# Patient Record
Sex: FEMALE | Race: WHITE | NOT HISPANIC OR LATINO | Employment: OTHER | URBAN - NONMETROPOLITAN AREA
[De-identification: names, ages, dates, MRNs, and addresses within clinical notes are randomized per-mention and may not be internally consistent; named-entity substitution may affect disease eponyms.]

---

## 2017-01-23 ENCOUNTER — OFFICE VISIT (OUTPATIENT)
Dept: CARDIOLOGY | Facility: CLINIC | Age: 72
End: 2017-01-23

## 2017-01-23 VITALS
HEART RATE: 65 BPM | WEIGHT: 165 LBS | BODY MASS INDEX: 30.36 KG/M2 | SYSTOLIC BLOOD PRESSURE: 118 MMHG | HEIGHT: 62 IN | DIASTOLIC BLOOD PRESSURE: 80 MMHG

## 2017-01-23 DIAGNOSIS — I10 BENIGN ESSENTIAL HYPERTENSION: ICD-10-CM

## 2017-01-23 DIAGNOSIS — I25.10 CORONARY ARTERY DISEASE INVOLVING NATIVE CORONARY ARTERY OF NATIVE HEART WITHOUT ANGINA PECTORIS: Primary | ICD-10-CM

## 2017-01-23 DIAGNOSIS — R00.2 PALPITATIONS: ICD-10-CM

## 2017-01-23 DIAGNOSIS — E78.5 HYPERLIPIDEMIA, UNSPECIFIED HYPERLIPIDEMIA TYPE: ICD-10-CM

## 2017-01-23 PROCEDURE — 99213 OFFICE O/P EST LOW 20 MIN: CPT | Performed by: INTERNAL MEDICINE

## 2017-01-23 RX ORDER — UBIDECARENONE 100 MG
100 CAPSULE ORAL DAILY
COMMUNITY

## 2017-01-23 RX ORDER — PYRIDOXINE HCL (VITAMIN B6) 100 MG
TABLET ORAL DAILY
COMMUNITY
End: 2017-06-06

## 2017-01-23 RX ORDER — SODIUM PHOSPHATE,MONO-DIBASIC 19G-7G/118
ENEMA (ML) RECTAL DAILY
COMMUNITY
End: 2017-06-06

## 2017-01-23 NOTE — LETTER
January 23, 2017     Abdiel Juan MD  [address] [facility] [address]  [facility] [address]    Patient: Monica Perea   YOB: 1945   Date of Visit: 1/23/2017       Dear Dr. Yessica MD:    Thank you for referring Monica Perea to me for evaluation. Below are the relevant portions of my assessment and plan of care.    If you have questions, please do not hesitate to call me. I look forward to following Monica along with you.         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    Chief Complaint   Patient presents with   • Coronary Artery Disease   • Hypertension       PROBLEM LIST:   1. Coronary artery disease:  a.  Left heart catheterization, 2009:  Moderate calcification of the left anterior descending territory.  Diffusely diseased proximal and mid left anterior descending with luminal narrowing up to 30%.  Large caliber diagonal 1 with a proximal calcified eccentric  narrowing of luminal up to 50%.  Normal left ventricular systolic function with normal left ventricular end-diastolic pressures.  b. Echocardiogram, 2012:  Ejection fraction 55% to 60%, mild MR and TR, RVSP of 50 mmHg.  c. Cardiolite GXT, October 2015,  within normal limits with an LVEF of 59%.  2. Hypertension.  3. Hyperlipidemia.  a. 12/16: 174/62/82/80  4.  Obstructive sleep apnea - discontinued CPAP use 2 months ago:  a.  Nocturnal oxygen saturation, October 2015, with 92% of the time spent below 95% oxygen saturation with 6.2% spent below 90% oxygen saturation. Reinitiation of CPAP therapy, November 2015.  5. Glaucoma.  6. Gastroesophageal reflux  disease.  7. Osteopenia.  8. Surgical history:  a. Total hysterectomy, 2000.  b. Tubal ligation, 1974.  c. Appendectomy in 1963.    Allergies  Allergies   Allergen Reactions   • Macrobid [Nitrofurantoin Monohyd Macro]    • Nitrofurantoin Hives   • Phenylephrine-Guaifenesin Hives   • Sulfa Antibiotics Hives       Current Medications    Current Outpatient Prescriptions:   •  amLODIPine (NORVASC) 5 MG tablet, Take 1 tablet by mouth Daily., Disp: 90 tablet, Rfl: 3  •  aspirin (ASPIRIN LOW DOSE) 81 MG tablet, Take  by mouth., Disp: , Rfl:   •  calcium-vitamin D (OSCAL-500) 500-200 MG-UNIT per tablet, Take  by mouth 2 (two) times a day., Disp: , Rfl:   •  clobetasol (TEMOVATE) 0.05 % ointment, Apply  topically 2 (Two) Times a Day., Disp: 60 g, Rfl: 2  •  coenzyme Q10 100 MG capsule, Take 100 mg by mouth Daily., Disp: , Rfl:   •  Cranberry 500 MG capsule, Take  by mouth Daily., Disp: , Rfl:   •  glucosamine-chondroitin 500-400 MG capsule capsule, Take  by mouth Daily., Disp: , Rfl:   •  magnesium oxide (MAGOX) 400 (241.3 MG) MG tablet tablet, Take 400 mg by mouth Every Night., Disp: , Rfl:   •  melatonin 5 MG tablet tablet, Take 5 mg by mouth at night as needed., Disp: , Rfl:   •  Omega-3 Fatty Acids (FISH OIL) 1000 MG capsule capsule, Take 1,200 mg by mouth 2 (Two) Times a Day With Meals., Disp: , Rfl:   •  pravastatin (PRAVACHOL) 40 MG tablet, Take 1 tablet by mouth Daily., Disp: 90 tablet, Rfl: 3      History of Present Illness   Patient presents in routine follow-up. Pt denies any chest pain, dyspnea, dyspnea on exertion, orthopnea, PND, or lower extremity edema.  Does not perform routine exercise regimen.  Blood pressures been well controlled.  Cholesterol elevated in her again after discontinuation of pravastatin last year.  Recheck of cholesterol through PCPs office was better controlled back on her pravastatin.  Has had a recurrent episode of her palpitations that we discussed at last office visit.  It was in  "November 2016 when she was traveling to [place] for a women's retreat.  She felt regular tachycardia palpitations that lasted about an hour and made her tired.  She went to bed early at night.  She denies chest discomfort, dyspnea, dizziness, or presyncopal symptoms.  There are no specific alleviating or aggravating factors.  She took an extra aspirin dose before going to bed and they were gone before morning.    ROS:  All systems have been reviewed and are negative with the exception of those mentioned in the HPI.    OBJECTIVE:  Vitals:    01/23/17 1007 01/23/17 1009   BP: 126/84 118/80   BP Location: Right arm Left arm   Patient Position: Sitting Sitting   Pulse: 65    Weight: 165 lb (74.8 kg)    Height: 62\" (157.5 cm)      Physical Exam   Constitutional: She is oriented to person, place, and time. She appears well-developed and well-nourished. No distress.   Neck: Normal range of motion. Neck supple. No hepatojugular reflux and no JVD present. Carotid bruit is not present. No tracheal deviation present. No thyromegaly present.   Cardiovascular: Normal rate, regular rhythm, S1 normal, S2 normal, intact distal pulses and normal pulses.  PMI is not displaced.  Exam reveals no gallop, no distant heart sounds, no friction rub, no midsystolic click and no opening snap.    No murmur heard.  Pulses:       Radial pulses are 2+ on the right side, and 2+ on the left side.        Dorsalis pedis pulses are 2+ on the right side, and 2+ on the left side.        Posterior tibial pulses are 2+ on the right side, and 2+ on the left side.   Pulmonary/Chest: Effort normal and breath sounds normal. She has no wheezes. She has no rales.   Abdominal: Soft. Bowel sounds are normal. She exhibits no mass. There is no tenderness. There is no guarding.   Musculoskeletal: Normal range of motion. She exhibits no edema or tenderness.   Neurological: She is alert and oriented to person, place, and time.   Nursing note and vitals " reviewed.      Diagnostic Data:  Procedures  Lab Results   Component Value Date    CHLPL 174 12/09/2016    TRIG 62 12/09/2016    HDL 82 12/09/2016    LDLDIRECT 78 01/07/2014        ASSESSMENT:   Diagnosis Plan   1. Coronary artery disease involving native coronary artery of native heart without angina pectoris     2. Benign essential hypertension     3. Hyperlipidemia, unspecified hyperlipidemia type     4. Palpitations         PLAN:  1.  No new anginal equivalent.  Continue risk factor modification and medical management.  2.  Well controlled on today's visit.  Continue current regimen.  3.  Improvement in cholesterol since she restarted her pravastatin over the summer 2016.  Continue to monitor yearly lipids.  4.  Stress-induced palpitations with the concerns and anxiety with caring for her elderly mother.  Encouraged to find a stress relieving activities and monitor symptoms.  Will defer Holter evaluation at her request.    Follow-up with us in one year.    Scribed for Elier Diaz MD by NANI Munroe. 1/23/2017  10:50 AM     Abdiel Juan MD, thank you for referring Ms. Perea for evaluation.  I have forwarded my electronically generated recommendations to you for review.  Please do not hesitate to call with any questions.      Elier Diaz MD, [facility]

## 2017-01-23 NOTE — PROGRESS NOTES
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    Chief Complaint   Patient presents with   • Coronary Artery Disease   • Hypertension       PROBLEM LIST:   1. Coronary artery disease:  a.  Left heart catheterization, 2009:  Moderate calcification of the left anterior descending territory.  Diffusely diseased proximal and mid left anterior descending with luminal narrowing up to 30%.  Large caliber diagonal 1 with a proximal calcified eccentric  narrowing of luminal up to 50%.  Normal left ventricular systolic function with normal left ventricular end-diastolic pressures.  b. Echocardiogram, 2012:  Ejection fraction 55% to 60%, mild MR and TR, RVSP of 50 mmHg.  c. Cardiolite GXT, October 2015,  within normal limits with an LVEF of 59%.  2. Hypertension.  3. Hyperlipidemia.  a. 12/16: 174/62/82/80  4.  Obstructive sleep apnea - discontinued CPAP use 2 months ago:  a.  Nocturnal oxygen saturation, October 2015, with 92% of the time spent below 95% oxygen saturation with 6.2% spent below 90% oxygen saturation. Reinitiation of CPAP therapy, November 2015.  5. Glaucoma.  6. Gastroesophageal reflux disease.  7. Osteopenia.  8. Surgical history:  a. Total hysterectomy, 2000.  b. Tubal ligation, 1974.  c. Appendectomy in 1963.    Allergies  Allergies   Allergen Reactions   • Macrobid [Nitrofurantoin Monohyd Macro]    • Nitrofurantoin Hives   • Phenylephrine-Guaifenesin Hives   • Sulfa Antibiotics Hives       Current Medications    Current Outpatient Prescriptions:   •  amLODIPine (NORVASC) 5 MG tablet, Take 1 tablet by mouth Daily., Disp: 90 tablet, Rfl: 3  •  aspirin (ASPIRIN LOW DOSE) 81 MG tablet, Take  by mouth., Disp: , Rfl:   •  calcium-vitamin D (OSCAL-500)  500-200 MG-UNIT per tablet, Take  by mouth 2 (two) times a day., Disp: , Rfl:   •  clobetasol (TEMOVATE) 0.05 % ointment, Apply  topically 2 (Two) Times a Day., Disp: 60 g, Rfl: 2  •  coenzyme Q10 100 MG capsule, Take 100 mg by mouth Daily., Disp: , Rfl:   •  Cranberry 500 MG capsule, Take  by mouth Daily., Disp: , Rfl:   •  glucosamine-chondroitin 500-400 MG capsule capsule, Take  by mouth Daily., Disp: , Rfl:   •  magnesium oxide (MAGOX) 400 (241.3 MG) MG tablet tablet, Take 400 mg by mouth Every Night., Disp: , Rfl:   •  melatonin 5 MG tablet tablet, Take 5 mg by mouth at night as needed., Disp: , Rfl:   •  Omega-3 Fatty Acids (FISH OIL) 1000 MG capsule capsule, Take 1,200 mg by mouth 2 (Two) Times a Day With Meals., Disp: , Rfl:   •  pravastatin (PRAVACHOL) 40 MG tablet, Take 1 tablet by mouth Daily., Disp: 90 tablet, Rfl: 3      History of Present Illness   Patient presents in routine follow-up. Pt denies any chest pain, dyspnea, dyspnea on exertion, orthopnea, PND, or lower extremity edema.  Does not perform routine exercise regimen.  Blood pressures been well controlled.  Cholesterol elevated in her again after discontinuation of pravastatin last year.  Recheck of cholesterol through PCPs office was better controlled back on her pravastatin.  Has had a recurrent episode of her palpitations that we discussed at last office visit.  It was in November 2016 when she was traveling to [place] for a women's retreat.  She felt regular tachycardia palpitations that lasted about an hour and made her tired.  She went to bed early at night.  She denies chest discomfort, dyspnea, dizziness, or presyncopal symptoms.  There are no specific alleviating or aggravating factors.  She took an extra aspirin dose before going to bed and they were gone before morning.    ROS:  All systems have been reviewed and are negative with the exception of those mentioned in the HPI.    OBJECTIVE:  Vitals:    01/23/17 1007 01/23/17 1009  "  BP: 126/84 118/80   BP Location: Right arm Left arm   Patient Position: Sitting Sitting   Pulse: 65    Weight: 165 lb (74.8 kg)    Height: 62\" (157.5 cm)      Physical Exam   Constitutional: She is oriented to person, place, and time. She appears well-developed and well-nourished. No distress.   Neck: Normal range of motion. Neck supple. No hepatojugular reflux and no JVD present. Carotid bruit is not present. No tracheal deviation present. No thyromegaly present.   Cardiovascular: Normal rate, regular rhythm, S1 normal, S2 normal, intact distal pulses and normal pulses.  PMI is not displaced.  Exam reveals no gallop, no distant heart sounds, no friction rub, no midsystolic click and no opening snap.    No murmur heard.  Pulses:       Radial pulses are 2+ on the right side, and 2+ on the left side.        Dorsalis pedis pulses are 2+ on the right side, and 2+ on the left side.        Posterior tibial pulses are 2+ on the right side, and 2+ on the left side.   Pulmonary/Chest: Effort normal and breath sounds normal. She has no wheezes. She has no rales.   Abdominal: Soft. Bowel sounds are normal. She exhibits no mass. There is no tenderness. There is no guarding.   Musculoskeletal: Normal range of motion. She exhibits no edema or tenderness.   Neurological: She is alert and oriented to person, place, and time.   Nursing note and vitals reviewed.      Diagnostic Data:  Procedures  Lab Results   Component Value Date    CHLPL 174 12/09/2016    TRIG 62 12/09/2016    HDL 82 12/09/2016    LDLDIRECT 78 01/07/2014        ASSESSMENT:   Diagnosis Plan   1. Coronary artery disease involving native coronary artery of native heart without angina pectoris     2. Benign essential hypertension     3. Hyperlipidemia, unspecified hyperlipidemia type     4. Palpitations         PLAN:  1.  No new anginal equivalent.  Continue risk factor modification and medical management.  2.  Well controlled on today's visit.  Continue current " regimen.  3.  Improvement in cholesterol since she restarted her pravastatin over the summer 2016.  Continue to monitor yearly lipids.  4.  Stress-induced palpitations with the concerns and anxiety with caring for her elderly mother.  Encouraged to find a stress relieving activities and monitor symptoms.  Will defer Holter evaluation at her request.    Follow-up with us in one year.    Scribed for Elier Diaz MD by NANI Munroe. 1/23/2017  10:50 AM   I, Elier Diaz MD, personally performed the services described in this documentation as scribed by the above named individual in my presence, and it is both accurate and complete.  1/23/2017  11:14 AM    Abdiel Juan MD, thank you for referring Ms. Perea for evaluation.  I have forwarded my electronically generated recommendations to you for review.  Please do not hesitate to call with any questions.      Elier Diaz MD, FACC

## 2017-01-23 NOTE — MR AVS SNAPSHOT
Monica Perea   1/23/2017 10:15 AM   Office Visit    Dept Phone:  [phone]   Encounter #:  [id number]    Provider:  Elier Diaz MD   Department:  [facility] CARDIOLOGY                Your Full Care Plan              Today's Medication Changes          These changes are accurate as of: 1/23/17 10:50 AM.  If you have any questions, ask your nurse or doctor.               Stop taking medication(s)listed here:     Cetirizine HCl 10 MG capsule   Stopped by:  Elier Diaz MD           Niacin (Antihyperlipidemic) 500 MG tablet   Stopped by:  Elier Diaz MD                      Your Updated Medication List          This list is accurate as of: 1/23/17 10:50 AM.  Always use your most recent med list.                amLODIPine 5 MG tablet   Commonly known as:  NORVASC   Take 1 tablet by mouth Daily.       ASPIRIN LOW DOSE 81 MG tablet   Generic drug:  aspirin       calcium-vitamin D 500-200 MG-UNIT per tablet   Commonly known as:  OSCAL-500       clobetasol 0.05 % ointment   Commonly known as:  TEMOVATE   Apply  topically 2 (Two) Times a Day.       coenzyme Q10 100 MG capsule       Cranberry 500 MG capsule       fish oil 1000 MG capsule capsule       glucosamine-chondroitin 500-400 MG capsule capsule       magnesium oxide 400 (241.3 MG) MG tablet tablet   Commonly known as:  MAGOX       melatonin 5 MG tablet tablet       pravastatin 40 MG tablet   Commonly known as:  PRAVACHOL   Take 1 tablet by mouth Daily.               You Were Diagnosed With        Codes Comments    Coronary artery disease involving native coronary artery of native heart without angina pectoris    -  Primary ICD-10-CM: I25.10  ICD-9-CM: 414.01     Benign essential hypertension     ICD-10-CM: I10  ICD-9-CM: 401.1     Hyperlipidemia, unspecified hyperlipidemia type     ICD-10-CM: E78.5  ICD-9-CM: 272.4     Palpitations     ICD-10-CM: R00.2  ICD-9-CM: 785.1       Instructions     "None    Patient Instructions History      Upcoming Appointments     Visit Type Date Time Department    FOLLOW UP 2017 10:15 AM [facility]    SUBSEQUENT MEDICARE WELLNESS 2017 10:30 AM [facility]      [employer] Signup     [employer] allows you to send messages to your doctor, view your test results, renew your prescriptions, schedule appointments, and more. To sign up, go to [employer] and click on the Sign Up Now link in the New User? box. Enter your [employer] Activation Code exactly as it appears below along with the last four digits of your Social Security Number and your Date of Birth () to complete the sign-up process. If you do not sign up before the expiration date, you must request a new code.    [employer] Activation Code: [email or web address]  Expires: 2017 10:48 AM    If you have questions, you can email [email or web address] or call [phone] to talk to our [employer] staff. Remember, [employer] is NOT to be used for urgent needs. For medical emergencies, dial 911.               Other Info from Your Visit           Your Appointments     2017 10:30 AM EDT   Subsequent Medicare Wellness with Abdiel Juan MD   [facility] PRIMARY CARE (--)    [address] [facility] [address]  [facility] [address] [phone]            2018 10:15 AM EST   Follow Up with Elier Diaz MD   [facility] CARDIOLOGY (--)    [address] [facility] [address]  [facility] [address] [phone]           Arrive 15 minutes prior to appointment.              Allergies     Macrobid [Nitrofurantoin Monohyd Macro]      Nitrofurantoin  Hives    Phenylephrine-guaifenesin  Hives    Sulfa Antibiotics  Hives      Reason for Visit     Coronary Artery Disease     Hypertension           Vital Signs     Blood Pressure Pulse Height Weight Body Mass Index Smoking Status    118/80 (BP Location: Left arm, Patient Position: Sitting) 65 62\" " (157.5 cm) 165 lb (74.8 kg) 30.18 kg/m2 Former Smoker      Problems and Diagnoses Noted     Benign essential hypertension    Coronary heart disease    High cholesterol or triglycerides    Palpitations

## 2017-03-21 ENCOUNTER — HOSPITAL ENCOUNTER (OUTPATIENT)
Dept: SLEEP MEDICINE | Facility: HOSPITAL | Age: 72
Discharge: HOME | End: 2017-03-21
Admitting: INTERNAL MEDICINE

## 2017-03-21 DIAGNOSIS — G47.33 OSA (OBSTRUCTIVE SLEEP APNEA): ICD-10-CM

## 2017-03-21 PROCEDURE — 95811 POLYSOM 6/>YRS CPAP 4/> PARM: CPT

## 2017-03-22 VITALS
SYSTOLIC BLOOD PRESSURE: 132 MMHG | WEIGHT: 165 LBS | OXYGEN SATURATION: 95 % | RESPIRATION RATE: 12 BRPM | DIASTOLIC BLOOD PRESSURE: 90 MMHG | HEIGHT: 62 IN | BODY MASS INDEX: 30.36 KG/M2 | HEART RATE: 66 BPM

## 2017-04-05 ENCOUNTER — OFFICE VISIT (OUTPATIENT)
Dept: INTERNAL MEDICINE | Facility: CLINIC | Age: 72
End: 2017-04-05

## 2017-04-05 VITALS
DIASTOLIC BLOOD PRESSURE: 64 MMHG | HEIGHT: 62 IN | TEMPERATURE: 98.3 F | OXYGEN SATURATION: 97 % | BODY MASS INDEX: 30 KG/M2 | RESPIRATION RATE: 14 BRPM | HEART RATE: 60 BPM | WEIGHT: 163 LBS | SYSTOLIC BLOOD PRESSURE: 130 MMHG

## 2017-04-05 DIAGNOSIS — R10.32 LEFT LOWER QUADRANT PAIN: ICD-10-CM

## 2017-04-05 DIAGNOSIS — N39.0 URINARY TRACT INFECTION, SITE UNSPECIFIED: Primary | ICD-10-CM

## 2017-04-05 LAB
BILIRUB BLD-MCNC: NEGATIVE MG/DL
CLARITY, POC: CLEAR
COLOR UR: YELLOW
GLUCOSE UR STRIP-MCNC: NEGATIVE MG/DL
KETONES UR QL: NEGATIVE
LEUKOCYTE EST, POC: NEGATIVE
NITRITE UR-MCNC: NEGATIVE MG/ML
PH UR: 6.5 [PH] (ref 5–8)
PROT UR STRIP-MCNC: NEGATIVE MG/DL
RBC # UR STRIP: NEGATIVE /UL
SP GR UR: 1.01 (ref 1–1.03)
UROBILINOGEN UR QL: NORMAL

## 2017-04-05 PROCEDURE — 99214 OFFICE O/P EST MOD 30 MIN: CPT | Performed by: INTERNAL MEDICINE

## 2017-04-05 PROCEDURE — 81003 URINALYSIS AUTO W/O SCOPE: CPT | Performed by: INTERNAL MEDICINE

## 2017-04-05 RX ORDER — CIPROFLOXACIN 500 MG/1
500 TABLET, FILM COATED ORAL 2 TIMES DAILY
Qty: 14 TABLET | Refills: 0 | Status: SHIPPED | OUTPATIENT
Start: 2017-04-05 | End: 2017-06-06

## 2017-04-05 RX ORDER — TERBINAFINE HYDROCHLORIDE 250 MG/1
TABLET ORAL
COMMUNITY
Start: 2017-03-08 | End: 2017-06-06

## 2017-04-05 NOTE — PROGRESS NOTES
Subjective   Monica Perea is a 72 y.o. female.     Chief Complaint   Patient presents with   • Urinary Tract Infection     possible       Urinary Tract Infection    This is a new problem. The current episode started in the past 7 days. The problem occurs every urination. The problem has been unchanged. The quality of the pain is described as burning. The pain is mild. There has been no fever. Associated symptoms include frequency and urgency. Pertinent negatives include no flank pain, hematuria, nausea or vomiting. Treatments tried: azur. The treatment provided moderate relief.   Abdominal Pain   This is a new problem. The current episode started in the past 7 days. The onset quality is gradual. The problem occurs constantly. The pain is located in the LLQ. The pain is mild. The quality of the pain is a sensation of fullness. The abdominal pain does not radiate. Associated symptoms include dysuria and frequency. Pertinent negatives include no anorexia, constipation, diarrhea, fever, flatus, hematochezia, hematuria, melena, nausea or vomiting. Nothing aggravates the pain. The pain is relieved by nothing. The treatment provided no relief.          Current Outpatient Prescriptions:   •  amLODIPine (NORVASC) 5 MG tablet, Take 1 tablet by mouth Daily., Disp: 90 tablet, Rfl: 3  •  aspirin (ASPIRIN LOW DOSE) 81 MG tablet, Take  by mouth., Disp: , Rfl:   •  calcium-vitamin D (OSCAL-500) 500-200 MG-UNIT per tablet, Take  by mouth 2 (two) times a day., Disp: , Rfl:   •  clobetasol (TEMOVATE) 0.05 % ointment, Apply  topically 2 (Two) Times a Day., Disp: 60 g, Rfl: 2  •  coenzyme Q10 100 MG capsule, Take 100 mg by mouth Daily., Disp: , Rfl:   •  Cranberry 500 MG capsule, Take  by mouth Daily., Disp: , Rfl:   •  glucosamine-chondroitin 500-400 MG capsule capsule, Take  by mouth Daily., Disp: , Rfl:   •  magnesium oxide (MAGOX) 400 (241.3 MG) MG tablet tablet, Take 400 mg by mouth Every Night., Disp: , Rfl:   •   melatonin 5 MG tablet tablet, Take 5 mg by mouth at night as needed., Disp: , Rfl:   •  Omega-3 Fatty Acids (FISH OIL) 1000 MG capsule capsule, Take 1,200 mg by mouth 2 (Two) Times a Day With Meals., Disp: , Rfl:   •  terbinafine (lamiSIL) 250 MG tablet, , Disp: , Rfl:   •  pravastatin (PRAVACHOL) 40 MG tablet, Take 1 tablet by mouth Daily., Disp: 90 tablet, Rfl: 3    The following portions of the patient's history were reviewed and updated as appropriate: allergies, current medications, past family history, past medical history, past social history, past surgical history and problem list.    Review of Systems   Constitutional: Negative.  Negative for fever.   Respiratory: Negative.    Cardiovascular: Negative.    Gastrointestinal: Negative.  Negative for anorexia, constipation, diarrhea, flatus, hematochezia, melena, nausea and vomiting. Abdominal pain: LLQ pain mild.   Genitourinary: Positive for dysuria, frequency and urgency. Negative for flank pain and hematuria.   Skin: Negative.    Psychiatric/Behavioral: Negative.        Objective   Physical Exam   Constitutional: She is oriented to person, place, and time. She appears well-developed and well-nourished.   Neck: Neck supple.   Cardiovascular: Normal rate, regular rhythm and normal heart sounds.    Pulmonary/Chest: Effort normal and breath sounds normal.   Abdominal: Soft. Bowel sounds are normal. Tenderness: mild LLQ tender.   Neurological: She is alert and oriented to person, place, and time.   Psychiatric: She has a normal mood and affect. Her behavior is normal.       All tests have been reviewed.    Assessment/Plan   There are no diagnoses linked to this encounter.         UTI vs interstitial cystitis UA dip normal,   refer back to uro pt to call, do micro  LLQ pain mild tender and pain, hx of divertic, start cipro call if no better

## 2017-04-06 LAB
APPEARANCE UR: ABNORMAL
BILIRUB UR QL STRIP: NEGATIVE
COLOR UR: YELLOW
GLUCOSE UR QL: NEGATIVE
HGB UR QL STRIP: NEGATIVE
KETONES UR QL STRIP: NEGATIVE
LEUKOCYTE ESTERASE UR QL STRIP: NEGATIVE
NITRITE UR QL STRIP: NEGATIVE
PH UR STRIP: 6.5 [PH] (ref 5–8)
PROT UR QL STRIP: NEGATIVE
SP GR UR: 1.01 (ref 1–1.03)
UROBILINOGEN UR STRIP-MCNC: ABNORMAL MG/DL

## 2017-06-06 ENCOUNTER — OFFICE VISIT (OUTPATIENT)
Dept: INTERNAL MEDICINE | Facility: CLINIC | Age: 72
End: 2017-06-06

## 2017-06-06 VITALS
DIASTOLIC BLOOD PRESSURE: 70 MMHG | WEIGHT: 162 LBS | HEART RATE: 56 BPM | BODY MASS INDEX: 29.81 KG/M2 | RESPIRATION RATE: 14 BRPM | OXYGEN SATURATION: 95 % | SYSTOLIC BLOOD PRESSURE: 102 MMHG | HEIGHT: 62 IN | TEMPERATURE: 98.4 F

## 2017-06-06 DIAGNOSIS — E78.5 HYPERLIPIDEMIA, UNSPECIFIED HYPERLIPIDEMIA TYPE: ICD-10-CM

## 2017-06-06 DIAGNOSIS — M79.7 FIBROMYALGIA: ICD-10-CM

## 2017-06-06 DIAGNOSIS — E66.3 OVERWEIGHT: ICD-10-CM

## 2017-06-06 DIAGNOSIS — R00.2 PALPITATIONS: ICD-10-CM

## 2017-06-06 DIAGNOSIS — M85.80 OSTEOPENIA: ICD-10-CM

## 2017-06-06 DIAGNOSIS — K21.9 GASTROESOPHAGEAL REFLUX DISEASE WITHOUT ESOPHAGITIS: ICD-10-CM

## 2017-06-06 DIAGNOSIS — R31.9 HEMATURIA: ICD-10-CM

## 2017-06-06 DIAGNOSIS — N30.10 INTERSTITIAL CYSTITIS: ICD-10-CM

## 2017-06-06 DIAGNOSIS — L40.9 PSORIASIS: ICD-10-CM

## 2017-06-06 DIAGNOSIS — H40.9 GLAUCOMA, UNSPECIFIED GLAUCOMA, UNSPECIFIED LATERALITY: ICD-10-CM

## 2017-06-06 DIAGNOSIS — K59.09 OTHER CONSTIPATION: ICD-10-CM

## 2017-06-06 DIAGNOSIS — I25.10 CORONARY ARTERY DISEASE INVOLVING NATIVE CORONARY ARTERY OF NATIVE HEART WITHOUT ANGINA PECTORIS: ICD-10-CM

## 2017-06-06 DIAGNOSIS — I10 BENIGN ESSENTIAL HYPERTENSION: Primary | ICD-10-CM

## 2017-06-06 DIAGNOSIS — G47.33 OSA (OBSTRUCTIVE SLEEP APNEA): ICD-10-CM

## 2017-06-06 DIAGNOSIS — M54.50 LOW BACK PAIN WITHOUT SCIATICA, UNSPECIFIED BACK PAIN LATERALITY, UNSPECIFIED CHRONICITY: ICD-10-CM

## 2017-06-06 DIAGNOSIS — N95.2 ATROPHIC VAGINITIS: ICD-10-CM

## 2017-06-06 DIAGNOSIS — M19.90 ARTHRITIS: ICD-10-CM

## 2017-06-06 DIAGNOSIS — K57.90 DIVERTICULOSIS OF INTESTINE WITHOUT BLEEDING, UNSPECIFIED INTESTINAL TRACT LOCATION: ICD-10-CM

## 2017-06-06 PROBLEM — R10.32 LEFT LOWER QUADRANT PAIN: Status: RESOLVED | Noted: 2017-04-05 | Resolved: 2017-06-06

## 2017-06-06 PROBLEM — N39.0 URINARY TRACT INFECTION: Status: RESOLVED | Noted: 2017-04-05 | Resolved: 2017-06-06

## 2017-06-06 PROCEDURE — G0439 PPPS, SUBSEQ VISIT: HCPCS | Performed by: INTERNAL MEDICINE

## 2017-06-06 PROCEDURE — 96160 PT-FOCUSED HLTH RISK ASSMT: CPT | Performed by: INTERNAL MEDICINE

## 2017-06-06 PROCEDURE — 99397 PER PM REEVAL EST PAT 65+ YR: CPT | Performed by: INTERNAL MEDICINE

## 2017-06-06 RX ORDER — KETOCONAZOLE 20 MG/G
CREAM TOPICAL DAILY
COMMUNITY
End: 2017-10-12

## 2017-06-06 NOTE — PROGRESS NOTES
QUICK REFERENCE INFORMATION:  The ABCs of the Annual Wellness Visit    Subsequent Medicare Wellness Visit    HEALTH RISK ASSESSMENT    1945    Recent Hospitalizations:  No hospitalization(s) within the last year..        Current Medical Providers:  Patient Care Team:  Abdiel Juan MD as PCP - General  Abdiel Juan MD as PCP - Family Medicine        Smoking Status:  History   Smoking Status   • Former Smoker   • Quit date: 1994   Smokeless Tobacco   • Not on file       Alcohol Consumption:  History   Alcohol Use No     Comment: quit in 1994       Depression Screen:   PHQ-9 Depression Screening 6/6/2017   Little interest or pleasure in doing things 0   Feeling down, depressed, or hopeless 0   Trouble falling or staying asleep, or sleeping too much -   Feeling tired or having little energy -   Poor appetite or overeating -   Feeling bad about yourself - or that you are a failure or have let yourself or your family down -   Trouble concentrating on things, such as reading the newspaper or watching television -   Moving or speaking so slowly that other people could have noticed. Or the opposite - being so fidgety or restless that you have been moving around a lot more than usual -   Thoughts that you would be better off dead, or of hurting yourself in some way -   PHQ-9 Total Score 0   If you checked off any problems, how difficult have these problems made it for you to do your work, take care of things at home, or get along with other people? -       Health Habits and Functional and Cognitive Screening:  Functional & Cognitive Status 6/6/2017   Do you have difficulty preparing food and eating? No   Do you have difficulty bathing yourself? No   Do you have difficulty getting dressed? No   Do you have difficulty using the toilet? No   Do you have difficulty moving around from place to place? No   In the past year have you fallen or experienced a near fall? No   Do you need help using the phone?  No   Are you deaf or  do you have serious difficulty hearing?  No   Do you need help with transportation? No   Do you need help shopping? No   Do you need help preparing meals?  No   Do you need help with housework?  No   Do you need help with laundry? No   Do you need help taking your medications? No   Do you need help managing money? No   Do you have difficulty concentrating, remembering or making decisions? No       Health Habits  Current Diet: Well Balanced Diet  Dental Exam: Up to date  Eye Exam: Up to date  Exercise (times per week): 0 times per week  Current Exercise Activities Include: None      Does the patient have evidence of cognitive impairment? Yes    Aspirin use counseling: Taking ASA appropriately as indicated      Recent Lab Results:  CMP:  Lab Results   Component Value Date    GLU 97 12/09/2016    BUN 12 12/09/2016    CREATININE 0.88 12/09/2016    EGFRIFNONA 67 12/09/2016    EGFRIFAFRI 78 12/09/2016    BCR 14 12/09/2016     (H) 12/09/2016    K 4.2 12/09/2016    CO2 25 12/09/2016    CALCIUM 9.1 12/09/2016    PROTENTOTREF 6.9 12/09/2016    ALBUMIN 4.1 12/09/2016    LABGLOBREF 2.8 12/09/2016    LABIL2 1.5 12/09/2016    BILITOT 0.7 12/09/2016    ALKPHOS 83 12/09/2016    AST 15 12/09/2016    ALT 10 12/09/2016     Lipid Panel:  Lab Results   Component Value Date    CHLPL 174 12/09/2016    TRIG 62 12/09/2016    HDL 82 12/09/2016    VLDL 12 12/09/2016    LDL 80 12/09/2016     HbA1c:       Visual Acuity:  No exam data present    Age-appropriate Screening Schedule:  Refer to the list below for future screening recommendations based on patient's age, sex and/or medical conditions. Orders for these recommended tests are listed in the plan section. The patient has been provided with a written plan.    Health Maintenance   Topic Date Due   • TDAP/TD VACCINES (1 - Tdap) 01/03/1966   • PNEUMOCOCCAL VACCINES (65+ LOW/MEDIUM RISK) (1 of 2 - PCV13) 01/03/2012   • ZOSTER VACCINE  06/03/2016   • COLONOSCOPY  06/06/2016   • DXA SCAN   11/15/2016   • INFLUENZA VACCINE  08/01/2017   • LIPID PANEL  12/09/2017   • MAMMOGRAM  06/06/2018        Subjective   History of Present Illness    Monica Perea is a 72 y.o. female who presents for an Subsequent Wellness Visit.    The following portions of the patient's history were reviewed and updated as appropriate: allergies, current medications, past family history, past medical history, past social history, past surgical history and problem list.    Outpatient Medications Prior to Visit   Medication Sig Dispense Refill   • amLODIPine (NORVASC) 5 MG tablet Take 1 tablet by mouth Daily. 90 tablet 3   • aspirin (ASPIRIN LOW DOSE) 81 MG tablet Take  by mouth.     • calcium-vitamin D (OSCAL-500) 500-200 MG-UNIT per tablet Take  by mouth 2 (two) times a day.     • coenzyme Q10 100 MG capsule Take 100 mg by mouth Daily.     • magnesium oxide (MAGOX) 400 (241.3 MG) MG tablet tablet Take 400 mg by mouth Every Night.     • melatonin 5 MG tablet tablet Take 5 mg by mouth at night as needed.     • Omega-3 Fatty Acids (FISH OIL) 1000 MG capsule capsule Take 1,200 mg by mouth 2 (Two) Times a Day With Meals.     • pravastatin (PRAVACHOL) 40 MG tablet Take 1 tablet by mouth Daily. 90 tablet 3   • ciprofloxacin (CIPRO) 500 MG tablet Take 1 tablet by mouth 2 (Two) Times a Day. 14 tablet 0   • clobetasol (TEMOVATE) 0.05 % ointment Apply  topically 2 (Two) Times a Day. 60 g 2   • Cranberry 500 MG capsule Take  by mouth Daily.     • glucosamine-chondroitin 500-400 MG capsule capsule Take  by mouth Daily.     • terbinafine (lamiSIL) 250 MG tablet        No facility-administered medications prior to visit.        Patient Active Problem List   Diagnosis   • Arthritis   • Benign essential hypertension   • Constipation   • Diverticulosis of intestine   • Gastroesophageal reflux disease   • Glaucoma   • Hematuria   • Hyperlipidemia   • Low back pain   • Osteopenia   • Overweight   • Atrophic vaginitis   • MOON (obstructive  sleep apnea)   • Tinea pedis   • Psoriasis   • Fibromyalgia   • Palpitations   • CAD (coronary artery disease)   • Urinary tract infection   • Left lower quadrant pain       Advance Care Planning:  has an advance directive - a copy HAS NOT been provided    Identification of Risk Factors:  Risk factors include: weight  and unhealthy diet.    Review of Systems   Constitutional: Negative.    HENT: Negative.    Eyes: Negative.    Respiratory: Negative.    Cardiovascular: Negative.    Gastrointestinal: Negative.    Endocrine: Negative.    Genitourinary: Negative.    Musculoskeletal: Negative.    Skin: Negative.    Allergic/Immunologic: Negative.    Neurological: Negative.    Hematological: Negative.    Psychiatric/Behavioral: Negative.        Compared to one year ago, the patient feels her physical health is the same.  Compared to one year ago, the patient feels her mental health is the same.    Objective     Physical Exam   Constitutional: She is oriented to person, place, and time. She appears well-developed and well-nourished.   HENT:   Head: Normocephalic and atraumatic.   Right Ear: External ear normal.   Left Ear: External ear normal.   Nose: Nose normal.   Mouth/Throat: Oropharynx is clear and moist.   Eyes: Conjunctivae and EOM are normal. Pupils are equal, round, and reactive to light.   Neck: Normal range of motion. Neck supple.   Cardiovascular: Normal rate, regular rhythm, normal heart sounds and intact distal pulses.    Pulmonary/Chest: Effort normal and breath sounds normal.   Abdominal: Soft. Bowel sounds are normal.   Musculoskeletal: Normal range of motion.   Neurological: She is alert and oriented to person, place, and time. She has normal reflexes.   Skin: Skin is warm and dry.   Psychiatric: She has a normal mood and affect. Her behavior is normal. Judgment and thought content normal.       Vitals:    06/06/17 1055   BP: 102/70   Pulse: 56   Resp: 14   Temp: 98.4 °F (36.9 °C)   SpO2: 95%   Weight: 162  "lb (73.5 kg)   Height: 62\" (157.5 cm)       Body mass index is 29.63 kg/(m^2).  Discussed the patient's BMI with her. The BMI is above average; BMI management plan is completed.    Assessment/Plan   Patient Self-Management and Personalized Health Advice  The patient has been provided with information about: diet, exercise and weight management and preventive services including:   · Advance directive.    Visit Diagnoses:  No diagnosis found.    No orders of the defined types were placed in this encounter.      Outpatient Encounter Prescriptions as of 6/6/2017   Medication Sig Dispense Refill   • amLODIPine (NORVASC) 5 MG tablet Take 1 tablet by mouth Daily. 90 tablet 3   • aspirin (ASPIRIN LOW DOSE) 81 MG tablet Take  by mouth.     • calcium-vitamin D (OSCAL-500) 500-200 MG-UNIT per tablet Take  by mouth 2 (two) times a day.     • coenzyme Q10 100 MG capsule Take 100 mg by mouth Daily.     • Fexofenadine HCl (ALLERGY 24-HR PO) Take  by mouth.     • ketoconazole (NIZORAL) 2 % cream Apply  topically Daily.     • magnesium oxide (MAGOX) 400 (241.3 MG) MG tablet tablet Take 400 mg by mouth Every Night.     • melatonin 5 MG tablet tablet Take 5 mg by mouth at night as needed.     • Omega-3 Fatty Acids (FISH OIL) 1000 MG capsule capsule Take 1,200 mg by mouth 2 (Two) Times a Day With Meals.     • pravastatin (PRAVACHOL) 40 MG tablet Take 1 tablet by mouth Daily. 90 tablet 3   • Sodium Sulfide 1 % gel Apply  topically.     • [DISCONTINUED] ciprofloxacin (CIPRO) 500 MG tablet Take 1 tablet by mouth 2 (Two) Times a Day. 14 tablet 0   • [DISCONTINUED] clobetasol (TEMOVATE) 0.05 % ointment Apply  topically 2 (Two) Times a Day. 60 g 2   • [DISCONTINUED] Cranberry 500 MG capsule Take  by mouth Daily.     • [DISCONTINUED] glucosamine-chondroitin 500-400 MG capsule capsule Take  by mouth Daily.     • [DISCONTINUED] terbinafine (lamiSIL) 250 MG tablet        No facility-administered encounter medications on file as of 6/6/2017.  "       Reviewed use of high risk medication in the elderly: yes  Reviewed for potential of harmful drug interactions in the elderly: no    Follow Up:  No Follow-up on file.     An After Visit Summary and PPPS with all of these plans were given to the patient.       hypertension, continue medicine  Cad MILD BLOCKAGE 2009 30%. palpitation soa with climbing stairs stress test normal, follow up with cardio, for possible holter  GERD continue omeprazole   Colon 7/18/16: Left-sided diverticulosis. Colon polyps. Internal hemorrhoids. Repeat 5 year  RECENT diverticulitis, resolved after cipro  Hyperlipidemia continue med do lab   Pneumovax 2015, refuse prevnar 13, and zostavax refused  Psoriasis stable without med  Constipation cont otc fiber  Knees pain, OA probable, glucosamine continue   Osteopenia schedule bone density scan 12/2016, --  Foot fungal infection s/p med by derm  Follow-up 3 weeks

## 2017-06-13 ENCOUNTER — RESULTS ENCOUNTER (OUTPATIENT)
Dept: INTERNAL MEDICINE | Facility: CLINIC | Age: 72
End: 2017-06-13

## 2017-06-13 DIAGNOSIS — I10 BENIGN ESSENTIAL HYPERTENSION: ICD-10-CM

## 2017-06-13 DIAGNOSIS — E78.5 HYPERLIPIDEMIA, UNSPECIFIED HYPERLIPIDEMIA TYPE: ICD-10-CM

## 2017-06-13 DIAGNOSIS — I25.10 CORONARY ARTERY DISEASE INVOLVING NATIVE CORONARY ARTERY OF NATIVE HEART WITHOUT ANGINA PECTORIS: ICD-10-CM

## 2017-06-13 DIAGNOSIS — R31.9 HEMATURIA: ICD-10-CM

## 2017-06-14 LAB
ALBUMIN SERPL-MCNC: 4.3 G/DL (ref 3.5–5)
ALBUMIN/GLOB SERPL: 1.5 G/DL (ref 1–2)
ALP SERPL-CCNC: 74 U/L (ref 38–126)
ALT SERPL-CCNC: 30 U/L (ref 13–69)
APPEARANCE UR: CLEAR
AST SERPL-CCNC: 24 U/L (ref 15–46)
BASOPHILS # BLD AUTO: 0.03 10*3/MM3 (ref 0–0.2)
BASOPHILS NFR BLD AUTO: 0.8 % (ref 0–2.5)
BILIRUB SERPL-MCNC: 0.6 MG/DL (ref 0.2–1.3)
BILIRUB UR QL STRIP: NEGATIVE
BUN SERPL-MCNC: 10 MG/DL (ref 7–20)
BUN/CREAT SERPL: 12.5 (ref 7.1–23.5)
CALCIUM SERPL-MCNC: 9.7 MG/DL (ref 8.4–10.2)
CHLORIDE SERPL-SCNC: 103 MMOL/L (ref 98–107)
CHOLEST SERPL-MCNC: 181 MG/DL (ref 0–199)
CK SERPL-CCNC: 35 U/L (ref 30–170)
CO2 SERPL-SCNC: 29 MMOL/L (ref 26–30)
COLOR UR: YELLOW
CREAT SERPL-MCNC: 0.8 MG/DL (ref 0.6–1.3)
EOSINOPHIL # BLD AUTO: 0.22 10*3/MM3 (ref 0–0.7)
EOSINOPHIL NFR BLD AUTO: 5.8 % (ref 0–7)
ERYTHROCYTE [DISTWIDTH] IN BLOOD BY AUTOMATED COUNT: 12.5 % (ref 11.5–14.5)
GLOBULIN SER CALC-MCNC: 2.9 GM/DL
GLUCOSE SERPL-MCNC: 95 MG/DL (ref 74–98)
GLUCOSE UR QL: NEGATIVE
HCT VFR BLD AUTO: 40 % (ref 37–47)
HDLC SERPL-MCNC: 67 MG/DL (ref 40–60)
HGB BLD-MCNC: 12.9 G/DL (ref 12–16)
HGB UR QL STRIP: NEGATIVE
IMM GRANULOCYTES # BLD: 0 10*3/MM3 (ref 0–0.06)
IMM GRANULOCYTES NFR BLD: 0 % (ref 0–0.6)
KETONES UR QL STRIP: NEGATIVE
LDLC SERPL CALC-MCNC: 92 MG/DL (ref 0–99)
LEUKOCYTE ESTERASE UR QL STRIP: NEGATIVE
LYMPHOCYTES # BLD AUTO: 1.53 10*3/MM3 (ref 0.6–3.4)
LYMPHOCYTES NFR BLD AUTO: 40.3 % (ref 10–50)
MCH RBC QN AUTO: 30.7 PG (ref 27–31)
MCHC RBC AUTO-ENTMCNC: 32.3 G/DL (ref 30–37)
MCV RBC AUTO: 95.2 FL (ref 81–99)
MONOCYTES # BLD AUTO: 0.44 10*3/MM3 (ref 0–0.9)
MONOCYTES NFR BLD AUTO: 11.6 % (ref 0–12)
NEUTROPHILS # BLD AUTO: 1.58 10*3/MM3 (ref 2–6.9)
NEUTROPHILS NFR BLD AUTO: 41.5 % (ref 37–80)
NITRITE UR QL STRIP: NEGATIVE
NRBC BLD AUTO-RTO: 0 /100 WBC (ref 0–0)
PH UR STRIP: 7 [PH] (ref 5–8)
PLATELET # BLD AUTO: 198 10*3/MM3 (ref 130–400)
POTASSIUM SERPL-SCNC: 4.5 MMOL/L (ref 3.5–5.1)
PROT SERPL-MCNC: 7.2 G/DL (ref 6.3–8.2)
PROT UR QL STRIP: NEGATIVE
RBC # BLD AUTO: 4.2 10*6/MM3 (ref 4.2–5.4)
SODIUM SERPL-SCNC: 143 MMOL/L (ref 137–145)
SP GR UR: 1.01 (ref 1–1.03)
TRIGL SERPL-MCNC: 111 MG/DL
TSH SERPL DL<=0.005 MIU/L-ACNC: 1.17 MIU/ML (ref 0.47–4.68)
UROBILINOGEN UR STRIP-MCNC: NORMAL MG/DL
VLDLC SERPL CALC-MCNC: 22.2 MG/DL
WBC # BLD AUTO: 3.8 10*3/MM3 (ref 4.8–10.8)

## 2017-07-10 ENCOUNTER — TRANSCRIBE ORDERS (OUTPATIENT)
Dept: INTERNAL MEDICINE | Facility: CLINIC | Age: 72
End: 2017-07-10

## 2017-07-10 DIAGNOSIS — Z12.31 VISIT FOR SCREENING MAMMOGRAM: Primary | ICD-10-CM

## 2017-07-19 ENCOUNTER — HOSPITAL ENCOUNTER (OUTPATIENT)
Dept: MAMMOGRAPHY | Facility: HOSPITAL | Age: 72
Discharge: HOME | End: 2017-07-19
Attending: INTERNAL MEDICINE | Admitting: INTERNAL MEDICINE

## 2017-07-19 DIAGNOSIS — Z12.31 VISIT FOR SCREENING MAMMOGRAM: ICD-10-CM

## 2017-07-19 PROCEDURE — G0202 SCR MAMMO BI INCL CAD: HCPCS | Performed by: RADIOLOGY

## 2017-07-19 PROCEDURE — 77063 BREAST TOMOSYNTHESIS BI: CPT | Performed by: RADIOLOGY

## 2017-07-19 PROCEDURE — G0202 SCR MAMMO BI INCL CAD: HCPCS

## 2017-07-19 PROCEDURE — 77063 BREAST TOMOSYNTHESIS BI: CPT

## 2017-08-18 NOTE — PROGRESS NOTES
72 years female with Hx OSAH, REM dependent with Hx with mask interface problems, was reason to have this PSG-Split night study done. Recent bout of respiratory failure.     Impression: 1. OSAH.                       2. Nocturnal Alveolar Hypoventilation with COPD.                       3. Hypertension.     Plan: Split night PSG

## 2017-08-24 ENCOUNTER — TELEPHONE (OUTPATIENT)
Dept: CARDIOLOGY | Facility: CLINIC | Age: 72
End: 2017-08-24

## 2017-08-25 DIAGNOSIS — R00.2 PALPITATIONS: Primary | ICD-10-CM

## 2017-08-25 NOTE — TELEPHONE ENCOUNTER
Patient aware and verbally understands. Message sent to scheduling for appt and order placed for ZIO patch.

## 2017-09-22 ENCOUNTER — TELEPHONE (OUTPATIENT)
Dept: CARDIOLOGY | Facility: CLINIC | Age: 72
End: 2017-09-22

## 2017-09-22 NOTE — TELEPHONE ENCOUNTER
Patient called and stated that she wanted to make sure her holter monitor was back and if not would like to move appt out. Informed her the monitor has not been received yet and I will let scheduling know and they will call and move appt out to ensure results are back.

## 2017-10-12 ENCOUNTER — OFFICE VISIT (OUTPATIENT)
Dept: CARDIOLOGY | Facility: CLINIC | Age: 72
End: 2017-10-12

## 2017-10-12 VITALS
BODY MASS INDEX: 29.08 KG/M2 | HEIGHT: 62 IN | DIASTOLIC BLOOD PRESSURE: 80 MMHG | SYSTOLIC BLOOD PRESSURE: 120 MMHG | OXYGEN SATURATION: 94 % | WEIGHT: 158 LBS | HEART RATE: 67 BPM

## 2017-10-12 DIAGNOSIS — I25.10 CORONARY ARTERY DISEASE INVOLVING NATIVE CORONARY ARTERY OF NATIVE HEART WITHOUT ANGINA PECTORIS: Primary | ICD-10-CM

## 2017-10-12 DIAGNOSIS — I10 ESSENTIAL HYPERTENSION: ICD-10-CM

## 2017-10-12 DIAGNOSIS — E78.2 MIXED HYPERLIPIDEMIA: ICD-10-CM

## 2017-10-12 DIAGNOSIS — R00.2 PALPITATIONS: ICD-10-CM

## 2017-10-12 PROCEDURE — 99214 OFFICE O/P EST MOD 30 MIN: CPT | Performed by: INTERNAL MEDICINE

## 2017-10-12 RX ORDER — CETIRIZINE HYDROCHLORIDE 10 MG/1
10 TABLET ORAL DAILY
COMMUNITY
End: 2019-03-25

## 2017-10-12 NOTE — PROGRESS NOTES
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    Chief Complaint   Patient presents with   • Coronary Artery Disease   • Shortness of Breath   • Fatigue       PROBLEM LIST:   1. Coronary artery disease:  a.  Left heart catheterization, 2009:  Moderate calcification of the left anterior descending territory.  Diffusely diseased proximal and mid left anterior descending with luminal narrowing up to 30%.  Large caliber diagonal 1 with a proximal calcified eccentric  narrowing of luminal up to 50%.  Normal left ventricular systolic function with normal left ventricular end-diastolic pressures.  b. Echocardiogram, 2012:  Ejection fraction 55% to 60%, mild MR and TR, RVSP of 50 mmHg.  c. Cardiolite GXT, October 2015,  within normal limits with an LVEF of 59%.  2. Hypertension.  9/17 Zio less than 1% PAC /PVC with normal circadian change  3. Hyperlipidemia.  a. 12/16: 174/62/82/80  4.  Obstructive sleep apnea - discontinued CPAP use 2 months ago:  a.  Nocturnal oxygen saturation, October 2015, with 92% of the time spent below 95% oxygen saturation with 6.2% spent below 90% oxygen saturation. Reinitiation of CPAP therapy, November 2015.  5. Glaucoma.  6. Gastroesophageal reflux disease.  7. Osteopenia.  8. Surgical history:  a. Total hysterectomy, 2000.  b. Tubal ligation, 1974.  c. Appendectomy in 1963.    Allergies  Allergies   Allergen Reactions   • Macrobid [Nitrofurantoin Monohyd Macro]    • Nitrofurantoin Hives   • Phenylephrine-Guaifenesin Hives   • Sulfa Antibiotics Hives       Current Medications    Current Outpatient Prescriptions:   •  amLODIPine (NORVASC) 5 MG tablet, Take 1 tablet by mouth Daily., Disp: 90 tablet, Rfl: 3  •  aspirin (ASPIRIN LOW DOSE) 81  "MG tablet, Take  by mouth Daily., Disp: , Rfl:   •  Calcium Carbonate-Vitamin D (CALCIUM PLUS VITAMIN D PO), Take  by mouth 2 (Two) Times a Day., Disp: , Rfl:   •  cetirizine (zyrTEC) 10 MG tablet, Take 10 mg by mouth Daily., Disp: , Rfl:   •  coenzyme Q10 100 MG capsule, Take 100 mg by mouth Daily., Disp: , Rfl:   •  magnesium oxide (MAGOX) 400 (241.3 MG) MG tablet tablet, Take 250 mg by mouth 2 (Two) Times a Day., Disp: , Rfl:   •  Omega-3 Fatty Acids (FISH OIL) 1000 MG capsule capsule, Take 1,200 mg by mouth 2 (Two) Times a Day With Meals., Disp: , Rfl:   •  pravastatin (PRAVACHOL) 40 MG tablet, Take 1 tablet by mouth Daily., Disp: 90 tablet, Rfl: 3      History of Present Illness   Patient presents in routine follow-up. Pt denies any chest pain, dyspnea, dyspnea on exertion, orthopnea, PND, or lower extremity edema.  Losing some weight a low carbohydrate diet.  Continuing to enjoy quilting and scheduled to go on a women's or treat this fall to [place]    ROS:  All systems have been reviewed and are negative with the exception of those mentioned in the HPI.    OBJECTIVE:  Vitals:    10/12/17 1326   BP: 120/80   BP Location: Right arm   Patient Position: Sitting   Pulse: 67   SpO2: 94%   Weight: 158 lb (71.7 kg)   Height: 62\" (157.5 cm)     Physical Exam   Constitutional: She is oriented to person, place, and time. She appears well-developed and well-nourished. No distress.   Neck: Normal range of motion. Neck supple. No hepatojugular reflux and no JVD present. Carotid bruit is not present. No tracheal deviation present. No thyromegaly present.   Cardiovascular: Normal rate, regular rhythm, S1 normal, S2 normal, intact distal pulses and normal pulses.  PMI is not displaced.  Exam reveals no gallop, no distant heart sounds, no friction rub, no midsystolic click and no opening snap.    No murmur heard.  Pulses:       Radial pulses are 2+ on the right side, and 2+ on the left side.        Dorsalis pedis pulses are " 2+ on the right side, and 2+ on the left side.        Posterior tibial pulses are 2+ on the right side, and 2+ on the left side.   Pulmonary/Chest: Effort normal and breath sounds normal. She has no wheezes. She has no rales.   Abdominal: Soft. Bowel sounds are normal. She exhibits no mass. There is no tenderness. There is no guarding.   Musculoskeletal: Normal range of motion. She exhibits no edema or tenderness.   Neurological: She is alert and oriented to person, place, and time.   Nursing note and vitals reviewed.      Diagnostic Data:  Procedures  Lab Results   Component Value Date    CHLPL 181 06/14/2017    TRIG 111 06/14/2017    HDL 67 (H) 06/14/2017    LDLDIRECT 78 01/07/2014        ASSESSMENT:   Diagnosis Plan   1. Coronary artery disease involving native coronary artery of native heart without angina pectoris     2. Essential hypertension     3. Mixed hyperlipidemia     4. Palpitations         PLAN:  1.  No new anginal equivalent.  Continue risk factor modification and medical management.  2.  Well controlled on today's visit.  Continue current regimen.  3.  Improvement in cholesterol since she restarted her pravastatin over the summer 2016.  Continue to monitor yearly lipids.  4.  Stress-induced palpitations with the concerns and anxiety with caring for her elderly mother.  Encouraged to find a stress relieving activities and monitor symptoms.    Follow-up with us in one year.

## 2017-12-26 RX ORDER — PRAVASTATIN SODIUM 40 MG
TABLET ORAL
Qty: 90 TABLET | Refills: 3 | Status: SHIPPED | OUTPATIENT
Start: 2017-12-26 | End: 2018-05-17 | Stop reason: SDUPTHER

## 2017-12-26 RX ORDER — AMLODIPINE BESYLATE 5 MG/1
TABLET ORAL
Qty: 90 TABLET | Refills: 3 | Status: SHIPPED | OUTPATIENT
Start: 2017-12-26 | End: 2018-05-17 | Stop reason: SDUPTHER

## 2018-05-17 ENCOUNTER — OFFICE VISIT (OUTPATIENT)
Dept: INTERNAL MEDICINE | Facility: CLINIC | Age: 73
End: 2018-05-17

## 2018-05-17 VITALS
HEART RATE: 66 BPM | TEMPERATURE: 98.6 F | RESPIRATION RATE: 12 BRPM | HEIGHT: 62 IN | WEIGHT: 161 LBS | SYSTOLIC BLOOD PRESSURE: 122 MMHG | DIASTOLIC BLOOD PRESSURE: 78 MMHG | BODY MASS INDEX: 29.63 KG/M2 | OXYGEN SATURATION: 99 %

## 2018-05-17 DIAGNOSIS — E66.3 OVERWEIGHT: ICD-10-CM

## 2018-05-17 DIAGNOSIS — I25.10 CORONARY ARTERY DISEASE INVOLVING NATIVE CORONARY ARTERY OF NATIVE HEART WITHOUT ANGINA PECTORIS: ICD-10-CM

## 2018-05-17 DIAGNOSIS — R31.9 URINARY TRACT INFECTION WITH HEMATURIA, SITE UNSPECIFIED: ICD-10-CM

## 2018-05-17 DIAGNOSIS — K21.9 GASTROESOPHAGEAL REFLUX DISEASE WITHOUT ESOPHAGITIS: ICD-10-CM

## 2018-05-17 DIAGNOSIS — M94.9 DISORDER OF CARTILAGE: ICD-10-CM

## 2018-05-17 DIAGNOSIS — E78.5 HYPERLIPIDEMIA, UNSPECIFIED HYPERLIPIDEMIA TYPE: ICD-10-CM

## 2018-05-17 DIAGNOSIS — N39.0 URINARY TRACT INFECTION WITH HEMATURIA, SITE UNSPECIFIED: ICD-10-CM

## 2018-05-17 DIAGNOSIS — R39.9 URINARY SYMPTOM OR SIGN: Primary | ICD-10-CM

## 2018-05-17 DIAGNOSIS — I10 BENIGN ESSENTIAL HYPERTENSION: ICD-10-CM

## 2018-05-17 DIAGNOSIS — N30.10 INTERSTITIAL CYSTITIS: ICD-10-CM

## 2018-05-17 DIAGNOSIS — Z12.11 ENCOUNTER FOR SCREENING FOR MALIGNANT NEOPLASM OF COLON: ICD-10-CM

## 2018-05-17 LAB
APPEARANCE UR: ABNORMAL
BACTERIA #/AREA URNS HPF: ABNORMAL /HPF
BILIRUB BLD-MCNC: ABNORMAL MG/DL
BILIRUB UR QL STRIP: NEGATIVE
CLARITY, POC: ABNORMAL
COLOR UR: YELLOW
COLOR UR: YELLOW
EPI CELLS #/AREA URNS HPF: ABNORMAL /HPF
GLUCOSE UR QL: NEGATIVE
GLUCOSE UR STRIP-MCNC: NEGATIVE MG/DL
HGB UR QL STRIP: ABNORMAL
KETONES UR QL STRIP: NEGATIVE
KETONES UR QL: NEGATIVE
LEUKOCYTE EST, POC: ABNORMAL
LEUKOCYTE ESTERASE UR QL STRIP: ABNORMAL
NITRITE UR QL STRIP: POSITIVE
NITRITE UR-MCNC: NEGATIVE MG/ML
PH UR STRIP: 7 [PH] (ref 5–8)
PH UR: 6.5 [PH] (ref 5–8)
PROT UR QL STRIP: NEGATIVE
PROT UR STRIP-MCNC: ABNORMAL MG/DL
RBC # UR STRIP: ABNORMAL /UL
RBC #/AREA URNS HPF: ABNORMAL /HPF
SP GR UR: 1.01 (ref 1–1.03)
SP GR UR: 1.02 (ref 1–1.03)
UROBILINOGEN UR QL: ABNORMAL
UROBILINOGEN UR STRIP-MCNC: ABNORMAL MG/DL
WBC #/AREA URNS HPF: ABNORMAL /HPF

## 2018-05-17 PROCEDURE — 99214 OFFICE O/P EST MOD 30 MIN: CPT | Performed by: INTERNAL MEDICINE

## 2018-05-17 PROCEDURE — 81003 URINALYSIS AUTO W/O SCOPE: CPT | Performed by: INTERNAL MEDICINE

## 2018-05-17 RX ORDER — CLOBETASOL PROPIONATE 0.5 MG/G
OINTMENT TOPICAL EVERY 12 HOURS SCHEDULED
Qty: 60 G | Refills: 3 | Status: SHIPPED | OUTPATIENT
Start: 2018-05-17 | End: 2019-03-25 | Stop reason: SDUPTHER

## 2018-05-17 RX ORDER — CEPHALEXIN 500 MG/1
500 CAPSULE ORAL 3 TIMES DAILY
Qty: 21 CAPSULE | Refills: 0 | Status: SHIPPED | OUTPATIENT
Start: 2018-05-17 | End: 2018-06-14

## 2018-05-17 RX ORDER — PRAVASTATIN SODIUM 40 MG
40 TABLET ORAL DAILY
Qty: 90 TABLET | Refills: 3 | Status: SHIPPED | OUTPATIENT
Start: 2018-05-17 | End: 2019-04-16 | Stop reason: SDUPTHER

## 2018-05-17 RX ORDER — AMLODIPINE BESYLATE 5 MG/1
5 TABLET ORAL DAILY
Qty: 90 TABLET | Refills: 3 | Status: SHIPPED | OUTPATIENT
Start: 2018-05-17 | End: 2018-10-22

## 2018-05-17 NOTE — PROGRESS NOTES
Subjective   Monica Perea is a 73 y.o. female.     No chief complaint on file.      History of Present Illness   Patient here for follow-up.  Hypertension stable on medication.  Hyperlipidemia stable medication.  GERD stable medication.  UTI stable.  After medication.  Psoriasis and patient needs medicine refill.  Knee joint pain patient is taking glucosamine helps some.    Current Outpatient Prescriptions:   •  amLODIPine (NORVASC) 5 MG tablet, Take 1 tablet by mouth Daily., Disp: 90 tablet, Rfl: 3  •  aspirin (ASPIRIN LOW DOSE) 81 MG tablet, Take  by mouth Daily., Disp: , Rfl:   •  Calcium Carbonate-Vitamin D (CALCIUM PLUS VITAMIN D PO), Take  by mouth 2 (Two) Times a Day., Disp: , Rfl:   •  cetirizine (zyrTEC) 10 MG tablet, Take 10 mg by mouth Daily., Disp: , Rfl:   •  coenzyme Q10 100 MG capsule, Take 100 mg by mouth Daily., Disp: , Rfl:   •  magnesium oxide (MAGOX) 400 (241.3 MG) MG tablet tablet, Take 250 mg by mouth 2 (Two) Times a Day., Disp: , Rfl:   •  Omega-3 Fatty Acids (FISH OIL) 1000 MG capsule capsule, Take 1,200 mg by mouth 2 (Two) Times a Day With Meals., Disp: , Rfl:   •  pravastatin (PRAVACHOL) 40 MG tablet, Take 1 tablet by mouth Daily. as directed, Disp: 90 tablet, Rfl: 3  •  TURMERIC PO, Take  by mouth., Disp: , Rfl:   •  Ubiquinol 100 MG capsule, Take  by mouth., Disp: , Rfl:   •  cephalexin (KEFLEX) 500 MG capsule, Take 1 capsule by mouth 3 (Three) Times a Day., Disp: 21 capsule, Rfl: 0  •  clobetasol (TEMOVATE) 0.05 % ointment, Apply  topically Every 12 (Twelve) Hours., Disp: 60 g, Rfl: 3    The following portions of the patient's history were reviewed and updated as appropriate: allergies, current medications, past family history, past medical history, past social history, past surgical history and problem list.    Review of Systems   Constitutional: Negative.    Respiratory: Negative.    Cardiovascular: Negative.    Gastrointestinal: Negative.    Musculoskeletal: Negative.     Skin: Negative.    Neurological: Negative.    Psychiatric/Behavioral: Negative.        Objective   Physical Exam   Constitutional: She is oriented to person, place, and time. She appears well-nourished.   Neck: Neck supple.   Cardiovascular: Normal rate, regular rhythm and normal heart sounds.    Pulmonary/Chest: Effort normal and breath sounds normal.   Abdominal: Bowel sounds are normal.   Neurological: She is alert and oriented to person, place, and time.   Skin: Skin is warm.   Psychiatric: She has a normal mood and affect.       All tests have been reviewed.    Assessment/Plan   Diagnoses and all orders for this visit:    Urinary symptom or sign  -     POC Urinalysis Dipstick, Automated  -     CBC & Differential  -     CK  -     Comprehensive Metabolic Panel  -     Lipid Panel  -     Hepatitis C Antibody  -     TSH  -     Urinalysis With / Microscopic If Indicated - Urine, Clean Catch  -     Vitamin D 25 Hydroxy  -     Cologuard - Stool, Per Rectum; Future    Benign essential hypertension  -     CBC & Differential  -     CK  -     Comprehensive Metabolic Panel  -     Lipid Panel  -     Hepatitis C Antibody  -     TSH  -     Urinalysis With / Microscopic If Indicated - Urine, Clean Catch  -     Vitamin D 25 Hydroxy  -     Cologuard - Stool, Per Rectum; Future    Hyperlipidemia, unspecified hyperlipidemia type  -     CBC & Differential  -     CK  -     Comprehensive Metabolic Panel  -     Lipid Panel  -     Hepatitis C Antibody  -     TSH  -     Urinalysis With / Microscopic If Indicated - Urine, Clean Catch  -     Vitamin D 25 Hydroxy  -     Cologuard - Stool, Per Rectum; Future    Coronary artery disease involving native coronary artery of native heart without angina pectoris  -     CBC & Differential  -     CK  -     Comprehensive Metabolic Panel  -     Lipid Panel  -     Hepatitis C Antibody  -     TSH  -     Urinalysis With / Microscopic If Indicated - Urine, Clean Catch  -     Vitamin D 25 Hydroxy  -      Cologuard - Stool, Per Rectum; Future    Gastroesophageal reflux disease without esophagitis  -     CBC & Differential  -     CK  -     Comprehensive Metabolic Panel  -     Lipid Panel  -     Hepatitis C Antibody  -     TSH  -     Urinalysis With / Microscopic If Indicated - Urine, Clean Catch  -     Vitamin D 25 Hydroxy  -     Cologuard - Stool, Per Rectum; Future    Interstitial cystitis    Overweight    Urinary tract infection with hematuria, site unspecified  -     CBC & Differential  -     CK  -     Comprehensive Metabolic Panel  -     Lipid Panel  -     Hepatitis C Antibody  -     TSH  -     Urinalysis With / Microscopic If Indicated - Urine, Clean Catch  -     Vitamin D 25 Hydroxy  -     Cologuard - Stool, Per Rectum; Future    Disorder of cartilage   -     Vitamin D 25 Hydroxy    Encounter for screening for malignant neoplasm of colon   -     Cologuard - Stool, Per Rectum; Future    Other orders  -     Ubiquinol 100 MG capsule; Take  by mouth.  -     TURMERIC PO; Take  by mouth.  -     amLODIPine (NORVASC) 5 MG tablet; Take 1 tablet by mouth Daily.  -     pravastatin (PRAVACHOL) 40 MG tablet; Take 1 tablet by mouth Daily. as directed  -     cephalexin (KEFLEX) 500 MG capsule; Take 1 capsule by mouth 3 (Three) Times a Day.  -     clobetasol (TEMOVATE) 0.05 % ointment; Apply  topically Every 12 (Twelve) Hours.               hypertension, continue medicine  UTI start med  Cad MILD BLOCKAGE 2009 30%. palpitation soa with climbing stairs stress test normal, follow up with cardio,   GERD continue omeprazole   Colon 7/18/16: Left-sided diverticulosis. Colon polyps. Internal hemorrhoids. Repeat 5 year   divertic hx  Hyperlipidemia continue med    Pneumovax 2015, refuse prevnar 13, and zostavax refused  Psoriasis refill clobetasole   Constipation cont otc fiber  Knees pain, OA probable, glucosamine continue   Osteopenia due bone density   Foot fungal infection s/p med by derm  Follow-up 4 weeks wellness after labs

## 2018-05-18 LAB
25(OH)D3+25(OH)D2 SERPL-MCNC: 53.3 NG/ML
ALBUMIN SERPL-MCNC: 4 G/DL (ref 3.5–5)
ALBUMIN/GLOB SERPL: 1.3 G/DL (ref 1–2)
ALP SERPL-CCNC: 96 U/L (ref 38–126)
ALT SERPL-CCNC: 23 U/L (ref 13–69)
AST SERPL-CCNC: 21 U/L (ref 15–46)
BASOPHILS # BLD AUTO: 0.06 10*3/MM3 (ref 0–0.2)
BASOPHILS NFR BLD AUTO: 1 % (ref 0–2.5)
BILIRUB SERPL-MCNC: 0.6 MG/DL (ref 0.2–1.3)
BUN SERPL-MCNC: 14 MG/DL (ref 7–20)
BUN/CREAT SERPL: 20 (ref 7.1–23.5)
CALCIUM SERPL-MCNC: 9.5 MG/DL (ref 8.4–10.2)
CHLORIDE SERPL-SCNC: 106 MMOL/L (ref 98–107)
CHOLEST SERPL-MCNC: 171 MG/DL (ref 0–199)
CK SERPL-CCNC: 105 U/L (ref 30–170)
CO2 SERPL-SCNC: 28 MMOL/L (ref 26–30)
CREAT SERPL-MCNC: 0.7 MG/DL (ref 0.6–1.3)
EOSINOPHIL # BLD AUTO: 0.24 10*3/MM3 (ref 0–0.7)
EOSINOPHIL NFR BLD AUTO: 3.9 % (ref 0–7)
ERYTHROCYTE [DISTWIDTH] IN BLOOD BY AUTOMATED COUNT: 12.5 % (ref 11.5–14.5)
GFR SERPLBLD CREATININE-BSD FMLA CKD-EPI: 100 ML/MIN/1.73
GFR SERPLBLD CREATININE-BSD FMLA CKD-EPI: 82 ML/MIN/1.73
GLOBULIN SER CALC-MCNC: 3.2 GM/DL
GLUCOSE SERPL-MCNC: 98 MG/DL (ref 74–98)
HCT VFR BLD AUTO: 38.8 % (ref 37–47)
HCV AB S/CO SERPL IA: <0.1 S/CO RATIO (ref 0–0.9)
HDLC SERPL-MCNC: 66 MG/DL (ref 40–60)
HGB BLD-MCNC: 12.6 G/DL (ref 12–16)
IMM GRANULOCYTES # BLD: 0.02 10*3/MM3 (ref 0–0.06)
IMM GRANULOCYTES NFR BLD: 0.3 % (ref 0–0.6)
LDLC SERPL CALC-MCNC: 90 MG/DL (ref 0–99)
LYMPHOCYTES # BLD AUTO: 1.87 10*3/MM3 (ref 0.6–3.4)
LYMPHOCYTES NFR BLD AUTO: 30.5 % (ref 10–50)
MCH RBC QN AUTO: 30.6 PG (ref 27–31)
MCHC RBC AUTO-ENTMCNC: 32.5 G/DL (ref 30–37)
MCV RBC AUTO: 94.2 FL (ref 81–99)
MONOCYTES # BLD AUTO: 0.56 10*3/MM3 (ref 0–0.9)
MONOCYTES NFR BLD AUTO: 9.1 % (ref 0–12)
NEUTROPHILS # BLD AUTO: 3.38 10*3/MM3 (ref 2–6.9)
NEUTROPHILS NFR BLD AUTO: 55.2 % (ref 37–80)
NRBC BLD AUTO-RTO: 0 /100 WBC (ref 0–0)
PLATELET # BLD AUTO: 214 10*3/MM3 (ref 130–400)
POTASSIUM SERPL-SCNC: 4 MMOL/L (ref 3.5–5.1)
PROT SERPL-MCNC: 7.2 G/DL (ref 6.3–8.2)
RBC # BLD AUTO: 4.12 10*6/MM3 (ref 4.2–5.4)
SODIUM SERPL-SCNC: 145 MMOL/L (ref 137–145)
TRIGL SERPL-MCNC: 75 MG/DL
TSH SERPL DL<=0.005 MIU/L-ACNC: 1.17 MIU/ML (ref 0.47–4.68)
VLDLC SERPL CALC-MCNC: 15 MG/DL
WBC # BLD AUTO: 6.13 10*3/MM3 (ref 4.8–10.8)

## 2018-06-04 DIAGNOSIS — I10 BENIGN ESSENTIAL HYPERTENSION: ICD-10-CM

## 2018-06-04 DIAGNOSIS — N39.0 URINARY TRACT INFECTION WITH HEMATURIA, SITE UNSPECIFIED: ICD-10-CM

## 2018-06-04 DIAGNOSIS — E78.5 HYPERLIPIDEMIA, UNSPECIFIED HYPERLIPIDEMIA TYPE: ICD-10-CM

## 2018-06-04 DIAGNOSIS — K21.9 GASTROESOPHAGEAL REFLUX DISEASE WITHOUT ESOPHAGITIS: ICD-10-CM

## 2018-06-04 DIAGNOSIS — R31.9 URINARY TRACT INFECTION WITH HEMATURIA, SITE UNSPECIFIED: ICD-10-CM

## 2018-06-04 DIAGNOSIS — R39.9 URINARY SYMPTOM OR SIGN: ICD-10-CM

## 2018-06-04 DIAGNOSIS — I25.10 CORONARY ARTERY DISEASE INVOLVING NATIVE CORONARY ARTERY OF NATIVE HEART WITHOUT ANGINA PECTORIS: ICD-10-CM

## 2018-06-04 DIAGNOSIS — Z12.11 ENCOUNTER FOR SCREENING FOR MALIGNANT NEOPLASM OF COLON: ICD-10-CM

## 2018-06-14 ENCOUNTER — OFFICE VISIT (OUTPATIENT)
Dept: INTERNAL MEDICINE | Facility: CLINIC | Age: 73
End: 2018-06-14

## 2018-06-14 DIAGNOSIS — N39.0 URINARY TRACT INFECTION WITH HEMATURIA, SITE UNSPECIFIED: ICD-10-CM

## 2018-06-14 DIAGNOSIS — L40.9 PSORIASIS: ICD-10-CM

## 2018-06-14 DIAGNOSIS — R00.2 PALPITATIONS: ICD-10-CM

## 2018-06-14 DIAGNOSIS — N95.2 ATROPHIC VAGINITIS: ICD-10-CM

## 2018-06-14 DIAGNOSIS — K57.90 DIVERTICULOSIS OF INTESTINE WITHOUT BLEEDING, UNSPECIFIED INTESTINAL TRACT LOCATION: ICD-10-CM

## 2018-06-14 DIAGNOSIS — E66.3 OVERWEIGHT: ICD-10-CM

## 2018-06-14 DIAGNOSIS — H40.9 GLAUCOMA, UNSPECIFIED GLAUCOMA TYPE, UNSPECIFIED LATERALITY: ICD-10-CM

## 2018-06-14 DIAGNOSIS — R31.9 URINARY TRACT INFECTION WITH HEMATURIA, SITE UNSPECIFIED: ICD-10-CM

## 2018-06-14 DIAGNOSIS — I10 BENIGN ESSENTIAL HYPERTENSION: ICD-10-CM

## 2018-06-14 DIAGNOSIS — N30.10 INTERSTITIAL CYSTITIS: ICD-10-CM

## 2018-06-14 DIAGNOSIS — M54.50 LOW BACK PAIN WITHOUT SCIATICA, UNSPECIFIED BACK PAIN LATERALITY, UNSPECIFIED CHRONICITY: ICD-10-CM

## 2018-06-14 DIAGNOSIS — K59.09 OTHER CONSTIPATION: ICD-10-CM

## 2018-06-14 DIAGNOSIS — M85.80 OSTEOPENIA, UNSPECIFIED LOCATION: ICD-10-CM

## 2018-06-14 DIAGNOSIS — G47.33 OSA (OBSTRUCTIVE SLEEP APNEA): ICD-10-CM

## 2018-06-14 DIAGNOSIS — Z78.0 MENOPAUSE: Primary | ICD-10-CM

## 2018-06-14 DIAGNOSIS — E78.5 HYPERLIPIDEMIA, UNSPECIFIED HYPERLIPIDEMIA TYPE: ICD-10-CM

## 2018-06-14 DIAGNOSIS — I25.10 CORONARY ARTERY DISEASE INVOLVING NATIVE CORONARY ARTERY OF NATIVE HEART WITHOUT ANGINA PECTORIS: ICD-10-CM

## 2018-06-14 PROBLEM — R39.9 URINARY SYMPTOM OR SIGN: Status: RESOLVED | Noted: 2018-05-17 | Resolved: 2018-06-14

## 2018-06-14 PROBLEM — M94.9 DISORDER OF CARTILAGE: Status: RESOLVED | Noted: 2018-05-17 | Resolved: 2018-06-14

## 2018-06-14 PROCEDURE — 99397 PER PM REEVAL EST PAT 65+ YR: CPT | Performed by: INTERNAL MEDICINE

## 2018-06-14 PROCEDURE — G0439 PPPS, SUBSEQ VISIT: HCPCS | Performed by: INTERNAL MEDICINE

## 2018-06-14 PROCEDURE — 96160 PT-FOCUSED HLTH RISK ASSMT: CPT | Performed by: INTERNAL MEDICINE

## 2018-06-14 NOTE — PROGRESS NOTES
QUICK REFERENCE INFORMATION:  The ABCs of the Annual Wellness Visit    Subsequent Medicare Wellness Visit    HEALTH RISK ASSESSMENT    1945    Recent Hospitalizations:  No hospitalization(s) within the last year..        Current Medical Providers:  Patient Care Team:  Abdiel Juan MD as PCP - General  Abdiel Juan MD as PCP - Family Medicine        Smoking Status:  History   Smoking Status   • Former Smoker   • Quit date: 1994   Smokeless Tobacco   • Not on file       Alcohol Consumption:  History   Alcohol Use No     Comment: quit in 1994       Depression Screen:   PHQ-2/PHQ-9 Depression Screening 6/14/2018   Little interest or pleasure in doing things 0   Feeling down, depressed, or hopeless 0   Trouble falling or staying asleep, or sleeping too much -   Feeling tired or having little energy -   Poor appetite or overeating -   Feeling bad about yourself - or that you are a failure or have let yourself or your family down -   Trouble concentrating on things, such as reading the newspaper or watching television -   Moving or speaking so slowly that other people could have noticed. Or the opposite - being so fidgety or restless that you have been moving around a lot more than usual -   Thoughts that you would be better off dead, or of hurting yourself in some way -   Total Score 0   If you checked off any problems, how difficult have these problems made it for you to do your work, take care of things at home, or get along with other people? -       Health Habits and Functional and Cognitive Screening:  Functional & Cognitive Status 6/14/2018   Do you have difficulty preparing food and eating? No   Do you have difficulty bathing yourself, getting dressed or grooming yourself? No   Do you have difficulty using the toilet? No   Do you have difficulty moving around from place to place? No   Do you have trouble with steps or getting out of a bed or a chair? No   In the past year have you fallen or experienced a near  fall? No   Current Diet Well Balanced Diet   Dental Exam Up to date   Eye Exam Up to date   Exercise (times per week) 0 times per week   Current Exercise Activities Include None   Do you need help using the phone?  No   Are you deaf or do you have serious difficulty hearing?  No   Do you need help with transportation? No   Do you need help shopping? No   Do you need help preparing meals?  No   Do you need help with housework?  No   Do you need help with laundry? No   Do you need help taking your medications? No   Do you need help managing money? No   Do you ever drive or ride in a car without wearing a seat belt? No   Have you felt unusual stress, anger or loneliness in the last month? No   Who do you live with? Spouse   If you need help, do you have trouble finding someone available to you? No   Have you been bothered in the last four weeks by sexual problems? No   Do you have difficulty concentrating, remembering or making decisions? No           Does the patient have evidence of cognitive impairment? No    Aspirin use counseling: Taking ASA appropriately as indicated      Recent Lab Results:  CMP:  Lab Results   Component Value Date    GLU 98 05/17/2018    BUN 14 05/17/2018    CREATININE 0.70 05/17/2018    EGFRIFNONA 82 05/17/2018    EGFRIFAFRI 100 05/17/2018    BCR 20.0 05/17/2018     05/17/2018    K 4.0 05/17/2018    CO2 28.0 05/17/2018    CALCIUM 9.5 05/17/2018    PROTENTOTREF 7.2 05/17/2018    ALBUMIN 4.00 05/17/2018    LABGLOBREF 3.2 05/17/2018    LABIL2 1.3 05/17/2018    BILITOT 0.6 05/17/2018    ALKPHOS 96 05/17/2018    AST 21 05/17/2018    ALT 23 05/17/2018     Lipid Panel:  Lab Results   Component Value Date    TRIG 75 05/17/2018    HDL 66 (H) 05/17/2018    VLDL 15 05/17/2018     HbA1c:       Visual Acuity:  No exam data present    Age-appropriate Screening Schedule:  Refer to the list below for future screening recommendations based on patient's age, sex and/or medical conditions. Orders for these  recommended tests are listed in the plan section. The patient has been provided with a written plan.    Health Maintenance   Topic Date Due   • TDAP/TD VACCINES (1 - Tdap) 01/03/1966   • ZOSTER VACCINE (1 of 2) 01/03/1997   • PNEUMOCOCCAL VACCINES (65+ LOW/MEDIUM RISK) (1 of 2 - PCV13) 01/03/2012   • DXA SCAN  11/15/2016   • INFLUENZA VACCINE  08/01/2018   • LIPID PANEL  05/17/2019   • MAMMOGRAM  07/19/2019   • COLONOSCOPY  07/18/2026        Subjective   History of Present Illness    Monica Perea is a 73 y.o. female who presents for an Subsequent Wellness Visit.    The following portions of the patient's history were reviewed and updated as appropriate: allergies, current medications, past family history, past medical history, past social history, past surgical history and problem list.    Outpatient Medications Prior to Visit   Medication Sig Dispense Refill   • amLODIPine (NORVASC) 5 MG tablet Take 1 tablet by mouth Daily. 90 tablet 3   • aspirin (ASPIRIN LOW DOSE) 81 MG tablet Take  by mouth Daily.     • Calcium Carbonate-Vitamin D (CALCIUM PLUS VITAMIN D PO) Take  by mouth 2 (Two) Times a Day.     • cetirizine (zyrTEC) 10 MG tablet Take 10 mg by mouth Daily.     • clobetasol (TEMOVATE) 0.05 % ointment Apply  topically Every 12 (Twelve) Hours. 60 g 3   • coenzyme Q10 100 MG capsule Take 100 mg by mouth Daily.     • magnesium oxide (MAGOX) 400 (241.3 MG) MG tablet tablet Take 250 mg by mouth 2 (Two) Times a Day.     • Omega-3 Fatty Acids (FISH OIL) 1000 MG capsule capsule Take 1,200 mg by mouth 2 (Two) Times a Day With Meals.     • pravastatin (PRAVACHOL) 40 MG tablet Take 1 tablet by mouth Daily. as directed 90 tablet 3   • TURMERIC PO Take  by mouth.     • Ubiquinol 100 MG capsule Take  by mouth.     • cephalexin (KEFLEX) 500 MG capsule Take 1 capsule by mouth 3 (Three) Times a Day. 21 capsule 0     No facility-administered medications prior to visit.        Patient Active Problem List   Diagnosis   •  Arthritis   • Benign essential hypertension   • Constipation   • Diverticulosis of intestine   • Gastroesophageal reflux disease   • Glaucoma   • Hematuria   • Hyperlipidemia   • Low back pain   • Osteopenia   • Overweight   • Atrophic vaginitis   • MOON (obstructive sleep apnea)   • Psoriasis   • Encounter for screening for malignant neoplasm of colon   • Fibromyalgia   • Palpitations   • CAD (coronary artery disease)   • Urinary tract infection with hematuria   • Interstitial cystitis   • Disorder of cartilage    • Urinary symptom or sign       Advance Care Planning:  has an advance directive - a copy HAS NOT been provided    Identification of Risk Factors:  Risk factors include: weight .    Review of Systems   Constitutional: Negative.    HENT: Negative.    Eyes: Negative.    Respiratory: Negative.    Cardiovascular: Negative.    Gastrointestinal: Negative.    Endocrine: Negative.    Genitourinary: Negative.    Musculoskeletal: Negative.    Skin: Negative.    Allergic/Immunologic: Negative.    Neurological: Negative.    Hematological: Negative.    Psychiatric/Behavioral: Negative.        Compared to one year ago, the patient feels her physical health is better.  Compared to one year ago, the patient feels her mental health is the same.    Objective     Physical Exam   Constitutional: She is oriented to person, place, and time. She appears well-developed and well-nourished.   HENT:   Head: Normocephalic and atraumatic.   Right Ear: External ear normal.   Left Ear: External ear normal.   Nose: Nose normal.   Mouth/Throat: Oropharynx is clear and moist.   Eyes: Conjunctivae and EOM are normal. Pupils are equal, round, and reactive to light.   Neck: Normal range of motion. Neck supple.   Cardiovascular: Normal rate, regular rhythm, normal heart sounds and intact distal pulses.    Pulmonary/Chest: Effort normal and breath sounds normal.   Abdominal: Soft. Bowel sounds are normal.   Musculoskeletal: Normal range of  "motion.   Neurological: She is alert and oriented to person, place, and time. She has normal reflexes.   Skin: Skin is warm and dry.   Psychiatric: She has a normal mood and affect. Her behavior is normal. Judgment and thought content normal.       Vitals:    06/14/18 1020   BP: 102/64   Pulse: 60   Resp: 14   Temp: 98.8 °F (37.1 °C)   SpO2: 98%   Weight: 72.6 kg (160 lb)   Height: 157.5 cm (62\")       Patient's Body mass index is 29.26 kg/m². BMI is above normal parameters. Recommendations include: nutrition counseling.      Assessment/Plan   Patient Self-Management and Personalized Health Advice  The patient has been provided with information about: diet, exercise and weight management and preventive services including:   · Advance directive.    Visit Diagnoses:  No diagnosis found.    No orders of the defined types were placed in this encounter.      Outpatient Encounter Prescriptions as of 6/14/2018   Medication Sig Dispense Refill   • amLODIPine (NORVASC) 5 MG tablet Take 1 tablet by mouth Daily. 90 tablet 3   • aspirin (ASPIRIN LOW DOSE) 81 MG tablet Take  by mouth Daily.     • Calcium Carbonate-Vitamin D (CALCIUM PLUS VITAMIN D PO) Take  by mouth 2 (Two) Times a Day.     • cetirizine (zyrTEC) 10 MG tablet Take 10 mg by mouth Daily.     • clobetasol (TEMOVATE) 0.05 % ointment Apply  topically Every 12 (Twelve) Hours. 60 g 3   • coenzyme Q10 100 MG capsule Take 100 mg by mouth Daily.     • magnesium oxide (MAGOX) 400 (241.3 MG) MG tablet tablet Take 250 mg by mouth 2 (Two) Times a Day.     • Omega-3 Fatty Acids (FISH OIL) 1000 MG capsule capsule Take 1,200 mg by mouth 2 (Two) Times a Day With Meals.     • pravastatin (PRAVACHOL) 40 MG tablet Take 1 tablet by mouth Daily. as directed 90 tablet 3   • TURMERIC PO Take  by mouth.     • Ubiquinol 100 MG capsule Take  by mouth.     • [DISCONTINUED] cephalexin (KEFLEX) 500 MG capsule Take 1 capsule by mouth 3 (Three) Times a Day. 21 capsule 0     No " facility-administered encounter medications on file as of 6/14/2018.        Reviewed use of high risk medication in the elderly: yes  Reviewed for potential of harmful drug interactions in the elderly: no    Follow Up:  No Follow-up on file.     An After Visit Summary and PPPS with all of these plans were given to the patient.          hypertension, continue medicine  Cad MILD BLOCKAGE 2009 30%.  stress test normal, follow up with cardio,   GERD continue omeprazole   Colon 7/18/16: Left-sided diverticulosis. Colon polyps. Internal hemorrhoids. Repeat 5 year       Diverticulitis hx.   Hyperlipidemia continue med    Pneumovax 2015, refuse prevnar 13, and zostavax and shingrix refused and explained risks  Psoriasis refill clobetasole   Constipation cont otc fiber  Knees pain, OA probable, glucosamine continue   Osteopenia due bone density   Mamm patient to schedule  Foot fungal infection s/p med by derm  6 mo

## 2018-06-18 ENCOUNTER — TRANSCRIBE ORDERS (OUTPATIENT)
Dept: INTERNAL MEDICINE | Facility: CLINIC | Age: 73
End: 2018-06-18

## 2018-06-18 ENCOUNTER — APPOINTMENT (OUTPATIENT)
Dept: BONE DENSITY | Facility: HOSPITAL | Age: 73
End: 2018-06-18

## 2018-06-18 DIAGNOSIS — Z12.31 VISIT FOR SCREENING MAMMOGRAM: Primary | ICD-10-CM

## 2018-06-28 ENCOUNTER — APPOINTMENT (OUTPATIENT)
Dept: BONE DENSITY | Facility: HOSPITAL | Age: 73
End: 2018-06-28

## 2018-06-28 PROCEDURE — 77080 DXA BONE DENSITY AXIAL: CPT

## 2018-07-02 VITALS
HEART RATE: 60 BPM | RESPIRATION RATE: 14 BRPM | WEIGHT: 160 LBS | OXYGEN SATURATION: 98 % | BODY MASS INDEX: 29.44 KG/M2 | HEIGHT: 62 IN | TEMPERATURE: 98.8 F | DIASTOLIC BLOOD PRESSURE: 64 MMHG | SYSTOLIC BLOOD PRESSURE: 102 MMHG

## 2018-07-23 ENCOUNTER — HOSPITAL ENCOUNTER (OUTPATIENT)
Dept: MAMMOGRAPHY | Facility: HOSPITAL | Age: 73
Discharge: HOME | End: 2018-07-23
Attending: INTERNAL MEDICINE | Admitting: INTERNAL MEDICINE

## 2018-07-23 DIAGNOSIS — Z12.31 VISIT FOR SCREENING MAMMOGRAM: ICD-10-CM

## 2018-07-23 PROCEDURE — 77067 SCR MAMMO BI INCL CAD: CPT

## 2018-07-23 PROCEDURE — 77063 BREAST TOMOSYNTHESIS BI: CPT | Performed by: RADIOLOGY

## 2018-07-23 PROCEDURE — 77063 BREAST TOMOSYNTHESIS BI: CPT

## 2018-07-23 PROCEDURE — 77067 SCR MAMMO BI INCL CAD: CPT | Performed by: RADIOLOGY

## 2018-10-22 ENCOUNTER — OFFICE VISIT (OUTPATIENT)
Dept: CARDIOLOGY | Facility: CLINIC | Age: 73
End: 2018-10-22

## 2018-10-22 VITALS
BODY MASS INDEX: 30.51 KG/M2 | HEART RATE: 85 BPM | SYSTOLIC BLOOD PRESSURE: 126 MMHG | WEIGHT: 165.8 LBS | HEIGHT: 62 IN | OXYGEN SATURATION: 96 % | DIASTOLIC BLOOD PRESSURE: 80 MMHG

## 2018-10-22 DIAGNOSIS — E78.2 MIXED HYPERLIPIDEMIA: ICD-10-CM

## 2018-10-22 DIAGNOSIS — I10 ESSENTIAL HYPERTENSION: ICD-10-CM

## 2018-10-22 DIAGNOSIS — I25.10 CORONARY ARTERY DISEASE INVOLVING NATIVE CORONARY ARTERY OF NATIVE HEART WITHOUT ANGINA PECTORIS: Primary | ICD-10-CM

## 2018-10-22 DIAGNOSIS — R00.2 PALPITATIONS: ICD-10-CM

## 2018-10-22 PROCEDURE — 99214 OFFICE O/P EST MOD 30 MIN: CPT | Performed by: INTERNAL MEDICINE

## 2018-10-22 RX ORDER — DILTIAZEM HYDROCHLORIDE 240 MG/1
240 CAPSULE, COATED, EXTENDED RELEASE ORAL DAILY
Qty: 90 CAPSULE | Refills: 3 | Status: SHIPPED | OUTPATIENT
Start: 2018-10-22 | End: 2018-10-25 | Stop reason: SDUPTHER

## 2018-10-22 NOTE — PROGRESS NOTES
[facility] at 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    Chief Complaint   Patient presents with   • Coronary Artery Disease   • Rapid Heart Rate     happened last night       PROBLEM LIST:   1. Coronary artery disease:  a.  Left heart catheterization, 2009:  Moderate calcification of the left anterior descending territory.  Diffusely diseased proximal and mid left anterior descending with luminal narrowing up to 30%.  Large caliber diagonal 1 with a proximal calcified eccentric  narrowing of luminal up to 50%.  Normal left ventricular systolic function with normal left ventricular end-diastolic pressures.  b. Echocardiogram, 2012:  Ejection fraction 55% to 60%, mild MR and TR, RVSP of 50 mmHg.  c. Cardiolite GXT, October 2015,  within normal limits with an LVEF of 59%.  2. Hypertension.  3. Palps  a. 9/17 Zio less than 1% PAC /PVC with normal circadian change  4. Hyperlipidemia.  a. 12/16: 174/62/82/80  b. 5/17/18  TG 75 HDL 66 LDL 90  5.  Obstructive sleep apnea - discontinued CPAP use 2 months ago:  a.  Nocturnal oxygen saturation, October 2015, with 92% of the time spent below 95% oxygen saturation with 6.2% spent below 90% oxygen saturation. Reinitiation of CPAP therapy, November 2015.  6. Glaucoma.  7. Gastroesophageal reflux disease.  8. Osteopenia.  9. Surgical history:  a. Total hysterectomy, 2000.  b. Tubal ligation, 1974.  c. Appendectomy in 1963.    Allergies  Allergies   Allergen Reactions   • Macrobid [Nitrofurantoin Monohyd Macro]    • Nitrofurantoin Hives   • Phenylephrine-Guaifenesin Hives   • Sulfa Antibiotics Hives       Current Medications    Current Outpatient Prescriptions:   •  amLODIPine (NORVASC) 5 MG tablet, Take 1 tablet by mouth  "Daily., Disp: 90 tablet, Rfl: 3  •  aspirin (ASPIRIN LOW DOSE) 81 MG tablet, Take  by mouth Daily., Disp: , Rfl:   •  Calcium Carbonate-Vitamin D (CALCIUM PLUS VITAMIN D PO), Take  by mouth 2 (Two) Times a Day., Disp: , Rfl:   •  cetirizine (zyrTEC) 10 MG tablet, Take 10 mg by mouth Daily., Disp: , Rfl:   •  clobetasol (TEMOVATE) 0.05 % ointment, Apply  topically Every 12 (Twelve) Hours., Disp: 60 g, Rfl: 3  •  coenzyme Q10 100 MG capsule, Take 100 mg by mouth Daily., Disp: , Rfl:   •  diphenhydrAMINE (SOMINEX) 25 MG tablet, Take 25 mg by mouth At Night As Needed for Sleep., Disp: , Rfl:   •  magnesium oxide (MAGOX) 400 (241.3 MG) MG tablet tablet, Take 250 mg by mouth 2 (Two) Times a Day., Disp: , Rfl:   •  Omega-3 Fatty Acids (FISH OIL) 1000 MG capsule capsule, Take 1,200 mg by mouth 2 (Two) Times a Day With Meals., Disp: , Rfl:   •  pravastatin (PRAVACHOL) 40 MG tablet, Take 1 tablet by mouth Daily. as directed, Disp: 90 tablet, Rfl: 3  •  TURMERIC PO, Take  by mouth., Disp: , Rfl:       History of Present Illness   Patient presents in routine follow-up. Pt denies any chest pain, dyspnea, dyspnea on exertion, orthopnea, PND, or lower extremity edema.    Had abrupt palpitations last pm that lasted several hours.  Not exercising and trying to watch CHO intake.    ROS:  All systems have been reviewed and are negative with the exception of those mentioned in the HPI.    OBJECTIVE:  Vitals:    10/22/18 1031   BP: 126/80   BP Location: Right arm   Patient Position: Sitting   Pulse: 85   SpO2: 96%   Weight: 75.2 kg (165 lb 12.8 oz)   Height: 157.5 cm (62\")     Physical Exam   Constitutional: She is oriented to person, place, and time. She appears well-developed and well-nourished. No distress.   Neck: Normal range of motion. Neck supple. No hepatojugular reflux and no JVD present. Carotid bruit is not present. No tracheal deviation present. No thyromegaly present.   Cardiovascular: Normal rate, regular rhythm, S1 " normal, S2 normal, intact distal pulses and normal pulses.  PMI is not displaced.  Exam reveals no gallop, no distant heart sounds, no friction rub, no midsystolic click and no opening snap.    No murmur heard.  Pulses:       Radial pulses are 2+ on the right side, and 2+ on the left side.        Dorsalis pedis pulses are 2+ on the right side, and 2+ on the left side.        Posterior tibial pulses are 2+ on the right side, and 2+ on the left side.   Pulmonary/Chest: Effort normal and breath sounds normal. She has no wheezes. She has no rales.   Abdominal: Soft. Bowel sounds are normal. She exhibits no mass. There is no tenderness. There is no guarding.   Musculoskeletal: Normal range of motion. She exhibits no edema or tenderness.   Neurological: She is alert and oriented to person, place, and time.   Nursing note and vitals reviewed.      Diagnostic Data:  Procedures  Lab Results   Component Value Date    CHLPL 171 05/17/2018    TRIG 75 05/17/2018    HDL 66 (H) 05/17/2018        ASSESSMENT:   Diagnosis Plan   1. Coronary artery disease involving native coronary artery of native heart without angina pectoris     2. Essential hypertension     3. Mixed hyperlipidemia     4. Palpitations         PLAN:  1.  No new anginal equivalent.  Continue risk factor modification and medical management.  2.  Well controlled on today's visit.  Transition amlod to dilt due to palpitations.  3.  Improvement in cholesterol since she restarted her pravastatin over the summer 2016.  Continue to monitor yearly lipids.  4.  Stress-induced palpitations with the concerns and anxiety with caring for her elderly mother.   Follow-up with us in one year.

## 2018-10-25 ENCOUNTER — TELEPHONE (OUTPATIENT)
Dept: CARDIOLOGY | Facility: CLINIC | Age: 73
End: 2018-10-25

## 2018-10-25 RX ORDER — DILTIAZEM HYDROCHLORIDE 120 MG/1
CAPSULE, COATED, EXTENDED RELEASE ORAL
Qty: 30 CAPSULE | Refills: 5 | Status: SHIPPED | OUTPATIENT
Start: 2018-10-25 | End: 2018-12-05 | Stop reason: ALTCHOICE

## 2018-11-09 ENCOUNTER — TELEPHONE (OUTPATIENT)
Dept: CARDIOLOGY | Facility: CLINIC | Age: 73
End: 2018-11-09

## 2018-11-09 NOTE — TELEPHONE ENCOUNTER
Patient called and stated she is concerned as her BP is elevated in morning. She reported 149/75 144/87 HR 53-73. Patient stated these reading are before medication. After medication patient reported 129/70 HR 55. Informed patient it appears the medication is controlling her BP and to take her BP after medication at least an hr. If he BP becomes elevated and consistently remains elevated after medication then we would consider a dose change at that time. Patient verbalized understanding.

## 2018-11-26 ENCOUNTER — OFFICE VISIT (OUTPATIENT)
Dept: CARDIOLOGY | Facility: CLINIC | Age: 73
End: 2018-11-26

## 2018-11-26 VITALS
HEIGHT: 62 IN | HEART RATE: 57 BPM | OXYGEN SATURATION: 97 % | BODY MASS INDEX: 30.36 KG/M2 | WEIGHT: 165 LBS | DIASTOLIC BLOOD PRESSURE: 90 MMHG | SYSTOLIC BLOOD PRESSURE: 166 MMHG

## 2018-11-26 DIAGNOSIS — I25.10 CORONARY ARTERY DISEASE INVOLVING NATIVE CORONARY ARTERY OF NATIVE HEART WITHOUT ANGINA PECTORIS: Primary | ICD-10-CM

## 2018-11-26 DIAGNOSIS — R00.2 PALPITATIONS: ICD-10-CM

## 2018-11-26 DIAGNOSIS — E78.2 MIXED HYPERLIPIDEMIA: ICD-10-CM

## 2018-11-26 DIAGNOSIS — I10 ESSENTIAL HYPERTENSION: ICD-10-CM

## 2018-11-26 PROCEDURE — 99214 OFFICE O/P EST MOD 30 MIN: CPT | Performed by: INTERNAL MEDICINE

## 2018-11-26 PROCEDURE — 93000 ELECTROCARDIOGRAM COMPLETE: CPT | Performed by: INTERNAL MEDICINE

## 2018-11-26 RX ORDER — AMLODIPINE BESYLATE 5 MG/1
5 TABLET ORAL DAILY
COMMUNITY
End: 2018-12-05 | Stop reason: SDUPTHER

## 2018-12-04 ENCOUNTER — TELEPHONE (OUTPATIENT)
Dept: CARDIOLOGY | Facility: CLINIC | Age: 73
End: 2018-12-04

## 2018-12-05 ENCOUNTER — OFFICE VISIT (OUTPATIENT)
Dept: CARDIOLOGY | Facility: CLINIC | Age: 73
End: 2018-12-05

## 2018-12-05 VITALS
HEART RATE: 69 BPM | DIASTOLIC BLOOD PRESSURE: 80 MMHG | SYSTOLIC BLOOD PRESSURE: 118 MMHG | BODY MASS INDEX: 31.21 KG/M2 | WEIGHT: 169.6 LBS | HEIGHT: 62 IN

## 2018-12-05 DIAGNOSIS — I25.10 CORONARY ARTERY DISEASE INVOLVING NATIVE CORONARY ARTERY OF NATIVE HEART WITHOUT ANGINA PECTORIS: Primary | ICD-10-CM

## 2018-12-05 DIAGNOSIS — R00.2 PALPITATIONS: ICD-10-CM

## 2018-12-05 DIAGNOSIS — I10 ESSENTIAL HYPERTENSION: ICD-10-CM

## 2018-12-05 PROCEDURE — 99213 OFFICE O/P EST LOW 20 MIN: CPT | Performed by: INTERNAL MEDICINE

## 2018-12-05 RX ORDER — AMLODIPINE BESYLATE 5 MG/1
5 TABLET ORAL DAILY
Qty: 90 TABLET | Refills: 3 | Status: SHIPPED | OUTPATIENT
Start: 2018-12-05 | End: 2019-03-25 | Stop reason: SDUPTHER

## 2018-12-14 ENCOUNTER — OFFICE VISIT (OUTPATIENT)
Dept: INTERNAL MEDICINE | Facility: CLINIC | Age: 73
End: 2018-12-14

## 2018-12-14 VITALS
OXYGEN SATURATION: 97 % | SYSTOLIC BLOOD PRESSURE: 136 MMHG | HEART RATE: 67 BPM | DIASTOLIC BLOOD PRESSURE: 84 MMHG | HEIGHT: 62 IN | WEIGHT: 166 LBS | BODY MASS INDEX: 30.55 KG/M2

## 2018-12-14 DIAGNOSIS — N39.0 URINARY TRACT INFECTION WITHOUT HEMATURIA, SITE UNSPECIFIED: Primary | ICD-10-CM

## 2018-12-14 DIAGNOSIS — N39.0 URINARY TRACT INFECTION WITH HEMATURIA, SITE UNSPECIFIED: ICD-10-CM

## 2018-12-14 DIAGNOSIS — E78.5 HYPERLIPIDEMIA, UNSPECIFIED HYPERLIPIDEMIA TYPE: ICD-10-CM

## 2018-12-14 DIAGNOSIS — R00.2 PALPITATION: ICD-10-CM

## 2018-12-14 DIAGNOSIS — R31.9 URINARY TRACT INFECTION WITH HEMATURIA, SITE UNSPECIFIED: ICD-10-CM

## 2018-12-14 DIAGNOSIS — I10 BENIGN ESSENTIAL HYPERTENSION: ICD-10-CM

## 2018-12-14 DIAGNOSIS — R53.83 OTHER FATIGUE: ICD-10-CM

## 2018-12-14 DIAGNOSIS — I25.10 CORONARY ARTERY DISEASE INVOLVING NATIVE CORONARY ARTERY OF NATIVE HEART WITHOUT ANGINA PECTORIS: ICD-10-CM

## 2018-12-14 DIAGNOSIS — F41.9 ANXIETY: ICD-10-CM

## 2018-12-14 LAB
ALBUMIN SERPL-MCNC: 4.4 G/DL (ref 3.5–5)
ALBUMIN/GLOB SERPL: 1.4 G/DL (ref 1–2)
ALP SERPL-CCNC: 88 U/L (ref 38–126)
ALT SERPL-CCNC: 25 U/L (ref 13–69)
AST SERPL-CCNC: 21 U/L (ref 15–46)
BASOPHILS # BLD AUTO: 0.05 10*3/MM3 (ref 0–0.2)
BASOPHILS NFR BLD AUTO: 1 % (ref 0–2.5)
BILIRUB BLD-MCNC: NEGATIVE MG/DL
BILIRUB SERPL-MCNC: 0.5 MG/DL (ref 0.2–1.3)
BUN SERPL-MCNC: 18 MG/DL (ref 7–20)
BUN/CREAT SERPL: 22.5 (ref 7.1–23.5)
CALCIUM SERPL-MCNC: 9.2 MG/DL (ref 8.4–10.2)
CHLORIDE SERPL-SCNC: 106 MMOL/L (ref 98–107)
CHOLEST SERPL-MCNC: 179 MG/DL (ref 0–199)
CK SERPL-CCNC: 52 U/L (ref 30–170)
CLARITY, POC: CLEAR
CO2 SERPL-SCNC: 27 MMOL/L (ref 26–30)
COLOR UR: YELLOW
CREAT SERPL-MCNC: 0.8 MG/DL (ref 0.6–1.3)
EOSINOPHIL # BLD AUTO: 0.23 10*3/MM3 (ref 0–0.7)
EOSINOPHIL NFR BLD AUTO: 4.5 % (ref 0–7)
ERYTHROCYTE [DISTWIDTH] IN BLOOD BY AUTOMATED COUNT: 12.5 % (ref 11.5–14.5)
FOLATE SERPL-MCNC: 13.7 NG/ML
GLOBULIN SER CALC-MCNC: 3.1 GM/DL
GLUCOSE SERPL-MCNC: 97 MG/DL (ref 74–98)
GLUCOSE UR STRIP-MCNC: NEGATIVE MG/DL
HCT VFR BLD AUTO: 38.3 % (ref 37–47)
HDLC SERPL-MCNC: 84 MG/DL (ref 40–60)
HGB BLD-MCNC: 12.4 G/DL (ref 12–16)
IMM GRANULOCYTES # BLD: 0.01 10*3/MM3 (ref 0–0.06)
IMM GRANULOCYTES NFR BLD: 0.2 % (ref 0–0.6)
KETONES UR QL: NEGATIVE
LDLC SERPL CALC-MCNC: 85 MG/DL (ref 0–99)
LEUKOCYTE EST, POC: ABNORMAL
LYMPHOCYTES # BLD AUTO: 1.59 10*3/MM3 (ref 0.6–3.4)
LYMPHOCYTES NFR BLD AUTO: 30.9 % (ref 10–50)
MCH RBC QN AUTO: 30.4 PG (ref 27–31)
MCHC RBC AUTO-ENTMCNC: 32.4 G/DL (ref 30–37)
MCV RBC AUTO: 93.9 FL (ref 81–99)
MONOCYTES # BLD AUTO: 0.48 10*3/MM3 (ref 0–0.9)
MONOCYTES NFR BLD AUTO: 9.3 % (ref 0–12)
NEUTROPHILS # BLD AUTO: 2.78 10*3/MM3 (ref 2–6.9)
NEUTROPHILS NFR BLD AUTO: 54.1 % (ref 37–80)
NITRITE UR-MCNC: NEGATIVE MG/ML
NRBC BLD AUTO-RTO: 0 /100 WBC (ref 0–0)
PH UR: 6 [PH] (ref 5–8)
PLATELET # BLD AUTO: 209 10*3/MM3 (ref 130–400)
POTASSIUM SERPL-SCNC: 4.1 MMOL/L (ref 3.5–5.1)
PROT SERPL-MCNC: 7.5 G/DL (ref 6.3–8.2)
PROT UR STRIP-MCNC: NEGATIVE MG/DL
RBC # BLD AUTO: 4.08 10*6/MM3 (ref 4.2–5.4)
RBC # UR STRIP: NEGATIVE /UL
SODIUM SERPL-SCNC: 142 MMOL/L (ref 137–145)
SP GR UR: 1.01 (ref 1–1.03)
TRIGL SERPL-MCNC: 52 MG/DL
TSH SERPL DL<=0.005 MIU/L-ACNC: 1.01 MIU/ML (ref 0.47–4.68)
UROBILINOGEN UR QL: NORMAL
VIT B12 SERPL-MCNC: 281 PG/ML (ref 239–931)
VLDLC SERPL CALC-MCNC: 10.4 MG/DL
WBC # BLD AUTO: 5.14 10*3/MM3 (ref 4.8–10.8)

## 2018-12-14 PROCEDURE — 81003 URINALYSIS AUTO W/O SCOPE: CPT | Performed by: INTERNAL MEDICINE

## 2018-12-14 PROCEDURE — 99214 OFFICE O/P EST MOD 30 MIN: CPT | Performed by: INTERNAL MEDICINE

## 2018-12-14 RX ORDER — CIPROFLOXACIN 500 MG/1
500 TABLET, FILM COATED ORAL 2 TIMES DAILY
Qty: 14 TABLET | Refills: 0 | Status: SHIPPED | OUTPATIENT
Start: 2018-12-14 | End: 2019-01-16

## 2018-12-14 RX ORDER — SERTRALINE HYDROCHLORIDE 25 MG/1
25 TABLET, FILM COATED ORAL DAILY
Qty: 30 TABLET | Refills: 1 | Status: SHIPPED | OUTPATIENT
Start: 2018-12-14 | End: 2019-02-18 | Stop reason: SDUPTHER

## 2018-12-14 NOTE — PROGRESS NOTES
Subjective   Monica Perea is a 73 y.o. female.     Chief Complaint   Patient presents with   • Follow-up     6 month follow up    • Irregular Heart Beat     went to cardio - did EKG discussed. - BP is all over the place during the day and HR gos up when at rest        History of Present Illness   Patient here for follow-up of.  October patient developed palpitation and hypertension and nighttime.  Patient was seen by cardiologist Holter monitor unremarkable.  Patient was diagnosed stress related hypertension.  Patient was put on amlodipine in the morning and metoprolol and nighttime as needed patient yet to start metoprolol.  Patient is on allergy medicine also has some anxiety.  Recent retinal leading possibly secondary to hypertension.  Also complains some bladder pressure no fever chills no urine frequency urgency.  History of a bladder infection.  GERD stable medication.  The hyperlipidemia stable medication.fatigue no energy    Current Outpatient Medications:   •  amLODIPine (NORVASC) 5 MG tablet, Take 1 tablet by mouth Daily., Disp: 90 tablet, Rfl: 3  •  aspirin (ASPIRIN LOW DOSE) 81 MG tablet, Take  by mouth Daily., Disp: , Rfl:   •  Calcium Carbonate-Vitamin D (CALCIUM PLUS VITAMIN D PO), Take  by mouth Daily., Disp: , Rfl:   •  cetirizine (zyrTEC) 10 MG tablet, Take 10 mg by mouth Daily., Disp: , Rfl:   •  clobetasol (TEMOVATE) 0.05 % ointment, Apply  topically Every 12 (Twelve) Hours., Disp: 60 g, Rfl: 3  •  coenzyme Q10 100 MG capsule, Take 100 mg by mouth Daily., Disp: , Rfl:   •  magnesium oxide (MAGOX) 400 (241.3 MG) MG tablet tablet, Take 250 mg by mouth 2 (Two) Times a Day., Disp: , Rfl:   •  metoprolol tartrate (LOPRESSOR) 25 MG tablet, Take 1 tablet by mouth Daily As Needed (palpitations)., Disp: 30 tablet, Rfl: 11  •  Omega-3 Fatty Acids (FISH OIL) 1000 MG capsule capsule, Take 1,200 mg by mouth 2 (Two) Times a Day With Meals., Disp: , Rfl:   •  pravastatin (PRAVACHOL) 40 MG tablet, Take  1 tablet by mouth Daily. as directed, Disp: 90 tablet, Rfl: 3  •  TURMERIC PO, Take  by mouth Daily., Disp: , Rfl:     The following portions of the patient's history were reviewed and updated as appropriate: allergies, current medications, past family history, past medical history, past social history, past surgical history and problem list.    Review of Systems   Constitutional: Negative.    Respiratory: Negative.    Cardiovascular: Positive for palpitations.   Gastrointestinal: Negative.    Musculoskeletal: Negative.    Skin: Negative.    Neurological: Negative.    Psychiatric/Behavioral: Negative.        Objective   Physical Exam   Constitutional: She is oriented to person, place, and time. She appears well-nourished.   Neck: Neck supple.   Cardiovascular: Normal rate, regular rhythm and normal heart sounds.   Pulmonary/Chest: Effort normal and breath sounds normal.   Abdominal: Bowel sounds are normal.   Neurological: She is alert and oriented to person, place, and time.   Skin: Skin is warm.   Psychiatric: She has a normal mood and affect.       All tests have been reviewed.    Assessment/Plan   There are no diagnoses linked to this encounter.           hypertension, continue medicine  Cad MILD BLOCKAGE 2009 30%.  stress test normal, follow up with cardio,   GERD continue omeprazole   Colon 7/18/16: Left-sided diverticulosis. Colon polyps. Internal hemorrhoids. Repeat 5 year       Diverticulitis hx.   Hyperlipidemia continue med    Pneumovax 2015, refuse prevnar 13, and zostavax and shingrix refused and explained risks  Psoriasis refill clobetasole   Constipation cont otc fiber  Knees pain, OA probable, glucosamine continue   Osteopenia due bone density   Foot fungal infection s/p med by derm  Palpitation seeing cardio on medicine improved,  metoprol prn at night per cardio, hold allergy medicine, start zoloft 25   Anxiety, start zoloft  Retina bleeding  Bladder pressure  Do UA  1mo

## 2019-01-15 NOTE — TELEPHONE ENCOUNTER
Pt called in and stated that the new medication that was started is not working for her. It does help her fluttering but makes her feel washed out and tired the next day. Blood pressure and heart rate have been normal. Today 159/87 80 pulse before taking her metoprolol      Previous visit note: stress-induced palpitations with the concerns and anxiety with caring for her elderly mother. Holter low freq pac/pvc to utilize metop tartrate 25 prn .   Is there another medication you would like to try?    Current medications  norvasc 5mg  Aspirin 81  Metoprolol as needed   pravastatin zoloft

## 2019-01-16 ENCOUNTER — OFFICE VISIT (OUTPATIENT)
Dept: INTERNAL MEDICINE | Facility: CLINIC | Age: 74
End: 2019-01-16

## 2019-01-16 VITALS
HEIGHT: 62 IN | BODY MASS INDEX: 30.55 KG/M2 | WEIGHT: 166 LBS | DIASTOLIC BLOOD PRESSURE: 80 MMHG | SYSTOLIC BLOOD PRESSURE: 122 MMHG | OXYGEN SATURATION: 97 % | HEART RATE: 61 BPM

## 2019-01-16 DIAGNOSIS — M54.50 LOW BACK PAIN WITHOUT SCIATICA, UNSPECIFIED BACK PAIN LATERALITY, UNSPECIFIED CHRONICITY: ICD-10-CM

## 2019-01-16 DIAGNOSIS — E66.3 OVERWEIGHT: ICD-10-CM

## 2019-01-16 DIAGNOSIS — E78.5 HYPERLIPIDEMIA, UNSPECIFIED HYPERLIPIDEMIA TYPE: ICD-10-CM

## 2019-01-16 DIAGNOSIS — N39.0 URINARY TRACT INFECTION WITH HEMATURIA, SITE UNSPECIFIED: ICD-10-CM

## 2019-01-16 DIAGNOSIS — R00.2 PALPITATION: ICD-10-CM

## 2019-01-16 DIAGNOSIS — F41.9 ANXIETY: ICD-10-CM

## 2019-01-16 DIAGNOSIS — R31.9 URINARY TRACT INFECTION WITH HEMATURIA, SITE UNSPECIFIED: ICD-10-CM

## 2019-01-16 DIAGNOSIS — G47.33 OSA (OBSTRUCTIVE SLEEP APNEA): ICD-10-CM

## 2019-01-16 DIAGNOSIS — I10 BENIGN ESSENTIAL HYPERTENSION: Primary | ICD-10-CM

## 2019-01-16 DIAGNOSIS — I25.10 CORONARY ARTERY DISEASE INVOLVING NATIVE CORONARY ARTERY OF NATIVE HEART WITHOUT ANGINA PECTORIS: ICD-10-CM

## 2019-01-16 PROCEDURE — 99214 OFFICE O/P EST MOD 30 MIN: CPT | Performed by: INTERNAL MEDICINE

## 2019-01-16 RX ORDER — HYDROCHLOROTHIAZIDE 12.5 MG/1
12.5 TABLET ORAL DAILY
Qty: 30 TABLET | Refills: 1 | Status: SHIPPED | OUTPATIENT
Start: 2019-01-16 | End: 2019-02-18 | Stop reason: SDUPTHER

## 2019-01-16 NOTE — TELEPHONE ENCOUNTER
Pt went to primary care doctor today. Pt was prescribed a hydroclorithiazide and to only take one tab of norvasc daily to help with swelling. Says that 1/2 of metoprolol is helping. She will try and take that each night and give us a call back to let us know how that is working.

## 2019-01-16 NOTE — PROGRESS NOTES
Subjective   Monica Perea is a 74 y.o. female.     Chief Complaint   Patient presents with   • Medication Problem     discuss BP meds    • Follow-up       History of Present Illness   Patient here for follow-up.  The patient still complains of some time blood pressure elevated and a heart rate elevated palpitation and she is.  Patient is taking metoprolol tartrate 12.5 mg daily instead of 25 mg as needed per cardiologist.  Patient states the symptoms is much improved.  Patient also taking 10 mg amlodipine herself due to blood pressure elevation.  Patient has a mild swelling in lower extremities.  Anxiety still patient declined to take the Zoloft.  Also allergy symptoms zyrtec makes worse.  Denies any urinary infection symptoms.    Current Outpatient Medications:   •  amLODIPine (NORVASC) 5 MG tablet, Take 1 tablet by mouth Daily., Disp: 90 tablet, Rfl: 3  •  aspirin (ASPIRIN LOW DOSE) 81 MG tablet, Take  by mouth Daily., Disp: , Rfl:   •  Calcium Carbonate-Vitamin D (CALCIUM PLUS VITAMIN D PO), Take  by mouth Daily., Disp: , Rfl:   •  cetirizine (zyrTEC) 10 MG tablet, Take 10 mg by mouth Daily., Disp: , Rfl:   •  clobetasol (TEMOVATE) 0.05 % ointment, Apply  topically Every 12 (Twelve) Hours., Disp: 60 g, Rfl: 3  •  coenzyme Q10 100 MG capsule, Take 100 mg by mouth Daily., Disp: , Rfl:   •  magnesium oxide (MAGOX) 400 (241.3 MG) MG tablet tablet, Take 250 mg by mouth 2 (Two) Times a Day., Disp: , Rfl:   •  metoprolol tartrate (LOPRESSOR) 25 MG tablet, Take 1 tablet by mouth Daily As Needed (palpitations)., Disp: 30 tablet, Rfl: 11  •  Omega-3 Fatty Acids (FISH OIL) 1000 MG capsule capsule, Take 1,200 mg by mouth 2 (Two) Times a Day With Meals., Disp: , Rfl:   •  pravastatin (PRAVACHOL) 40 MG tablet, Take 1 tablet by mouth Daily. as directed, Disp: 90 tablet, Rfl: 3  •  sertraline (ZOLOFT) 25 MG tablet, Take 1 tablet by mouth Daily., Disp: 30 tablet, Rfl: 1  •  TURMERIC PO, Take  by mouth Daily., Disp: ,  Rfl:     The following portions of the patient's history were reviewed and updated as appropriate: allergies, current medications, past family history, past medical history, past social history, past surgical history and problem list.    Review of Systems   Constitutional: Negative.    Respiratory: Negative.    Cardiovascular: Negative.    Gastrointestinal: Negative.    Musculoskeletal: Negative.    Skin: Negative.    Neurological: Negative.    Psychiatric/Behavioral: Negative.        Objective   Physical Exam   Constitutional: She is oriented to person, place, and time. She appears well-nourished.   Neck: Neck supple.   Cardiovascular: Normal rate, regular rhythm and normal heart sounds.   Pulmonary/Chest: Effort normal and breath sounds normal.   Abdominal: Bowel sounds are normal.   Neurological: She is alert and oriented to person, place, and time.   Skin: Skin is warm.   Psychiatric: She has a normal mood and affect.       All tests have been reviewed.    Assessment/Plan   There are no diagnoses linked to this encounter.           hypertension, continue medicine, add HCTZ 12.5 daily  Cad MILD BLOCKAGE 2009 30%.  stress test normal, follow up with cardio,   GERD continue omeprazole   Colon 7/18/16: Left-sided diverticulosis. Colon polyps. Internal hemorrhoids. Repeat 5 year       Diverticulitis hx.   Hyperlipidemia continue med    Pneumovax 2015, refuse prevnar 13, and zostavax and shingrix refused and explained risks  Psoriasis refill clobetasole   Constipation cont otc fiber  Knees pain, OA probable, glucosamine continue   Osteopenia due bone density   Foot fungal infection s/p med by derm  Allergy, claritin   Palpitation seeing cardio on medicine improved,  metoprol change to 1/2 tab qhs,   Anxiety, patient declines zoloft  Retina bleeding  Bladder pressure  Do UA abnormal patient no sx now, watch  1mo

## 2019-01-25 NOTE — TELEPHONE ENCOUNTER
Received a call from patient and her regimen that she is taking now is working but she wants to know if she can have the extended release Metoprolol     She is taking short acting 1/2 at night and 1/2 in the morning

## 2019-02-18 ENCOUNTER — OFFICE VISIT (OUTPATIENT)
Dept: INTERNAL MEDICINE | Facility: CLINIC | Age: 74
End: 2019-02-18

## 2019-02-18 VITALS
BODY MASS INDEX: 30.73 KG/M2 | HEIGHT: 62 IN | DIASTOLIC BLOOD PRESSURE: 80 MMHG | SYSTOLIC BLOOD PRESSURE: 132 MMHG | OXYGEN SATURATION: 98 % | HEART RATE: 70 BPM | WEIGHT: 167 LBS

## 2019-02-18 DIAGNOSIS — E78.5 HYPERLIPIDEMIA, UNSPECIFIED HYPERLIPIDEMIA TYPE: ICD-10-CM

## 2019-02-18 DIAGNOSIS — I25.10 CORONARY ARTERY DISEASE INVOLVING NATIVE CORONARY ARTERY OF NATIVE HEART WITHOUT ANGINA PECTORIS: ICD-10-CM

## 2019-02-18 DIAGNOSIS — I10 BENIGN ESSENTIAL HYPERTENSION: Primary | ICD-10-CM

## 2019-02-18 DIAGNOSIS — E66.3 OVERWEIGHT: ICD-10-CM

## 2019-02-18 DIAGNOSIS — G47.33 OSA (OBSTRUCTIVE SLEEP APNEA): ICD-10-CM

## 2019-02-18 DIAGNOSIS — F41.9 ANXIETY: ICD-10-CM

## 2019-02-18 PROBLEM — R31.9 URINARY TRACT INFECTION WITH HEMATURIA: Status: RESOLVED | Noted: 2017-04-05 | Resolved: 2019-02-18

## 2019-02-18 PROBLEM — N39.0 URINARY TRACT INFECTION WITH HEMATURIA: Status: RESOLVED | Noted: 2017-04-05 | Resolved: 2019-02-18

## 2019-02-18 PROCEDURE — 99214 OFFICE O/P EST MOD 30 MIN: CPT | Performed by: INTERNAL MEDICINE

## 2019-02-18 RX ORDER — HYDROCHLOROTHIAZIDE 12.5 MG/1
12.5 TABLET ORAL DAILY
Qty: 30 TABLET | Refills: 1 | Status: SHIPPED | OUTPATIENT
Start: 2019-02-18 | End: 2019-04-17 | Stop reason: SDUPTHER

## 2019-02-18 RX ORDER — ALPRAZOLAM 0.25 MG/1
0.25 TABLET ORAL DAILY PRN
Qty: 15 TABLET | Refills: 1 | Status: SHIPPED | OUTPATIENT
Start: 2019-02-18 | End: 2019-11-04 | Stop reason: SDUPTHER

## 2019-02-18 RX ORDER — ALPRAZOLAM 0.25 MG/1
0.25 TABLET ORAL DAILY PRN
Qty: 15 TABLET | Refills: 1 | Status: SHIPPED | OUTPATIENT
Start: 2019-02-18 | End: 2019-02-18 | Stop reason: SDUPTHER

## 2019-02-18 RX ORDER — SERTRALINE HYDROCHLORIDE 25 MG/1
25 TABLET, FILM COATED ORAL DAILY
Qty: 90 TABLET | Refills: 3 | Status: SHIPPED | OUTPATIENT
Start: 2019-02-18 | End: 2019-02-18 | Stop reason: SDUPTHER

## 2019-02-18 NOTE — PROGRESS NOTES
Subjective   Monica Perea is a 74 y.o. female.     Chief Complaint   Patient presents with   • Follow-up     1 month    • Medication Problem     discuss HCTZ    • Hypertension     discuss- patient reports when walking short distance her BP goes up too high        History of Present Illness   Patient here for follow-up of.  Blood pressure normal on medication now.  Patient states episodes of a high blood pressure at home.  Patient still has some palpitation sensation at home.  Patient does complain anxious nervous.  Patient is taking Zoloft 25 mg no significant difference.  Hyperlipidemia stable on diet medication.  Constipation stable.  Eye disease patient is seeing an eye doctor.  Coronary artery disease patient only had 30% blockages in 209 stress test normal patient is seeing cardiologist.    Current Outpatient Medications:   •  amLODIPine (NORVASC) 5 MG tablet, Take 1 tablet by mouth Daily., Disp: 90 tablet, Rfl: 3  •  aspirin (ASPIRIN LOW DOSE) 81 MG tablet, Take  by mouth Daily., Disp: , Rfl:   •  Calcium Carbonate-Vitamin D (CALCIUM PLUS VITAMIN D PO), Take  by mouth Daily., Disp: , Rfl:   •  cetirizine (zyrTEC) 10 MG tablet, Take 10 mg by mouth Daily., Disp: , Rfl:   •  clobetasol (TEMOVATE) 0.05 % ointment, Apply  topically Every 12 (Twelve) Hours., Disp: 60 g, Rfl: 3  •  coenzyme Q10 100 MG capsule, Take 100 mg by mouth Daily., Disp: , Rfl:   •  hydrochlorothiazide (HYDRODIURIL) 12.5 MG tablet, Take 1 tablet by mouth Daily., Disp: 30 tablet, Rfl: 1  •  magnesium oxide (MAGOX) 400 (241.3 MG) MG tablet tablet, Take 250 mg by mouth 2 (Two) Times a Day., Disp: , Rfl:   •  metoprolol tartrate (LOPRESSOR) 25 MG tablet, Take 1 tablet by mouth Daily As Needed (palpitations)., Disp: 30 tablet, Rfl: 11  •  Omega-3 Fatty Acids (FISH OIL) 1000 MG capsule capsule, Take 1,200 mg by mouth 2 (Two) Times a Day With Meals., Disp: , Rfl:   •  pravastatin (PRAVACHOL) 40 MG tablet, Take 1 tablet by mouth Daily. as  directed, Disp: 90 tablet, Rfl: 3  •  sertraline (ZOLOFT) 25 MG tablet, Take 1 tablet by mouth Daily., Disp: 90 tablet, Rfl: 3  •  TURMERIC PO, Take  by mouth Daily., Disp: , Rfl:     The following portions of the patient's history were reviewed and updated as appropriate: allergies, current medications, past family history, past medical history, past social history, past surgical history and problem list.    Review of Systems   Constitutional: Negative.    Respiratory: Negative.    Cardiovascular: Negative.    Gastrointestinal: Negative.    Musculoskeletal: Negative.    Skin: Negative.    Neurological: Negative.    Psychiatric/Behavioral: The patient is nervous/anxious.        Objective   Physical Exam   Constitutional: She is oriented to person, place, and time. She appears well-nourished.   Neck: Neck supple.   Cardiovascular: Normal rate, regular rhythm and normal heart sounds.   Pulmonary/Chest: Effort normal and breath sounds normal.   Abdominal: Bowel sounds are normal.   Neurological: She is alert and oriented to person, place, and time.   Skin: Skin is warm.   Psychiatric: She has a normal mood and affect.       All tests have been reviewed.    Assessment/Plan   There are no diagnoses linked to this encounter.            hypertension, continue medicine,  HCTZ 12.5 daily  Cad MILD BLOCKAGE 2009 30%.  stress test normal, follow up with cardio,   GERD continue omeprazole   Colon 7/18/16: Left-sided diverticulosis. Colon polyps. Internal hemorrhoids. Repeat 5 year       Diverticulitis hx.   Hyperlipidemia continue med    Pneumovax 2015, refuse prevnar 13, and zostavax and shingrix refused and explained risks  Psoriasis refill clobetasole   Constipation cont otc fiber  Knees pain, OA probable, glucosamine continue   Osteopenia due bone density   Foot fungal infection s/p med by derm  Allergy, claritin   Palpitation seeing cardio on medicine improved,  metoprol change to 1/2 tab qhs prn,   Anxiety,  increase  zoloft to 50  Retina bleeding  Bladder pressure  UA abnormal patient no sx now, watch  1mo

## 2019-03-01 ENCOUNTER — OFFICE VISIT (OUTPATIENT)
Dept: CARDIOLOGY | Facility: CLINIC | Age: 74
End: 2019-03-01

## 2019-03-01 VITALS
WEIGHT: 163 LBS | HEIGHT: 62 IN | SYSTOLIC BLOOD PRESSURE: 122 MMHG | OXYGEN SATURATION: 97 % | BODY MASS INDEX: 30 KG/M2 | RESPIRATION RATE: 18 BRPM | DIASTOLIC BLOOD PRESSURE: 72 MMHG | HEART RATE: 64 BPM

## 2019-03-01 DIAGNOSIS — R00.2 PALPITATIONS: ICD-10-CM

## 2019-03-01 DIAGNOSIS — I10 BENIGN ESSENTIAL HYPERTENSION: Primary | ICD-10-CM

## 2019-03-01 DIAGNOSIS — I34.1 MVP (MITRAL VALVE PROLAPSE): ICD-10-CM

## 2019-03-01 PROCEDURE — 99214 OFFICE O/P EST MOD 30 MIN: CPT | Performed by: INTERNAL MEDICINE

## 2019-03-01 RX ORDER — CLONIDINE HYDROCHLORIDE 0.1 MG/1
0.1 TABLET ORAL EVERY 8 HOURS PRN
Status: DISCONTINUED | OUTPATIENT
Start: 2019-03-01 | End: 2019-04-04

## 2019-03-01 RX ORDER — CLONIDINE HYDROCHLORIDE 0.1 MG/1
0.1 TABLET ORAL EVERY 12 HOURS SCHEDULED
Status: DISCONTINUED | OUTPATIENT
Start: 2019-03-01 | End: 2019-03-01

## 2019-03-01 NOTE — PROGRESS NOTES
"    Subjective:     Encounter Date:03/01/2019      Patient ID: Monica Perea is a 74 y.o. female.    Chief Complaint: Palpitations  HPI  This is a 74-year-old female patient who presents to cardiology clinic for a second opinion regarding palpitations.  The patient was previously under the care of Dr. Diaz who had performed multiple outpatient cardiac monitoring procedures all of which were unremarkable.  The patient has been experiencing palpitations for the last 5+ years.  She reports a sense that her heart will race, beat irregularly, flutter and skipped beats.  There is no associated dizziness or syncope.  The patient has previously worn a 14-day BioZ patch which showed no significant arrhythmia with rare PAC and PVC burden.  There was no correlation between ectopy and her symptoms.  The patient also recently wore a 48-hour Holter monitor which was essentially normal.  All bradycardic events were limited to sleeping hours and were asymptomatic.  She had a total of 2 PVCs over a 48-hour timeframe.  She had a less than 1% PAC burden with 6 episodes of very brief paroxysmal atrial tachycardia.  This was all asymptomatic with no correlation to her symptoms.  The patient has no history of documented atrial fibrillation\atrial flutter.  She has no history of ventricular tachycardia.  The patient is also concerned about her hypertension.  Her last 4 clinic recorded blood pressures have been acceptable.  The patient indicates that when she is at home and is at rest her blood pressure will be \"perfect\".  However if she gets up and does physical activities she notes that her blood pressure will be as high as 160 mmHg systolic.  She has no chest discomfort at rest or with activity.  There is no exertional chest arm neck jaw shoulder or back discomfort.  The patient underwent heart catheterization 10-15 years ago and was demonstrated to have minor luminal irregularities at that time.  She has no history of " congestive heart failure.  There is no shortness of breath with rest or with activity.  She has no orthopnea PND or lower extremity edema.  She carries the diagnosis of mitral valve prolapse which is unsubstantiated by any recent echocardiogram available for my review.  She stopped smoking in 1994.  She has no personal history of diabetes but is on statin based cholesterol-lowering therapy.  Her family history is negative for premature coronary artery disease.  The following portions of the patient's history were reviewed and updated as appropriate: allergies, current medications, past family history, past medical history, past social history, past surgical history and problem  Review of Systems   Constitution: Negative for chills, diaphoresis, fever, weakness, malaise/fatigue, night sweats, weight gain and weight loss.   HENT: Negative for ear discharge, hearing loss, hoarse voice and nosebleeds.    Eyes: Negative for discharge, double vision, pain and photophobia.   Cardiovascular: Positive for palpitations. Negative for chest pain, claudication, cyanosis, dyspnea on exertion, irregular heartbeat, leg swelling, near-syncope, orthopnea, paroxysmal nocturnal dyspnea and syncope.   Respiratory: Negative for cough, hemoptysis, shortness of breath, sputum production and wheezing.    Endocrine: Negative for cold intolerance, heat intolerance, polydipsia, polyphagia and polyuria.   Hematologic/Lymphatic: Negative for adenopathy and bleeding problem. Does not bruise/bleed easily.   Skin: Negative for color change, flushing, itching and rash.   Musculoskeletal: Negative for muscle cramps, muscle weakness, myalgias and stiffness.   Gastrointestinal: Negative for abdominal pain, diarrhea, hematemesis, hematochezia, nausea and vomiting.   Genitourinary: Negative for dysuria, frequency and nocturia.   Neurological: Negative for focal weakness, loss of balance, numbness, paresthesias and seizures.   Psychiatric/Behavioral:  Negative for altered mental status, hallucinations and suicidal ideas.   Allergic/Immunologic: Negative for HIV exposure, hives and persistent infections.           Current Outpatient Medications:   •  ALPRAZolam (XANAX) 0.25 MG tablet, Take 1 tablet by mouth Daily As Needed for Anxiety., Disp: 15 tablet, Rfl: 1  •  amLODIPine (NORVASC) 5 MG tablet, Take 1 tablet by mouth Daily., Disp: 90 tablet, Rfl: 3  •  aspirin (ASPIRIN LOW DOSE) 81 MG tablet, Take  by mouth Daily., Disp: , Rfl:   •  Calcium Carbonate-Vitamin D (CALCIUM PLUS VITAMIN D PO), Take  by mouth Daily., Disp: , Rfl:   •  cetirizine (zyrTEC) 10 MG tablet, Take 10 mg by mouth Daily., Disp: , Rfl:   •  clobetasol (TEMOVATE) 0.05 % ointment, Apply  topically Every 12 (Twelve) Hours., Disp: 60 g, Rfl: 3  •  coenzyme Q10 100 MG capsule, Take 100 mg by mouth Daily., Disp: , Rfl:   •  hydrochlorothiazide (HYDRODIURIL) 12.5 MG tablet, Take 1 tablet by mouth Daily., Disp: 30 tablet, Rfl: 1  •  magnesium oxide (MAGOX) 400 (241.3 MG) MG tablet tablet, Take 250 mg by mouth 2 (Two) Times a Day., Disp: , Rfl:   •  metoprolol tartrate (LOPRESSOR) 25 MG tablet, Take 1 tablet by mouth Daily As Needed (palpitations)., Disp: 30 tablet, Rfl: 11  •  Omega-3 Fatty Acids (FISH OIL) 1000 MG capsule capsule, Take 1,200 mg by mouth 2 (Two) Times a Day With Meals., Disp: , Rfl:   •  pravastatin (PRAVACHOL) 40 MG tablet, Take 1 tablet by mouth Daily. as directed, Disp: 90 tablet, Rfl: 3  •  sertraline (ZOLOFT) 50 MG tablet, Take 1 tablet by mouth Daily., Disp: 30 tablet, Rfl: 1  •  TURMERIC PO, Take  by mouth Daily., Disp: , Rfl:     Current Facility-Administered Medications:   •  CloNIDine (CATAPRES) tablet 0.1 mg, 0.1 mg, Oral, Q8H PRN, Breeding, Tico DELGADO MD    Objective:   Physical Exam   Constitutional: She is oriented to person, place, and time. She appears well-developed and well-nourished. No distress.   HENT:   Head: Normocephalic and atraumatic.   Mouth/Throat: Oropharynx  "is clear and moist.   Eyes: Conjunctivae and EOM are normal. Pupils are equal, round, and reactive to light. No scleral icterus.   Neck: Normal range of motion. Neck supple. No JVD present. No tracheal deviation present. No thyromegaly present.   Cardiovascular: Normal rate, regular rhythm, S1 normal, S2 normal, normal heart sounds, intact distal pulses and normal pulses. PMI is not displaced. Exam reveals no gallop and no friction rub.   No murmur heard.  Pulmonary/Chest: Effort normal and breath sounds normal. No stridor. No respiratory distress. She has no wheezes. She has no rales.   Abdominal: Soft. Bowel sounds are normal. She exhibits no distension and no mass. There is no tenderness. There is no rebound and no guarding.   Musculoskeletal: Normal range of motion. She exhibits no edema or deformity.   Neurological: She is alert and oriented to person, place, and time. She displays normal reflexes. No cranial nerve deficit. Coordination normal.   Skin: Skin is warm and dry. No rash noted. She is not diaphoretic. No erythema.   Psychiatric: She has a normal mood and affect. Her behavior is normal. Thought content normal.     Blood pressure 122/72, pulse 64, resp. rate 18, height 157.5 cm (62.01\"), weight 73.9 kg (163 lb), SpO2 97 %.   Lab Review:     Assessment:       1. Benign essential hypertension  Overall her blood pressure control appears to be acceptable from my perspective.  The patient is extremely concerned about her overall blood pressure control as she recently had an ophthalmologic evaluation which demonstrated flame hemorrhages in the retina which were attributed to hypertensive retinopathy.  - Adult Transthoracic Echo Complete W/ Cont if Necessary Per Protocol  - CloNIDine (CATAPRES) tablet 0.1 mg; Take 1 tablet by mouth Every 8 (Eight) Hours As Needed for High Blood Pressure (SBP>150 mm Hg and/or DBP> 90 mm Hg).    2. MVP (mitral valve prolapse)  I did not appreciate the typical click murmur " complex associated with true mitral valve prolapse during auscultation of her heart at today's cardiac examination.  - Adult Transthoracic Echo Complete W/ Cont if Necessary Per Protocol    3. Palpitations  Over the patient's most recent outpatient cardiac monitoring, the patient has manifest no arrhythmia or frequent\complex atrial or ventricular ectopy.  She has had no correlation between her symptoms and underlying rhythm disorder or ectopy.  She is not accepted reassurance that this is almost certainly a benign condition unrelated to a cardiac arrhythmia.  - Adult Transthoracic Echo Complete W/ Cont if Necessary Per Protocol    Procedures    Plan:     I have reassured the patient regarding the benign nature of her cardiac testing today.  I have recommended an implantable loop recorder.  I have recommended an echocardiogram.  The patient has been reassured that it is normal for the systolic blood pressure to increase with activity.  She has been counseled to take her blood pressure 30-45 minutes after taking all of her morning medications when she is sitting comfortably and relaxed without having done physical activity previously and not emotionally upset for any reason.  I have given the patient a prescription for clonidine 0.1 mg to take orally every 8 hours as needed for a systolic blood pressure greater than 150 mmHg and/or diastolic blood pressure greater than 90 mmHg.  The patient has been counseled regarding the importance of dietary sodium restriction.  She has been given a list of specific foods and beverages that are high in sodium content with instructions to avoid these.  She has been counseled to eat foods that are rich in potassium such as cantaloupes, tomatoes, bananas, etc.  She is counseled to maintain an active lifestyle.  Further recommendations will be predicated on the results of her outpatient testing.

## 2019-03-07 LAB
BH CV ECHO MEAS - % IVS THICK: 68.2 %
BH CV ECHO MEAS - % LVPW THICK: 53.8 %
BH CV ECHO MEAS - AO MAX PG (FULL): 3.1 MMHG
BH CV ECHO MEAS - AO MAX PG: 7 MMHG
BH CV ECHO MEAS - AO MEAN PG (FULL): 2 MMHG
BH CV ECHO MEAS - AO MEAN PG: 4 MMHG
BH CV ECHO MEAS - AO ROOT AREA: 3.1 CM^2
BH CV ECHO MEAS - AO ROOT DIAM: 2 CM
BH CV ECHO MEAS - AO V2 MAX: 133.5 CM/SEC
BH CV ECHO MEAS - AO V2 MEAN: 92.9 CM/SEC
BH CV ECHO MEAS - AO V2 VTI: 30 CM
BH CV ECHO MEAS - AVA(I,A): 2.3 CM^2
BH CV ECHO MEAS - AVA(I,D): 2.3 CM^2
BH CV ECHO MEAS - AVA(V,A): 2.3 CM^2
BH CV ECHO MEAS - AVA(V,D): 2.3 CM^2
BH CV ECHO MEAS - EDV(CUBED): 121.3 ML
BH CV ECHO MEAS - EDV(MOD-SP4): 104 ML
BH CV ECHO MEAS - EDV(TEICH): 115.5 ML
BH CV ECHO MEAS - EF(CUBED): 81.9 %
BH CV ECHO MEAS - EF(MOD-SP4): 68.3 %
BH CV ECHO MEAS - EF(TEICH): 74.4 %
BH CV ECHO MEAS - ESV(CUBED): 22 ML
BH CV ECHO MEAS - ESV(MOD-SP4): 33 ML
BH CV ECHO MEAS - ESV(TEICH): 29.6 ML
BH CV ECHO MEAS - FS: 43.4 %
BH CV ECHO MEAS - IVS/LVPW: 1.7
BH CV ECHO MEAS - IVSD: 0.9 CM
BH CV ECHO MEAS - IVSS: 1.9 CM
BH CV ECHO MEAS - LA DIMENSION: 3.1 CM
BH CV ECHO MEAS - LA/AO: 1.6
BH CV ECHO MEAS - LAD MAJOR: 4 CM
BH CV ECHO MEAS - LAT PEAK E' VEL: 12.3 CM/SEC
BH CV ECHO MEAS - LATERAL E/E' RATIO: 4.7
BH CV ECHO MEAS - LV IVRT: 0.13 SEC
BH CV ECHO MEAS - LV MASS(C)D: 149.9 GRAMS
BH CV ECHO MEAS - LV MASS(C)S: 132.4 GRAMS
BH CV ECHO MEAS - LV MAX PG: 3.9 MMHG
BH CV ECHO MEAS - LV MEAN PG: 2 MMHG
BH CV ECHO MEAS - LV V1 MAX: 99.1 CM/SEC
BH CV ECHO MEAS - LV V1 MEAN: 58.5 CM/SEC
BH CV ECHO MEAS - LV V1 VTI: 22 CM
BH CV ECHO MEAS - LVIDD: 5 CM
BH CV ECHO MEAS - LVIDS: 2.8 CM
BH CV ECHO MEAS - LVLD AP4: 7.7 CM
BH CV ECHO MEAS - LVLS AP4: 6.5 CM
BH CV ECHO MEAS - LVOT AREA (M): 3.1 CM^2
BH CV ECHO MEAS - LVOT AREA: 3.1 CM^2
BH CV ECHO MEAS - LVOT DIAM: 2 CM
BH CV ECHO MEAS - LVPWD: 0.65 CM
BH CV ECHO MEAS - LVPWS: 1 CM
BH CV ECHO MEAS - MED PEAK E' VEL: 8.9 CM/SEC
BH CV ECHO MEAS - MEDIAL E/E' RATIO: 6.6
BH CV ECHO MEAS - MR MAX PG: 86 MMHG
BH CV ECHO MEAS - MR MAX VEL: 463 CM/SEC
BH CV ECHO MEAS - MR MEAN PG: 52 MMHG
BH CV ECHO MEAS - MR MEAN VEL: 335 CM/SEC
BH CV ECHO MEAS - MR VTI: 169 CM
BH CV ECHO MEAS - MV A MAX VEL: 46.9 CM/SEC
BH CV ECHO MEAS - MV DEC SLOPE: 259.5 CM/SEC^2
BH CV ECHO MEAS - MV DEC TIME: 0.21 SEC
BH CV ECHO MEAS - MV E MAX VEL: 58.2 CM/SEC
BH CV ECHO MEAS - MV E/A: 1.2
BH CV ECHO MEAS - MV MAX PG: 2.5 MMHG
BH CV ECHO MEAS - MV MEAN PG: 1 MMHG
BH CV ECHO MEAS - MV P1/2T MAX VEL: 60.7 CM/SEC
BH CV ECHO MEAS - MV P1/2T: 68.5 MSEC
BH CV ECHO MEAS - MV V2 MAX: 79.3 CM/SEC
BH CV ECHO MEAS - MV V2 MEAN: 46.4 CM/SEC
BH CV ECHO MEAS - MV V2 VTI: 28 CM
BH CV ECHO MEAS - MVA P1/2T LCG: 3.6 CM^2
BH CV ECHO MEAS - MVA(P1/2T): 3.2 CM^2
BH CV ECHO MEAS - MVA(VTI): 2.5 CM^2
BH CV ECHO MEAS - PA MAX PG: 2.8 MMHG
BH CV ECHO MEAS - PA V2 MAX: 84.4 CM/SEC
BH CV ECHO MEAS - RAP SYSTOLE: 5 MMHG
BH CV ECHO MEAS - RVSP: 30 MMHG
BH CV ECHO MEAS - SV(AO): 94.2 ML
BH CV ECHO MEAS - SV(CUBED): 99.3 ML
BH CV ECHO MEAS - SV(LVOT): 69.1 ML
BH CV ECHO MEAS - SV(MOD-SP4): 71 ML
BH CV ECHO MEAS - SV(TEICH): 86 ML
BH CV ECHO MEAS - TR MAX PG: 25 MMHG
BH CV ECHO MEAS - TR MAX VEL: 250 CM/SEC
BH CV ECHO MEAS - TV MAX PG: 0.79 MMHG
BH CV ECHO MEAS - TV V2 MAX: 44.4 CM/SEC
BH CV ECHO MEASUREMENTS AVERAGE E/E' RATIO: 5.49
LEFT ATRIUM VOLUME: 35 ML
LV EF 2D ECHO EST: 69 %

## 2019-03-18 ENCOUNTER — TELEPHONE (OUTPATIENT)
Dept: CARDIOLOGY | Facility: CLINIC | Age: 74
End: 2019-03-18

## 2019-03-18 NOTE — TELEPHONE ENCOUNTER
Patient called to check on her next appointment. Patient wants to continue with you as her cardiologist. Patient does Not wish to have the implantable loop recorder placed. Patient also wanted to let you know that her BP is perfect since increasing Amlodipine 10 mg qd.

## 2019-03-25 ENCOUNTER — OFFICE VISIT (OUTPATIENT)
Dept: INTERNAL MEDICINE | Facility: CLINIC | Age: 74
End: 2019-03-25

## 2019-03-25 VITALS
OXYGEN SATURATION: 95 % | SYSTOLIC BLOOD PRESSURE: 124 MMHG | DIASTOLIC BLOOD PRESSURE: 74 MMHG | BODY MASS INDEX: 29.77 KG/M2 | TEMPERATURE: 97.7 F | HEART RATE: 66 BPM | HEIGHT: 62 IN | WEIGHT: 161.75 LBS

## 2019-03-25 DIAGNOSIS — I34.1 MVP (MITRAL VALVE PROLAPSE): ICD-10-CM

## 2019-03-25 DIAGNOSIS — H61.23 BILATERAL IMPACTED CERUMEN: ICD-10-CM

## 2019-03-25 DIAGNOSIS — N95.2 ATROPHIC VAGINITIS: ICD-10-CM

## 2019-03-25 DIAGNOSIS — K57.90 DIVERTICULOSIS OF INTESTINE WITHOUT BLEEDING, UNSPECIFIED INTESTINAL TRACT LOCATION: ICD-10-CM

## 2019-03-25 DIAGNOSIS — E78.5 HYPERLIPIDEMIA, UNSPECIFIED HYPERLIPIDEMIA TYPE: ICD-10-CM

## 2019-03-25 DIAGNOSIS — M54.50 LOW BACK PAIN WITHOUT SCIATICA, UNSPECIFIED BACK PAIN LATERALITY, UNSPECIFIED CHRONICITY: ICD-10-CM

## 2019-03-25 DIAGNOSIS — F41.9 ANXIETY: ICD-10-CM

## 2019-03-25 DIAGNOSIS — K59.09 OTHER CONSTIPATION: ICD-10-CM

## 2019-03-25 DIAGNOSIS — N30.10 INTERSTITIAL CYSTITIS: ICD-10-CM

## 2019-03-25 DIAGNOSIS — J06.9 UPPER RESPIRATORY TRACT INFECTION, UNSPECIFIED TYPE: ICD-10-CM

## 2019-03-25 DIAGNOSIS — I10 BENIGN ESSENTIAL HYPERTENSION: Primary | ICD-10-CM

## 2019-03-25 DIAGNOSIS — M85.80 OSTEOPENIA, UNSPECIFIED LOCATION: ICD-10-CM

## 2019-03-25 DIAGNOSIS — I25.10 CORONARY ARTERY DISEASE INVOLVING NATIVE CORONARY ARTERY OF NATIVE HEART WITHOUT ANGINA PECTORIS: ICD-10-CM

## 2019-03-25 DIAGNOSIS — E66.3 OVERWEIGHT: ICD-10-CM

## 2019-03-25 DIAGNOSIS — R00.2 PALPITATIONS: ICD-10-CM

## 2019-03-25 DIAGNOSIS — L40.9 PSORIASIS: ICD-10-CM

## 2019-03-25 DIAGNOSIS — G47.33 OSA (OBSTRUCTIVE SLEEP APNEA): ICD-10-CM

## 2019-03-25 DIAGNOSIS — H40.9 GLAUCOMA, UNSPECIFIED GLAUCOMA TYPE, UNSPECIFIED LATERALITY: ICD-10-CM

## 2019-03-25 PROCEDURE — 99214 OFFICE O/P EST MOD 30 MIN: CPT | Performed by: INTERNAL MEDICINE

## 2019-03-25 PROCEDURE — 69210 REMOVE IMPACTED EAR WAX UNI: CPT | Performed by: INTERNAL MEDICINE

## 2019-03-25 RX ORDER — BENZONATATE 200 MG/1
200 CAPSULE ORAL 3 TIMES DAILY PRN
Qty: 30 CAPSULE | Refills: 0 | Status: SHIPPED | OUTPATIENT
Start: 2019-03-25 | End: 2019-04-09

## 2019-03-25 RX ORDER — CLOBETASOL PROPIONATE 0.5 MG/G
OINTMENT TOPICAL EVERY 12 HOURS SCHEDULED
Qty: 60 G | Refills: 3 | Status: SHIPPED | OUTPATIENT
Start: 2019-03-25 | End: 2020-03-13 | Stop reason: SDUPTHER

## 2019-03-25 RX ORDER — AMLODIPINE BESYLATE 10 MG/1
10 TABLET ORAL DAILY
Qty: 90 TABLET | Refills: 3 | Status: SHIPPED | OUTPATIENT
Start: 2019-03-25 | End: 2019-06-25 | Stop reason: SDUPTHER

## 2019-03-25 NOTE — PROGRESS NOTES
Subjective   Monica Perea is a 74 y.o. female.     Chief Complaint   Patient presents with   • Follow-up     pt states she had to start taking 2 amlodipine and hydrochlorothiazide to keep her BP steady/ pt is also complaining of a sore throat        History of Present Illness   Patient presents today for follow up of HTN. Blood pressure well controlled. Patient is currently taking amlodipine bid and hydrochlorothiazide for HTN. She also is prescribed clonidine as needed if blood pressure is elevated. Patient stated she monitors BP at home every day usually in AM and PM. Night time BP usually runs 106/74, and AM BP runs 110/80.   GERD controlled on med. HLD controlled on med. Constipation doing well with fiber. Anxiety controlled with zoloft. Patient taking Xanax as needed, only taking 2 pills per month on average. CAD followed by Cardiologist. Next follow up 4/15/19.  Patient complains today of sore throat and cough. Patient stated Symptoms began on Saturday. Cough is productive with clear phlegm. Sore throat is rated 2/10. Cough is worse at night when lying flat. Sore throat is worse with cough. Patient has tried Robitussin DM with improvement of cough/sore throat. Patient stated she is feeling better today compared to previous 2 days. Denies trouble swallowing or choking. Denies sinus pain, sinus pressure, post nasal drip or ear pain. Denies fever or chills. Patient denies chest pain, SOA, abdominal pain, nausea, or diarrhea.   Anxiety improved after increased zoloft to 50 daily    Current Outpatient Medications:   •  ALPRAZolam (XANAX) 0.25 MG tablet, Take 1 tablet by mouth Daily As Needed for Anxiety., Disp: 15 tablet, Rfl: 1  •  amLODIPine (NORVASC) 5 MG tablet, Take 1 tablet by mouth Daily., Disp: 90 tablet, Rfl: 3  •  aspirin (ASPIRIN LOW DOSE) 81 MG tablet, Take  by mouth Daily., Disp: , Rfl:   •  Calcium Carbonate-Vitamin D (CALCIUM PLUS VITAMIN D PO), Take  by mouth Daily., Disp: , Rfl:   •   clobetasol (TEMOVATE) 0.05 % ointment, Apply  topically Every 12 (Twelve) Hours., Disp: 60 g, Rfl: 3  •  coenzyme Q10 100 MG capsule, Take 100 mg by mouth Daily., Disp: , Rfl:   •  hydrochlorothiazide (HYDRODIURIL) 12.5 MG tablet, Take 1 tablet by mouth Daily., Disp: 30 tablet, Rfl: 1  •  magnesium oxide (MAGOX) 400 (241.3 MG) MG tablet tablet, Take 250 mg by mouth 2 (Two) Times a Day., Disp: , Rfl:   •  Omega-3 Fatty Acids (FISH OIL) 1000 MG capsule capsule, Take 1,200 mg by mouth 2 (Two) Times a Day With Meals., Disp: , Rfl:   •  pravastatin (PRAVACHOL) 40 MG tablet, Take 1 tablet by mouth Daily. as directed, Disp: 90 tablet, Rfl: 3  •  sertraline (ZOLOFT) 50 MG tablet, Take 1 tablet by mouth Daily., Disp: 30 tablet, Rfl: 1  •  TURMERIC PO, Take  by mouth Daily., Disp: , Rfl:     Current Facility-Administered Medications:   •  CloNIDine (CATAPRES) tablet 0.1 mg, 0.1 mg, Oral, Q8H PRN, Breeding, Tico DELGADO MD    The following portions of the patient's history were reviewed and updated as appropriate: allergies, current medications, past family history, past medical history, past social history, past surgical history and problem list.    Review of Systems   HENT: Positive for sore throat. Negative for congestion, ear pain, postnasal drip, rhinorrhea, sinus pressure and sinus pain.    Respiratory: Positive for cough. Negative for chest tightness, shortness of breath, wheezing and stridor.    Cardiovascular: Negative for chest pain, palpitations and leg swelling.   Gastrointestinal: Negative for abdominal pain, constipation, diarrhea, nausea and vomiting.   Genitourinary: Negative for difficulty urinating, dysuria, flank pain, frequency, hematuria and urgency.   Skin: Negative.    Neurological: Negative.        Objective   Physical Exam   Constitutional: She is oriented to person, place, and time. She appears well-developed and well-nourished. No distress.   HENT:   Head: Normocephalic.   Right Ear: External ear normal.  No swelling or tenderness.   Left Ear: External ear normal. No swelling or tenderness.   Nose: Right sinus exhibits no maxillary sinus tenderness and no frontal sinus tenderness. Left sinus exhibits no maxillary sinus tenderness and no frontal sinus tenderness.   Mouth/Throat: Uvula is midline and mucous membranes are normal. Posterior oropharyngeal erythema present. No tonsillar exudate.   Cerumen bilaterally blocking visualization of TM   Eyes: EOM are normal.   Neck: Normal range of motion.   Cardiovascular: Normal rate, regular rhythm, normal heart sounds and intact distal pulses. Exam reveals no gallop and no friction rub.   No murmur heard.  Pulmonary/Chest: Effort normal and breath sounds normal. No stridor. No respiratory distress. She has no wheezes. She has no rales.   Abdominal: Soft. She exhibits no distension. There is no tenderness.   Musculoskeletal: She exhibits no edema.   Neurological: She is alert and oriented to person, place, and time.   Skin: Skin is warm and dry.   Psychiatric: She has a normal mood and affect. Her behavior is normal.       All tests have been reviewed.    Assessment/Plan   There are no diagnoses linked to this encounter.          hypertension, continue medicine,  HCTZ 12.5 daily  Cad MILD BLOCKAGE 2009 30%.  stress test normal, follow up with cardio,   GERD continue omeprazole   Colon 7/18/16: Left-sided diverticulosis. Colon polyps. Internal hemorrhoids. Repeat 5 year       Diverticulitis hx.   Hyperlipidemia continue med    Pneumovax 2015, prevnar 13 today, and zostavax and shingrix refused and explained risks  Psoriasis refill clobetasole   Constipation cont otc fiber  Knees pain, OA probable, glucosamine continue   Osteopenia due bone density   Foot fungal infection s/p med by derm  Allergy  -- not currently taking claritin  Palpitation seeing cardio on medicine improved,   Anxiety,  improved on zoloft 50, xanax prn  Retina bleeding  Bladder pressure  UA abnormal patient  no sx now, watch  Informed about shingrix at pharmacy. Tdap?   Eczema refill medicine today  Uri, tessher, zyrtec, tylenol  1mo     Ear Cerumen Removal Instrumentation  Date/Time: 3/25/2019 9:53 AM  Performed by: Abdiel Juan MD  Authorized by: Abdiel Juan MD   Consent: Verbal consent obtained.  Risks and benefits: risks, benefits and alternatives were discussed  Consent given by: patient  Patient understanding: patient states understanding of the procedure being performed  Patient consent: the patient's understanding of the procedure matches consent given  Patient identity confirmed: verbally with patient    Anesthesia:  Local Anesthetic: none  Location details: right ear and left ear  Patient tolerance: Patient tolerated the procedure well with no immediate complications  Procedure type: curette   Sedation:  Patient sedated: no

## 2019-04-04 ENCOUNTER — TELEPHONE (OUTPATIENT)
Dept: CARDIOLOGY | Facility: CLINIC | Age: 74
End: 2019-04-04

## 2019-04-04 DIAGNOSIS — I10 ESSENTIAL HYPERTENSION: Primary | ICD-10-CM

## 2019-04-04 RX ORDER — CLONIDINE HYDROCHLORIDE 0.1 MG/1
0.1 TABLET ORAL 3 TIMES DAILY
Qty: 90 TABLET | Refills: 6 | Status: SHIPPED | OUTPATIENT
Start: 2019-04-04 | End: 2019-08-01 | Stop reason: SINTOL

## 2019-04-04 NOTE — TELEPHONE ENCOUNTER
Wrong Rx for Clonidine was sent in for patient because it did not Go anywhere. Sent the correct med to [facility] pharmacy.  Elham Posada MA

## 2019-04-09 ENCOUNTER — OFFICE VISIT (OUTPATIENT)
Dept: INTERNAL MEDICINE | Facility: CLINIC | Age: 74
End: 2019-04-09

## 2019-04-09 VITALS
HEART RATE: 66 BPM | OXYGEN SATURATION: 97 % | BODY MASS INDEX: 30.39 KG/M2 | TEMPERATURE: 97.1 F | DIASTOLIC BLOOD PRESSURE: 76 MMHG | SYSTOLIC BLOOD PRESSURE: 136 MMHG | HEIGHT: 62 IN | WEIGHT: 165.12 LBS

## 2019-04-09 DIAGNOSIS — N39.0 URINARY TRACT INFECTION WITHOUT HEMATURIA, SITE UNSPECIFIED: ICD-10-CM

## 2019-04-09 DIAGNOSIS — R39.9 URINARY SYMPTOM OR SIGN: Primary | ICD-10-CM

## 2019-04-09 LAB
BILIRUB BLD-MCNC: NEGATIVE MG/DL
CLARITY, POC: CLEAR
COLOR UR: YELLOW
GLUCOSE UR STRIP-MCNC: NEGATIVE MG/DL
KETONES UR QL: NEGATIVE
LEUKOCYTE EST, POC: ABNORMAL
NITRITE UR-MCNC: POSITIVE MG/ML
PH UR: 6.5 [PH] (ref 5–8)
PROT UR STRIP-MCNC: NEGATIVE MG/DL
RBC # UR STRIP: NEGATIVE /UL
SP GR UR: 1 (ref 1–1.03)
UROBILINOGEN UR QL: NORMAL

## 2019-04-09 PROCEDURE — 81003 URINALYSIS AUTO W/O SCOPE: CPT | Performed by: INTERNAL MEDICINE

## 2019-04-09 PROCEDURE — 99213 OFFICE O/P EST LOW 20 MIN: CPT | Performed by: INTERNAL MEDICINE

## 2019-04-09 RX ORDER — CIPROFLOXACIN 500 MG/1
500 TABLET, FILM COATED ORAL 2 TIMES DAILY
Qty: 14 TABLET | Refills: 0 | Status: SHIPPED | OUTPATIENT
Start: 2019-04-09 | End: 2019-06-25

## 2019-04-09 NOTE — PROGRESS NOTES
Subjective   Monica Perea is a 74 y.o. female.     Chief Complaint   Patient presents with   • Urinary Frequency     X 2-3 days.       History of Present Illness   Dysuria , frequency, urgency, no back , fever chill none. Increase fluids no help. Azo helps some. For 2 days.     Current Outpatient Medications:   •  ALPRAZolam (XANAX) 0.25 MG tablet, Take 1 tablet by mouth Daily As Needed for Anxiety., Disp: 15 tablet, Rfl: 1  •  amLODIPine (NORVASC) 10 MG tablet, Take 1 tablet by mouth Daily., Disp: 90 tablet, Rfl: 3  •  aspirin (ASPIRIN LOW DOSE) 81 MG tablet, Take  by mouth Daily., Disp: , Rfl:   •  Calcium Carbonate-Vitamin D (CALCIUM PLUS VITAMIN D PO), Take  by mouth Daily., Disp: , Rfl:   •  clobetasol (TEMOVATE) 0.05 % ointment, Apply  topically to the appropriate area as directed Every 12 (Twelve) Hours., Disp: 60 g, Rfl: 3  •  CloNIDine (CATAPRES) 0.1 MG tablet, Take 1 tablet by mouth 3 (Three) Times a Day. If BP is 150/90, Disp: 90 tablet, Rfl: 6  •  coenzyme Q10 100 MG capsule, Take 100 mg by mouth Daily., Disp: , Rfl:   •  hydrochlorothiazide (HYDRODIURIL) 12.5 MG tablet, Take 1 tablet by mouth Daily., Disp: 30 tablet, Rfl: 1  •  magnesium oxide (MAGOX) 400 (241.3 MG) MG tablet tablet, Take 250 mg by mouth 2 (Two) Times a Day., Disp: , Rfl:   •  Omega-3 Fatty Acids (FISH OIL) 1000 MG capsule capsule, Take 1,200 mg by mouth 2 (Two) Times a Day With Meals., Disp: , Rfl:   •  pravastatin (PRAVACHOL) 40 MG tablet, Take 1 tablet by mouth Daily. as directed, Disp: 90 tablet, Rfl: 3  •  sertraline (ZOLOFT) 50 MG tablet, Take 1 tablet by mouth Daily., Disp: 30 tablet, Rfl: 1  •  TURMERIC PO, Take  by mouth Daily., Disp: , Rfl:   •  ciprofloxacin (CIPRO) 500 MG tablet, Take 1 tablet by mouth 2 (Two) Times a Day., Disp: 14 tablet, Rfl: 0    The following portions of the patient's history were reviewed and updated as appropriate: allergies, current medications, past family history, past medical history,  past social history, past surgical history and problem list.    Review of Systems   Constitutional: Negative.    Respiratory: Negative.    Cardiovascular: Negative.    Gastrointestinal: Negative.    Genitourinary: Positive for dysuria, frequency and urgency. Negative for flank pain and hematuria.   Skin: Negative.    Psychiatric/Behavioral: Negative.        Objective   Physical Exam   Constitutional: She is oriented to person, place, and time. She appears well-developed and well-nourished.   HENT:   Head: Normocephalic.   Neck: Neck supple.   Cardiovascular: Normal rate, regular rhythm and normal heart sounds.   Pulmonary/Chest: Effort normal and breath sounds normal.   Abdominal: Soft. Bowel sounds are normal.   Neurological: She is alert and oriented to person, place, and time.   Psychiatric: She has a normal mood and affect. Her behavior is normal.       All tests have been reviewed.    Assessment/Plan   Diagnoses and all orders for this visit:    Urinary symptom or sign  -     POC Urinalysis Dipstick, Automated    Urinary tract infection without hematuria, site unspecified    Other orders  -     ciprofloxacin (CIPRO) 500 MG tablet; Take 1 tablet by mouth 2 (Two) Times a Day.

## 2019-04-15 ENCOUNTER — OFFICE VISIT (OUTPATIENT)
Dept: CARDIOLOGY | Facility: CLINIC | Age: 74
End: 2019-04-15

## 2019-04-15 VITALS
OXYGEN SATURATION: 99 % | HEIGHT: 62 IN | BODY MASS INDEX: 30.25 KG/M2 | DIASTOLIC BLOOD PRESSURE: 72 MMHG | HEART RATE: 90 BPM | WEIGHT: 164.4 LBS | SYSTOLIC BLOOD PRESSURE: 122 MMHG

## 2019-04-15 DIAGNOSIS — R00.2 PALPITATIONS: ICD-10-CM

## 2019-04-15 DIAGNOSIS — I10 BENIGN ESSENTIAL HYPERTENSION: Primary | ICD-10-CM

## 2019-04-15 DIAGNOSIS — I25.10 CORONARY ARTERY DISEASE INVOLVING NATIVE CORONARY ARTERY OF NATIVE HEART WITHOUT ANGINA PECTORIS: ICD-10-CM

## 2019-04-15 PROCEDURE — 99214 OFFICE O/P EST MOD 30 MIN: CPT | Performed by: INTERNAL MEDICINE

## 2019-04-15 RX ORDER — CHOLECALCIFEROL (VITAMIN D3) 125 MCG
5 CAPSULE ORAL NIGHTLY PRN
COMMUNITY
Start: 2019-03-26 | End: 2021-04-26

## 2019-04-15 NOTE — PROGRESS NOTES
Subjective:     Encounter Date:04/15/2019      Patient ID: Monica Perea is a 74 y.o. female.    Chief Complaint: Palpitations  HPI  This is a 74-year-old female patient who is been experiencing palpitations off and on for quite some time.  She was recommended to have an echocardiogram which was performed and was normal. The echocardiogram showed no evidence of ventricular hypertrophy or cardiac chamber enlargement.  Left ventricular systolic and diastolic function was normal.  There was no evidence of valvular pathology of significance, pericardial disease, pulmonary hypertension or intracardiac shunting.  The left ventricular ejection fraction was normal and there were no regional wall motion abnormalities.  She was also recommended to have an implantable loop recorder as her previous 30-day cardiac monitor had shown no abnormalities.  The patient decided she did not want to proceed with placing an implantable loop recorder.  She indicates that her palpitations persist with no change in frequency duration or intensity.  She has some dizziness and reports fatigue and exhaustion.  She has no syncope.  There is no chest discomfort at rest or with activity.  There is no change in her baseline dyspnea with activity.  There is no orthopnea PND or lower extremity edema.  She is a non-smoker.  She indicates that she has had only one episode where her systolic blood pressure was greater than 150 mmHg but chose not to use her clonidine for that episode.    The following portions of the patient's history were reviewed and updated as appropriate: allergies, current medications, past family history, past medical history, past social history, past surgical history and problem  Review of Systems   Constitution: Negative for chills, diaphoresis, fever, weakness, malaise/fatigue, weight gain and weight loss.   HENT: Negative for ear discharge, hearing loss, hoarse voice and nosebleeds.    Eyes: Negative for discharge,  double vision, pain and photophobia.   Cardiovascular: Positive for palpitations. Negative for chest pain, claudication, cyanosis, dyspnea on exertion, irregular heartbeat, leg swelling, near-syncope, orthopnea, paroxysmal nocturnal dyspnea and syncope.   Respiratory: Negative for cough, hemoptysis, shortness of breath, sputum production and wheezing.    Endocrine: Negative for cold intolerance, heat intolerance, polydipsia, polyphagia and polyuria.   Hematologic/Lymphatic: Negative for adenopathy and bleeding problem. Does not bruise/bleed easily.   Skin: Negative for color change, flushing, itching and rash.   Musculoskeletal: Negative for muscle cramps, muscle weakness, myalgias and stiffness.   Gastrointestinal: Negative for abdominal pain, diarrhea, hematemesis, hematochezia, nausea and vomiting.   Genitourinary: Negative for dysuria, frequency and nocturia.   Neurological: Negative for focal weakness, loss of balance, numbness, paresthesias and seizures.   Psychiatric/Behavioral: Negative for altered mental status, hallucinations and suicidal ideas.   Allergic/Immunologic: Negative for HIV exposure, hives and persistent infections.           Current Outpatient Medications:   •  ALPRAZolam (XANAX) 0.25 MG tablet, Take 1 tablet by mouth Daily As Needed for Anxiety., Disp: 15 tablet, Rfl: 1  •  amLODIPine (NORVASC) 10 MG tablet, Take 1 tablet by mouth Daily., Disp: 90 tablet, Rfl: 3  •  aspirin (ASPIRIN LOW DOSE) 81 MG tablet, Take  by mouth Daily., Disp: , Rfl:   •  Calcium Carbonate-Vitamin D (CALCIUM PLUS VITAMIN D PO), Take  by mouth Daily., Disp: , Rfl:   •  ciprofloxacin (CIPRO) 500 MG tablet, Take 1 tablet by mouth 2 (Two) Times a Day., Disp: 14 tablet, Rfl: 0  •  clobetasol (TEMOVATE) 0.05 % ointment, Apply  topically to the appropriate area as directed Every 12 (Twelve) Hours., Disp: 60 g, Rfl: 3  •  CloNIDine (CATAPRES) 0.1 MG tablet, Take 1 tablet by mouth 3 (Three) Times a Day. If BP is 150/90, Disp:  90 tablet, Rfl: 6  •  coenzyme Q10 100 MG capsule, Take 100 mg by mouth Daily., Disp: , Rfl:   •  hydrochlorothiazide (HYDRODIURIL) 12.5 MG tablet, Take 1 tablet by mouth Daily., Disp: 30 tablet, Rfl: 1  •  magnesium oxide (MAGOX) 400 (241.3 MG) MG tablet tablet, Take 250 mg by mouth 2 (Two) Times a Day., Disp: , Rfl:   •  melatonin 5 MG tablet tablet, , Disp: , Rfl:   •  Omega-3 Fatty Acids (FISH OIL) 1000 MG capsule capsule, Take 1,200 mg by mouth 2 (Two) Times a Day With Meals., Disp: , Rfl:   •  Oxymetazoline HCl (NASAL DECONGESTANT SPRAY NA), , Disp: , Rfl:   •  pravastatin (PRAVACHOL) 40 MG tablet, Take 1 tablet by mouth Daily. as directed, Disp: 90 tablet, Rfl: 3  •  sertraline (ZOLOFT) 50 MG tablet, Take 1 tablet by mouth Daily., Disp: 30 tablet, Rfl: 1  •  TURMERIC PO, Take  by mouth Daily., Disp: , Rfl:     Objective:   Physical Exam   Constitutional: She is oriented to person, place, and time. She appears well-developed and well-nourished. No distress.   HENT:   Head: Normocephalic and atraumatic.   Mouth/Throat: Oropharynx is clear and moist.   Eyes: Conjunctivae and EOM are normal. Pupils are equal, round, and reactive to light. No scleral icterus.   Neck: Normal range of motion. Neck supple. No JVD present. No tracheal deviation present. No thyromegaly present.   Cardiovascular: Normal rate, regular rhythm, S1 normal, S2 normal, normal heart sounds, intact distal pulses and normal pulses. PMI is not displaced. Exam reveals no gallop and no friction rub.   No murmur heard.  Pulmonary/Chest: Effort normal and breath sounds normal. No stridor. No respiratory distress. She has no wheezes. She has no rales.   Abdominal: Soft. Bowel sounds are normal. She exhibits no distension and no mass. There is no tenderness. There is no rebound and no guarding.   Musculoskeletal: Normal range of motion. She exhibits no edema or deformity.   Neurological: She is alert and oriented to person, place, and time. She  "displays normal reflexes. No cranial nerve deficit. Coordination normal.   Skin: Skin is warm and dry. No rash noted. She is not diaphoretic. No erythema.   Psychiatric: She has a normal mood and affect. Her behavior is normal. Thought content normal.     Blood pressure 122/72, pulse 90, height 157.5 cm (62\"), weight 74.6 kg (164 lb 6.4 oz), SpO2 99 %.   Lab Review:     Assessment:       1. Benign essential hypertension  Acceptable blood pressure control.    2. Coronary artery disease involving native coronary artery of native heart without angina pectoris  Stable and angina free with an active lifestyle.    3. Palpitations  No evidence of arrhythmia or ectopy based on previous cardiac monitoring.  Her echocardiogram shows no structural heart disease.  The patient has declined placing an implantable loop recorder.    Procedures    Plan:     No changes in her medications have been made at today's visit.  No additional cardiovascular testing is warranted.  The patient is encouraged to maintain an active lifestyle.  She is up again been counseled regarding the importance of dietary sodium restriction.  The patient has been instructed that if she changes her mind and decides to have the implantable loop recorder placed, call our office and we can arrange this to be done through Dr. Otto Car.      "

## 2019-04-16 RX ORDER — PRAVASTATIN SODIUM 40 MG
40 TABLET ORAL DAILY
Qty: 90 TABLET | Refills: 3 | Status: SHIPPED | OUTPATIENT
Start: 2019-04-16 | End: 2019-11-01 | Stop reason: SDUPTHER

## 2019-04-17 RX ORDER — HYDROCHLOROTHIAZIDE 12.5 MG/1
12.5 TABLET ORAL DAILY
Qty: 30 TABLET | Refills: 1 | Status: SHIPPED | OUTPATIENT
Start: 2019-04-17 | End: 2019-06-25 | Stop reason: SDUPTHER

## 2019-06-25 ENCOUNTER — OFFICE VISIT (OUTPATIENT)
Dept: INTERNAL MEDICINE | Facility: CLINIC | Age: 74
End: 2019-06-25

## 2019-06-25 VITALS
HEART RATE: 61 BPM | SYSTOLIC BLOOD PRESSURE: 124 MMHG | HEIGHT: 62 IN | TEMPERATURE: 97.7 F | WEIGHT: 161.12 LBS | OXYGEN SATURATION: 99 % | DIASTOLIC BLOOD PRESSURE: 78 MMHG | BODY MASS INDEX: 29.65 KG/M2

## 2019-06-25 DIAGNOSIS — I25.10 CORONARY ARTERY DISEASE INVOLVING NATIVE CORONARY ARTERY OF NATIVE HEART WITHOUT ANGINA PECTORIS: ICD-10-CM

## 2019-06-25 DIAGNOSIS — M54.50 LOW BACK PAIN WITHOUT SCIATICA, UNSPECIFIED BACK PAIN LATERALITY, UNSPECIFIED CHRONICITY: ICD-10-CM

## 2019-06-25 DIAGNOSIS — N30.10 INTERSTITIAL CYSTITIS: ICD-10-CM

## 2019-06-25 DIAGNOSIS — L40.9 PSORIASIS: ICD-10-CM

## 2019-06-25 DIAGNOSIS — E66.3 OVERWEIGHT: ICD-10-CM

## 2019-06-25 DIAGNOSIS — N95.2 ATROPHIC VAGINITIS: ICD-10-CM

## 2019-06-25 DIAGNOSIS — F41.9 ANXIETY: ICD-10-CM

## 2019-06-25 DIAGNOSIS — R00.2 PALPITATIONS: ICD-10-CM

## 2019-06-25 DIAGNOSIS — G47.33 OSA (OBSTRUCTIVE SLEEP APNEA): ICD-10-CM

## 2019-06-25 DIAGNOSIS — K57.90 DIVERTICULOSIS OF INTESTINE WITHOUT BLEEDING, UNSPECIFIED INTESTINAL TRACT LOCATION: ICD-10-CM

## 2019-06-25 DIAGNOSIS — H40.9 GLAUCOMA, UNSPECIFIED GLAUCOMA TYPE, UNSPECIFIED LATERALITY: ICD-10-CM

## 2019-06-25 DIAGNOSIS — I10 BENIGN ESSENTIAL HYPERTENSION: Primary | ICD-10-CM

## 2019-06-25 DIAGNOSIS — G47.00 INSOMNIA, UNSPECIFIED TYPE: ICD-10-CM

## 2019-06-25 DIAGNOSIS — I34.1 MVP (MITRAL VALVE PROLAPSE): ICD-10-CM

## 2019-06-25 DIAGNOSIS — K59.09 OTHER CONSTIPATION: ICD-10-CM

## 2019-06-25 DIAGNOSIS — E78.5 HYPERLIPIDEMIA, UNSPECIFIED HYPERLIPIDEMIA TYPE: ICD-10-CM

## 2019-06-25 DIAGNOSIS — M85.80 OSTEOPENIA, UNSPECIFIED LOCATION: ICD-10-CM

## 2019-06-25 DIAGNOSIS — M54.16 LUMBAR RADICULOPATHY: ICD-10-CM

## 2019-06-25 PROBLEM — N39.0 URINARY TRACT INFECTION WITHOUT HEMATURIA: Status: RESOLVED | Noted: 2017-04-05 | Resolved: 2019-06-25

## 2019-06-25 PROCEDURE — 96160 PT-FOCUSED HLTH RISK ASSMT: CPT | Performed by: INTERNAL MEDICINE

## 2019-06-25 PROCEDURE — 99213 OFFICE O/P EST LOW 20 MIN: CPT | Performed by: INTERNAL MEDICINE

## 2019-06-25 PROCEDURE — G0439 PPPS, SUBSEQ VISIT: HCPCS | Performed by: INTERNAL MEDICINE

## 2019-06-25 PROCEDURE — 99397 PER PM REEVAL EST PAT 65+ YR: CPT | Performed by: INTERNAL MEDICINE

## 2019-06-25 RX ORDER — HYDROCHLOROTHIAZIDE 25 MG/1
25 TABLET ORAL DAILY
Qty: 30 TABLET | Refills: 1 | Status: SHIPPED | OUTPATIENT
Start: 2019-06-25 | End: 2019-08-19 | Stop reason: SDUPTHER

## 2019-06-25 RX ORDER — AMLODIPINE BESYLATE 5 MG/1
5 TABLET ORAL DAILY
Qty: 30 TABLET | Refills: 1 | Status: SHIPPED | OUTPATIENT
Start: 2019-06-25 | End: 2019-11-01

## 2019-06-25 RX ORDER — TRAVOPROST 0.004 %
DROPS OPHTHALMIC (EYE)
Refills: 6 | COMMUNITY
Start: 2019-05-19 | End: 2021-10-18 | Stop reason: ALTCHOICE

## 2019-06-25 NOTE — PROGRESS NOTES
QUICK REFERENCE INFORMATION:  The ABCs of the Annual Wellness Visit    Subsequent Medicare Wellness Visit     HEALTH RISK ASSESSMENT    : 1945    Recent Hospitalizations:  No hospitalization(s) within the last year..  ccc      Current Medical Providers:  Patient Care Team:  Abdiel Juan MD as PCP - General  Abdiel Juan MD as PCP - Family Medicine        Smoking Status:  Social History     Tobacco Use   Smoking Status Former Smoker   • Last attempt to quit:    • Years since quittin.4   Smokeless Tobacco Never Used       Alcohol Consumption:  Social History     Substance and Sexual Activity   Alcohol Use No    Comment: quit in        Depression Screen:   PHQ-2/PHQ-9 Depression Screening 2019   Little interest or pleasure in doing things 0   Feeling down, depressed, or hopeless 0   Trouble falling or staying asleep, or sleeping too much -   Feeling tired or having little energy -   Poor appetite or overeating -   Feeling bad about yourself - or that you are a failure or have let yourself or your family down -   Trouble concentrating on things, such as reading the newspaper or watching television -   Moving or speaking so slowly that other people could have noticed. Or the opposite - being so fidgety or restless that you have been moving around a lot more than usual -   Thoughts that you would be better off dead, or of hurting yourself in some way -   Total Score 0   If you checked off any problems, how difficult have these problems made it for you to do your work, take care of things at home, or get along with other people? -       Health Habits and Functional and Cognitive Screening:  Functional & Cognitive Status 2019   Do you have difficulty preparing food and eating? No   Do you have difficulty bathing yourself, getting dressed or grooming yourself? No   Do you have difficulty using the toilet? No   Do you have difficulty moving around from place to place? No   Do you have trouble  with steps or getting out of a bed or a chair? No   In the past year have you fallen or experienced a near fall? No   Current Diet Well Balanced Diet   Dental Exam Up to date   Eye Exam Up to date   Exercise (times per week) 0 times per week   Current Exercise Activities Include None   Do you need help using the phone?  No   Are you deaf or do you have serious difficulty hearing?  No   Do you need help with transportation? No   Do you need help shopping? No   Do you need help preparing meals?  No   Do you need help with housework?  No   Do you need help with laundry? No   Do you need help taking your medications? No   Do you need help managing money? No   Do you ever drive or ride in a car without wearing a seat belt? No   Have you felt unusual stress, anger or loneliness in the last month? No   Who do you live with? Spouse   If you need help, do you have trouble finding someone available to you? No   Have you been bothered in the last four weeks by sexual problems? No   Do you have difficulty concentrating, remembering or making decisions? No           Does the patient have evidence of cognitive impairment? No    Asiprin use counseling: Taking ASA appropriately as indicated      Recent Lab Results:    Lab Results   Component Value Date    GLU 97 12/14/2018        Lab Results   Component Value Date    TRIG 52 12/14/2018    HDL 84 (H) 12/14/2018    VLDL 10.4 12/14/2018           Age-appropriate Screening Schedule:  Refer to the list below for future screening recommendations based on patient's age, sex and/or medical conditions. Orders for these recommended tests are listed in the plan section. The patient has been provided with a written plan.    Health Maintenance   Topic Date Due   • TDAP/TD VACCINES (1 - Tdap) 01/03/1966   • ZOSTER VACCINE (1 of 2) 01/03/1997   • INFLUENZA VACCINE  08/01/2019   • LIPID PANEL  12/14/2019   • PNEUMOCOCCAL VACCINES (65+ LOW/MEDIUM RISK) (2 of 2 - PPSV23) 03/25/2020   • DXA SCAN   06/28/2020   • MAMMOGRAM  07/23/2020   • COLONOSCOPY  07/18/2026        Subjective   History of Present Illness    Monica Perea is a 74 y.o. female who presents for an Annual Wellness Visit.  Patient here for annual checkup wellness check also has medical problems to be addressed.  Hypertension stable on medications.  Patient complains lower extremities edema worse at nighttime.  No pain.  Patient also complains of right buttock area pain especially at nighttime in bed.  Patient does have history of low back pain which is stable now.  GERD stable.  Hyperlipidemia stable on medication.  Psoriasis improved after medication.  Insomnia stable on medication.  Anxiety stable now.  The following portions of the patient's history were reviewed and updated as appropriate: allergies, current medications, past family history, past medical history, past social history, past surgical history and problem list.    Outpatient Medications Prior to Visit   Medication Sig Dispense Refill   • ALPRAZolam (XANAX) 0.25 MG tablet Take 1 tablet by mouth Daily As Needed for Anxiety. 15 tablet 1   • amLODIPine (NORVASC) 10 MG tablet Take 1 tablet by mouth Daily. 90 tablet 3   • aspirin (ASPIRIN LOW DOSE) 81 MG tablet Take  by mouth Daily.     • Calcium Carbonate-Vitamin D (CALCIUM PLUS VITAMIN D PO) Take  by mouth Daily.     • clobetasol (TEMOVATE) 0.05 % ointment Apply  topically to the appropriate area as directed Every 12 (Twelve) Hours. 60 g 3   • CloNIDine (CATAPRES) 0.1 MG tablet Take 1 tablet by mouth 3 (Three) Times a Day. If BP is 150/90 90 tablet 6   • coenzyme Q10 100 MG capsule Take 100 mg by mouth Daily.     • hydrochlorothiazide (HYDRODIURIL) 12.5 MG tablet Take 1 tablet by mouth Daily. 30 tablet 1   • magnesium oxide (MAGOX) 400 (241.3 MG) MG tablet tablet Take 250 mg by mouth 2 (Two) Times a Day.     • melatonin 5 MG tablet tablet      • Omega-3 Fatty Acids (FISH OIL) 1000 MG capsule capsule Take 1,200 mg by mouth 2  (Two) Times a Day With Meals.     • Oxymetazoline HCl (NASAL DECONGESTANT SPRAY NA)      • pravastatin (PRAVACHOL) 40 MG tablet TAKE 1 TABLET BY MOUTH DAILY AS DIRECTED 90 tablet 3   • sertraline (ZOLOFT) 50 MG tablet Take 1 tablet by mouth Daily. 30 tablet 1   • TRAVATAN Z 0.004 % solution ophthalmic solution INSTILL 1 DROP INTO EACH EYE NIGHTLY AS DIRECTED  6   • TURMERIC PO Take  by mouth Daily.     • ciprofloxacin (CIPRO) 500 MG tablet Take 1 tablet by mouth 2 (Two) Times a Day. 14 tablet 0     No facility-administered medications prior to visit.        Patient Active Problem List   Diagnosis   • Benign essential hypertension   • Constipation   • Diverticulosis of intestine   • Glaucoma   • Hyperlipidemia   • Low back pain   • Osteopenia   • Overweight   • Palpitations   • Atrophic vaginitis   • MOON (obstructive sleep apnea)   • Psoriasis   • CAD (coronary artery disease)   • Urinary tract infection without hematuria   • Interstitial cystitis   • Anxiety   • MVP (mitral valve prolapse)       Advance Care Planning:  Patient has an advance directive - a copy has not been provided. Have asked the patient to send this to us to add to record    Identification of Risk Factors:  Risk factors include: weight .    Review of Systems   Constitutional: Negative.    HENT: Negative.    Eyes: Negative.    Respiratory: Negative.    Cardiovascular: Positive for leg swelling.   Gastrointestinal: Negative.    Endocrine: Negative.    Genitourinary: Negative.    Musculoskeletal: Positive for arthralgias.        Right sciatic pain   Skin: Negative.    Allergic/Immunologic: Negative.    Neurological: Negative.    Hematological: Negative.    Psychiatric/Behavioral: Negative.        Compared to one year ago, the patient feels her physical health is the same.  Compared to one year ago, the patient feels her mental health is the same.    Objective     Physical Exam   Constitutional: She is oriented to person, place, and time. She appears  "well-developed and well-nourished.   HENT:   Head: Normocephalic and atraumatic.   Right Ear: External ear normal.   Left Ear: External ear normal.   Nose: Nose normal.   Mouth/Throat: Oropharynx is clear and moist.   Eyes: Conjunctivae and EOM are normal. Pupils are equal, round, and reactive to light.   Neck: Normal range of motion. Neck supple.   Cardiovascular: Normal rate, regular rhythm, normal heart sounds and intact distal pulses.   Pulmonary/Chest: Effort normal and breath sounds normal.   Abdominal: Soft. Bowel sounds are normal.   Genitourinary: Vagina normal and uterus normal.   Musculoskeletal: Normal range of motion. She exhibits tenderness.   Neurological: She is alert and oriented to person, place, and time. She has normal reflexes.   Skin: Skin is warm and dry.   Psychiatric: She has a normal mood and affect. Her behavior is normal. Judgment and thought content normal.       Vitals:    06/25/19 1043   BP: 124/78   Pulse: 61   Temp: 97.7 °F (36.5 °C)   TempSrc: Temporal   SpO2: 99%   Weight: 73.1 kg (161 lb 1.9 oz)   Height: 157.5 cm (62\")   PainSc: 0-No pain       Patient's Body mass index is 29.47 kg/m². BMI is above normal parameters. Recommendations include: nutrition counseling.      Assessment/Plan   Patient Self-Management and Personalized Health Advice  The patient has been provided with information about: diet, exercise and weight management and preventive services including:   · Advance directive.    Visit Diagnoses:  No diagnosis found.    No orders of the defined types were placed in this encounter.      Outpatient Encounter Medications as of 6/25/2019   Medication Sig Dispense Refill   • ALPRAZolam (XANAX) 0.25 MG tablet Take 1 tablet by mouth Daily As Needed for Anxiety. 15 tablet 1   • amLODIPine (NORVASC) 10 MG tablet Take 1 tablet by mouth Daily. 90 tablet 3   • aspirin (ASPIRIN LOW DOSE) 81 MG tablet Take  by mouth Daily.     • Calcium Carbonate-Vitamin D (CALCIUM PLUS VITAMIN D PO) " Take  by mouth Daily.     • clobetasol (TEMOVATE) 0.05 % ointment Apply  topically to the appropriate area as directed Every 12 (Twelve) Hours. 60 g 3   • CloNIDine (CATAPRES) 0.1 MG tablet Take 1 tablet by mouth 3 (Three) Times a Day. If BP is 150/90 90 tablet 6   • coenzyme Q10 100 MG capsule Take 100 mg by mouth Daily.     • hydrochlorothiazide (HYDRODIURIL) 12.5 MG tablet Take 1 tablet by mouth Daily. 30 tablet 1   • magnesium oxide (MAGOX) 400 (241.3 MG) MG tablet tablet Take 250 mg by mouth 2 (Two) Times a Day.     • melatonin 5 MG tablet tablet      • Omega-3 Fatty Acids (FISH OIL) 1000 MG capsule capsule Take 1,200 mg by mouth 2 (Two) Times a Day With Meals.     • Oxymetazoline HCl (NASAL DECONGESTANT SPRAY NA)      • pravastatin (PRAVACHOL) 40 MG tablet TAKE 1 TABLET BY MOUTH DAILY AS DIRECTED 90 tablet 3   • sertraline (ZOLOFT) 50 MG tablet Take 1 tablet by mouth Daily. 30 tablet 1   • TRAVATAN Z 0.004 % solution ophthalmic solution INSTILL 1 DROP INTO EACH EYE NIGHTLY AS DIRECTED  6   • TURMERIC PO Take  by mouth Daily.     • [DISCONTINUED] ciprofloxacin (CIPRO) 500 MG tablet Take 1 tablet by mouth 2 (Two) Times a Day. 14 tablet 0     No facility-administered encounter medications on file as of 6/25/2019.        Reviewed use of high risk medication in the elderly: yes  Reviewed for potential of harmful drug interactions in the elderly: no    Follow Up:  No Follow-up on file.     An After Visit Summary and PPPS with all of these plans were given to the patient.         hypertension, continue medicine,  HCTZ 12.5 daily  Cad MILD BLOCKAGE 2009 30%.  stress test normal, follow up with cardio,   GERD continue omeprazole   Colon 7/18/16: Left-sided diverticulosis. Colon polyps. Internal hemorrhoids. Repeat 5 year       Diverticulitis hx.   Hyperlipidemia continue med    Pneumovax 2015,and zostavax and shingrix refused and explained risks  Psoriasis refill clobetasole   Constipation cont otc fiber  Knees pain,  OA probable, glucosamine continue   Osteopenia due bone density   Foot fungal infection s/p med by derm  Allergy  -- not currently taking claritin  Palpitation seeing cardio on medicine improved,   Anxiety,  improved on zoloft 50, xanax prn  Retina bleeding  Bladder pressure  UA abnormal patient no sx now, watch   Bilateral lower extremity edema worse in the evening we will lower amlodipine to 5 mg daily (due to leg swelling) and to increase hydrochlorothiazide to 25 mg daily.  Eczema continue med  Lumbar radiculopathy, do PT  1 mo after labs

## 2019-07-15 ENCOUNTER — TRANSCRIBE ORDERS (OUTPATIENT)
Dept: ADMINISTRATIVE | Facility: HOSPITAL | Age: 74
End: 2019-07-15

## 2019-07-15 DIAGNOSIS — Z12.31 VISIT FOR SCREENING MAMMOGRAM: Primary | ICD-10-CM

## 2019-07-16 ENCOUNTER — HOSPITAL ENCOUNTER (EMERGENCY)
Facility: HOSPITAL | Age: 74
Discharge: HOME | End: 2019-07-17
Attending: STUDENT IN AN ORGANIZED HEALTH CARE EDUCATION/TRAINING PROGRAM | Admitting: STUDENT IN AN ORGANIZED HEALTH CARE EDUCATION/TRAINING PROGRAM

## 2019-07-16 DIAGNOSIS — I48.0 PAROXYSMAL ATRIAL FIBRILLATION (HCC): Primary | ICD-10-CM

## 2019-07-16 PROCEDURE — 96374 THER/PROPH/DIAG INJ IV PUSH: CPT

## 2019-07-16 PROCEDURE — 99284 EMERGENCY DEPT VISIT MOD MDM: CPT

## 2019-07-17 VITALS
BODY MASS INDEX: 29.26 KG/M2 | HEIGHT: 62 IN | RESPIRATION RATE: 18 BRPM | HEART RATE: 116 BPM | TEMPERATURE: 97.4 F | OXYGEN SATURATION: 97 % | WEIGHT: 159 LBS | DIASTOLIC BLOOD PRESSURE: 91 MMHG | SYSTOLIC BLOOD PRESSURE: 126 MMHG

## 2019-07-17 LAB
ALBUMIN SERPL-MCNC: 4.4 G/DL (ref 3.5–5)
ALBUMIN/GLOB SERPL: 1.2 G/DL (ref 1–2)
ALP SERPL-CCNC: 73 U/L (ref 38–126)
ALT SERPL W P-5'-P-CCNC: 19 U/L (ref 13–69)
ANION GAP SERPL CALCULATED.3IONS-SCNC: 13.2 MMOL/L (ref 10–20)
AST SERPL-CCNC: 27 U/L (ref 15–46)
BASOPHILS # BLD AUTO: 0.06 10*3/MM3 (ref 0–0.2)
BASOPHILS NFR BLD AUTO: 1 % (ref 0–1.5)
BILIRUB SERPL-MCNC: 0.4 MG/DL (ref 0.2–1.3)
BUN BLD-MCNC: 18 MG/DL (ref 7–20)
BUN/CREAT SERPL: 22.5 (ref 7.1–23.5)
CALCIUM SPEC-SCNC: 9.4 MG/DL (ref 8.4–10.2)
CHLORIDE SERPL-SCNC: 98 MMOL/L (ref 98–107)
CO2 SERPL-SCNC: 28 MMOL/L (ref 26–30)
CREAT BLD-MCNC: 0.8 MG/DL (ref 0.6–1.3)
DEPRECATED RDW RBC AUTO: 39.5 FL (ref 37–54)
EOSINOPHIL # BLD AUTO: 0.32 10*3/MM3 (ref 0–0.4)
EOSINOPHIL NFR BLD AUTO: 5.2 % (ref 0.3–6.2)
ERYTHROCYTE [DISTWIDTH] IN BLOOD BY AUTOMATED COUNT: 12.3 % (ref 12.3–15.4)
GFR SERPL CREATININE-BSD FRML MDRD: 71 ML/MIN/1.73
GLOBULIN UR ELPH-MCNC: 3.6 GM/DL
GLUCOSE BLD-MCNC: 107 MG/DL (ref 74–98)
HCT VFR BLD AUTO: 36.7 % (ref 34–46.6)
HGB BLD-MCNC: 12.7 G/DL (ref 12–15.9)
IMM GRANULOCYTES # BLD AUTO: 0.01 10*3/MM3 (ref 0–0.05)
IMM GRANULOCYTES NFR BLD AUTO: 0.2 % (ref 0–0.5)
LYMPHOCYTES # BLD AUTO: 1.88 10*3/MM3 (ref 0.7–3.1)
LYMPHOCYTES NFR BLD AUTO: 30.7 % (ref 19.6–45.3)
MCH RBC QN AUTO: 31 PG (ref 26.6–33)
MCHC RBC AUTO-ENTMCNC: 34.6 G/DL (ref 31.5–35.7)
MCV RBC AUTO: 89.5 FL (ref 79–97)
MONOCYTES # BLD AUTO: 0.72 10*3/MM3 (ref 0.1–0.9)
MONOCYTES NFR BLD AUTO: 11.8 % (ref 5–12)
NEUTROPHILS # BLD AUTO: 3.13 10*3/MM3 (ref 1.7–7)
NEUTROPHILS NFR BLD AUTO: 51.1 % (ref 42.7–76)
NRBC BLD AUTO-RTO: 0 /100 WBC (ref 0–0.2)
PLATELET # BLD AUTO: 192 10*3/MM3 (ref 140–450)
PMV BLD AUTO: 11.7 FL (ref 6–12)
POTASSIUM BLD-SCNC: 3.2 MMOL/L (ref 3.5–5.1)
PROT SERPL-MCNC: 8 G/DL (ref 6.3–8.2)
RBC # BLD AUTO: 4.1 10*6/MM3 (ref 3.77–5.28)
SODIUM BLD-SCNC: 136 MMOL/L (ref 137–145)
TROPONIN I SERPL-MCNC: <0.012 NG/ML (ref 0–0.03)
WBC NRBC COR # BLD: 6.12 10*3/MM3 (ref 3.4–10.8)

## 2019-07-17 PROCEDURE — 85025 COMPLETE CBC W/AUTO DIFF WBC: CPT | Performed by: STUDENT IN AN ORGANIZED HEALTH CARE EDUCATION/TRAINING PROGRAM

## 2019-07-17 PROCEDURE — 93005 ELECTROCARDIOGRAM TRACING: CPT | Performed by: STUDENT IN AN ORGANIZED HEALTH CARE EDUCATION/TRAINING PROGRAM

## 2019-07-17 PROCEDURE — 84484 ASSAY OF TROPONIN QUANT: CPT | Performed by: STUDENT IN AN ORGANIZED HEALTH CARE EDUCATION/TRAINING PROGRAM

## 2019-07-17 PROCEDURE — 80053 COMPREHEN METABOLIC PANEL: CPT | Performed by: STUDENT IN AN ORGANIZED HEALTH CARE EDUCATION/TRAINING PROGRAM

## 2019-07-17 RX ORDER — METOPROLOL TARTRATE 5 MG/5ML
5 INJECTION INTRAVENOUS ONCE
Status: COMPLETED | OUTPATIENT
Start: 2019-07-17 | End: 2019-07-17

## 2019-07-17 RX ADMIN — METOPROLOL TARTRATE 5 MG: 1 INJECTION, SOLUTION INTRAVENOUS at 01:44

## 2019-07-17 RX ADMIN — RIVAROXABAN 20 MG: 10 TABLET, FILM COATED ORAL at 01:35

## 2019-07-17 RX ADMIN — METOPROLOL TARTRATE 25 MG: 25 TABLET ORAL at 01:38

## 2019-07-18 ENCOUNTER — OFFICE VISIT (OUTPATIENT)
Dept: CARDIOLOGY | Facility: CLINIC | Age: 74
End: 2019-07-18

## 2019-07-18 VITALS
HEIGHT: 62 IN | HEART RATE: 50 BPM | BODY MASS INDEX: 29.08 KG/M2 | SYSTOLIC BLOOD PRESSURE: 114 MMHG | WEIGHT: 158 LBS | DIASTOLIC BLOOD PRESSURE: 80 MMHG | OXYGEN SATURATION: 99 %

## 2019-07-18 DIAGNOSIS — R00.2 PALPITATIONS: ICD-10-CM

## 2019-07-18 DIAGNOSIS — I48.0 PAROXYSMAL ATRIAL FIBRILLATION (HCC): Primary | ICD-10-CM

## 2019-07-18 DIAGNOSIS — I25.10 CORONARY ARTERY DISEASE INVOLVING NATIVE CORONARY ARTERY OF NATIVE HEART WITHOUT ANGINA PECTORIS: ICD-10-CM

## 2019-07-18 DIAGNOSIS — E78.00 PURE HYPERCHOLESTEROLEMIA: ICD-10-CM

## 2019-07-18 DIAGNOSIS — I34.1 MVP (MITRAL VALVE PROLAPSE): ICD-10-CM

## 2019-07-18 DIAGNOSIS — I10 BENIGN ESSENTIAL HYPERTENSION: ICD-10-CM

## 2019-07-18 PROCEDURE — 99214 OFFICE O/P EST MOD 30 MIN: CPT | Performed by: NURSE PRACTITIONER

## 2019-07-18 NOTE — PATIENT INSTRUCTIONS
Monitor BP and heart rate twice daily and document.  Reduce metoprolol 25mg 1/2 twice daily = so a total of 25 mg daily.  Follow up one month after holter monitor.

## 2019-07-18 NOTE — PROGRESS NOTES
"Monica Perea is a 74 y.o. female.  MRN #: [id number]    Referring Provider:Abdiel Juan MD    Chief Complaint:   Chief Complaint   Patient presents with   • Follow-up   • Coronary Artery Disease        History of Present Illness:  74-year-old female presents for cardiac follow-up after evaluation in the emergency department 3 nights ago for abrupt onset of atrial fibrillation with rapid ventricular response.  Patient's atrial fib was chemically cardioverted with IV Lopressor and oral Lopressor and she was started on Xarelto.  Patient does have a past medical history of benign essential hypertension, hyperlipidemia, palpitations, CAD involving the native coronary artery, mitral valve prolapse.  Patient has prior history of vasodilator myocardial perfusion stress test in 2015, with no evidence of EKG changes suggestive of myocardial ischemia.  Patient states she was again evaluated by her cardiologist, (Dr Diaz) at that time for palpitations and she wore a 72-hour Holter monitor.  Her Holter monitor at that time showed episodes of sinus arrhythmia, 58 episodes of supraventricular ectopic beats and 1 episode of supraventricular tachycardia.  Patient states that she was started on a beta-blocker by  for which she did not tolerate well.  She was subsequently continued from her beta-blocker.  She presents to the office today stating that she feels much better, she denies any episodes of chest discomfort, palpitations, shortness of breath.  She denies any vertigo or near syncopal episodes.  She does state however that she does feel \"draggy\" after restarting the beta-blocker metoprolol 50 mg twice daily.  She also reports that she checks her blood pressure and heart rate and has noted that her heart rate has been in the \"50s\".    The patient presents today with their self who contributes to the history of their care.     The following portions of the patient's history were reviewed and updated as " appropriate: allergies, current medications, past family history, past medical history, past social history, past surgical history and problem list.     Review of Systems:     Review of Systems   Constitutional: Negative for activity change, appetite change, diaphoresis, fatigue, unexpected weight gain and unexpected weight loss.   HENT: Negative.    Eyes: Negative for blurred vision, double vision and visual disturbance.   Respiratory: Positive for shortness of breath. Negative for apnea, chest tightness and wheezing.    Cardiovascular: Positive for chest pain and palpitations. Negative for leg swelling.        History of paroxysmal atrial tachydysrhythmia, paroxysmal atrial fibrillation.   Gastrointestinal: Negative for abdominal distention, abdominal pain, nausea, vomiting, GERD and indigestion.   Endocrine: Negative.    Genitourinary: Negative for decreased libido and hematuria.   Musculoskeletal: Negative.  Negative for back pain, joint swelling and neck pain.   Skin: Negative.  Negative for pallor and bruise.   Allergic/Immunologic: Negative.    Neurological: Negative.  Negative for dizziness, tremors, syncope, facial asymmetry, speech difficulty, weakness, light-headedness and numbness.   Hematological: Negative.  Does not bruise/bleed easily.   Psychiatric/Behavioral: Negative.  Negative for agitation, sleep disturbance, negative for hyperactivity and stress. The patient is not nervous/anxious.    All other systems reviewed and are negative.         Current Outpatient Medications:   •  ALPRAZolam (XANAX) 0.25 MG tablet, Take 1 tablet by mouth Daily As Needed for Anxiety., Disp: 15 tablet, Rfl: 1  •  amLODIPine (NORVASC) 5 MG tablet, Take 1 tablet by mouth Daily., Disp: 30 tablet, Rfl: 1  •  Calcium Carbonate-Vitamin D (CALCIUM PLUS VITAMIN D PO), Take  by mouth Daily., Disp: , Rfl:   •  clobetasol (TEMOVATE) 0.05 % ointment, Apply  topically to the appropriate area as directed Every 12 (Twelve) Hours., Disp:  "60 g, Rfl: 3  •  CloNIDine (CATAPRES) 0.1 MG tablet, Take 1 tablet by mouth 3 (Three) Times a Day. If BP is 150/90, Disp: 90 tablet, Rfl: 6  •  coenzyme Q10 100 MG capsule, Take 100 mg by mouth Daily., Disp: , Rfl:   •  hydrochlorothiazide (HYDRODIURIL) 25 MG tablet, Take 1 tablet by mouth Daily., Disp: 30 tablet, Rfl: 1  •  magnesium oxide (MAGOX) 400 (241.3 MG) MG tablet tablet, Take 250 mg by mouth 2 (Two) Times a Day., Disp: , Rfl:   •  melatonin 5 MG tablet tablet, , Disp: , Rfl:   •  metoprolol tartrate (LOPRESSOR) 25 MG tablet, Take 1 tablet by mouth 2 (Two) Times a Day., Disp: 60 tablet, Rfl: 0  •  Omega-3 Fatty Acids (FISH OIL) 1000 MG capsule capsule, Take 1,200 mg by mouth 2 (Two) Times a Day With Meals., Disp: , Rfl:   •  Oxymetazoline HCl (NASAL DECONGESTANT SPRAY NA), , Disp: , Rfl:   •  pravastatin (PRAVACHOL) 40 MG tablet, TAKE 1 TABLET BY MOUTH DAILY AS DIRECTED, Disp: 90 tablet, Rfl: 3  •  rivaroxaban (XARELTO) 20 MG tablet, Take 1 tablet by mouth Daily., Disp: 30 tablet, Rfl: 0  •  sertraline (ZOLOFT) 50 MG tablet, Take 1 tablet by mouth Daily., Disp: 30 tablet, Rfl: 1  •  TRAVATAN Z 0.004 % solution ophthalmic solution, INSTILL 1 DROP INTO EACH EYE NIGHTLY AS DIRECTED, Disp: , Rfl: 6  •  aspirin (ASPIRIN LOW DOSE) 81 MG tablet, Take  by mouth Daily., Disp: , Rfl:   •  TURMERIC PO, Take  by mouth Daily., Disp: , Rfl:     Vitals:    07/18/19 1022   BP: 114/80   BP Location: Right arm   Patient Position: Sitting   Cuff Size: Adult   Pulse: 50   SpO2: 99%   Weight: 71.7 kg (158 lb)   Height: 157.5 cm (62\")       Physical Exam:     Physical Exam   Constitutional: She is oriented to person, place, and time. She appears well-developed and well-nourished. No distress.   HENT:   Head: Normocephalic and atraumatic.   Eyes: EOM are normal. Pupils are equal, round, and reactive to light. No scleral icterus.   Neck: Trachea normal, normal range of motion and full passive range of motion without pain. Neck " supple. No JVD present. Carotid bruit is not present. No thyromegaly present.   Cardiovascular: Regular rhythm, normal heart sounds and intact distal pulses. PMI is not displaced. Exam reveals no gallop and no friction rub.   No murmur heard.  Bradycardic rate at 50 bpm   Pulmonary/Chest: Effort normal and breath sounds normal. No respiratory distress. She has no wheezes. She has no rales. She exhibits no tenderness.   Abdominal: Soft. Bowel sounds are normal. She exhibits no mass. There is no tenderness.   Musculoskeletal: Normal range of motion. She exhibits no edema.   Neurovascular checks are intact.   Neurological: She is alert and oriented to person, place, and time.   Skin: Skin is warm and dry. Capillary refill takes less than 2 seconds. No rash noted. She is not diaphoretic.   Psychiatric: She has a normal mood and affect. Her behavior is normal. Judgment and thought content normal.   Nursing note and vitals reviewed.      Procedures    Results:   Viewed medications and EKGs with patient.    Assessment/Plan:   We will decrease her metoprolol to 12.5 mg p.o. twice daily.  She is instructed to monitor her blood pressure and heart rate 2-3 times daily and document and bring with her to her next office visit.  Monica was seen today for follow-up and coronary artery disease.    Diagnoses and all orders for this visit:    Paroxysmal atrial fibrillation (CMS/HCC)  -     [facility] Outpatient Telemetry; Future    Palpitations  -     [facility]patient Tele[facility]; Future    Benign essential hypertension    Pure hypercholesterolemia    Coronary artery disease involving native coronary artery of native heart without angina pectoris    MVP (mitral valve prolapse)        No Follow-up on file.    TERRY Andrade

## 2019-07-23 LAB
ALBUMIN SERPL-MCNC: 4.2 G/DL (ref 3.5–5)
ALBUMIN/GLOB SERPL: 1.3 G/DL (ref 1–2)
ALP SERPL-CCNC: 67 U/L (ref 38–126)
ALT SERPL-CCNC: 24 U/L (ref 13–69)
APPEARANCE UR: CLEAR
AST SERPL-CCNC: 24 U/L (ref 15–46)
BACTERIA #/AREA URNS HPF: ABNORMAL /HPF
BASOPHILS # BLD AUTO: 0.08 10*3/MM3 (ref 0–0.2)
BASOPHILS NFR BLD AUTO: 1.6 % (ref 0–1.5)
BILIRUB SERPL-MCNC: 0.5 MG/DL (ref 0.2–1.3)
BILIRUB UR QL STRIP: NEGATIVE
BUN SERPL-MCNC: 16 MG/DL (ref 7–20)
BUN/CREAT SERPL: 22.9 (ref 7.1–23.5)
CALCIUM SERPL-MCNC: 9.6 MG/DL (ref 8.4–10.2)
CASTS URNS MICRO: ABNORMAL
CHLORIDE SERPL-SCNC: 100 MMOL/L (ref 98–107)
CHOLEST SERPL-MCNC: 196 MG/DL (ref 0–199)
CK SERPL-CCNC: 40 U/L (ref 30–170)
CO2 SERPL-SCNC: 30 MMOL/L (ref 26–30)
COLOR UR: YELLOW
CREAT SERPL-MCNC: 0.7 MG/DL (ref 0.6–1.3)
EOSINOPHIL # BLD AUTO: 0.31 10*3/MM3 (ref 0–0.4)
EOSINOPHIL NFR BLD AUTO: 6.3 % (ref 0.3–6.2)
EPI CELLS #/AREA URNS HPF: ABNORMAL /HPF
ERYTHROCYTE [DISTWIDTH] IN BLOOD BY AUTOMATED COUNT: 12.5 % (ref 12.3–15.4)
GLOBULIN SER CALC-MCNC: 3.2 GM/DL
GLUCOSE SERPL-MCNC: 96 MG/DL (ref 74–98)
GLUCOSE UR QL: NEGATIVE
HCT VFR BLD AUTO: 39 % (ref 34–46.6)
HDLC SERPL-MCNC: 61 MG/DL (ref 40–60)
HGB BLD-MCNC: 12.9 G/DL (ref 12–15.9)
HGB UR QL STRIP: NEGATIVE
IMM GRANULOCYTES # BLD AUTO: 0.01 10*3/MM3 (ref 0–0.05)
IMM GRANULOCYTES NFR BLD AUTO: 0.2 % (ref 0–0.5)
KETONES UR QL STRIP: NEGATIVE
LDLC SERPL CALC-MCNC: 119 MG/DL (ref 0–99)
LEUKOCYTE ESTERASE UR QL STRIP: ABNORMAL
LYMPHOCYTES # BLD AUTO: 1.68 10*3/MM3 (ref 0.7–3.1)
LYMPHOCYTES NFR BLD AUTO: 34.1 % (ref 19.6–45.3)
MCH RBC QN AUTO: 30.4 PG (ref 26.6–33)
MCHC RBC AUTO-ENTMCNC: 33.1 G/DL (ref 31.5–35.7)
MCV RBC AUTO: 91.8 FL (ref 79–97)
MONOCYTES # BLD AUTO: 0.5 10*3/MM3 (ref 0.1–0.9)
MONOCYTES NFR BLD AUTO: 10.2 % (ref 5–12)
NEUTROPHILS # BLD AUTO: 2.34 10*3/MM3 (ref 1.7–7)
NEUTROPHILS NFR BLD AUTO: 47.6 % (ref 42.7–76)
NITRITE UR QL STRIP: NEGATIVE
NRBC BLD AUTO-RTO: 0 /100 WBC (ref 0–0.2)
PH UR STRIP: 7 [PH] (ref 5–8)
PLATELET # BLD AUTO: 213 10*3/MM3 (ref 140–450)
POTASSIUM SERPL-SCNC: 3.6 MMOL/L (ref 3.5–5.1)
PROT SERPL-MCNC: 7.4 G/DL (ref 6.3–8.2)
PROT UR QL STRIP: NEGATIVE
RBC # BLD AUTO: 4.25 10*6/MM3 (ref 3.77–5.28)
RBC #/AREA URNS HPF: ABNORMAL /HPF
SODIUM SERPL-SCNC: 140 MMOL/L (ref 137–145)
SP GR UR: 1.02 (ref 1–1.03)
TRIGL SERPL-MCNC: 78 MG/DL
TSH SERPL DL<=0.005 MIU/L-ACNC: 0.97 MIU/ML (ref 0.47–4.68)
UROBILINOGEN UR STRIP-MCNC: ABNORMAL MG/DL
VIT B12 SERPL-MCNC: 277 PG/ML (ref 239–931)
VLDLC SERPL CALC-MCNC: 15.6 MG/DL
WBC # BLD AUTO: 4.92 10*3/MM3 (ref 3.4–10.8)
WBC #/AREA URNS HPF: ABNORMAL /HPF

## 2019-08-01 ENCOUNTER — OFFICE VISIT (OUTPATIENT)
Dept: INTERNAL MEDICINE | Facility: CLINIC | Age: 74
End: 2019-08-01

## 2019-08-01 VITALS
SYSTOLIC BLOOD PRESSURE: 134 MMHG | BODY MASS INDEX: 29.4 KG/M2 | HEIGHT: 62 IN | OXYGEN SATURATION: 98 % | HEART RATE: 57 BPM | WEIGHT: 159.8 LBS | DIASTOLIC BLOOD PRESSURE: 72 MMHG | TEMPERATURE: 98.5 F

## 2019-08-01 DIAGNOSIS — I48.0 PAROXYSMAL ATRIAL FIBRILLATION (HCC): ICD-10-CM

## 2019-08-01 DIAGNOSIS — E66.3 OVERWEIGHT: ICD-10-CM

## 2019-08-01 DIAGNOSIS — E53.8 LOW SERUM VITAMIN B12: ICD-10-CM

## 2019-08-01 DIAGNOSIS — R39.9 URINARY SYMPTOM OR SIGN: ICD-10-CM

## 2019-08-01 DIAGNOSIS — I10 BENIGN ESSENTIAL HYPERTENSION: Primary | ICD-10-CM

## 2019-08-01 DIAGNOSIS — M54.50 LOW BACK PAIN WITHOUT SCIATICA, UNSPECIFIED BACK PAIN LATERALITY, UNSPECIFIED CHRONICITY: ICD-10-CM

## 2019-08-01 DIAGNOSIS — I25.10 CORONARY ARTERY DISEASE INVOLVING NATIVE CORONARY ARTERY OF NATIVE HEART WITHOUT ANGINA PECTORIS: ICD-10-CM

## 2019-08-01 DIAGNOSIS — E78.00 PURE HYPERCHOLESTEROLEMIA: ICD-10-CM

## 2019-08-01 DIAGNOSIS — F41.9 ANXIETY: ICD-10-CM

## 2019-08-01 DIAGNOSIS — N30.10 INTERSTITIAL CYSTITIS: ICD-10-CM

## 2019-08-01 PROCEDURE — 99214 OFFICE O/P EST MOD 30 MIN: CPT | Performed by: INTERNAL MEDICINE

## 2019-08-01 PROCEDURE — 81003 URINALYSIS AUTO W/O SCOPE: CPT | Performed by: INTERNAL MEDICINE

## 2019-08-16 ENCOUNTER — APPOINTMENT (OUTPATIENT)
Dept: MAMMOGRAPHY | Facility: HOSPITAL | Age: 74
End: 2019-08-16

## 2019-08-20 RX ORDER — HYDROCHLOROTHIAZIDE 25 MG/1
25 TABLET ORAL DAILY
Qty: 30 TABLET | Refills: 1 | Status: SHIPPED | OUTPATIENT
Start: 2019-08-20 | End: 2019-10-24 | Stop reason: SDUPTHER

## 2019-08-29 ENCOUNTER — HOSPITAL ENCOUNTER (OUTPATIENT)
Dept: MAMMOGRAPHY | Facility: HOSPITAL | Age: 74
Discharge: HOME | End: 2019-08-29
Admitting: INTERNAL MEDICINE

## 2019-08-29 DIAGNOSIS — Z12.31 VISIT FOR SCREENING MAMMOGRAM: ICD-10-CM

## 2019-08-29 PROCEDURE — 77067 SCR MAMMO BI INCL CAD: CPT

## 2019-08-29 PROCEDURE — 77063 BREAST TOMOSYNTHESIS BI: CPT

## 2019-08-29 PROCEDURE — 77067 SCR MAMMO BI INCL CAD: CPT | Performed by: RADIOLOGY

## 2019-08-29 PROCEDURE — 77063 BREAST TOMOSYNTHESIS BI: CPT | Performed by: RADIOLOGY

## 2019-09-04 ENCOUNTER — OFFICE VISIT (OUTPATIENT)
Dept: CARDIOLOGY | Facility: CLINIC | Age: 74
End: 2019-09-04

## 2019-09-04 VITALS
BODY MASS INDEX: 29.3 KG/M2 | HEIGHT: 62 IN | WEIGHT: 159.2 LBS | OXYGEN SATURATION: 99 % | DIASTOLIC BLOOD PRESSURE: 70 MMHG | SYSTOLIC BLOOD PRESSURE: 120 MMHG | HEART RATE: 51 BPM

## 2019-09-04 DIAGNOSIS — I48.0 PAROXYSMAL ATRIAL FIBRILLATION (HCC): Primary | ICD-10-CM

## 2019-09-04 DIAGNOSIS — R00.2 PALPITATIONS: ICD-10-CM

## 2019-09-04 DIAGNOSIS — I10 BENIGN ESSENTIAL HYPERTENSION: ICD-10-CM

## 2019-09-04 DIAGNOSIS — I34.1 MVP (MITRAL VALVE PROLAPSE): ICD-10-CM

## 2019-09-04 DIAGNOSIS — G47.33 OSA (OBSTRUCTIVE SLEEP APNEA): ICD-10-CM

## 2019-09-04 DIAGNOSIS — I25.10 CORONARY ARTERY DISEASE INVOLVING NATIVE CORONARY ARTERY OF NATIVE HEART WITHOUT ANGINA PECTORIS: ICD-10-CM

## 2019-09-04 DIAGNOSIS — E78.00 PURE HYPERCHOLESTEROLEMIA: ICD-10-CM

## 2019-09-04 PROCEDURE — 99213 OFFICE O/P EST LOW 20 MIN: CPT | Performed by: NURSE PRACTITIONER

## 2019-09-04 NOTE — PROGRESS NOTES
Monica Perea is a 74 y.o. female.  MRN #: [id number]    Referring Provider: Abdiel Juan MD    Chief Complaint:   Chief Complaint   Patient presents with   • Follow-up   • Coronary Artery Disease   • Palpitations        History of Present Illness:  74-year-old female presents for cardiac follow-up after Holter monitor test for a reoccurrence of paroxysmal atrial fibrillation with RVR.  Patient was evaluated in the emergency department July 16, 2019, she was chemically converted with Lopressor beta-blocker therapy, and followed up in our office July 18, 2019.  She is currently on metoprolol 12.5 mg p.o. daily for rate control and for prevention of atrial fibrillation.  With today's visit patient states she has done well.  She has not had any episodes of palpitations, vertigo symptoms, near syncope, chest pain, shortness of breath.  She does relate that her heart rate will drop into the 40s in the evening time when she lays down for sleep.  During these times she states that she has no symptoms.    The patient presents today with their self who contributes to the history of their care.     The following portions of the patient's history were reviewed and updated as appropriate: allergies, current medications, past family history, past medical history, past social history, past surgical history and problem list.     Review of Systems:     Review of Systems   Constitutional: Negative.  Negative for activity change, appetite change, diaphoresis and fatigue.   HENT: Negative.    Eyes: Negative.  Negative for blurred vision, double vision and visual disturbance.   Respiratory: Negative.  Negative for apnea, chest tightness, shortness of breath and wheezing.    Cardiovascular: Positive for chest pain and palpitations. Negative for leg swelling.   Gastrointestinal: Negative.  Negative for nausea, vomiting, GERD and indigestion.   Endocrine: Negative.    Genitourinary: Negative.  Negative for decreased libido.    Musculoskeletal: Negative.  Negative for back pain and neck pain.   Skin: Negative.    Allergic/Immunologic: Negative.    Neurological: Negative.  Negative for dizziness, tremors, syncope, speech difficulty, weakness, light-headedness, numbness, headache, memory problem and confusion.   Hematological: Negative.  Does not bruise/bleed easily.   Psychiatric/Behavioral: Negative.  Negative for agitation, sleep disturbance, negative for hyperactivity and stress. The patient is not nervous/anxious.    All other systems reviewed and are negative.         Current Outpatient Medications:   •  amLODIPine (NORVASC) 5 MG tablet, Take 1 tablet by mouth Daily., Disp: 30 tablet, Rfl: 1  •  aspirin (ASPIRIN LOW DOSE) 81 MG tablet, Take  by mouth Daily., Disp: , Rfl:   •  Calcium Carbonate-Vitamin D (CALCIUM PLUS VITAMIN D PO), Take  by mouth Daily., Disp: , Rfl:   •  clobetasol (TEMOVATE) 0.05 % ointment, Apply  topically to the appropriate area as directed Every 12 (Twelve) Hours., Disp: 60 g, Rfl: 3  •  coenzyme Q10 100 MG capsule, Take 100 mg by mouth Daily., Disp: , Rfl:   •  hydrochlorothiazide (HYDRODIURIL) 25 MG tablet, Take 1 tablet by mouth Daily., Disp: 30 tablet, Rfl: 1  •  magnesium oxide (MAGOX) 400 (241.3 MG) MG tablet tablet, Take 250 mg by mouth 2 (Two) Times a Day., Disp: , Rfl:   •  melatonin 5 MG tablet tablet, , Disp: , Rfl:   •  metoprolol tartrate (LOPRESSOR) 25 MG tablet, Take 1 tablet by mouth 2 (Two) Times a Day. (Patient taking differently: Take  by mouth 2 (Two) Times a Day. Pt is taking 1/2 tab at night), Disp: 60 tablet, Rfl: 0  •  Omega-3 Fatty Acids (FISH OIL) 1000 MG capsule capsule, Take 1,200 mg by mouth 2 (Two) Times a Day With Meals., Disp: , Rfl:   •  pravastatin (PRAVACHOL) 40 MG tablet, TAKE 1 TABLET BY MOUTH DAILY AS DIRECTED, Disp: 90 tablet, Rfl: 3  •  rivaroxaban (XARELTO) 20 MG tablet, Take 1 tablet by mouth Daily., Disp: 30 tablet, Rfl: 5  •  TRAVATAN Z 0.004 % solution ophthalmic  "solution, INSTILL 1 DROP INTO EACH EYE NIGHTLY AS DIRECTED, Disp: , Rfl: 6  •  ALPRAZolam (XANAX) 0.25 MG tablet, Take 1 tablet by mouth Daily As Needed for Anxiety., Disp: 15 tablet, Rfl: 1  •  Oxymetazoline HCl (NASAL DECONGESTANT SPRAY NA), , Disp: , Rfl:   •  sertraline (ZOLOFT) 50 MG tablet, Take 1 tablet by mouth Daily., Disp: 30 tablet, Rfl: 1  •  TURMERIC PO, Take  by mouth Daily., Disp: , Rfl:     Vitals:    09/04/19 0950   BP: 120/70   BP Location: Left arm   Patient Position: Sitting   Cuff Size: Adult   Pulse: 51   SpO2: 99%   Weight: 72.2 kg (159 lb 3.2 oz)   Height: 157.5 cm (62\")       Physical Exam:     Physical Exam   Constitutional: She is oriented to person, place, and time. She appears well-developed and well-nourished.   HENT:   Head: Normocephalic and atraumatic.   Eyes: Conjunctivae are normal. Pupils are equal, round, and reactive to light. No scleral icterus.   Neck: Trachea normal, normal range of motion and full passive range of motion without pain. Neck supple. No JVD present. Carotid bruit is not present. No thyroid mass and no thyromegaly present.   Cardiovascular: Normal rate, regular rhythm, S1 normal, S2 normal, intact distal pulses and normal pulses. PMI is not displaced. Exam reveals no gallop and no friction rub.   Murmur heard.  Pulmonary/Chest: Effort normal and breath sounds normal. No respiratory distress. She has no wheezes. She has no rales. She exhibits no tenderness.   Abdominal: Soft. Bowel sounds are normal. She exhibits no mass. There is no tenderness.   Musculoskeletal: Normal range of motion. She exhibits no edema.   Distal neurovascular checks are intact   Neurological: She is alert and oriented to person, place, and time.   Skin: Skin is warm and dry. Capillary refill takes less than 2 seconds. No rash noted.   Psychiatric: She has a normal mood and affect. Her behavior is normal. Judgment and thought content normal.   Nursing note and vitals " reviewed.      Procedures    Results:   Holter monitor results, normal monitor study with no evidence of recurrent atrial fibrillation or dysrhythmias.    Assessment/Plan:   Will continue metoprolol 12.5 mg p.o. Daily.  Follow-up 6 months or as needed.  Monica was seen today for follow-up, coronary artery disease and palpitations.    Diagnoses and all orders for this visit:    Paroxysmal atrial fibrillation (CMS/HCC)  -     rivaroxaban (XARELTO) 20 MG tablet; Take 1 tablet by mouth Daily.    Benign essential hypertension    Pure hypercholesterolemia    Palpitations  -     rivaroxaban (XARELTO) 20 MG tablet; Take 1 tablet by mouth Daily.    Coronary artery disease involving native coronary artery of native heart without angina pectoris  -     rivaroxaban (XARELTO) 20 MG tablet; Take 1 tablet by mouth Daily.    MVP (mitral valve prolapse)  -     rivaroxaban (XARELTO) 20 MG tablet; Take 1 tablet by mouth Daily.    MOON (obstructive sleep apnea)        Return in about 6 months (around 3/4/2020).    TERRY Andrade

## 2019-09-06 ENCOUNTER — TELEPHONE (OUTPATIENT)
Dept: CARDIOLOGY | Facility: CLINIC | Age: 74
End: 2019-09-06

## 2019-09-06 NOTE — TELEPHONE ENCOUNTER
Patient informed we could not get the cost lower. Patient was told when she gets low on her xarelto to come in and get samples

## 2019-09-06 NOTE — TELEPHONE ENCOUNTER
[employer] Part D ID [id number], BIN [id number] [facility] [id number], [id number]   Patient cost $47.00 No PA needed.

## 2019-10-02 DIAGNOSIS — R00.2 PALPITATIONS: Primary | ICD-10-CM

## 2019-10-02 DIAGNOSIS — I10 HYPERTENSION, UNSPECIFIED TYPE: ICD-10-CM

## 2019-10-02 NOTE — TELEPHONE ENCOUNTER
Patient is on Metoprolol and states that she needs it changed to something else.  States that it is making hr low-53 to 40's, bp low-87/50's and weakness. Advise

## 2019-10-03 RX ORDER — CARVEDILOL 3.12 MG/1
3.12 TABLET ORAL 2 TIMES DAILY
Qty: 180 TABLET | Refills: 3 | Status: SHIPPED | OUTPATIENT
Start: 2019-10-03 | End: 2019-11-19 | Stop reason: ALTCHOICE

## 2019-10-03 NOTE — TELEPHONE ENCOUNTER
So the patient has tried Metoprolol 6.25 (1/4) TABLET of the 25 mg, and the 12.5 mg (1/2) tablet of 25 mg, she cannot get a happy medium, BP & HR is either too HIGH or too LOW. Could the patient try another type of Beta Blocker such as Coreg?  Please review and advise.  lEham Posada MA

## 2019-10-08 DIAGNOSIS — I25.10 CORONARY ARTERY DISEASE INVOLVING NATIVE CORONARY ARTERY OF NATIVE HEART WITHOUT ANGINA PECTORIS: ICD-10-CM

## 2019-10-08 DIAGNOSIS — R00.2 PALPITATIONS: ICD-10-CM

## 2019-10-08 DIAGNOSIS — I48.0 PAROXYSMAL ATRIAL FIBRILLATION (HCC): ICD-10-CM

## 2019-10-08 DIAGNOSIS — I34.1 MVP (MITRAL VALVE PROLAPSE): ICD-10-CM

## 2019-10-24 RX ORDER — HYDROCHLOROTHIAZIDE 25 MG/1
TABLET ORAL
Qty: 30 TABLET | Refills: 1 | Status: SHIPPED | OUTPATIENT
Start: 2019-10-24 | End: 2019-11-01 | Stop reason: SDUPTHER

## 2019-11-01 ENCOUNTER — OFFICE VISIT (OUTPATIENT)
Dept: INTERNAL MEDICINE | Facility: CLINIC | Age: 74
End: 2019-11-01

## 2019-11-01 VITALS
BODY MASS INDEX: 29.44 KG/M2 | SYSTOLIC BLOOD PRESSURE: 120 MMHG | HEART RATE: 54 BPM | OXYGEN SATURATION: 98 % | DIASTOLIC BLOOD PRESSURE: 64 MMHG | TEMPERATURE: 97.5 F | HEIGHT: 62 IN | RESPIRATION RATE: 18 BRPM | WEIGHT: 160 LBS

## 2019-11-01 DIAGNOSIS — I10 BENIGN ESSENTIAL HYPERTENSION: Primary | ICD-10-CM

## 2019-11-01 DIAGNOSIS — I48.0 PAROXYSMAL ATRIAL FIBRILLATION (HCC): ICD-10-CM

## 2019-11-01 DIAGNOSIS — E78.00 PURE HYPERCHOLESTEROLEMIA: ICD-10-CM

## 2019-11-01 DIAGNOSIS — M85.80 OSTEOPENIA, UNSPECIFIED LOCATION: ICD-10-CM

## 2019-11-01 DIAGNOSIS — M54.50 LOW BACK PAIN WITHOUT SCIATICA, UNSPECIFIED BACK PAIN LATERALITY, UNSPECIFIED CHRONICITY: ICD-10-CM

## 2019-11-01 DIAGNOSIS — I25.10 CORONARY ARTERY DISEASE INVOLVING NATIVE CORONARY ARTERY OF NATIVE HEART WITHOUT ANGINA PECTORIS: ICD-10-CM

## 2019-11-01 DIAGNOSIS — F41.9 ANXIETY: ICD-10-CM

## 2019-11-01 DIAGNOSIS — G47.00 INSOMNIA, UNSPECIFIED TYPE: ICD-10-CM

## 2019-11-01 DIAGNOSIS — E53.8 B12 DEFICIENCY: ICD-10-CM

## 2019-11-01 DIAGNOSIS — G47.33 OSA (OBSTRUCTIVE SLEEP APNEA): ICD-10-CM

## 2019-11-01 DIAGNOSIS — K59.09 OTHER CONSTIPATION: ICD-10-CM

## 2019-11-01 DIAGNOSIS — E66.3 OVERWEIGHT: ICD-10-CM

## 2019-11-01 PROCEDURE — G0008 ADMIN INFLUENZA VIRUS VAC: HCPCS | Performed by: INTERNAL MEDICINE

## 2019-11-01 PROCEDURE — 99214 OFFICE O/P EST MOD 30 MIN: CPT | Performed by: INTERNAL MEDICINE

## 2019-11-01 PROCEDURE — 90653 IIV ADJUVANT VACCINE IM: CPT | Performed by: INTERNAL MEDICINE

## 2019-11-01 RX ORDER — PRAVASTATIN SODIUM 40 MG
40 TABLET ORAL DAILY
Qty: 90 TABLET | Refills: 3 | Status: SHIPPED | OUTPATIENT
Start: 2019-11-01 | End: 2020-07-20

## 2019-11-01 RX ORDER — HYDROCHLOROTHIAZIDE 25 MG/1
25 TABLET ORAL DAILY
Qty: 90 TABLET | Refills: 3 | Status: SHIPPED | OUTPATIENT
Start: 2019-11-01 | End: 2020-02-17 | Stop reason: ALTCHOICE

## 2019-11-01 NOTE — PROGRESS NOTES
Subjective   Monica Perea is a 74 y.o. female.     Chief Complaint   Patient presents with   • Follow-up     3mo f/u for HLD, CAD, low back pain.        History of Present Illness   Patient here for follow-up of.  The blood pressure stable on medication.  Patient self discontinued amlodipine doing well now.  Hyperlipidemia stable on medication.  The coronary artery disease is stable now.  Atrial fibrillation stable on medication.  Sleep apnea on CPAP is stable.  The constipation stable now.  Osteopenia on supplement, Pain immediately relieved. No complications.  Supplemented.  Anxiety stable on medication.  Overweight is still.    Current Outpatient Medications:   •  ALPRAZolam (XANAX) 0.25 MG tablet, Take 1 tablet by mouth Daily As Needed for Anxiety., Disp: 15 tablet, Rfl: 1  •  aspirin (ASPIRIN LOW DOSE) 81 MG tablet, Take  by mouth Daily., Disp: , Rfl:   •  Calcium Carbonate-Vitamin D (CALCIUM PLUS VITAMIN D PO), Take  by mouth Daily., Disp: , Rfl:   •  carvedilol (COREG) 3.125 MG tablet, Take 1 tablet by mouth 2 (Two) Times a Day., Disp: 180 tablet, Rfl: 3  •  clobetasol (TEMOVATE) 0.05 % ointment, Apply  topically to the appropriate area as directed Every 12 (Twelve) Hours., Disp: 60 g, Rfl: 3  •  coenzyme Q10 100 MG capsule, Take 100 mg by mouth Daily., Disp: , Rfl:   •  hydroCHLOROthiazide (HYDRODIURIL) 25 MG tablet, Take 1 tablet by mouth Daily., Disp: 90 tablet, Rfl: 3  •  magnesium oxide (MAGOX) 400 (241.3 MG) MG tablet tablet, Take 250 mg by mouth 2 (Two) Times a Day., Disp: , Rfl:   •  melatonin 5 MG tablet tablet, , Disp: , Rfl:   •  Omega-3 Fatty Acids (FISH OIL) 1000 MG capsule capsule, Take 1,200 mg by mouth 2 (Two) Times a Day With Meals., Disp: , Rfl:   •  Oxymetazoline HCl (NASAL DECONGESTANT SPRAY NA), , Disp: , Rfl:   •  pravastatin (PRAVACHOL) 40 MG tablet, Take 1 tablet by mouth Daily. as directed, Disp: 90 tablet, Rfl: 3  •  rivaroxaban (XARELTO) 20 MG tablet, Take 1 tablet by mouth  Daily., Disp: 30 tablet, Rfl: 5  •  sertraline (ZOLOFT) 50 MG tablet, Take 1 tablet by mouth Daily., Disp: 30 tablet, Rfl: 1  •  TRAVATAN Z 0.004 % solution ophthalmic solution, INSTILL 1 DROP INTO EACH EYE NIGHTLY AS DIRECTED, Disp: , Rfl: 6  •  TURMERIC PO, Take  by mouth Daily., Disp: , Rfl:     The following portions of the patient's history were reviewed and updated as appropriate: allergies, current medications, past family history, past medical history, past social history, past surgical history and problem list.    Review of Systems   Constitutional: Negative.    Respiratory: Negative.    Cardiovascular: Negative.    Gastrointestinal: Negative.    Skin: Negative.    Psychiatric/Behavioral: Negative.        Objective   Physical Exam   Constitutional: She is oriented to person, place, and time. She appears well-developed and well-nourished.   Neck: Neck supple.   Cardiovascular: Normal rate, regular rhythm and normal heart sounds.   Pulmonary/Chest: Effort normal and breath sounds normal.   Abdominal: Soft. Bowel sounds are normal.   Neurological: She is alert and oriented to person, place, and time.   Psychiatric: She has a normal mood and affect. Her behavior is normal.       All tests have been reviewed.    Assessment/Plan   Diagnoses and all orders for this visit:    Benign essential hypertension cont med    Pure hypercholesterolemia cont med  Coronary artery disease involving native coronary artery of native heart without angina pectoris cont med    Paroxysmal atrial fibrillation (CMS/HCC) cont med    MOON (obstructive sleep apnea) cont cpap    Other constipation    Low back pain cont med    Osteopenia, unspecified location oscal D    Anxiety stable cont med    Overweight diet    Insomnia, unspecified type cont med    Other orders  -     pravastatin (PRAVACHOL) 40 MG tablet; Take 1 tablet by mouth Daily. as directed  -     hydroCHLOROthiazide (HYDRODIURIL) 25 MG tablet; Take 1 tablet by mouth Daily.    3  mo after lab          ----Below is to historical records for reference only:      Cad MILD BLOCKAGE 2009 30%.  stress test normal, follow up with cardio,   Colon 7/18/16: Left-sided diverticulosis. Colon polyps. Internal hemorrhoids. Repeat 5 year       Diverticulitis hx.    Pneumovax 2015,and zostavax and shingrix refused and explained risks  Psoriasis refill clobetasole   Knees pain, OA probable, glucosamine continue   Foot fungal infection s/p med by derm  Interstitial cystitis UA normal follow uro   leg swelling on hydrochlorothiazide to 25 mg daily., edema resolved

## 2019-11-04 RX ORDER — ALPRAZOLAM 0.25 MG/1
TABLET ORAL
Qty: 15 TABLET | Refills: 1 | Status: SHIPPED | OUTPATIENT
Start: 2019-11-04 | End: 2019-12-31 | Stop reason: ALTCHOICE

## 2019-11-04 NOTE — TELEPHONE ENCOUNTER
SHELLY fontanez, please advise  
Abdomen soft, nontender, nondistended, bowel sounds present in all 4 quadrants.

## 2019-11-11 DIAGNOSIS — I34.1 MVP (MITRAL VALVE PROLAPSE): ICD-10-CM

## 2019-11-11 DIAGNOSIS — R00.2 PALPITATIONS: ICD-10-CM

## 2019-11-11 DIAGNOSIS — I48.0 PAROXYSMAL ATRIAL FIBRILLATION (HCC): ICD-10-CM

## 2019-11-11 DIAGNOSIS — I25.10 CORONARY ARTERY DISEASE INVOLVING NATIVE CORONARY ARTERY OF NATIVE HEART WITHOUT ANGINA PECTORIS: ICD-10-CM

## 2019-11-19 ENCOUNTER — OFFICE VISIT (OUTPATIENT)
Dept: CARDIOLOGY | Facility: CLINIC | Age: 74
End: 2019-11-19

## 2019-11-19 VITALS
OXYGEN SATURATION: 95 % | HEIGHT: 62 IN | SYSTOLIC BLOOD PRESSURE: 130 MMHG | HEART RATE: 69 BPM | BODY MASS INDEX: 29.81 KG/M2 | DIASTOLIC BLOOD PRESSURE: 70 MMHG | WEIGHT: 162 LBS

## 2019-11-19 DIAGNOSIS — I10 BENIGN ESSENTIAL HYPERTENSION: ICD-10-CM

## 2019-11-19 DIAGNOSIS — I25.10 CORONARY ARTERY DISEASE INVOLVING NATIVE CORONARY ARTERY OF NATIVE HEART WITHOUT ANGINA PECTORIS: Primary | ICD-10-CM

## 2019-11-19 DIAGNOSIS — I10 ESSENTIAL HYPERTENSION: ICD-10-CM

## 2019-11-19 DIAGNOSIS — I34.1 MVP (MITRAL VALVE PROLAPSE): ICD-10-CM

## 2019-11-19 DIAGNOSIS — R00.2 PALPITATIONS: ICD-10-CM

## 2019-11-19 DIAGNOSIS — I48.0 PAROXYSMAL ATRIAL FIBRILLATION (HCC): ICD-10-CM

## 2019-11-19 PROCEDURE — 93000 ELECTROCARDIOGRAM COMPLETE: CPT | Performed by: NURSE PRACTITIONER

## 2019-11-19 PROCEDURE — 99214 OFFICE O/P EST MOD 30 MIN: CPT | Performed by: NURSE PRACTITIONER

## 2019-11-19 RX ORDER — CARVEDILOL 6.25 MG/1
6.25 TABLET ORAL 2 TIMES DAILY
Qty: 180 TABLET | Refills: 3 | Status: SHIPPED | OUTPATIENT
Start: 2019-11-19 | End: 2020-01-28 | Stop reason: ALTCHOICE

## 2019-11-19 NOTE — PROGRESS NOTES
"Monica Perea is a 74 y.o. female.  MRN #: [id number]    Referring Provider: Abdiel Juan MD    Chief Complaint:   Chief Complaint   Patient presents with   • Follow-up     from Sat night    • Palpitations   • Chest Pain     throbbing pain    • Hypertension     bottom # was high during episode         History of Present Illness:  Ms Perea is a 74-year-old female that presents for follow-up for her history of essential hypertension but also new occurrence of palpitations and chest pain.  Patient had recently been changed from metoprolol to Coreg 3.125 mg p.o. twice daily for blood pressure maintenance.  Patient reports that after starting her Coreg she had been doing well until 2 nights ago when she developed palpitations and chest discomfort.  Patient reports that she noticed her diastolic blood pressure was \"140\".  She states it has never been this high before.  She then took another 3.125 mg of Coreg in addition to 6.125 mg of metoprolol.  She stated her symptoms resolved but then her blood pressure \"dropped too low\".  She was evaluated in the emergency department and her cardiac work-up was negative for any acute cardiac findings.  She was advised by the emergency department provider to follow-up with cardiology.  With today's visit patient's blood pressure is stable at 130/70 and heart rate of 56 with twelve-lead EKG of sinus bradycardia with no indication of ischemia.  At present she denies any chest pain palpitations or shortness of breath.  She reports that since her change to Coreg she had been doing well with her blood pressure maintenance and with her symptoms.  She is inquiring as to whether she may increase her Coreg and was also questioning if she could supplement her Coreg with her metoprolol.  She was advised against this.  Ms. Perea is reluctant to undergo a vasodilator myocardial perfusion stress test due to a past intolerance to this test.  She is also requesting dietary measures " to aid in healthy diet choices and for sodium reduction and high fat reduction.    The patient presents today with their self who contributes to the history of their care.     The following portions of the patient's history were reviewed and updated as appropriate: allergies, current medications, past family history, past medical history, past social history, past surgical history and problem list.     Review of Systems:     Review of Systems   Constitutional: Positive for fatigue. Negative for activity change, appetite change, diaphoresis, unexpected weight gain and unexpected weight loss.   HENT: Negative.    Eyes: Negative for blurred vision, double vision, photophobia and visual disturbance.        Wears corrective lenses   Respiratory: Positive for chest tightness. Negative for apnea, cough, shortness of breath and wheezing.    Cardiovascular: Positive for chest pain. Negative for palpitations and leg swelling.   Gastrointestinal: Negative.  Negative for abdominal distention, abdominal pain, blood in stool, nausea, vomiting, GERD and indigestion.   Endocrine: Negative.  Negative for cold intolerance, heat intolerance, polydipsia, polyphagia and polyuria.   Genitourinary: Negative.  Negative for decreased libido, frequency, genital sores, hematuria and urgency.   Musculoskeletal: Positive for arthralgias and myalgias. Negative for back pain, joint swelling, neck pain and neck stiffness.   Skin: Negative.  Negative for color change, pallor, rash and bruise.   Allergic/Immunologic: Negative.    Neurological: Negative.  Negative for dizziness, tremors, seizures, syncope, facial asymmetry, speech difficulty, weakness, light-headedness and numbness.   Hematological: Negative.  Negative for adenopathy. Does not bruise/bleed easily.   Psychiatric/Behavioral: Negative.  Negative for agitation, decreased concentration, sleep disturbance and stress. The patient is not nervous/anxious.    All other systems reviewed and are  "negative.         Current Outpatient Medications:   •  ALPRAZolam (XANAX) 0.25 MG tablet, TAKE 1 TABLET BY MOUTH ONCE DAILY AS NEEDED FOR ANXIETY, Disp: 15 tablet, Rfl: 1  •  aspirin (ASPIRIN LOW DOSE) 81 MG tablet, Take  by mouth Daily., Disp: , Rfl:   •  Calcium Carbonate-Vitamin D (CALCIUM PLUS VITAMIN D PO), Take  by mouth Daily., Disp: , Rfl:   •  clobetasol (TEMOVATE) 0.05 % ointment, Apply  topically to the appropriate area as directed Every 12 (Twelve) Hours., Disp: 60 g, Rfl: 3  •  coenzyme Q10 100 MG capsule, Take 100 mg by mouth Daily., Disp: , Rfl:   •  hydroCHLOROthiazide (HYDRODIURIL) 25 MG tablet, Take 1 tablet by mouth Daily., Disp: 90 tablet, Rfl: 3  •  melatonin 5 MG tablet tablet, , Disp: , Rfl:   •  Omega-3 Fatty Acids (FISH OIL) 1000 MG capsule capsule, Take 1,200 mg by mouth 2 (Two) Times a Day With Meals., Disp: , Rfl:   •  pravastatin (PRAVACHOL) 40 MG tablet, Take 1 tablet by mouth Daily. as directed, Disp: 90 tablet, Rfl: 3  •  rivaroxaban (XARELTO) 20 MG tablet, Take 1 tablet by mouth Daily., Disp: 30 tablet, Rfl: 5  •  TRAVATAN Z 0.004 % solution ophthalmic solution, INSTILL 1 DROP INTO EACH EYE NIGHTLY AS DIRECTED, Disp: , Rfl: 6  •  carvedilol (COREG) 6.25 MG tablet, Take 1 tablet by mouth 2 (Two) Times a Day., Disp: 180 tablet, Rfl: 3  •  magnesium oxide (MAGOX) 400 (241.3 MG) MG tablet tablet, Take 250 mg by mouth 2 (Two) Times a Day., Disp: , Rfl:   •  Oxymetazoline HCl (NASAL DECONGESTANT SPRAY NA), , Disp: , Rfl:   •  sertraline (ZOLOFT) 50 MG tablet, Take 1 tablet by mouth Daily., Disp: 30 tablet, Rfl: 1  •  TURMERIC PO, Take  by mouth Daily., Disp: , Rfl:     Vitals:    11/19/19 0843   BP: 130/70   BP Location: Right arm   Patient Position: Sitting   Cuff Size: Adult   Pulse: 69   SpO2: 95%   Weight: 73.5 kg (162 lb)   Height: 157.5 cm (62\")       Physical Exam:     Physical Exam   Constitutional: She is oriented to person, place, and time. She appears well-developed and " well-nourished.   HENT:   Head: Normocephalic and atraumatic.   Eyes: No scleral icterus.   Neck: Trachea normal and normal range of motion. Neck supple. No JVD present. Carotid bruit is not present.   Cardiovascular: Normal rate, regular rhythm, S1 normal, S2 normal, normal heart sounds and intact distal pulses. PMI is not displaced. Exam reveals no gallop and no friction rub.   No murmur heard.  Pulses:       Carotid pulses are 1+ on the right side, and 1+ on the left side.  Pulmonary/Chest: Effort normal and breath sounds normal. No respiratory distress. She has no wheezes. She has no rales. She exhibits no tenderness.   Abdominal: Soft. Bowel sounds are normal. She exhibits no mass. There is no tenderness.   Musculoskeletal: Normal range of motion. She exhibits no edema.   Neurological: She is alert and oriented to person, place, and time.   Skin: Skin is warm and dry. Capillary refill takes less than 2 seconds. No rash noted.   Psychiatric: She has a normal mood and affect. Her behavior is normal. Judgment and thought content normal.   Nursing note and vitals reviewed.        ECG 12 Lead  Date/Time: 11/19/2019 12:19 PM  Performed by: Delfino Thomas Jr., APRN  Authorized by: Delfino Thomas Jr., TERRY   Comparison: compared with previous ECG   Similar to previous ECG  Comparison to previous ECG: Prior EKG of July 17th 2019 shows a sinus rhythm with no acute findings.  Rhythm: sinus bradycardia  Rate: bradycardic  Conduction: conduction normal  ST Segments: ST segments normal  T Waves: T waves normal  QRS axis: normal  Other: no other findings    Clinical impression: abnormal EKG  Comments: EKG shows a sinus bradycardia with no acute findings.  QT interval of 0.43 seconds.            Results:   Reviewed prior office notes and emergency department evaluation, lab data, and treatment regimen.  Reviewed her medication regimen and updated.  Reviewed vital signs and EKG from today which shows stable vital  signs and sinus bradycardia with no acute or ischemic findings on EKG.    Assessment/Plan:   Increase Coreg to 6.25 mg p.o. twice daily  Refer to dietary for nutritional guidance and for low-fat and low-sodium diet guidance.  Will recheck in 6 weeks for blood pressure evaluation.  Recheck if any complications or cardiac related issues.  Monica was seen today for follow-up, palpitations, chest pain and hypertension.    Diagnoses and all orders for this visit:    Coronary artery disease involving native coronary artery of native heart without angina pectoris  -     carvedilol (COREG) 6.25 MG tablet; Take 1 tablet by mouth 2 (Two) Times a Day.  -     Ambulatory Referral to Nutrition Services  -     ECG 12 Lead    Palpitations  -     carvedilol (COREG) 6.25 MG tablet; Take 1 tablet by mouth 2 (Two) Times a Day.  -     Ambulatory Referral to Nutrition Services  -     ECG 12 Lead    MVP (mitral valve prolapse)  -     carvedilol (COREG) 6.25 MG tablet; Take 1 tablet by mouth 2 (Two) Times a Day.  -     Ambulatory Referral to Nutrition Services  -     ECG 12 Lead    Paroxysmal atrial fibrillation (CMS/HCC)  -     carvedilol (COREG) 6.25 MG tablet; Take 1 tablet by mouth 2 (Two) Times a Day.  -     Ambulatory Referral to Nutrition Services  -     ECG 12 Lead    Benign essential hypertension  -     carvedilol (COREG) 6.25 MG tablet; Take 1 tablet by mouth 2 (Two) Times a Day.  -     Ambulatory Referral to Nutrition Services  -     ECG 12 Lead    Essential hypertension  -     carvedilol (COREG) 6.25 MG tablet; Take 1 tablet by mouth 2 (Two) Times a Day.  -     Ambulatory Referral to Nutrition Services  -     ECG 12 Lead        Return in about 6 weeks (around 12/31/2019).    TERRY Andrade

## 2019-12-05 DIAGNOSIS — I34.1 MVP (MITRAL VALVE PROLAPSE): ICD-10-CM

## 2019-12-05 DIAGNOSIS — I48.0 PAROXYSMAL ATRIAL FIBRILLATION (HCC): ICD-10-CM

## 2019-12-05 DIAGNOSIS — I25.10 CORONARY ARTERY DISEASE INVOLVING NATIVE CORONARY ARTERY OF NATIVE HEART WITHOUT ANGINA PECTORIS: ICD-10-CM

## 2019-12-05 DIAGNOSIS — R00.2 PALPITATIONS: ICD-10-CM

## 2019-12-24 NOTE — TELEPHONE ENCOUNTER
Patient request samples of Xarelto 20 mg. Patient informed that we are out of 20 mg but we do have 10 mg.  4 bottles of Xarelto 10 mg given to patient with instructions to take 2 tabs. Patient stated understanding.

## 2019-12-31 ENCOUNTER — OFFICE VISIT (OUTPATIENT)
Dept: CARDIOLOGY | Facility: CLINIC | Age: 74
End: 2019-12-31

## 2019-12-31 VITALS
HEIGHT: 62 IN | OXYGEN SATURATION: 98 % | BODY MASS INDEX: 30.73 KG/M2 | HEART RATE: 59 BPM | WEIGHT: 167 LBS | SYSTOLIC BLOOD PRESSURE: 122 MMHG | DIASTOLIC BLOOD PRESSURE: 64 MMHG

## 2019-12-31 DIAGNOSIS — I10 BENIGN ESSENTIAL HYPERTENSION: ICD-10-CM

## 2019-12-31 DIAGNOSIS — R00.2 PALPITATIONS: ICD-10-CM

## 2019-12-31 DIAGNOSIS — I25.10 CORONARY ARTERY DISEASE INVOLVING NATIVE CORONARY ARTERY OF NATIVE HEART WITHOUT ANGINA PECTORIS: ICD-10-CM

## 2019-12-31 DIAGNOSIS — I34.1 MVP (MITRAL VALVE PROLAPSE): ICD-10-CM

## 2019-12-31 DIAGNOSIS — I20.8 ANGINAL EQUIVALENT (HCC): Primary | ICD-10-CM

## 2019-12-31 DIAGNOSIS — I48.0 PAROXYSMAL ATRIAL FIBRILLATION (HCC): ICD-10-CM

## 2019-12-31 PROCEDURE — 99214 OFFICE O/P EST MOD 30 MIN: CPT | Performed by: NURSE PRACTITIONER

## 2019-12-31 NOTE — PROGRESS NOTES
"Monica Perea is a 74 y.o. female.  MRN #: [id number]    Referring Provider: Abdiel Juan MD    Chief Complaint:   Chief Complaint   Patient presents with   • Follow-up   • Coronary Artery Disease   • Atrial Fibrillation        History of Present Illness:  Ms Perea is a 74-year-old female that presents for cardiac follow-up for her history of essential hypertension, hyperlipidemia, recurrent palpitations.  With last evaluation patient was started on carvedilol and adjusted to 6.25 mg twice daily for palpitations and blood pressure control.  With follow-up appointment patient reports that her palpitations had improved as well as a more stabilized blood pressure.  With today's evaluation patient reports a return of palpitations, increasing fatigue, increasing shortness of breath with exertion, upper chest discomfort and left posterior upper back pain.  She denies any nausea or diaphoresis.  She reports new symptoms of \"I get short of breath doing my routine shopping\".  She also reports episodes of increased palpitations that occur mainly with exertional activity.  She had a 22-day Holter monitor which was normal with the exception of a rare PVC with a burden of less than 1%.  She had an echocardiogram March 2019 which showed mild mitral valve regurg, normal LV systolic function with estimated ejection fraction of 69%, mild tricuspid valve regurg.  She reports a strong family history of \"cardiac issues\" with all of her siblings being treated for various heart problems.      The patient presents today with their self who contributes to the history of their care.     The following portions of the patient's history were reviewed and updated as appropriate: allergies, current medications, past family history, past medical history, past social history, past surgical history and problem list.     Review of Systems:     Review of Systems   Constitutional: Positive for activity change and fatigue. Negative for " appetite change, diaphoresis, unexpected weight gain and unexpected weight loss.   HENT: Negative.    Eyes: Negative.  Negative for blurred vision, double vision, photophobia and visual disturbance.   Respiratory: Positive for chest tightness and shortness of breath. Negative for apnea, cough and wheezing.    Cardiovascular: Positive for chest pain and palpitations. Negative for leg swelling.   Gastrointestinal: Negative.  Negative for abdominal distention, abdominal pain, blood in stool, nausea, vomiting, GERD and indigestion.   Endocrine: Negative.  Negative for cold intolerance, heat intolerance, polydipsia, polyphagia and polyuria.   Genitourinary: Negative for decreased libido, frequency, genital sores, hematuria, urgency and urinary incontinence.   Musculoskeletal: Negative.  Negative for back pain, joint swelling, myalgias, neck pain and neck stiffness.   Skin: Negative.  Negative for color change, pallor, rash and bruise.   Allergic/Immunologic: Negative.  Negative for environmental allergies, food allergies and immunocompromised state.   Neurological: Negative.  Negative for dizziness, tremors, seizures, syncope, facial asymmetry, speech difficulty, weakness, light-headedness and numbness.   Hematological: Negative.  Negative for adenopathy. Does not bruise/bleed easily.   Psychiatric/Behavioral: Negative.  Negative for agitation, decreased concentration, sleep disturbance, suicidal ideas, negative for hyperactivity, depressed mood and stress. The patient is not nervous/anxious.    All other systems reviewed and are negative.         Current Outpatient Medications:   •  aspirin (ASPIRIN LOW DOSE) 81 MG tablet, Take  by mouth Daily., Disp: , Rfl:   •  Calcium Carbonate-Vitamin D (CALCIUM PLUS VITAMIN D PO), Take  by mouth Daily., Disp: , Rfl:   •  carvedilol (COREG) 6.25 MG tablet, Take 1 tablet by mouth 2 (Two) Times a Day., Disp: 180 tablet, Rfl: 3  •  clobetasol (TEMOVATE) 0.05 % ointment, Apply   "topically to the appropriate area as directed Every 12 (Twelve) Hours., Disp: 60 g, Rfl: 3  •  coenzyme Q10 100 MG capsule, Take 100 mg by mouth Daily., Disp: , Rfl:   •  hydroCHLOROthiazide (HYDRODIURIL) 25 MG tablet, Take 1 tablet by mouth Daily., Disp: 90 tablet, Rfl: 3  •  melatonin 5 MG tablet tablet, , Disp: , Rfl:   •  Omega-3 Fatty Acids (FISH OIL) 1000 MG capsule capsule, Take 1,200 mg by mouth 2 (Two) Times a Day With Meals., Disp: , Rfl:   •  pravastatin (PRAVACHOL) 40 MG tablet, Take 1 tablet by mouth Daily. as directed, Disp: 90 tablet, Rfl: 3  •  rivaroxaban (XARELTO) 20 MG tablet, Take 1 tablet by mouth Daily., Disp: 30 tablet, Rfl: 5  •  TRAVATAN Z 0.004 % solution ophthalmic solution, INSTILL 1 DROP INTO EACH EYE NIGHTLY AS DIRECTED, Disp: , Rfl: 6    Vitals:    12/31/19 0913   BP: 122/64   BP Location: Left arm   Patient Position: Sitting   Cuff Size: Adult   Pulse: 59   SpO2: 98%   Weight: 75.8 kg (167 lb)   Height: 157.5 cm (62\")       Physical Exam:     Physical Exam   Constitutional: She is oriented to person, place, and time. She appears well-developed and well-nourished. No distress.   HENT:   Head: Normocephalic and atraumatic.   Eyes: Pupils are equal, round, and reactive to light. Conjunctivae are normal. No scleral icterus.   Neck: Trachea normal, normal range of motion and full passive range of motion without pain. Neck supple. No JVD present. Carotid bruit is not present. No thyroid mass and no thyromegaly present.   Cardiovascular: Normal rate, regular rhythm, S1 normal, S2 normal and intact distal pulses. PMI is not displaced. Exam reveals no gallop and no friction rub.   Murmur heard.   Systolic murmur is present with a grade of 2/6.  Pulses:       Carotid pulses are 1+ on the right side, and 1+ on the left side.       Radial pulses are 1+ on the right side, and 1+ on the left side.   Systolic murmur heard best at left sternal border, intercostal space 3 and 4.   Pulmonary/Chest: " Effort normal and breath sounds normal. No respiratory distress. She has no wheezes. She has no rales. She exhibits no tenderness.   Abdominal: Soft. Bowel sounds are normal. She exhibits no distension and no mass. There is no tenderness.   Musculoskeletal: Normal range of motion. She exhibits no edema.   Neurological: She is alert and oriented to person, place, and time. No sensory deficit.   Skin: Skin is warm and dry. Capillary refill takes less than 2 seconds. No rash noted. She is not diaphoretic.   Psychiatric: She has a normal mood and affect. Her behavior is normal. Judgment and thought content normal.   Nursing note and vitals reviewed.      Procedures    Results:    Reviewed medication profile and updated.  Reviewed most recent cardiac diagnostic testing which includes transthoracic echocardiogram and 22-day Holter monitoring    Assessment/Plan:   New onset of upper chest pressure, anginal equivalent symptoms.  No changes made in medication regimen.  Will obtain a vasodilator nuclear stress test and follow-up after her diagnostic testing.  Monica was seen today for follow-up, coronary artery disease and atrial fibrillation.    Diagnoses and all orders for this visit:    Anginal equivalent (CMS/HCC)  -     Stress Test With Myocardial Perfusion Two Day; Future    Palpitations  -     Stress Test With Myocardial Perfusion Two Day; Future    MVP (mitral valve prolapse)  -     Stress Test With Myocardial Perfusion Two Day; Future    Paroxysmal atrial fibrillation (CMS/HCC)  -     Stress Test With Myocardial Perfusion Two Day; Future    Benign essential hypertension  -     Stress Test With Myocardial Perfusion Two Day; Future    Coronary artery disease involving native coronary artery of native heart without angina pectoris  -     Stress Test With Myocardial Perfusion Two Day; Future        Return for F/U after testing complete.    TERRY Andrade

## 2020-01-06 DIAGNOSIS — I48.0 PAROXYSMAL ATRIAL FIBRILLATION (HCC): ICD-10-CM

## 2020-01-06 DIAGNOSIS — I25.10 CORONARY ARTERY DISEASE INVOLVING NATIVE CORONARY ARTERY OF NATIVE HEART WITHOUT ANGINA PECTORIS: ICD-10-CM

## 2020-01-06 DIAGNOSIS — R00.2 PALPITATIONS: ICD-10-CM

## 2020-01-06 DIAGNOSIS — I34.1 MVP (MITRAL VALVE PROLAPSE): ICD-10-CM

## 2020-01-07 ENCOUNTER — HOSPITAL ENCOUNTER (OUTPATIENT)
Dept: NUTRITION | Facility: HOSPITAL | Age: 75
Discharge: HOME | End: 2020-01-07
Admitting: NURSE PRACTITIONER

## 2020-01-07 PROCEDURE — 97802 MEDICAL NUTRITION INDIV IN: CPT

## 2020-01-07 NOTE — PROGRESS NOTES
"Adult Outpatient Nutrition  Assessment/PES    Patient Name:  Monica Perea  YOB: 1945  MRN: [id number]    Assessment Date:  1/7/2020    Comments:  Ms. Perea was seen today for nutritional counseling regarding a heart healthy diet. She states she has CAD and HTN. She states she is most worried about how much salt she consumes.    Ht. 61\"  Wt. 162.6 lb.   BMI  30.8    Estimated Daily Needs:  ~3502-0705 Kcal  ~40-50 gm Protein    RD reviewed 24 hour recall and made suggestions for ways to cut fat and sodium out of the diet. RD suggested increasing whole grains and adding oatmeal at breakfast. RD also suggested changing milk to 1% or skim. Pt. States she plans to change her milk to 1%. RD also suggested eating more lean meats and grilling, baking or broiling them. RD suggested increasing fiber with whole grains, fruits and vegetables. Pt. States she wants to find recipes for eating vegetables and seasoning them that do not use salt. RD also discussed using low sodium, low fat lunch meat.     Pt. States she has been following a low carbohydrate diet but feels this may have contributed to increased fat intake. Pt. States she was doing this to lose weight. RD suggested that carbohydrates be consumed in moderation of two servings per meal and include whole grains and fiber from fruits and vegetables. She states she has been trying to incorporate more fruit. She states she also finds it difficult to eat at restaurants and find foods lower in sodium and fat. RD provided a list of lower sodium fast foods and encouraged pt. To eat salads, chicken, turkey or fish when eating at restaurants.     RD used tubes filled with salt and fat from various foods to explain how fat and salt are in everything we consume. Pt. Was surprised at how much fat and salt were in various food items. She states she plans to use reduced or fat free items when available now and not salt her foods.     Goals set this " visit:  1.) Find new recipes for vegetables using the internet by next visit.  2.) Cut back on salt and fat by seasoning more without salt, changing milk to 1%, buying reduced/fat free sour cream and other items when available and changing mayonnaise by next visit.     Ms. Perea scheduled follow up visit for 02/18/2020 at 10:30 a.m. Thank you for the referral to nutrition counseling. It was my pleasure to see Ms. Perea today.     General Info     Row Name 01/07/20 1307       Today's Session    Person(s) attending today's session  Patient     Services Used Today?  No       General Information    How Well Do You Speak English?  very well    Last grade of school completed  12 plus on year [employer]        Physical Findings     Row Name 01/07/20 1308          Physical Findings    Overall Physical Appearance  obese           Nutritional Info/Activity     Row Name 01/07/20 1306       Nutritional Information    Have you had weight changes?  No    What is your desired body weight?  61.2 kg (135 lb)    Have you tried to lose weight before?  Yes    List programs tried, date, and success  low carbohydrate    What is your motivation to lose weight?  I lost weight on this    Food Allergies  -- None    History of eating disorder?  No    Do you have difficulty chewing food?  No    Functional Status  able to prepare meals    List any food cravings/trigger foods you have  bread and sweets    Food Behaviors  Stress eater;Boredom eater    How often do you eat out and where?  [employer] once per week    Do you use Food Assistance programs (WIC, food stamps, food bank)?  no    Do you need information about Food Assistance programs?  no    How many times do you drink milk per day?  0    How many times do you eat fruit per day?  0    How many times do you eat vegetables per day?  1    How many times do you drink juice per day?  0    How many times do you eat candy/chocolates per day?  0    How many times do  you eat baked goods per day?  1    How many times do you eat desserts per day?  1    How many times do you eat ice cream per day?  0    How many times do you eat snack foods per day?  2    How many diet sodas do you drink per day?  0    How many times do you eat ethnic food per day?  -- occasionally    How many times do you drink alcohol per day?  0    How many times do you have caffeine per day?  0    How many servings of artificial sweetner do you have per day?  4    How many meals do you eat each day?  2    How many snacks do you eat each day?  2    What is the biggest challenge you have with your diet?  -- Seasoning    What type of support do you currently use to help you with your health issues?  None    Enter everything you can remember eating in the last 24 hours (1 day)  Grapes, Bananas, Coffee, Water, Ham Galax, Cranberry Salad, Water, Fritos, Water       Eating Environment    Eating environment  Alone;Family       Physical Activity    Are you currently involved in an activity/exercise program?   No    Reasons for Inactivity  Limited activity    How many minutes do you spend on exercise each day?  0    How would you rank exercise as an important health lifestyle practice?  10          Estimated/Assessed Needs     Row Name 01/07/20 1311          Estimated/Assessed Needs    Additional Documentation  Protein Requirements (Group);Calorie Requirements (Group)        Calorie Requirements    Estimated Calorie Requirement Comment  2630-3176 Kcal/day        Protein Requirements    Est Protein Requirement Amount (gms/kg)  -- 40-50 gm/day                 Problem/Interventions:  Problem 1     Row Name 01/07/20 1312          Nutrition Diagnoses Problem 1    Problem 1  Knowledge Deficit     Etiology (related to)  Medical Diagnosis     Cardiac  HTN;CAD     Signs/Symptoms (evidenced by)  Report/Observation     Reported/Observed By  Patient;MD                 Intervention Goal     Row Name 01/07/20 5988           Intervention Goal    General  Meet nutritional needs for age/condition     PO  Meet estimated needs     Weight  Appropriate weight loss           Nutrition Prescription     Row Name 01/07/20 1313          Nutrition Prescription PO    PO Prescription  Begin/change diet     Common Modifiers  Low Fat;Consistent Carbohydrate     Other Modifiers  High fiber     New PO Prescription Ordered?  Yes         Education/Evaluation     Row Name 01/07/20 1313          Education    Education  Provided education regarding;Education topics     Education Topics  Basic nutrition;Cardiac heart health;Cholesterol;Fat;Fiber;Fluid;Gradual weight loss;Milk;Na+;Low fat;Protein;Triglyceride        Monitor/Evaluation    Monitor  Per protocol;PO intake;Weight;Food/activity diary           Electronically signed by:  Radha Mishra RD  01/07/20 1:24 PM

## 2020-01-23 ENCOUNTER — HOSPITAL ENCOUNTER (OUTPATIENT)
Dept: NUCLEAR MEDICINE | Facility: HOSPITAL | Age: 75
Discharge: HOME | End: 2020-01-23

## 2020-01-23 DIAGNOSIS — I48.0 PAROXYSMAL ATRIAL FIBRILLATION (HCC): ICD-10-CM

## 2020-01-23 DIAGNOSIS — I25.10 CORONARY ARTERY DISEASE INVOLVING NATIVE CORONARY ARTERY OF NATIVE HEART WITHOUT ANGINA PECTORIS: ICD-10-CM

## 2020-01-23 DIAGNOSIS — R00.2 PALPITATIONS: ICD-10-CM

## 2020-01-23 DIAGNOSIS — I34.1 MVP (MITRAL VALVE PROLAPSE): ICD-10-CM

## 2020-01-23 DIAGNOSIS — I20.8 ANGINAL EQUIVALENT (HCC): ICD-10-CM

## 2020-01-23 DIAGNOSIS — I10 BENIGN ESSENTIAL HYPERTENSION: ICD-10-CM

## 2020-01-23 PROCEDURE — 78452 HT MUSCLE IMAGE SPECT MULT: CPT | Performed by: INTERNAL MEDICINE

## 2020-01-23 PROCEDURE — 0 TECHNETIUM SESTAMIBI: Performed by: NURSE PRACTITIONER

## 2020-01-23 PROCEDURE — 93018 CV STRESS TEST I&R ONLY: CPT | Performed by: INTERNAL MEDICINE

## 2020-01-23 PROCEDURE — 93017 CV STRESS TEST TRACING ONLY: CPT

## 2020-01-23 PROCEDURE — A9500 TC99M SESTAMIBI: HCPCS | Performed by: NURSE PRACTITIONER

## 2020-01-23 PROCEDURE — 25010000002 REGADENOSON 0.4 MG/5ML SOLUTION: Performed by: NURSE PRACTITIONER

## 2020-01-23 PROCEDURE — 78452 HT MUSCLE IMAGE SPECT MULT: CPT

## 2020-01-23 RX ADMIN — TECHNETIUM TC 99M SESTAMIBI 1 DOSE: 1 INJECTION INTRAVENOUS at 08:10

## 2020-01-23 RX ADMIN — REGADENOSON 0.4 MG: 0.08 INJECTION, SOLUTION INTRAVENOUS at 08:10

## 2020-01-24 ENCOUNTER — HOSPITAL ENCOUNTER (OUTPATIENT)
Dept: NUCLEAR MEDICINE | Facility: HOSPITAL | Age: 75
Discharge: HOME | End: 2020-01-24

## 2020-01-24 LAB
BH CV STRESS COMMENTS STAGE 1: NORMAL
BH CV STRESS DOSE REGADENOSON STAGE 1: 0.4
BH CV STRESS DURATION MIN STAGE 1: 0
BH CV STRESS DURATION SEC STAGE 1: 10
BH CV STRESS PROTOCOL 1: NORMAL
BH CV STRESS RECOVERY BP: NORMAL MMHG
BH CV STRESS RECOVERY HR: 73 BPM
BH CV STRESS STAGE 1: 1
LV EF NUC BP: 77 %
MAXIMAL PREDICTED HEART RATE: 147 BPM
PERCENT MAX PREDICTED HR: 56.46 %
STRESS BASELINE BP: NORMAL MMHG
STRESS BASELINE HR: 55 BPM
STRESS PERCENT HR: 66 %
STRESS POST PEAK BP: NORMAL MMHG
STRESS POST PEAK HR: 83 BPM
STRESS TARGET HR: 125 BPM

## 2020-01-24 PROCEDURE — 0 TECHNETIUM SESTAMIBI: Performed by: NURSE PRACTITIONER

## 2020-01-24 PROCEDURE — A9500 TC99M SESTAMIBI: HCPCS | Performed by: NURSE PRACTITIONER

## 2020-01-24 RX ADMIN — TECHNETIUM TC 99M SESTAMIBI 1 DOSE: 1 INJECTION INTRAVENOUS at 08:30

## 2020-01-28 ENCOUNTER — OFFICE VISIT (OUTPATIENT)
Dept: CARDIOLOGY | Facility: CLINIC | Age: 75
End: 2020-01-28

## 2020-01-28 VITALS
BODY MASS INDEX: 30.55 KG/M2 | HEIGHT: 62 IN | DIASTOLIC BLOOD PRESSURE: 62 MMHG | SYSTOLIC BLOOD PRESSURE: 124 MMHG | HEART RATE: 54 BPM | WEIGHT: 166 LBS | OXYGEN SATURATION: 99 %

## 2020-01-28 DIAGNOSIS — R00.2 PALPITATIONS: ICD-10-CM

## 2020-01-28 DIAGNOSIS — I20.8 ANGINAL EQUIVALENT (HCC): ICD-10-CM

## 2020-01-28 DIAGNOSIS — I25.10 CORONARY ARTERY DISEASE INVOLVING NATIVE CORONARY ARTERY OF NATIVE HEART WITHOUT ANGINA PECTORIS: ICD-10-CM

## 2020-01-28 DIAGNOSIS — I48.0 PAROXYSMAL ATRIAL FIBRILLATION (HCC): ICD-10-CM

## 2020-01-28 DIAGNOSIS — I34.1 MVP (MITRAL VALVE PROLAPSE): ICD-10-CM

## 2020-01-28 DIAGNOSIS — E78.5 HYPERLIPIDEMIA, UNSPECIFIED HYPERLIPIDEMIA TYPE: ICD-10-CM

## 2020-01-28 DIAGNOSIS — I10 ESSENTIAL HYPERTENSION: Primary | ICD-10-CM

## 2020-01-28 PROBLEM — I20.89 ANGINAL EQUIVALENT: Status: ACTIVE | Noted: 2020-01-28

## 2020-01-28 PROCEDURE — 99214 OFFICE O/P EST MOD 30 MIN: CPT | Performed by: NURSE PRACTITIONER

## 2020-01-28 RX ORDER — CARVEDILOL 6.25 MG/1
6.25 TABLET ORAL EVERY MORNING
Qty: 180 TABLET | Refills: 3 | Status: SHIPPED | OUTPATIENT
Start: 2020-01-28 | End: 2020-05-14 | Stop reason: ALTCHOICE

## 2020-01-28 RX ORDER — CARVEDILOL 3.12 MG/1
3.12 TABLET ORAL NIGHTLY
Qty: 180 TABLET | Refills: 3 | Status: SHIPPED | OUTPATIENT
Start: 2020-01-28 | End: 2020-03-30

## 2020-01-28 NOTE — PROGRESS NOTES
"Monica Perea is a 75 y.o. female.  MRN #: [id number]    Referring Provider: Abdiel Juan MD    Chief Complaint:   Chief Complaint   Patient presents with   • Follow-up   • Hypertension        History of Present Illness:  Ms Perea is a 75 year old female for blood pressure reevaluation, history of paroxysmal atrial fibrillation, history of CAD.  Patient had a vasodilator myocardial perfusion stress test done to evaluate for ischemia related to increasing fatigue and fluctuating blood pressure with activity.  Her stress test shows no evidence of myocardial ischemia and normal myocardial perfusion consistent with a low risk study.  Patient still expresses concerns regarding her rather labile and inconsistent blood pressure readings.  She states that many times when she will awaken her blood pressure is low and her heart rate is low.  She also states that when she does become more active through the course of the day her heart rate will go up and her blood pressure will go up, \"sometimes the bottom number will be over 100\".  She does develop fatigue and shortness of breath symptoms when she notices her blood pressure is elevated.  She states her normal baseline \"for the most part\" stays well regulated with her heart rate usually in the mid 50s, and blood pressure typically stable \"in the 110-120 range\".  She states \"however the blood pressure and heart rate will go up with activity BEFORE taking her carvedilol, but will regulate after her medication.  She does express concern about her blood pressure upon awakening many times is too low and her heart rate is \"too low\" and is questioning as to whether her blood pressure dosage is too high prior to her bedtime.  Again, patient denies any chest pain, chest pressure with radiation, unusual shortness of breath or weight gain or fluid retention.    The patient presents today with their self who contributes to the history of their care.     The following portions " of the patient's history were reviewed and updated as appropriate: allergies, current medications, past family history, past medical history, past social history, past surgical history and problem list.     Review of Systems:     Review of Systems   Constitutional: Positive for fatigue. Negative for activity change, appetite change, diaphoresis, unexpected weight gain and unexpected weight loss.   HENT: Negative.    Eyes: Negative.  Negative for blurred vision, double vision, photophobia and visual disturbance.   Respiratory: Negative.  Negative for apnea, cough, chest tightness, shortness of breath and wheezing.    Cardiovascular: Negative.  Negative for chest pain, palpitations and leg swelling.   Gastrointestinal: Negative for abdominal distention, abdominal pain, blood in stool, nausea, vomiting, GERD and indigestion.   Endocrine: Negative.  Negative for cold intolerance, heat intolerance, polydipsia, polyphagia and polyuria.   Genitourinary: Negative.  Negative for decreased libido, frequency, genital sores, hematuria and urgency.   Musculoskeletal: Positive for arthralgias. Negative for back pain, joint swelling, myalgias, neck pain and neck stiffness.   Skin: Negative.  Negative for color change, pallor and bruise.   Allergic/Immunologic: Negative.  Negative for environmental allergies, food allergies and immunocompromised state.   Neurological: Positive for weakness. Negative for dizziness, tremors, seizures, syncope, facial asymmetry, speech difficulty, light-headedness and numbness.   Hematological: Negative.  Negative for adenopathy. Does not bruise/bleed easily.   Psychiatric/Behavioral: Negative.  Negative for agitation, decreased concentration, self-injury, sleep disturbance, suicidal ideas and stress. The patient is not nervous/anxious.    All other systems reviewed and are negative.         Current Outpatient Medications:   •  aspirin (ASPIRIN LOW DOSE) 81 MG tablet, Take  by mouth Daily., Disp: ,  "Rfl:   •  Calcium Carbonate-Vitamin D (CALCIUM PLUS VITAMIN D PO), Take  by mouth Daily., Disp: , Rfl:   •  clobetasol (TEMOVATE) 0.05 % ointment, Apply  topically to the appropriate area as directed Every 12 (Twelve) Hours., Disp: 60 g, Rfl: 3  •  coenzyme Q10 100 MG capsule, Take 100 mg by mouth Daily., Disp: , Rfl:   •  hydroCHLOROthiazide (HYDRODIURIL) 25 MG tablet, Take 1 tablet by mouth Daily., Disp: 90 tablet, Rfl: 3  •  melatonin 5 MG tablet tablet, , Disp: , Rfl:   •  Omega-3 Fatty Acids (FISH OIL) 1000 MG capsule capsule, Take 1,200 mg by mouth 2 (Two) Times a Day With Meals., Disp: , Rfl:   •  pravastatin (PRAVACHOL) 40 MG tablet, Take 1 tablet by mouth Daily. as directed, Disp: 90 tablet, Rfl: 3  •  rivaroxaban (XARELTO) 20 MG tablet, Take 1 tablet by mouth Daily., Disp: 30 tablet, Rfl: 5  •  TRAVATAN Z 0.004 % solution ophthalmic solution, INSTILL 1 DROP INTO EACH EYE NIGHTLY AS DIRECTED, Disp: , Rfl: 6  •  carvedilol (COREG) 3.125 MG tablet, Take 1 tablet by mouth Every Night., Disp: 180 tablet, Rfl: 3  •  carvedilol (COREG) 6.25 MG tablet, Take 1 tablet by mouth Every Morning., Disp: 180 tablet, Rfl: 3    Vitals:    01/28/20 0919   BP: 124/62   BP Location: Right arm   Patient Position: Sitting   Cuff Size: Adult   Pulse: 54   SpO2: 99%   Weight: 75.3 kg (166 lb)   Height: 157.5 cm (62\")       Physical Exam:     Physical Exam   Constitutional: She is oriented to person, place, and time. She appears well-developed and well-nourished. No distress.   HENT:   Head: Normocephalic and atraumatic.   Eyes: Pupils are equal, round, and reactive to light. No scleral icterus.   Neck: Normal range of motion. Neck supple. No JVD present. Carotid bruit is not present. No thyromegaly present.   Cardiovascular: Normal rate, regular rhythm, S1 normal, S2 normal, normal heart sounds and intact distal pulses. PMI is not displaced. Exam reveals no gallop and no friction rub.   No murmur heard.  Pulses:       Carotid " pulses are 1+ on the right side, and 1+ on the left side.  Pulmonary/Chest: Effort normal and breath sounds normal. No respiratory distress. She has no wheezes. She has no rales. She exhibits no tenderness.   Abdominal: Soft. Bowel sounds are normal. She exhibits no mass. There is no tenderness.   Musculoskeletal: Normal range of motion. She exhibits no edema.   Neurological: She is alert and oriented to person, place, and time. No sensory deficit.   Skin: Skin is warm and dry. Capillary refill takes less than 2 seconds. No rash noted. She is not diaphoretic.   Psychiatric: She has a normal mood and affect. Her behavior is normal. Judgment and thought content normal.   Nursing note and vitals reviewed.      Procedures    Results:   Reviewed cardiac diagnostic testing her vasodilator myocardial perfusion stress test was low risk study with no evidence of ischemia.  Reviewed her medication regimen and updated.    Assessment/Plan:   Due to her fluctuations in blood pressure between night and day and with activity and with no activity we will try to titrate her carvedilol, for her to take 6.25 mg in the morning, and 3.125 mg in the evening.  Advised patient to monitor blood pressure 2-3 times daily and as needed and document.  Will follow-up in 6 months or as needed for any cardiac related issues.  Monica was seen today for follow-up and hypertension.    Diagnoses and all orders for this visit:    Essential hypertension  -     carvedilol (COREG) 6.25 MG tablet; Take 1 tablet by mouth Every Morning.  -     carvedilol (COREG) 3.125 MG tablet; Take 1 tablet by mouth Every Night.    Hyperlipidemia, unspecified hyperlipidemia type  -     carvedilol (COREG) 6.25 MG tablet; Take 1 tablet by mouth Every Morning.  -     carvedilol (COREG) 3.125 MG tablet; Take 1 tablet by mouth Every Night.    Palpitations  -     carvedilol (COREG) 6.25 MG tablet; Take 1 tablet by mouth Every Morning.  -     carvedilol (COREG) 3.125 MG tablet;  Take 1 tablet by mouth Every Night.    Coronary artery disease involving native coronary artery of native heart without angina pectoris  -     carvedilol (COREG) 6.25 MG tablet; Take 1 tablet by mouth Every Morning.  -     carvedilol (COREG) 3.125 MG tablet; Take 1 tablet by mouth Every Night.    MVP (mitral valve prolapse)  -     carvedilol (COREG) 6.25 MG tablet; Take 1 tablet by mouth Every Morning.  -     carvedilol (COREG) 3.125 MG tablet; Take 1 tablet by mouth Every Night.    Paroxysmal atrial fibrillation (CMS/HCC)  -     carvedilol (COREG) 6.25 MG tablet; Take 1 tablet by mouth Every Morning.  -     carvedilol (COREG) 3.125 MG tablet; Take 1 tablet by mouth Every Night.    Anginal equivalent (CMS/HCC)  -     carvedilol (COREG) 6.25 MG tablet; Take 1 tablet by mouth Every Morning.  -     carvedilol (COREG) 3.125 MG tablet; Take 1 tablet by mouth Every Night.        No follow-ups on file.    TERRY Andrade

## 2020-02-03 ENCOUNTER — OFFICE VISIT (OUTPATIENT)
Dept: INTERNAL MEDICINE | Facility: CLINIC | Age: 75
End: 2020-02-03

## 2020-02-03 VITALS
TEMPERATURE: 97.5 F | DIASTOLIC BLOOD PRESSURE: 74 MMHG | BODY MASS INDEX: 30 KG/M2 | SYSTOLIC BLOOD PRESSURE: 128 MMHG | WEIGHT: 163 LBS | HEART RATE: 57 BPM | OXYGEN SATURATION: 98 % | HEIGHT: 62 IN

## 2020-02-03 DIAGNOSIS — G47.33 OSA (OBSTRUCTIVE SLEEP APNEA): ICD-10-CM

## 2020-02-03 DIAGNOSIS — I10 ESSENTIAL HYPERTENSION: Primary | ICD-10-CM

## 2020-02-03 DIAGNOSIS — I25.10 CORONARY ARTERY DISEASE INVOLVING NATIVE CORONARY ARTERY OF NATIVE HEART WITHOUT ANGINA PECTORIS: ICD-10-CM

## 2020-02-03 DIAGNOSIS — N63.22 UNSPECIFIED LUMP IN THE LEFT BREAST, UPPER INNER QUADRANT: ICD-10-CM

## 2020-02-03 DIAGNOSIS — E78.5 HYPERLIPIDEMIA, UNSPECIFIED HYPERLIPIDEMIA TYPE: ICD-10-CM

## 2020-02-03 DIAGNOSIS — N63.20 BREAST MASS, LEFT: ICD-10-CM

## 2020-02-03 DIAGNOSIS — I48.0 PAROXYSMAL ATRIAL FIBRILLATION (HCC): ICD-10-CM

## 2020-02-03 PROBLEM — I20.89 ANGINAL EQUIVALENT: Status: RESOLVED | Noted: 2020-01-28 | Resolved: 2020-02-03

## 2020-02-03 PROBLEM — I20.8 ANGINAL EQUIVALENT (HCC): Status: RESOLVED | Noted: 2020-01-28 | Resolved: 2020-02-03

## 2020-02-03 PROCEDURE — 99214 OFFICE O/P EST MOD 30 MIN: CPT | Performed by: INTERNAL MEDICINE

## 2020-02-03 RX ORDER — CYANOCOBALAMIN (VITAMIN B-12) 1000 MCG
TABLET ORAL DAILY
COMMUNITY
End: 2020-03-30

## 2020-02-03 NOTE — PROGRESS NOTES
Subjective   Monica Perea is a 75 y.o. female.     Chief Complaint   Patient presents with   • Follow-up     3 mo f/u HLD, CAD, HTN. Pt did not get labs done.    • Breast Problem     Pt states that she found a knot in her left breast, denies any pain.        History of Present Illness   Patient here for follow-up.  Complaining a week ago discover left medial breast small mass a week ago.  No tenderness no nipple discharge.  Mammogram was normal.  Family history of a breast cancer.  Coronary artery disease stress test within normal limits.  Patient complains of heart rate ups and downs on carvedilol.  Patient is seeing cardiologist's.  Patient does have a atrial fibrillation.  Blood pressure fluctuates also.  Sleep apnea on CPAP.  Patient also complains some low back pain comes and goes.    Current Outpatient Medications:   •  aspirin (ASPIRIN LOW DOSE) 81 MG tablet, Take  by mouth Daily., Disp: , Rfl:   •  Calcium Carbonate-Vitamin D (CALCIUM PLUS VITAMIN D PO), Take  by mouth Daily., Disp: , Rfl:   •  carvedilol (COREG) 3.125 MG tablet, Take 1 tablet by mouth Every Night., Disp: 180 tablet, Rfl: 3  •  carvedilol (COREG) 6.25 MG tablet, Take 1 tablet by mouth Every Morning., Disp: 180 tablet, Rfl: 3  •  clobetasol (TEMOVATE) 0.05 % ointment, Apply  topically to the appropriate area as directed Every 12 (Twelve) Hours., Disp: 60 g, Rfl: 3  •  coenzyme Q10 100 MG capsule, Take 100 mg by mouth Daily., Disp: , Rfl:   •  Cyanocobalamin (B-12) 1000 MCG tablet, Take  by mouth Daily., Disp: , Rfl:   •  hydroCHLOROthiazide (HYDRODIURIL) 25 MG tablet, Take 1 tablet by mouth Daily., Disp: 90 tablet, Rfl: 3  •  melatonin 5 MG tablet tablet, , Disp: , Rfl:   •  Omega-3 Fatty Acids (FISH OIL) 1000 MG capsule capsule, Take 1,200 mg by mouth 2 (Two) Times a Day With Meals., Disp: , Rfl:   •  pravastatin (PRAVACHOL) 40 MG tablet, Take 1 tablet by mouth Daily. as directed, Disp: 90 tablet, Rfl: 3  •  rivaroxaban (XARELTO) 20  MG tablet, Take 1 tablet by mouth Daily., Disp: 30 tablet, Rfl: 5  •  TRAVATAN Z 0.004 % solution ophthalmic solution, INSTILL 1 DROP INTO EACH EYE NIGHTLY AS DIRECTED, Disp: , Rfl: 6    The following portions of the patient's history were reviewed and updated as appropriate: allergies, current medications, past family history, past medical history, past social history, past surgical history and problem list.    Review of Systems   Constitutional: Negative.    Respiratory: Negative.    Cardiovascular: Negative.    Gastrointestinal: Negative.    Musculoskeletal: Negative.    Skin: Negative.    Neurological: Negative.    Psychiatric/Behavioral: Negative.        Objective   Physical Exam   Constitutional: She is oriented to person, place, and time. She appears well-nourished.   Neck: Neck supple.   Cardiovascular: Normal rate, regular rhythm and normal heart sounds.   Pulmonary/Chest: Effort normal and breath sounds normal.   Abdominal: Bowel sounds are normal.   Neurological: She is alert and oriented to person, place, and time.   Skin: Skin is warm.   Psychiatric: She has a normal mood and affect.       All tests have been reviewed.    Assessment/Plan   Diagnoses and all orders for this visit:    Essential hypertension continue medication recommend hold hydrochlorothiazide for now.  Patient going to follow-up with cardiologist    Hyperlipidemia, unspecified hyperlipidemia type continue medication    Coronary artery disease involving native coronary artery of native heart without angina pectoris stress test January 2020 normal    Paroxysmal atrial fibrillation (CMS/HCC) heart rate fluctuates with carvedilol patient is going to discuss with cardiologist for possible pacemaker.    MOON (obstructive sleep apnea) continue CPAP    Breast mass, left discovered 1 week ago  -     Mammo Diagnostic Digital Tomosynthesis Left With CAD  -     US breast left complete    Unspecified lump in the left breast, upper inner quadrant   -      Mammo Diagnostic Digital Tomosynthesis Left With CAD  -     US breast left complete    Other orders  -     Cyanocobalamin (B-12) 1000 MCG tablet; Take  by mouth Daily.    do lab   1 mo             ----Below is to historical records for reference only:      Cad MILD BLOCKAGE 2009 30%.  stress test normal, follow up with cardio,   Colon 7/18/16: Left-sided diverticulosis. Colon polyps. Internal hemorrhoids. Repeat 5 year       Diverticulitis hx.    Pneumovax 2015,and zostavax and shingrix refused and explained risks  Psoriasis refill clobetasole   Knees pain, OA probable, glucosamine continue   Foot fungal infection s/p med by derm  Interstitial cystitis UA normal follow uro   leg swelling on hydrochlorothiazide to 25 mg daily., edema resolved

## 2020-02-17 ENCOUNTER — OFFICE VISIT (OUTPATIENT)
Dept: CARDIOLOGY | Facility: CLINIC | Age: 75
End: 2020-02-17

## 2020-02-17 VITALS
DIASTOLIC BLOOD PRESSURE: 76 MMHG | OXYGEN SATURATION: 97 % | BODY MASS INDEX: 36.98 KG/M2 | WEIGHT: 159.8 LBS | HEIGHT: 55 IN | SYSTOLIC BLOOD PRESSURE: 148 MMHG | HEART RATE: 66 BPM

## 2020-02-17 DIAGNOSIS — I34.1 MVP (MITRAL VALVE PROLAPSE): ICD-10-CM

## 2020-02-17 DIAGNOSIS — E78.2 MIXED HYPERLIPIDEMIA: ICD-10-CM

## 2020-02-17 DIAGNOSIS — I48.0 PAROXYSMAL ATRIAL FIBRILLATION (HCC): ICD-10-CM

## 2020-02-17 DIAGNOSIS — I25.10 CORONARY ARTERY DISEASE INVOLVING NATIVE CORONARY ARTERY OF NATIVE HEART WITHOUT ANGINA PECTORIS: ICD-10-CM

## 2020-02-17 DIAGNOSIS — R00.2 PALPITATIONS: ICD-10-CM

## 2020-02-17 DIAGNOSIS — I10 ESSENTIAL HYPERTENSION: Primary | ICD-10-CM

## 2020-02-17 PROCEDURE — 99214 OFFICE O/P EST MOD 30 MIN: CPT | Performed by: NURSE PRACTITIONER

## 2020-02-17 RX ORDER — AMLODIPINE BESYLATE 5 MG/1
5 TABLET ORAL DAILY
Qty: 30 TABLET | Refills: 11 | Status: SHIPPED | OUTPATIENT
Start: 2020-02-17 | End: 2020-08-18

## 2020-02-17 RX ORDER — HYDROCHLOROTHIAZIDE 25 MG/1
25 TABLET ORAL DAILY
Qty: 30 TABLET | Refills: 11 | Status: SHIPPED | OUTPATIENT
Start: 2020-02-17 | End: 2020-09-18 | Stop reason: SDUPTHER

## 2020-02-17 NOTE — PATIENT INSTRUCTIONS
Continue carvedilol 6.25mg twice daily  Continue HCTZ 25mg twice daily  Add Norvasc 5mg   Continue to monitor BP

## 2020-02-17 NOTE — PROGRESS NOTES
Monica Perea is a 75 y.o. female.  MRN #: [id number]    Referring Provider: Abdiel Juan MD    Chief Complaint:   Chief Complaint   Patient presents with   • Follow-up     Please talk to patient about Carvedilol doses that she is taking   • Atrial Fibrillation     Pt states she has been in Afib all weekend; Pt states    • Fatigue   • Hypertension     Pt has BP sheet with her   • Carotid Artery Disease        History of Present Illness:  Ms Perea is a 75-year-old female that presents for follow-up for blood pressure reevaluation.  Patient had been evaluated January 28, 2020 for fluctuating blood pressure with episodes of hypertension and dizziness.  Her blood pressure medication was adjusted and she was tried on carvedilol 6.2 5 in the AM and carvedilol 3.12 5 in the evening.  Patient continued to have episodes of labile blood pressure with hypertensive episodes in the evening time.  She reports that she has been taking a combined 6.25 and 3.12 5 in the morning time.  Her hydrochlorothiazide has been discontinued, and patient states that she had a significant weight gain of 9 pounds after discontinuation of her hydrochlorothiazide.  She denies any symptoms of unusual chest pain or shortness of breath.  She does relate, however that over the weekend she has had episodes of tachycardia and palpitations and felt as though she had gone back into her atrial fibrillation.  This was of short duration and converted back into a sinus rhythm with a normal heart rate.  Patient states that her vital signs, particularly her blood pressure continues to be inconsistent with normal blood pressures in the morning but extremely high blood pressures in the evening.    The patient presents today with their self who contributes to the history of their care.     The following portions of the patient's history were reviewed and updated as appropriate: allergies, current medications, past family history, past medical  history, past social history, past surgical history and problem list.     Review of Systems:     Review of Systems   Constitutional: Negative.  Negative for activity change, appetite change, diaphoresis, fatigue, unexpected weight gain and unexpected weight loss.   HENT: Negative.    Eyes: Negative.  Negative for blurred vision, double vision, photophobia and visual disturbance.   Respiratory: Negative.  Negative for apnea, cough, chest tightness, shortness of breath and wheezing.    Cardiovascular: Positive for palpitations. Negative for chest pain and leg swelling.        Street of atrial fibrillation, and reports episodes of tachycardia with palpitations this weekend.   Gastrointestinal: Negative for abdominal distention, abdominal pain, nausea, vomiting, GERD and indigestion.   Endocrine: Negative.  Negative for cold intolerance, heat intolerance, polydipsia, polyphagia and polyuria.   Genitourinary: Negative.  Negative for decreased libido, frequency, genital sores, hematuria and urgency.   Musculoskeletal: Negative.  Negative for arthralgias, back pain, joint swelling, myalgias, neck pain and neck stiffness.   Skin: Negative.  Negative for color change, pallor, rash and bruise.   Allergic/Immunologic: Negative.  Negative for environmental allergies, food allergies and immunocompromised state.   Neurological: Negative.  Negative for dizziness, tremors, seizures, syncope, facial asymmetry, speech difficulty, weakness, light-headedness and numbness.   Hematological: Negative.  Negative for adenopathy. Does not bruise/bleed easily.   Psychiatric/Behavioral: Negative.  Negative for agitation, decreased concentration, self-injury, sleep disturbance, suicidal ideas, negative for hyperactivity and stress. The patient is not nervous/anxious.    All other systems reviewed and are negative.         Current Outpatient Medications:   •  aspirin (ASPIRIN LOW DOSE) 81 MG tablet, Take  by mouth Daily., Disp: , Rfl:   •   "Calcium Carbonate-Vitamin D (CALCIUM PLUS VITAMIN D PO), Take  by mouth Daily., Disp: , Rfl:   •  carvedilol (COREG) 3.125 MG tablet, Take 1 tablet by mouth Every Night., Disp: 180 tablet, Rfl: 3  •  carvedilol (COREG) 6.25 MG tablet, Take 1 tablet by mouth Every Morning., Disp: 180 tablet, Rfl: 3  •  clobetasol (TEMOVATE) 0.05 % ointment, Apply  topically to the appropriate area as directed Every 12 (Twelve) Hours., Disp: 60 g, Rfl: 3  •  coenzyme Q10 100 MG capsule, Take 100 mg by mouth Daily., Disp: , Rfl:   •  Cyanocobalamin (B-12) 1000 MCG tablet, Take  by mouth Daily., Disp: , Rfl:   •  melatonin 5 MG tablet tablet, , Disp: , Rfl:   •  Omega-3 Fatty Acids (FISH OIL) 1000 MG capsule capsule, Take 1,200 mg by mouth 2 (Two) Times a Day With Meals., Disp: , Rfl:   •  pravastatin (PRAVACHOL) 40 MG tablet, Take 1 tablet by mouth Daily. as directed, Disp: 90 tablet, Rfl: 3  •  rivaroxaban (XARELTO) 20 MG tablet, Take 1 tablet by mouth Daily., Disp: 30 tablet, Rfl: 5  •  TRAVATAN Z 0.004 % solution ophthalmic solution, INSTILL 1 DROP INTO EACH EYE NIGHTLY AS DIRECTED, Disp: , Rfl: 6  •  amLODIPine (NORVASC) 5 MG tablet, Take 1 tablet by mouth Daily for 360 days., Disp: 30 tablet, Rfl: 11  •  hydroCHLOROthiazide (HYDRODIURIL) 25 MG tablet, Take 1 tablet by mouth Daily., Disp: 30 tablet, Rfl: 11    Vitals:    02/17/20 1016   BP: 148/76   BP Location: Left arm   Patient Position: Sitting   Cuff Size: Adult   Pulse: 66   SpO2: 97%   Weight: 72.5 kg (159 lb 12.8 oz)   Height: 61 cm (24.02\")       Physical Exam:     Physical Exam   Constitutional: She is oriented to person, place, and time. She appears well-developed and well-nourished. No distress.   HENT:   Head: Normocephalic and atraumatic.   Eyes: Pupils are equal, round, and reactive to light. Conjunctivae are normal. No scleral icterus.   Neck: Trachea normal, normal range of motion and full passive range of motion without pain. Neck supple. No JVD present. Carotid " bruit is not present. No thyromegaly present.   Cardiovascular: Normal rate, regular rhythm, S1 normal, S2 normal, normal heart sounds and intact distal pulses. PMI is not displaced. Exam reveals no gallop and no friction rub.   No murmur heard.  Pulses:       Carotid pulses are 1+ on the right side, and 1+ on the left side.  Pulmonary/Chest: Effort normal and breath sounds normal. No respiratory distress. She has no wheezes. She has no rales. She exhibits no tenderness.   Abdominal: Soft. Bowel sounds are normal. She exhibits no mass. There is no tenderness.   Musculoskeletal: Normal range of motion. She exhibits no edema.   Neurological: She is alert and oriented to person, place, and time. No sensory deficit.   Skin: Skin is warm and dry. Capillary refill takes less than 2 seconds. No rash noted. She is not diaphoretic.   Psychiatric: She has a normal mood and affect. Her behavior is normal. Judgment and thought content normal.   Nursing note and vitals reviewed.      Procedures    Results:   Reviewed medication regimen and updated.    Assessment/Plan:   Patient is presently on Xarelto for her prior history of atrial fibrillation.  Due to her symptoms of palpitations and tachycardia we will monitor her with a Holter monitor for 14 days.  We will adjust her carvedilol to 6.125 mg p.o. twice daily.  Restart her hydrochlorothiazide at 25 mg p.o. Daily.  We will start her on Norvasc 5 mg p.o. Daily.  We will follow-up in 1 month or as needed for any cardiac related issues.  Monica was seen today for follow-up, atrial fibrillation, fatigue, hypertension and carotid artery disease.    Diagnoses and all orders for this visit:    Essential hypertension  -     Holter Monitor - 72 Hour Up To 21 Days; Future  -     hydroCHLOROthiazide (HYDRODIURIL) 25 MG tablet; Take 1 tablet by mouth Daily.  -     amLODIPine (NORVASC) 5 MG tablet; Take 1 tablet by mouth Daily for 360 days.    Mixed hyperlipidemia  -     Holter Monitor -  72 Hour Up To 21 Days; Future  -     hydroCHLOROthiazide (HYDRODIURIL) 25 MG tablet; Take 1 tablet by mouth Daily.  -     amLODIPine (NORVASC) 5 MG tablet; Take 1 tablet by mouth Daily for 360 days.    Coronary artery disease involving native coronary artery of native heart without angina pectoris  -     Holter Monitor - 72 Hour Up To 21 Days; Future  -     hydroCHLOROthiazide (HYDRODIURIL) 25 MG tablet; Take 1 tablet by mouth Daily.  -     amLODIPine (NORVASC) 5 MG tablet; Take 1 tablet by mouth Daily for 360 days.    MVP (mitral valve prolapse)  -     Holter Monitor - 72 Hour Up To 21 Days; Future  -     hydroCHLOROthiazide (HYDRODIURIL) 25 MG tablet; Take 1 tablet by mouth Daily.  -     amLODIPine (NORVASC) 5 MG tablet; Take 1 tablet by mouth Daily for 360 days.    Paroxysmal atrial fibrillation (CMS/HCC)  -     Holter Monitor - 72 Hour Up To 21 Days; Future  -     hydroCHLOROthiazide (HYDRODIURIL) 25 MG tablet; Take 1 tablet by mouth Daily.  -     amLODIPine (NORVASC) 5 MG tablet; Take 1 tablet by mouth Daily for 360 days.    Palpitations  -     Holter Monitor - 72 Hour Up To 21 Days; Future  -     hydroCHLOROthiazide (HYDRODIURIL) 25 MG tablet; Take 1 tablet by mouth Daily.  -     amLODIPine (NORVASC) 5 MG tablet; Take 1 tablet by mouth Daily for 360 days.        Return in about 1 month (around 3/17/2020).    TERRY Andrade

## 2020-02-18 ENCOUNTER — HOSPITAL ENCOUNTER (OUTPATIENT)
Dept: NUTRITION | Facility: HOSPITAL | Age: 75
Discharge: HOME | End: 2020-02-18
Admitting: NURSE PRACTITIONER

## 2020-02-18 PROCEDURE — 97803 MED NUTRITION INDIV SUBSEQ: CPT

## 2020-02-18 NOTE — PROGRESS NOTES
"Nutrition Services    Patient Name:  Monica Perea  YOB: 1945  MRN: [id number]  Admit Date:  2/18/2020    Ms. Perea was seen today for nutritional follow up counseling regarding a heart healthy diet. She states she is glad she attended the previous education session with RD as she now reads labels and is more conscious of how much salt she takes in.    Ht. 61\"  Wt. 159 lb. She has lost 3.6 lbs. Since last visit one month ago.  BMI: 30.12    Estimated Daily Needs:  ~0027-8432 Kcal  ~40-50 gm Protein    RD reviewed 24 hour recall and made suggestions for ways to add fiber and suggested more lean protein consumption. RD also provide a list of lean protein foods.  Ms. Perea is considering trying the keto diet to lose weight. RD cautioned not to consume high fat foods with this diet plan as it may work against her heart health. RD suggested more fiber and lean meats.     Goal Progress:  1.) Find new recipes for vegetables using the internet by next visit. Goal met by 100%.  2.) Cut back on salt and fat by seasoning more without salt, changing milk to 1%, buying reduced/fat free sour cream and other items when available and changing mayonnaise by next visit. Goal met by 100%.    Ms. Perea states she would like to work on weight loss at some point in the future. RD suggested she return to our office and be seen for weight management in the future if she decides too. Ms. Perea states she has a lot going on health issues right now but may return to our office in the future. MASON encouraged Ms. Perea to call our office with questions. Ms. Perea declines future follow up at this time. RD to close pt.'s nutrition chart and physician will be notified.  Thank you for the referral it has been my pleasure to follow this pt.     Electronically signed by:  Radha Mishra RD  02/18/20 10:35 AM   "

## 2020-02-19 ENCOUNTER — HOSPITAL ENCOUNTER (OUTPATIENT)
Dept: ULTRASOUND IMAGING | Facility: HOSPITAL | Age: 75
End: 2020-02-19

## 2020-02-19 ENCOUNTER — HOSPITAL ENCOUNTER (OUTPATIENT)
Dept: MAMMOGRAPHY | Facility: HOSPITAL | Age: 75
Discharge: HOME | End: 2020-02-19
Admitting: INTERNAL MEDICINE

## 2020-02-19 PROCEDURE — G0279 TOMOSYNTHESIS, MAMMO: HCPCS

## 2020-02-19 PROCEDURE — 77065 DX MAMMO INCL CAD UNI: CPT

## 2020-02-28 ENCOUNTER — APPOINTMENT (OUTPATIENT)
Dept: ULTRASOUND IMAGING | Facility: HOSPITAL | Age: 75
End: 2020-02-28

## 2020-02-28 ENCOUNTER — APPOINTMENT (OUTPATIENT)
Dept: MAMMOGRAPHY | Facility: HOSPITAL | Age: 75
End: 2020-02-28

## 2020-03-02 DIAGNOSIS — R00.2 PALPITATIONS: ICD-10-CM

## 2020-03-02 DIAGNOSIS — I25.10 CORONARY ARTERY DISEASE INVOLVING NATIVE CORONARY ARTERY OF NATIVE HEART WITHOUT ANGINA PECTORIS: ICD-10-CM

## 2020-03-02 DIAGNOSIS — I48.0 PAROXYSMAL ATRIAL FIBRILLATION (HCC): ICD-10-CM

## 2020-03-02 DIAGNOSIS — I34.1 MVP (MITRAL VALVE PROLAPSE): ICD-10-CM

## 2020-03-05 ENCOUNTER — OFFICE VISIT (OUTPATIENT)
Dept: INTERNAL MEDICINE | Facility: CLINIC | Age: 75
End: 2020-03-05

## 2020-03-05 VITALS
OXYGEN SATURATION: 96 % | BODY MASS INDEX: 37.52 KG/M2 | DIASTOLIC BLOOD PRESSURE: 74 MMHG | SYSTOLIC BLOOD PRESSURE: 116 MMHG | TEMPERATURE: 97.5 F | HEIGHT: 55 IN | WEIGHT: 162.12 LBS | HEART RATE: 61 BPM | RESPIRATION RATE: 16 BRPM

## 2020-03-05 DIAGNOSIS — I25.10 CORONARY ARTERY DISEASE INVOLVING NATIVE CORONARY ARTERY OF NATIVE HEART WITHOUT ANGINA PECTORIS: ICD-10-CM

## 2020-03-05 DIAGNOSIS — G47.00 INSOMNIA, UNSPECIFIED TYPE: ICD-10-CM

## 2020-03-05 DIAGNOSIS — I10 ESSENTIAL HYPERTENSION: Primary | ICD-10-CM

## 2020-03-05 DIAGNOSIS — E78.2 MIXED HYPERLIPIDEMIA: ICD-10-CM

## 2020-03-05 DIAGNOSIS — E66.3 OVERWEIGHT: ICD-10-CM

## 2020-03-05 DIAGNOSIS — M79.89 LEG SWELLING: ICD-10-CM

## 2020-03-05 DIAGNOSIS — I48.0 PAROXYSMAL ATRIAL FIBRILLATION (HCC): ICD-10-CM

## 2020-03-05 DIAGNOSIS — E55.9 VITAMIN D DEFICIENCY: ICD-10-CM

## 2020-03-05 DIAGNOSIS — E53.8 B12 DEFICIENCY: ICD-10-CM

## 2020-03-05 LAB
ALBUMIN SERPL-MCNC: 4.3 G/DL (ref 3.5–5.2)
ALBUMIN/GLOB SERPL: 1.6 G/DL
ALP SERPL-CCNC: 62 U/L (ref 39–117)
ALT SERPL-CCNC: 13 U/L (ref 1–33)
AST SERPL-CCNC: 12 U/L (ref 1–32)
BILIRUB SERPL-MCNC: 0.4 MG/DL (ref 0.2–1.2)
BUN SERPL-MCNC: 10 MG/DL (ref 8–23)
BUN/CREAT SERPL: 12.5 (ref 7–25)
CALCIUM SERPL-MCNC: 9.2 MG/DL (ref 8.6–10.5)
CHLORIDE SERPL-SCNC: 101 MMOL/L (ref 98–107)
CHOLEST SERPL-MCNC: 149 MG/DL (ref 0–200)
CK SERPL-CCNC: 52 U/L (ref 20–180)
CO2 SERPL-SCNC: 28.9 MMOL/L (ref 22–29)
CREAT SERPL-MCNC: 0.8 MG/DL (ref 0.57–1)
GLOBULIN SER CALC-MCNC: 2.7 GM/DL
GLUCOSE SERPL-MCNC: 100 MG/DL (ref 65–99)
HDLC SERPL-MCNC: 60 MG/DL (ref 40–60)
LDLC SERPL CALC-MCNC: 71 MG/DL (ref 0–100)
POTASSIUM SERPL-SCNC: 3.6 MMOL/L (ref 3.5–5.2)
PROT SERPL-MCNC: 7 G/DL (ref 6–8.5)
SODIUM SERPL-SCNC: 141 MMOL/L (ref 136–145)
TRIGL SERPL-MCNC: 90 MG/DL (ref 0–150)
VIT B12 SERPL-MCNC: >2000 PG/ML (ref 211–946)
VLDLC SERPL CALC-MCNC: 18 MG/DL

## 2020-03-05 PROCEDURE — 99214 OFFICE O/P EST MOD 30 MIN: CPT | Performed by: INTERNAL MEDICINE

## 2020-03-05 NOTE — PROGRESS NOTES
Subjective   Monica Perea is a 75 y.o. female.     Chief Complaint   Patient presents with   • Hypertension   • Hyperlipidemia       History of Present Illness   Patient here for follow-up.  Blood pressure stable on medication.  Patient recently started amlodipine again.  Complaining of the swelling in lower extremity.  Hyperlipidemia stable atrial fibrillation stable on medication.  Vitamin D deficiency on supplement is stable.  CAD stable without chest pain or short of breath.  Weight is stable.  Vitamin D low on supplement is stable.  Insomnia stable on melatonin.  Diagnostic mammogram was benign    Current Outpatient Medications:   •  amLODIPine (NORVASC) 5 MG tablet, Take 1 tablet by mouth Daily for 360 days., Disp: 30 tablet, Rfl: 11  •  aspirin (ASPIRIN LOW DOSE) 81 MG tablet, Take  by mouth Daily., Disp: , Rfl:   •  Calcium Carbonate-Vitamin D (CALCIUM PLUS VITAMIN D PO), Take  by mouth Daily., Disp: , Rfl:   •  carvedilol (COREG) 3.125 MG tablet, Take 1 tablet by mouth Every Night., Disp: 180 tablet, Rfl: 3  •  carvedilol (COREG) 6.25 MG tablet, Take 1 tablet by mouth Every Morning., Disp: 180 tablet, Rfl: 3  •  clobetasol (TEMOVATE) 0.05 % ointment, Apply  topically to the appropriate area as directed Every 12 (Twelve) Hours., Disp: 60 g, Rfl: 3  •  coenzyme Q10 100 MG capsule, Take 100 mg by mouth Daily., Disp: , Rfl:   •  Cyanocobalamin (B-12) 1000 MCG tablet, Take  by mouth Daily., Disp: , Rfl:   •  hydroCHLOROthiazide (HYDRODIURIL) 25 MG tablet, Take 1 tablet by mouth Daily., Disp: 30 tablet, Rfl: 11  •  melatonin 5 MG tablet tablet, , Disp: , Rfl:   •  Omega-3 Fatty Acids (FISH OIL) 1000 MG capsule capsule, Take 1,200 mg by mouth 2 (Two) Times a Day With Meals., Disp: , Rfl:   •  pravastatin (PRAVACHOL) 40 MG tablet, Take 1 tablet by mouth Daily. as directed, Disp: 90 tablet, Rfl: 3  •  rivaroxaban (XARELTO) 20 MG tablet, Take 1 tablet by mouth Daily., Disp: 30 tablet, Rfl: 5  •  TRAVATAN Z  0.004 % solution ophthalmic solution, INSTILL 1 DROP INTO EACH EYE NIGHTLY AS DIRECTED, Disp: , Rfl: 6    The following portions of the patient's history were reviewed and updated as appropriate: allergies, current medications, past family history, past medical history, past social history, past surgical history and problem list.    Review of Systems   Constitutional: Negative.    Respiratory: Negative.    Cardiovascular: Positive for leg swelling.   Gastrointestinal: Negative.    Musculoskeletal: Negative.    Skin: Negative.    Neurological: Negative.    Psychiatric/Behavioral: Negative.        Objective   Physical Exam   Constitutional: She is oriented to person, place, and time. She appears well-nourished.   Neck: Neck supple.   Cardiovascular: Normal rate, regular rhythm and normal heart sounds.   Pulmonary/Chest: Effort normal and breath sounds normal.   Abdominal: Bowel sounds are normal.   Musculoskeletal: She exhibits edema ( 1+).   Neurological: She is alert and oriented to person, place, and time.   Skin: Skin is warm.   Psychiatric: She has a normal mood and affect.       All tests have been reviewed.    Assessment/Plan   Diagnoses and all orders for this visit:    Essential hypertension continue medication    Mixed hyperlipidemia continue medication    Paroxysmal atrial fibrillation (CMS/HCC) continue medication    B12 deficiency continue supplement    Coronary artery disease involving native coronary artery of native heart without angina pectoris stress test normal    Overweight continue good diet    Vitamin D deficiency continue supplement    Insomnia, unspecified type continue medication    4 mo wellness           ----Below is to historical records for reference only:      Cad MILD BLOCKAGE 2009 30%.  stress test normal, follow up with cardio,   Colon 7/18/16: Left-sided diverticulosis. Colon polyps. Internal hemorrhoids. Repeat 5 year       Diverticulitis hx.    Pneumovax 2015,and zostavax and  shingrix refused and explained risks  Psoriasis refill clobetasole   Knees pain, OA probable, glucosamine continue   Foot fungal infection s/p med by derm  Interstitial cystitis UA normal follow uro   leg swelling on hydrochlorothiazide to 25 mg daily., edema resolved

## 2020-03-13 RX ORDER — CLOBETASOL PROPIONATE 0.5 MG/G
OINTMENT TOPICAL EVERY 12 HOURS SCHEDULED
Qty: 60 G | Refills: 3 | Status: SHIPPED | OUTPATIENT
Start: 2020-03-13 | End: 2021-07-28

## 2020-03-16 ENCOUNTER — HOSPITAL ENCOUNTER (OUTPATIENT)
Dept: CT IMAGING | Facility: HOSPITAL | Age: 75
Discharge: HOME | End: 2020-03-16
Admitting: NURSE PRACTITIONER

## 2020-03-16 ENCOUNTER — OFFICE VISIT (OUTPATIENT)
Dept: CARDIOLOGY | Facility: CLINIC | Age: 75
End: 2020-03-16

## 2020-03-16 ENCOUNTER — HOSPITAL ENCOUNTER (OUTPATIENT)
Dept: ULTRASOUND IMAGING | Facility: HOSPITAL | Age: 75
Discharge: HOME | End: 2020-03-16

## 2020-03-16 VITALS
SYSTOLIC BLOOD PRESSURE: 122 MMHG | WEIGHT: 162.6 LBS | BODY MASS INDEX: 37.63 KG/M2 | OXYGEN SATURATION: 98 % | DIASTOLIC BLOOD PRESSURE: 70 MMHG | HEIGHT: 55 IN | HEART RATE: 57 BPM

## 2020-03-16 DIAGNOSIS — Z92.29 HX OF LONG TERM USE OF BLOOD THINNERS: ICD-10-CM

## 2020-03-16 DIAGNOSIS — I34.1 MVP (MITRAL VALVE PROLAPSE): ICD-10-CM

## 2020-03-16 DIAGNOSIS — E66.01 MORBIDLY OBESE (HCC): ICD-10-CM

## 2020-03-16 DIAGNOSIS — E78.49 OTHER HYPERLIPIDEMIA: ICD-10-CM

## 2020-03-16 DIAGNOSIS — I25.10 CORONARY ARTERY DISEASE INVOLVING NATIVE CORONARY ARTERY OF NATIVE HEART WITHOUT ANGINA PECTORIS: ICD-10-CM

## 2020-03-16 DIAGNOSIS — M79.604 ACUTE PAIN OF RIGHT LOWER EXTREMITY: ICD-10-CM

## 2020-03-16 DIAGNOSIS — I10 ESSENTIAL HYPERTENSION: Primary | ICD-10-CM

## 2020-03-16 DIAGNOSIS — I48.0 PAROXYSMAL ATRIAL FIBRILLATION (HCC): ICD-10-CM

## 2020-03-16 DIAGNOSIS — I10 ESSENTIAL HYPERTENSION: ICD-10-CM

## 2020-03-16 DIAGNOSIS — G44.311 INTRACTABLE ACUTE POST-TRAUMATIC HEADACHE: ICD-10-CM

## 2020-03-16 PROBLEM — Z79.01 HX OF LONG TERM USE OF BLOOD THINNERS: Status: ACTIVE | Noted: 2020-03-16

## 2020-03-16 PROCEDURE — 70450 CT HEAD/BRAIN W/O DYE: CPT

## 2020-03-16 PROCEDURE — 93971 EXTREMITY STUDY: CPT

## 2020-03-16 PROCEDURE — 99214 OFFICE O/P EST MOD 30 MIN: CPT | Performed by: NURSE PRACTITIONER

## 2020-03-16 NOTE — PROGRESS NOTES
Moncia Perea is a 75 y.o. female.  MRN #: [id number]    Referring Provider: Abdiel Juan MD    Chief Complaint:   Chief Complaint   Patient presents with   • Follow-up     testing   • Fall     this week, 2 bad bruises, hit her head hard   • Atrial Fibrillation   • Dizziness   • Hypertension   • Cardiac Valve Problem   • Hyperlipidemia        History of Present Illness:  Ms Perea is a 75-year-old female that presents for follow-up after cardiac diagnostic testing Holter monitoring for symptoms of palpitations, near syncopal episodes, vertigo.  Patient does have an abnormal 30-day MCOT with 7 pauses longer than 3-second duration, 1 pause was 6.6 seconds long.  Recurrent paroxysmal atrial fibrillation, atrial fibrillation burden 0.6%, 21 recurrences one lasting 45 minutes, rare PVC with burden less than 0.1%.   Patient states that overall she is feeling much better with less episodes of palpitations and vertigo and near syncopal episodes.   Patient also presents with bruising and discoloration and swelling of her right lower calf area, as well as sporadic headache and bruising to her head after a fall this weekend.  Patient is on Xarelto.  Patient reports that yesterday she did have symptoms of a headache.  She is also complaining of pain and inflammation to her right lower calf.    The patient presents today with their self who contributes to the history of their care.     The following portions of the patient's history were reviewed and updated as appropriate: allergies, current medications, past family history, past medical history, past social history, past surgical history and problem list.     Review of Systems:     Review of Systems   Constitutional: Negative for activity change, appetite change, diaphoresis, fatigue, unexpected weight gain and unexpected weight loss.   HENT:        2 large hematoma tender to palpation on scalp   Eyes: Negative.  Negative for blurred vision, double vision,  photophobia and visual disturbance.   Respiratory: Negative.  Negative for apnea, cough, chest tightness, shortness of breath and wheezing.    Cardiovascular: Negative.  Negative for chest pain, palpitations and leg swelling.        History of paroxysmal atrial fibrillation.  History of palpitations.   Gastrointestinal: Negative.  Negative for abdominal distention, abdominal pain, nausea, vomiting, GERD and indigestion.   Endocrine: Negative.  Negative for cold intolerance, heat intolerance, polydipsia, polyphagia and polyuria.   Genitourinary: Negative.  Negative for decreased libido, frequency, genital sores, hematuria and urgency.   Musculoskeletal: Positive for myalgias. Negative for back pain, joint swelling, neck pain and neck stiffness.        Right lower calf pain with hematoma   Skin: Negative for color change, dry skin, pallor and bruise.   Allergic/Immunologic: Negative.  Negative for environmental allergies, food allergies and immunocompromised state.   Neurological: Positive for dizziness and headache. Negative for tremors, seizures, syncope, facial asymmetry, speech difficulty, weakness, light-headedness, numbness, memory problem and confusion.   Hematological: Negative.  Negative for adenopathy. Does not bruise/bleed easily.   Psychiatric/Behavioral: Negative.  Negative for agitation, decreased concentration, self-injury, sleep disturbance, suicidal ideas, negative for hyperactivity and stress. The patient is not nervous/anxious.    All other systems reviewed and are negative.         Current Outpatient Medications:   •  amLODIPine (NORVASC) 5 MG tablet, Take 1 tablet by mouth Daily for 360 days., Disp: 30 tablet, Rfl: 11  •  aspirin (ASPIRIN LOW DOSE) 81 MG tablet, Take  by mouth Daily., Disp: , Rfl:   •  Calcium Carbonate-Vitamin D (CALCIUM PLUS VITAMIN D PO), Take  by mouth Daily., Disp: , Rfl:   •  carvedilol (COREG) 3.125 MG tablet, Take 1 tablet by mouth Every Night., Disp: 180 tablet, Rfl: 3  •  " carvedilol (COREG) 6.25 MG tablet, Take 1 tablet by mouth Every Morning., Disp: 180 tablet, Rfl: 3  •  clobetasol (TEMOVATE) 0.05 % ointment, Apply  topically to the appropriate area as directed Every 12 (Twelve) Hours., Disp: 60 g, Rfl: 3  •  coenzyme Q10 100 MG capsule, Take 100 mg by mouth Daily., Disp: , Rfl:   •  Cyanocobalamin (B-12) 1000 MCG tablet, Take  by mouth Daily., Disp: , Rfl:   •  hydroCHLOROthiazide (HYDRODIURIL) 25 MG tablet, Take 1 tablet by mouth Daily., Disp: 30 tablet, Rfl: 11  •  melatonin 5 MG tablet tablet, , Disp: , Rfl:   •  Omega-3 Fatty Acids (FISH OIL) 1000 MG capsule capsule, Take 1,200 mg by mouth 2 (Two) Times a Day With Meals., Disp: , Rfl:   •  pravastatin (PRAVACHOL) 40 MG tablet, Take 1 tablet by mouth Daily. as directed, Disp: 90 tablet, Rfl: 3  •  rivaroxaban (XARELTO) 20 MG tablet, Take 1 tablet by mouth Daily., Disp: 30 tablet, Rfl: 5  •  TRAVATAN Z 0.004 % solution ophthalmic solution, INSTILL 1 DROP INTO EACH EYE NIGHTLY AS DIRECTED, Disp: , Rfl: 6    Vitals:    03/16/20 0921   BP: 122/70   BP Location: Right arm   Patient Position: Sitting   Cuff Size: Adult   Pulse: 57   SpO2: 98%   Weight: 73.8 kg (162 lb 9.6 oz)   Height: 61 cm (24.02\")       Physical Exam:     Physical Exam   Constitutional: She is oriented to person, place, and time. She appears well-developed and well-nourished. No distress.   HENT:   Head: Normocephalic and atraumatic.   Right side of scalp with two quarter sized areas of hematoma.   Eyes: Pupils are equal, round, and reactive to light. Conjunctivae and EOM are normal. No scleral icterus.   Neck: Trachea normal and normal range of motion. Neck supple. No JVD present. Carotid bruit is not present. No tracheal deviation present. No thyromegaly present.   Cardiovascular: Normal rate, regular rhythm, S1 normal, S2 normal, normal heart sounds and intact distal pulses. PMI is not displaced. Exam reveals no gallop and no friction rub.   No murmur " heard.  Pulmonary/Chest: Effort normal and breath sounds normal. No respiratory distress. She has no wheezes. She has no rales. She exhibits no tenderness.   Abdominal: Soft. Bowel sounds are normal. She exhibits no mass. There is no tenderness.   Musculoskeletal: Normal range of motion. She exhibits edema and tenderness.   Right calf with dime sized area of hematoma that is tender to palpation.  Positive Homans sign to plantar flexion   Neurological: She is alert and oriented to person, place, and time. No sensory deficit.   Skin: Skin is warm and dry. Capillary refill takes less than 2 seconds. No rash noted. She is not diaphoretic.   Psychiatric: She has a normal mood and affect. Her behavior is normal. Judgment and thought content normal.   Nursing note and vitals reviewed.      Procedures    Results:   Reviewed 30 day MCOT with patient.     Assessment/Plan:   Will obtain a stat CT scan of the head due to her head trauma, with use of Xarelto.  And stat ultrasound of the right lower extremity for DVT.  Refer to electrophysiology for EP studies.  No changes in medication regimen.  Will follow-up in 1 month.  Monica was seen today for follow-up, fall, atrial fibrillation, dizziness, hypertension, cardiac valve problem and hyperlipidemia.    Diagnoses and all orders for this visit:    Essential hypertension  -     CT Head Without Contrast; Future  -     Cancel: US Venous Doppler Lower Extremity Left (duplex); Future  -     Ambulatory Referral to Cardiac Electrophysiology  -     Carvedilol 3.125 mg at at bedtime  -     Carvedilol 6.25 mg p.o. every morning    Other hyperlipidemia  -     CT Head Without Contrast; Future  -     Cancel: US Venous Doppler Lower Extremity Left (duplex); Future  -     Ambulatory Referral to Cardiac Electrophysiology  -     Pravachol 40 mg p.o. nightly    Coronary artery disease involving native coronary artery of native heart without angina pectoris  -     CT Head Without Contrast;  Future  -     Cancel: US Venous Doppler Lower Extremity Left (duplex); Future  -     Ambulatory Referral to Cardiac Electrophysiology    MVP (mitral valve prolapse)  -     CT Head Without Contrast; Future  -     Cancel: US Venous Doppler Lower Extremity Left (duplex); Future  -     Ambulatory Referral to Cardiac Electrophysiology    Paroxysmal atrial fibrillation (CMS/HCC)  -     CT Head Without Contrast; Future  -     Cancel: US Venous Doppler Lower Extremity Left (duplex); Future  -     Ambulatory Referral to Cardiac Electrophysiology    Intractable acute post-traumatic headache  -     CT Head Without Contrast; Future  -     Cancel: US Venous Doppler Lower Extremity Left (duplex); Future  -     Ambulatory Referral to Cardiac Electrophysiology    Hx of long term use of blood thinners  -     CT Head Without Contrast; Future  -     Cancel: US Venous Doppler Lower Extremity Left (duplex); Future  -     Ambulatory Referral to Cardiac Electrophysiology    Acute pain of right lower extremity  -     US Venous Doppler Lower Extremity Right (duplex); Future        Return in about 1 month (around 4/16/2020).    Delfino Thomas, APRN

## 2020-03-17 ENCOUNTER — TELEPHONE (OUTPATIENT)
Dept: CARDIOLOGY | Facility: CLINIC | Age: 75
End: 2020-03-17

## 2020-03-17 NOTE — TELEPHONE ENCOUNTER
----- Message from Delfino Thomas Jr., APRN sent at 3/16/2020  4:06 PM EDT -----  No evidence of intracranial bleed.  Please advise patient

## 2020-03-17 NOTE — TELEPHONE ENCOUNTER
Patient notified as well as notified of the U/S of her legs was normal.  Patient states understanding and advise to be careful and rest due to her dizziness subsides.

## 2020-03-19 ENCOUNTER — APPOINTMENT (OUTPATIENT)
Dept: CT IMAGING | Facility: HOSPITAL | Age: 75
End: 2020-03-19

## 2020-03-19 ENCOUNTER — APPOINTMENT (OUTPATIENT)
Dept: ULTRASOUND IMAGING | Facility: HOSPITAL | Age: 75
End: 2020-03-19

## 2020-03-25 DIAGNOSIS — I34.1 MVP (MITRAL VALVE PROLAPSE): ICD-10-CM

## 2020-03-25 DIAGNOSIS — R00.2 PALPITATIONS: ICD-10-CM

## 2020-03-25 DIAGNOSIS — I25.10 CORONARY ARTERY DISEASE INVOLVING NATIVE CORONARY ARTERY OF NATIVE HEART WITHOUT ANGINA PECTORIS: ICD-10-CM

## 2020-03-25 DIAGNOSIS — I48.0 PAROXYSMAL ATRIAL FIBRILLATION (HCC): ICD-10-CM

## 2020-03-30 ENCOUNTER — TELEMEDICINE (OUTPATIENT)
Dept: CARDIOLOGY | Facility: CLINIC | Age: 75
End: 2020-03-30

## 2020-03-30 VITALS
OXYGEN SATURATION: 98 % | BODY MASS INDEX: 29.81 KG/M2 | HEIGHT: 62 IN | HEART RATE: 58 BPM | WEIGHT: 162 LBS | SYSTOLIC BLOOD PRESSURE: 125 MMHG | DIASTOLIC BLOOD PRESSURE: 70 MMHG

## 2020-03-30 DIAGNOSIS — E78.49 OTHER HYPERLIPIDEMIA: ICD-10-CM

## 2020-03-30 DIAGNOSIS — Z92.29 HX OF LONG TERM USE OF BLOOD THINNERS: ICD-10-CM

## 2020-03-30 DIAGNOSIS — I10 ESSENTIAL HYPERTENSION: ICD-10-CM

## 2020-03-30 DIAGNOSIS — G44.311 INTRACTABLE ACUTE POST-TRAUMATIC HEADACHE: ICD-10-CM

## 2020-03-30 DIAGNOSIS — I48.0 PAROXYSMAL ATRIAL FIBRILLATION (HCC): ICD-10-CM

## 2020-03-30 DIAGNOSIS — I25.10 CORONARY ARTERY DISEASE INVOLVING NATIVE CORONARY ARTERY OF NATIVE HEART WITHOUT ANGINA PECTORIS: ICD-10-CM

## 2020-03-30 DIAGNOSIS — I34.1 MVP (MITRAL VALVE PROLAPSE): ICD-10-CM

## 2020-03-30 PROCEDURE — 99203 OFFICE O/P NEW LOW 30 MIN: CPT | Performed by: INTERNAL MEDICINE

## 2020-03-30 RX ORDER — FLECAINIDE ACETATE 50 MG/1
50 TABLET ORAL 2 TIMES DAILY
Qty: 180 TABLET | Refills: 0 | Status: SHIPPED | OUTPATIENT
Start: 2020-03-30 | End: 2020-05-19 | Stop reason: SINTOL

## 2020-03-30 NOTE — PROGRESS NOTES
Problem List:      1. Paroxysmal Atrial Fibrillation   a. CHADSVASc = 2  b. Anticoagulated with Xarelto   c. ECHO 3/7/2019 EF 69%, mild MR / TR, LA 3.1 cm  d. Stress MPS 1/23/2020 without evidence of ischemia, EF 70%  e. 14 day Monitor Feb 2020 w/ HR , avg 62 bpm, AFib burden < 1%, 7 pauses > 3 sec and up to 6.6 sec in duration.   2. Essential Hypertension   3. Dyslipidemia   4. MOON   5. Mitral Valve Prolapse      History of Present Illness:  Mrs Monica Perea is a 75 year old white female with the above noted past medical history seen in EP consultation today for the evaluation of recurrent palpitations, near syncope, and falls / injury with recent 14-day cardiac monitor demonstrating multiple pauses up to 6.6 seconds in duration.            Cardiac Electrophysiology Outpatient Consult Note            [facility]     Consult Note     Monica Perea  [id number]  03/30/2020       Primary Care Physician: Abdiel Juan MD    Referred By: Delfino Thomas Jr*    Subjective     Chief Complaint:   Chief Complaint   Patient presents with   • Hypertension   's note this was a video visit which converted to a telephone conversation due to technical difficulties with the video link.  I spent 12 minutes and 14 seconds on the telephone with her.    History of Present Illness:   Mrs. Mnoica Perea is a 75 y.o. female who presents to my electrophysiology clinic for evaluation of paroxysmal atrial fibrillation.  She says that these episodes of racing heart rate will occur spontaneously out of the blue they make her feel terrible sometimes.  There are other episodes that are milder and more easily tolerated for her.  She has had several visits to the emergency department as which are associated rapid racing heart rate with A. fib on telemetry.  She is also short of breath with these episodes.  Resting makes him feel better but she feels dramatically better when the  rhythm perseverates to normal.    Presently she feels fine.  She has had no bleeding issues with Xarelto.  She does have some bruising which she suspects is because of the aspirin but nothing major.    She has no chest pain nausea vomiting fevers chills syncope presyncope.    MsArelis to having sleep apnea and is reasonably compliant with CPAP therapy.    Review of Systems:   Constitutional: No fevers or chills, no recent weight gain or weight loss or fatigue  Eyes: No visual loss, blurred vision, double vision, yellow sclerae.  ENT: No headaches, hearing loss, vertigo, congestion or sore throat.   Cardiovascular: Per HPI  Respiratory: No cough or wheezing, no sputum production, no hematemesis   Gastrointestinal: No abdominal pain, no nausea, vomiting, constipation, diarrhea, melena.   Genitourinary: No dysuria, hematuria or increased frequency.  Musculoskeletal:  No gait disturbance, weakness or joint pain or stiffness  Integumentary: No rashes, urticaria, ulcers or sores.   Neurological: No headache, dizziness, syncope, paralysis, ataxia, no prior CVA/TIA  Psychiatric: No anxiety, or depression  Endocrine: No diaphoresis, cold or heat intolerance. No polyuria or polydipsia.   Hematologic/Lymphatic: No anemia, abnormal bruising or bleeding. No history of DVT/PE.         Past Medical History:   Past Medical History:   Diagnosis Date   • Anemia 2008   • B12 deficiency 2008   • Back pain 2010   • CAD (coronary artery disease)    • Cellulitis of foot    • Dyspnea    • GERD (gastroesophageal reflux disease)    • Glaucoma 2016   • Hematuria    • History of mammogram    • Hypercholesteremia 2005   • Hypertension    • Low back pain    • Menopause    • Neck strain    • Osteopenia    • Overweight    • Palpitations    • Postmenopausal atrophic vaginitis    • Sleep apnea 2012    discontinued CPAP use 2 months ago   • Tinea pedis        Past Surgical History:   Past Surgical History:   Procedure Laterality Date   • APPENDECTOMY   1964   • BREAST EXCISIONAL BIOPSY Bilateral     Biopsies in    • COLONOSCOPY          • HYSTERECTOMY      total    • OOPHORECTOMY Bilateral    • TUBAL ABDOMINAL LIGATION         Family History:   Family History   Problem Relation Age of Onset   • Heart attack Other    • Arthritis Other    • Diabetes Other    • Hypertension Other    • Hodgkin's lymphoma Other    • Esophagitis Mother    • Heart failure Mother    • Esophagitis Maternal Aunt    • Breast cancer Maternal Aunt    • Colon cancer Maternal Grandmother 49   • Heart attack Father    • Breast cancer Sister 72   • Ovarian cancer Sister 74   • Esophageal cancer Neg Hx    • Stomach cancer Neg Hx    • Liver cancer Neg Hx    • Liver disease Neg Hx        Social History:   Social History     Socioeconomic History   • Marital status:      Spouse name: Not on file   • Number of children: Not on file   • Years of education: Not on file   • Highest education level: Not on file   Tobacco Use   • Smoking status: Former Smoker     Last attempt to quit:      Years since quittin.2   • Smokeless tobacco: Never Used   Substance and Sexual Activity   • Alcohol use: No     Comment: quit in    • Drug use: No   • Sexual activity: Defer       Medications:     Current Outpatient Medications:   •  amLODIPine (NORVASC) 5 MG tablet, Take 1 tablet by mouth Daily for 360 days., Disp: 30 tablet, Rfl: 11  •  aspirin (ASPIRIN LOW DOSE) 81 MG tablet, Take  by mouth Daily., Disp: , Rfl:   •  Calcium Carbonate-Vitamin D (CALCIUM PLUS VITAMIN D PO), Take  by mouth Daily., Disp: , Rfl:   •  carvedilol (COREG) 6.25 MG tablet, Take 1 tablet by mouth Every Morning. (Patient taking differently: Take 6.25 mg by mouth 2 (Two) Times a Day With Meals.), Disp: 180 tablet, Rfl: 3  •  clobetasol (TEMOVATE) 0.05 % ointment, Apply  topically to the appropriate area as directed Every 12 (Twelve) Hours., Disp: 60 g, Rfl: 3  •  coenzyme Q10 100 MG capsule, Take 100 mg by  "mouth Daily., Disp: , Rfl:   •  hydroCHLOROthiazide (HYDRODIURIL) 25 MG tablet, Take 1 tablet by mouth Daily., Disp: 30 tablet, Rfl: 11  •  melatonin 5 MG tablet tablet, , Disp: , Rfl:   •  Omega-3 Fatty Acids (FISH OIL) 1000 MG capsule capsule, Take 1,200 mg by mouth 2 (Two) Times a Day With Meals., Disp: , Rfl:   •  pravastatin (PRAVACHOL) 40 MG tablet, Take 1 tablet by mouth Daily. as directed, Disp: 90 tablet, Rfl: 3  •  rivaroxaban (XARELTO) 20 MG tablet, Take 1 tablet by mouth Daily., Disp: 30 tablet, Rfl: 5  •  TRAVATAN Z 0.004 % solution ophthalmic solution, INSTILL 1 DROP INTO EACH EYE NIGHTLY AS DIRECTED, Disp: , Rfl: 6  •  flecainide (TAMBOCOR) 50 MG tablet, Take 1 tablet by mouth 2 (Two) Times a Day for 90 days., Disp: 180 tablet, Rfl: 0    Allergies:   Allergies   Allergen Reactions   • Macrobid [Nitrofurantoin Monohyd Macro] Unknown - High Severity   • Nitrofurantoin Hives   • Phenylephrine-Guaifenesin Hives   • Sulfa Antibiotics Hives       Objective     Physical Exam:  Vital Signs:   Vitals:    03/30/20 1327   BP: 125/70   Pulse: 58   SpO2: 98%   Weight: 73.5 kg (162 lb)   Height: 156.2 cm (61.5\")         Lab Results   Component Value Date    GLUCOSE 107 (H) 07/17/2019    CALCIUM 9.2 03/05/2020     03/05/2020    K 3.6 03/05/2020    CO2 28.9 03/05/2020     03/05/2020    BUN 10 03/05/2020    CREATININE 0.80 03/05/2020    EGFRIFAFRI 85 03/05/2020    EGFRIFNONA 70 03/05/2020    BCR 12.5 03/05/2020    ANIONGAP 13.2 07/17/2019     Lab Results   Component Value Date    WBC 4.92 07/23/2019    HGB 12.9 07/23/2019    HCT 39.0 07/23/2019    MCV 91.8 07/23/2019     07/23/2019     No results found for: INR, PROTIME  Lab Results   Component Value Date    TSH 0.972 07/23/2019       Cardiac Testing:      I personally viewed and interpreted the patient's EKG/Telemetry/lab data    Procedures    Tobacco Cessation: N/A  Obstructive Sleep Apnea Screening: Completed    Assessment & Plan      Pleasant " 75-year-old female patient with a history of a structurally normal heart by echocardiogram and stress test and cardiac catheterization in 2009 which demonstrated mild nonobstructive disease.  She has paroxysmal episodes of A. fib which are moderately symptomatic.  She has had a couple of ER visits when it is been quite unpleasant but notes that she has several episodes which are far milder.  She wore an ambulatory heart rhythm monitor which does not demonstrate sinus node dysfunction or AV block.  These periods bradycardia were during profound sleep and clearly she was unconscious.  She has sleep apnea and I discussed with the patient that this is often seen.    Regarding her tachycardic episodes of paroxysmal A. fib think rhythm control strategy is warranted.  I have chosen flecainide will start her on a low-dose of 50 mg orally twice daily.  I will also have her stop her aspirin.  She will continue Xarelto for the primary prevention of stroke.    She and I had a long conversation about the risks of stroke when you have A. fib and the benefit of anticoagulation and I think she is a clear understanding.    We will see her in my office in approximately 3 months time.    There are no diagnoses linked to this encounter.      Follow Up:       Thank you for allowing me to participate in the care of your patient. Please to not hesitate to contact me with additional questions or concerns.        Villa Thomas, DO, FACC, [facility] [[email or web address]]  Cardiac Electrophysiologist

## 2020-04-10 ENCOUNTER — TELEPHONE (OUTPATIENT)
Dept: CARDIOLOGY | Facility: CLINIC | Age: 75
End: 2020-04-10

## 2020-04-10 ENCOUNTER — OFFICE VISIT (OUTPATIENT)
Dept: CARDIOLOGY | Facility: CLINIC | Age: 75
End: 2020-04-10

## 2020-04-10 VITALS
DIASTOLIC BLOOD PRESSURE: 90 MMHG | OXYGEN SATURATION: 99 % | HEART RATE: 58 BPM | WEIGHT: 165 LBS | BODY MASS INDEX: 30.36 KG/M2 | HEIGHT: 62 IN | SYSTOLIC BLOOD PRESSURE: 122 MMHG

## 2020-04-10 DIAGNOSIS — R55 POSTURAL DIZZINESS WITH NEAR SYNCOPE: ICD-10-CM

## 2020-04-10 DIAGNOSIS — E78.49 OTHER HYPERLIPIDEMIA: ICD-10-CM

## 2020-04-10 DIAGNOSIS — I48.0 PAROXYSMAL ATRIAL FIBRILLATION (HCC): ICD-10-CM

## 2020-04-10 DIAGNOSIS — I10 ESSENTIAL HYPERTENSION: Primary | ICD-10-CM

## 2020-04-10 DIAGNOSIS — I25.10 CORONARY ARTERY DISEASE INVOLVING NATIVE CORONARY ARTERY OF NATIVE HEART WITHOUT ANGINA PECTORIS: ICD-10-CM

## 2020-04-10 DIAGNOSIS — R42 POSTURAL DIZZINESS WITH NEAR SYNCOPE: ICD-10-CM

## 2020-04-10 DIAGNOSIS — I34.1 MVP (MITRAL VALVE PROLAPSE): ICD-10-CM

## 2020-04-10 PROCEDURE — 99214 OFFICE O/P EST MOD 30 MIN: CPT | Performed by: NURSE PRACTITIONER

## 2020-04-10 PROCEDURE — 93000 ELECTROCARDIOGRAM COMPLETE: CPT | Performed by: NURSE PRACTITIONER

## 2020-04-10 RX ORDER — MECLIZINE HYDROCHLORIDE 25 MG/1
25 TABLET ORAL 3 TIMES DAILY PRN
Status: DISCONTINUED | OUTPATIENT
Start: 2020-04-10 | End: 2020-04-28

## 2020-04-10 NOTE — TELEPHONE ENCOUNTER
Pt called back and states she is now feeling worse, Pt states that when she thinks she is starting to feel better she starts to feel worse. Pt was advised to go to the ER and states verbal understanding.

## 2020-04-10 NOTE — PROGRESS NOTES
Monica Perea is a 75 y.o. female.  MRN #: [id number]    Referring Provider: Abdiel Juan MD    Chief Complaint:   Chief Complaint   Patient presents with   • Follow-up   • Dizziness        History of Present Illness:  Ms Perea is a 75-year-old female that presents to the office after acute onset of vertigo, and near syncopal episodes.  Patient had been evaluated in this office 3/16/2020 for abnormal Holter monitor.  She had 7 pauses greater than 3-second duration the longest being 6.6 seconds, ventricular triplet and occasional PVCs with a burden of less than 0.1%, also had episodes of recurrent paroxysmal atrial fibrillation with atrial fibrillation burden of 0.6% with 21 occurrences.  She was subsequently referred for electrophysiology evaluation with Dr. Mike Thomas MD , and she was started on flecainide 50 mg twice daily.   reports frequent episodes of vertigo, with loss of balance and near syncopal episodes.  She correlates this with the starting of her flecainide.  She has had no other changes in her medication regimen.  She reports that she still has palpitation symptoms in the evening time.    The patient presents today with their self who contributes to the history of their care.     The following portions of the patient's history were reviewed and updated as appropriate: allergies, current medications, past family history, past medical history, past social history, past surgical history and problem list.     Review of Systems:     Review of Systems       Current Outpatient Medications:   •  amLODIPine (NORVASC) 5 MG tablet, Take 1 tablet by mouth Daily for 360 days., Disp: 30 tablet, Rfl: 11  •  Calcium Carbonate-Vitamin D (CALCIUM PLUS VITAMIN D PO), Take  by mouth Daily., Disp: , Rfl:   •  carvedilol (COREG) 6.25 MG tablet, Take 1 tablet by mouth Every Morning. (Patient taking differently: Take 6.25 mg by mouth 2 (Two) Times a Day With Meals.), Disp: 180 tablet, Rfl: 3  •   "clobetasol (TEMOVATE) 0.05 % ointment, Apply  topically to the appropriate area as directed Every 12 (Twelve) Hours., Disp: 60 g, Rfl: 3  •  coenzyme Q10 100 MG capsule, Take 100 mg by mouth Daily., Disp: , Rfl:   •  flecainide (TAMBOCOR) 50 MG tablet, Take 1 tablet by mouth 2 (Two) Times a Day for 90 days., Disp: 180 tablet, Rfl: 0  •  hydroCHLOROthiazide (HYDRODIURIL) 25 MG tablet, Take 1 tablet by mouth Daily., Disp: 30 tablet, Rfl: 11  •  melatonin 5 MG tablet tablet, , Disp: , Rfl:   •  Omega-3 Fatty Acids (FISH OIL) 1000 MG capsule capsule, Take 1,200 mg by mouth 2 (Two) Times a Day With Meals., Disp: , Rfl:   •  pravastatin (PRAVACHOL) 40 MG tablet, Take 1 tablet by mouth Daily. as directed, Disp: 90 tablet, Rfl: 3  •  rivaroxaban (XARELTO) 20 MG tablet, Take 1 tablet by mouth Daily., Disp: 30 tablet, Rfl: 5  •  TRAVATAN Z 0.004 % solution ophthalmic solution, INSTILL 1 DROP INTO EACH EYE NIGHTLY AS DIRECTED, Disp: , Rfl: 6    Current Facility-Administered Medications:   •  meclizine (ANTIVERT) tablet 25 mg, 25 mg, Oral, TID PRN, Delfino Thomas Jr., APRN    Vitals:    04/10/20 1104 04/10/20 1108 04/10/20 1117   BP: 124/82 130/80 122/90   BP Location: Right arm Left arm Right arm   Patient Position: Sitting Standing Lying   Cuff Size: Adult Adult Adult   Pulse: 57 58    SpO2: 98% 99%    Weight: 74.8 kg (165 lb)     Height: 156.2 cm (61.5\")         Physical Exam:     Physical Exam   Constitutional: She is oriented to person, place, and time. She appears well-developed and well-nourished. No distress.   HENT:   Head: Normocephalic and atraumatic.   Eyes: Pupils are equal, round, and reactive to light. Conjunctivae and EOM are normal. No scleral icterus.   Neck: Normal range of motion. Neck supple. No JVD present. Carotid bruit is not present. No tracheal deviation present. No thyroid mass and no thyromegaly present.   Cardiovascular: Regular rhythm, S1 normal, S2 normal, normal heart sounds and intact " distal pulses. Bradycardia present. PMI is not displaced. Exam reveals no gallop and no friction rub.   No murmur heard.  Pulses:       Carotid pulses are 1+ on the right side, and 1+ on the left side.  EKG shows a sinus bradycardia with no acute findings.   Pulmonary/Chest: Effort normal and breath sounds normal. No respiratory distress. She has no wheezes. She has no rales. She exhibits no tenderness.   Abdominal: Soft. Bowel sounds are normal. She exhibits no mass. There is no tenderness.   Musculoskeletal: Normal range of motion. She exhibits no edema.   Neurological: She is alert and oriented to person, place, and time. No cranial nerve deficit or sensory deficit.   No indication of Romberg sign   Skin: Skin is warm and dry. Capillary refill takes less than 2 seconds. No rash noted. She is not diaphoretic.   Psychiatric: She has a normal mood and affect. Her behavior is normal. Judgment and thought content normal.   Nursing note and vitals reviewed.        ECG 12 Lead  Date/Time: 4/10/2020 12:53 PM  Performed by: Delfino Thomas Jr., APRN  Authorized by: Delfino Thomas Jr., TERRY   Comparison: compared with previous ECG from 11/19/2019  Similar to previous ECG  Comparison to previous ECG: No acute changes, continued sinus bradycardia with a heart rate of 55  Rhythm: sinus bradycardia  Rate: bradycardic  BPM: 55  Conduction: conduction normal  ST Segments: ST segments normal  T Waves: T waves normal    Clinical impression: abnormal EKG  Comments: Sinus bradycardia with a heart rate of 55 at present.  Patient does have history of abnormal Holter monitor with paroxysmal atrial fibrillation, occasional PVCs, as well as frequent sinus pause.  She had been on flecainide 50 mg twice daily however had to be discontinued due to side effects.            Results:   Reviewed vital signs which are stable.  She does not appear to have orthostatic changes from supine to upright.  Blood pressure 124/82 with a heart  rate of 57 while supine, blood pressure 130/80 with heart rate of 58 while upright.  EKG shows a sinus bradycardia with a rate of 55 bpm.    Assessment/Plan:   Will speak with Dr Thomas ,phone consultation.   12:45 still waiting on phone call from , will discontinue flecainide, waiting on further instructions from .  Will prescribe meclizine 25 mg twice daily as needed for vertigo symptoms.    I suspect her vertigo and near syncopal episodes may be a side effect to her flecainide, in that her symptoms started after starting her flecainide therapy.  Monica was seen today for follow-up and dizziness.    Diagnoses and all orders for this visit:    Essential hypertension  -     ECG 12 Lead    Other hyperlipidemia  -     ECG 12 Lead    Coronary artery disease involving native coronary artery of native heart without angina pectoris  -     ECG 12 Lead    Paroxysmal atrial fibrillation (CMS/HCC)  -     ECG 12 Lead    MVP (mitral valve prolapse)  -     ECG 12 Lead    Postural dizziness with near syncope  -     ECG 12 Lead  -     meclizine (ANTIVERT) tablet 25 mg        Return in about 1 month (around 5/10/2020).    TERRY Andrade

## 2020-04-10 NOTE — TELEPHONE ENCOUNTER
Patient called stating that she felt like she was going to pass out multiple times this morning. When I called her back she notified me that she is in the waiting room at Dr. Charles's office to be evaluated. I instructed her to have them call us if they need to speak with Dr. Thomas.

## 2020-04-10 NOTE — TELEPHONE ENCOUNTER
I was contacted by nurse practitioner Martha after she saw the patient today in her office.  Patient and I discussed the events leading up to this clinic visit via telephone just now.  Patient tells me that since starting the flecainide she has had some sensation of room spinning and also potentially passing out.  She had an episode earlier today and sat down while she was still symptomatic she noted that her blood pressure was in the 140 systolic range and her pulse was about 65 bpm.  This was by a noninvasive cuff that she had at home.    Nurse practitioner's office she was advised to stop the flecainide.    I discussed the telephone and additional recommendations with the patient which are as follows:    1.  Stay off the flecainide for now.    2.  Her prior ambulatory heart rhythm monitor demonstrated bradycardia only while asleep suggesting sleep apnea.  There was no evidence of bradycardia during waking hours of significance.    3.  We will send her an additional event recorder.  We will continue to monitor with this subsequent event recorder.  If she has symptoms during the event recorder this will be extremely helpful and informative differentiate her symptoms being due to an arrhythmia potentially exacerbated by flecainide versus non-arrhythmia related symptoms.    4.  Asked the patient to call us on Monday or Tuesday of next week to let us know how she is feeling.  If she continues to have symptoms of presyncope and vertigo while off of flecainide then we can conclude that this is unlikely to be flecainide related.

## 2020-04-10 NOTE — PATIENT INSTRUCTIONS
Margarita valenzuela presently .  We will call when we hear from Dr William Thurman for dizziness/vertigo

## 2020-04-10 NOTE — TELEPHONE ENCOUNTER
Patient called stating she is Very Dizzy and went to the floor while she was trying to fix her hair this am, She did NOT fall. She states her BP was 130/50, HR and O2 was Normal. She had started taking Flecainide 1 week ago per Dr. Bhagat via Video visit. She had started to get upset because she was scared. I ask her to try to remain calm and she told me all the above after that. I explained she needed to call Dr. Bhagat's office to see what he suggested. I told her he may want to reduce her Flecainide and call back on Monday to see if she felt better or he may want her to come into the office to have EKG and BP checked to see if she was having orthostatic hypotension, he may ask her to come here instead of [facility] due to it being closer. She states understanding and states she was feeling better before we got off the phone, she also said her  was there with her if needed to take her to ER.  Elham Posada MA

## 2020-04-10 NOTE — PROGRESS NOTES
Please give her a 30 day event recorder so that if she has recurrent symptoms we may correlate with her rhythm.    Please have her hold the flecainide for a week.  If her symptoms of vertigo persist then it is not the flecainide and I would favor restarting the flecainide.    If she has resolution of her symptoms then indeed this may be a response to flecainde.  If they correlate with bradycardia then she will not be a candidate for any antiarrhythmic med.  If they do not correlate with bradycardia, then we can try propafenone instead ( 150 mg po bid ).    Please note that her bradycardia on ambulatory heart monitor was during sleeping hours only and was highly suggestive of sleep apnea.

## 2020-04-10 NOTE — PROGRESS NOTES
Thank you to everybody involved.  I spoke to the patient via telephone.  She understands that she is going to stop her flecainide.  I also discussed with her that we would be sending her a 30-day event recorder.  She is well versed in how to use this.    I also asked that she give my office a call on Monday or Tuesday of this coming week or of course sooner if need be.

## 2020-04-17 NOTE — TELEPHONE ENCOUNTER
1.  Stay off the flecainide for now.    2.  Did she get the heart rhythm monitor yet?    3.  Did her symptoms of presyncope / vertigo improve?

## 2020-04-17 NOTE — TELEPHONE ENCOUNTER
Pt called back and continued to have episodes off Flec.  She wants to know if you want her to go back on the Flec or just remain off.  Her BP and HR have been fine off ranging SBP of 100-130 and HR 55-78.

## 2020-04-20 ENCOUNTER — TELEPHONE (OUTPATIENT)
Dept: CARDIOLOGY | Facility: CLINIC | Age: 75
End: 2020-04-20

## 2020-04-20 NOTE — TELEPHONE ENCOUNTER
We was called and printed off 2 Urgent reports from [facility]. Dr. Charles reviewed and had the patient cut her Coreg to half which would be Coreg 3.125 mg BID. Patient states understanding. She states she hasn't felt good today her HR and her BP has been low.

## 2020-04-27 ENCOUNTER — PREP FOR SURGERY (OUTPATIENT)
Dept: OTHER | Facility: HOSPITAL | Age: 75
End: 2020-04-27

## 2020-04-27 ENCOUNTER — TELEPHONE (OUTPATIENT)
Dept: CARDIOLOGY | Facility: CLINIC | Age: 75
End: 2020-04-27

## 2020-04-27 ENCOUNTER — DOCUMENTATION (OUTPATIENT)
Dept: CARDIOLOGY | Facility: CLINIC | Age: 75
End: 2020-04-27

## 2020-04-27 DIAGNOSIS — I34.1 MVP (MITRAL VALVE PROLAPSE): ICD-10-CM

## 2020-04-27 DIAGNOSIS — I48.0 PAROXYSMAL ATRIAL FIBRILLATION (HCC): ICD-10-CM

## 2020-04-27 DIAGNOSIS — I48.0 PAF (PAROXYSMAL ATRIAL FIBRILLATION) (HCC): Primary | ICD-10-CM

## 2020-04-27 DIAGNOSIS — I48.0 PAROXYSMAL ATRIAL FIBRILLATION (HCC): Primary | ICD-10-CM

## 2020-04-27 DIAGNOSIS — R00.2 PALPITATIONS: ICD-10-CM

## 2020-04-27 DIAGNOSIS — I25.10 CORONARY ARTERY DISEASE INVOLVING NATIVE CORONARY ARTERY OF NATIVE HEART WITHOUT ANGINA PECTORIS: ICD-10-CM

## 2020-04-27 RX ORDER — SODIUM CHLORIDE 0.9 % (FLUSH) 0.9 %
3 SYRINGE (ML) INJECTION EVERY 12 HOURS SCHEDULED
Status: CANCELLED | OUTPATIENT
Start: 2020-04-27

## 2020-04-27 RX ORDER — SODIUM CHLORIDE 0.9 % (FLUSH) 0.9 %
10 SYRINGE (ML) INJECTION AS NEEDED
Status: CANCELLED | OUTPATIENT
Start: 2020-04-27

## 2020-04-27 RX ORDER — CEFAZOLIN SODIUM 2 G/100ML
2 INJECTION, SOLUTION INTRAVENOUS ONCE
Status: CANCELLED | OUTPATIENT
Start: 2020-04-27 | End: 2020-04-27

## 2020-04-27 NOTE — TELEPHONE ENCOUNTER
I failed to complete the telephone note and tell pt to not go back on Flec, she resumed 4/17, I am sorry.  I received phone call today in regards to her monitor.  LTB cut her coreg down to 3.125 due to some slower HRs but I don't think he realized she had resumed the Flec.  I spoke with pt and she can correlate her feeling of presyncope to a 5.2 sec pause she had.  She does not feel like her symptoms are any worse or better on the Flec.  I have instructed her to stop it.  Do you want to wait until she has more symptoms off the Flec to put ppm or do you feel like we have enough information to support a tachy-chiara indication?  LTBs office have scanned in strips but they are recorded while pt was on Flec.

## 2020-04-27 NOTE — TELEPHONE ENCOUNTER
I called the patient regarding the findings on her ambulatory heart rhythm monitor.  She conveyed to me that during the episodes where she had bradycardia she felt presyncopal.    All of the tracings implicate sinus node dysfunction.  The patient clearly correlate symptoms of presyncope with the evidence of sinus node dysfunction.  Thus she has clear-cut case of sinus node dysfunction with severe symptoms.  She has no identifiable reversible cause.    I offered the patient a pacemaker.  I reviewed in detail the procedural technique for receiving a pacemaker and the procedural risks including 1% chance of infection death stroke myocardial infarction lead dislodgment hemothorax pneumothorax cardiac perforation hemopericardium.  Discussed with her that the point of the procedure would be to improve her symptoms of presyncope.  She would like to proceed.    We will schedule this procedure for tomorrow.  We will have her hold her Xarelto today.    Villa Thomas DO, FACC, [facility] [[email or web address]]  Cardiac Electrophysiologist  [facility] / [facility]

## 2020-04-27 NOTE — TELEPHONE ENCOUNTER
I called her this AM and offered her a PPM.      She ( the patient ) agreed.    I asked the schedulers to put on for tomorrow with [facility].  She will hold her Xarelto.

## 2020-04-27 NOTE — TELEPHONE ENCOUNTER
Vero notified us that Pt had a urgent monitor report with a 5.2 second pause. Delfino Thomas gave verbal orders to call Dr. Thomas's office and have her be seen ASAP. Spoke with Rubi and she will contact the patient.

## 2020-04-28 ENCOUNTER — HOSPITAL ENCOUNTER (OUTPATIENT)
Facility: HOSPITAL | Age: 75
Discharge: HOME | End: 2020-04-28
Attending: INTERNAL MEDICINE | Admitting: INTERNAL MEDICINE

## 2020-04-28 ENCOUNTER — APPOINTMENT (OUTPATIENT)
Dept: GENERAL RADIOLOGY | Facility: HOSPITAL | Age: 75
End: 2020-04-28

## 2020-04-28 VITALS
DIASTOLIC BLOOD PRESSURE: 56 MMHG | SYSTOLIC BLOOD PRESSURE: 114 MMHG | WEIGHT: 166 LBS | RESPIRATION RATE: 12 BRPM | BODY MASS INDEX: 31.34 KG/M2 | HEIGHT: 61 IN | OXYGEN SATURATION: 89 % | HEART RATE: 60 BPM

## 2020-04-28 DIAGNOSIS — I48.0 PAROXYSMAL ATRIAL FIBRILLATION (HCC): ICD-10-CM

## 2020-04-28 DIAGNOSIS — I25.10 CORONARY ARTERY DISEASE INVOLVING NATIVE CORONARY ARTERY OF NATIVE HEART WITHOUT ANGINA PECTORIS: ICD-10-CM

## 2020-04-28 DIAGNOSIS — I34.1 MVP (MITRAL VALVE PROLAPSE): ICD-10-CM

## 2020-04-28 DIAGNOSIS — R00.2 PALPITATIONS: ICD-10-CM

## 2020-04-28 PROBLEM — I49.5 SINUS NODE DYSFUNCTION: Status: ACTIVE | Noted: 2020-04-28

## 2020-04-28 LAB
ALBUMIN SERPL-MCNC: 4.4 G/DL (ref 3.5–5.2)
ALBUMIN/GLOB SERPL: 1.3 G/DL
ALP SERPL-CCNC: 75 U/L (ref 39–117)
ALT SERPL W P-5'-P-CCNC: 10 U/L (ref 1–33)
ANION GAP SERPL CALCULATED.3IONS-SCNC: 12 MMOL/L (ref 5–15)
AST SERPL-CCNC: 16 U/L (ref 1–32)
BASOPHILS # BLD AUTO: 0.03 10*3/MM3 (ref 0–0.2)
BASOPHILS NFR BLD AUTO: 0.7 % (ref 0–1.5)
BILIRUB SERPL-MCNC: 0.5 MG/DL (ref 0.2–1.2)
BUN BLD-MCNC: 12 MG/DL (ref 8–23)
BUN/CREAT SERPL: 14.3 (ref 7–25)
CALCIUM SPEC-SCNC: 9.9 MG/DL (ref 8.6–10.5)
CHLORIDE SERPL-SCNC: 100 MMOL/L (ref 98–107)
CO2 SERPL-SCNC: 30 MMOL/L (ref 22–29)
CREAT BLD-MCNC: 0.84 MG/DL (ref 0.57–1)
DEPRECATED RDW RBC AUTO: 42.7 FL (ref 37–54)
EOSINOPHIL # BLD AUTO: 0.24 10*3/MM3 (ref 0–0.4)
EOSINOPHIL NFR BLD AUTO: 5.3 % (ref 0.3–6.2)
ERYTHROCYTE [DISTWIDTH] IN BLOOD BY AUTOMATED COUNT: 12.7 % (ref 12.3–15.4)
GFR SERPL CREATININE-BSD FRML MDRD: 66 ML/MIN/1.73
GLOBULIN UR ELPH-MCNC: 3.5 GM/DL
GLUCOSE BLD-MCNC: 107 MG/DL (ref 65–99)
HCT VFR BLD AUTO: 37 % (ref 34–46.6)
HGB BLD-MCNC: 11.7 G/DL (ref 12–15.9)
IMM GRANULOCYTES # BLD AUTO: 0.01 10*3/MM3 (ref 0–0.05)
IMM GRANULOCYTES NFR BLD AUTO: 0.2 % (ref 0–0.5)
LYMPHOCYTES # BLD AUTO: 1.5 10*3/MM3 (ref 0.7–3.1)
LYMPHOCYTES NFR BLD AUTO: 33 % (ref 19.6–45.3)
MAGNESIUM SERPL-MCNC: 1.9 MG/DL (ref 1.6–2.4)
MCH RBC QN AUTO: 29 PG (ref 26.6–33)
MCHC RBC AUTO-ENTMCNC: 31.6 G/DL (ref 31.5–35.7)
MCV RBC AUTO: 91.8 FL (ref 79–97)
MONOCYTES # BLD AUTO: 0.55 10*3/MM3 (ref 0.1–0.9)
MONOCYTES NFR BLD AUTO: 12.1 % (ref 5–12)
NEUTROPHILS # BLD AUTO: 2.21 10*3/MM3 (ref 1.7–7)
NEUTROPHILS NFR BLD AUTO: 48.7 % (ref 42.7–76)
NRBC BLD AUTO-RTO: 0 /100 WBC (ref 0–0.2)
PLATELET # BLD AUTO: 209 10*3/MM3 (ref 140–450)
PMV BLD AUTO: 11.4 FL (ref 6–12)
POTASSIUM BLD-SCNC: 3.4 MMOL/L (ref 3.5–5.2)
PROT SERPL-MCNC: 7.9 G/DL (ref 6–8.5)
RBC # BLD AUTO: 4.03 10*6/MM3 (ref 3.77–5.28)
SODIUM BLD-SCNC: 142 MMOL/L (ref 136–145)
WBC NRBC COR # BLD: 4.54 10*3/MM3 (ref 3.4–10.8)

## 2020-04-28 PROCEDURE — 94660 CPAP INITIATION&MGMT: CPT

## 2020-04-28 PROCEDURE — 93005 ELECTROCARDIOGRAM TRACING: CPT | Performed by: INTERNAL MEDICINE

## 2020-04-28 PROCEDURE — 25010000003 LIDOCAINE 1 % SOLUTION: Performed by: INTERNAL MEDICINE

## 2020-04-28 PROCEDURE — C1898 LEAD, PMKR, OTHER THAN TRANS: HCPCS

## 2020-04-28 PROCEDURE — 80053 COMPREHEN METABOLIC PANEL: CPT | Performed by: INTERNAL MEDICINE

## 2020-04-28 PROCEDURE — 99152 MOD SED SAME PHYS/QHP 5/>YRS: CPT | Performed by: INTERNAL MEDICINE

## 2020-04-28 PROCEDURE — 33208 INSRT HEART PM ATRIAL & VENT: CPT | Performed by: INTERNAL MEDICINE

## 2020-04-28 PROCEDURE — 83735 ASSAY OF MAGNESIUM: CPT | Performed by: INTERNAL MEDICINE

## 2020-04-28 PROCEDURE — C1892 INTRO/SHEATH,FIXED,PEEL-AWAY: HCPCS | Performed by: INTERNAL MEDICINE

## 2020-04-28 PROCEDURE — 25010000002 FENTANYL CITRATE (PF) 100 MCG/2ML SOLUTION: Performed by: INTERNAL MEDICINE

## 2020-04-28 PROCEDURE — A9270 NON-COVERED ITEM OR SERVICE: HCPCS | Performed by: INTERNAL MEDICINE

## 2020-04-28 PROCEDURE — 63710000001 IBUPROFEN 600 MG TABLET: Performed by: INTERNAL MEDICINE

## 2020-04-28 PROCEDURE — 71046 X-RAY EXAM CHEST 2 VIEWS: CPT

## 2020-04-28 PROCEDURE — 99153 MOD SED SAME PHYS/QHP EA: CPT | Performed by: INTERNAL MEDICINE

## 2020-04-28 PROCEDURE — C1785 PMKR, DUAL, RATE-RESP: HCPCS | Performed by: INTERNAL MEDICINE

## 2020-04-28 PROCEDURE — 25010000003 CEFAZOLIN IN DEXTROSE 2-4 GM/100ML-% SOLUTION: Performed by: PHYSICIAN ASSISTANT

## 2020-04-28 PROCEDURE — 25010000002 MIDAZOLAM PER 1 MG: Performed by: INTERNAL MEDICINE

## 2020-04-28 PROCEDURE — 25010000002 ONDANSETRON PER 1 MG: Performed by: INTERNAL MEDICINE

## 2020-04-28 PROCEDURE — 36415 COLL VENOUS BLD VENIPUNCTURE: CPT

## 2020-04-28 PROCEDURE — 94799 UNLISTED PULMONARY SVC/PX: CPT

## 2020-04-28 PROCEDURE — 0 IOPAMIDOL PER 1 ML: Performed by: INTERNAL MEDICINE

## 2020-04-28 PROCEDURE — 85025 COMPLETE CBC W/AUTO DIFF WBC: CPT | Performed by: INTERNAL MEDICINE

## 2020-04-28 DEVICE — PACEMAKER
Type: IMPLANTABLE DEVICE | Status: FUNCTIONAL
Brand: ACCOLADE™ MRI DR

## 2020-04-28 RX ORDER — FENTANYL CITRATE 50 UG/ML
INJECTION, SOLUTION INTRAMUSCULAR; INTRAVENOUS AS NEEDED
Status: DISCONTINUED | OUTPATIENT
Start: 2020-04-28 | End: 2020-04-28 | Stop reason: HOSPADM

## 2020-04-28 RX ORDER — SODIUM CHLORIDE 0.9 % (FLUSH) 0.9 %
10 SYRINGE (ML) INJECTION AS NEEDED
Status: DISCONTINUED | OUTPATIENT
Start: 2020-04-28 | End: 2020-04-28 | Stop reason: HOSPADM

## 2020-04-28 RX ORDER — ONDANSETRON 2 MG/ML
INJECTION INTRAMUSCULAR; INTRAVENOUS AS NEEDED
Status: DISCONTINUED | OUTPATIENT
Start: 2020-04-28 | End: 2020-04-28 | Stop reason: HOSPADM

## 2020-04-28 RX ORDER — BUPIVACAINE HYDROCHLORIDE 5 MG/ML
INJECTION, SOLUTION EPIDURAL; INTRACAUDAL AS NEEDED
Status: DISCONTINUED | OUTPATIENT
Start: 2020-04-28 | End: 2020-04-28 | Stop reason: HOSPADM

## 2020-04-28 RX ORDER — MIDAZOLAM HYDROCHLORIDE 1 MG/ML
INJECTION INTRAMUSCULAR; INTRAVENOUS AS NEEDED
Status: DISCONTINUED | OUTPATIENT
Start: 2020-04-28 | End: 2020-04-28 | Stop reason: HOSPADM

## 2020-04-28 RX ORDER — ACETAMINOPHEN 325 MG/1
650 TABLET ORAL EVERY 6 HOURS
Status: DISCONTINUED | OUTPATIENT
Start: 2020-04-28 | End: 2020-04-28 | Stop reason: HOSPADM

## 2020-04-28 RX ORDER — SODIUM CHLORIDE 0.9 % (FLUSH) 0.9 %
3 SYRINGE (ML) INJECTION EVERY 12 HOURS SCHEDULED
Status: DISCONTINUED | OUTPATIENT
Start: 2020-04-28 | End: 2020-04-28 | Stop reason: HOSPADM

## 2020-04-28 RX ORDER — CEFAZOLIN SODIUM 2 G/100ML
2 INJECTION, SOLUTION INTRAVENOUS ONCE
Status: COMPLETED | OUTPATIENT
Start: 2020-04-28 | End: 2020-04-28

## 2020-04-28 RX ORDER — LIDOCAINE HYDROCHLORIDE 10 MG/ML
INJECTION, SOLUTION INFILTRATION; PERINEURAL AS NEEDED
Status: DISCONTINUED | OUTPATIENT
Start: 2020-04-28 | End: 2020-04-28 | Stop reason: HOSPADM

## 2020-04-28 RX ORDER — IBUPROFEN 600 MG/1
600 TABLET ORAL EVERY 6 HOURS SCHEDULED
Status: DISCONTINUED | OUTPATIENT
Start: 2020-04-28 | End: 2020-04-28 | Stop reason: HOSPADM

## 2020-04-28 RX ADMIN — IBUPROFEN 600 MG: 600 TABLET, FILM COATED ORAL at 16:59

## 2020-04-28 RX ADMIN — CEFAZOLIN SODIUM 2 G: 2 INJECTION, SOLUTION INTRAVENOUS at 14:17

## 2020-05-14 ENCOUNTER — OFFICE VISIT (OUTPATIENT)
Dept: CARDIOLOGY | Facility: CLINIC | Age: 75
End: 2020-05-14

## 2020-05-14 VITALS
HEART RATE: 62 BPM | HEIGHT: 61 IN | DIASTOLIC BLOOD PRESSURE: 78 MMHG | OXYGEN SATURATION: 97 % | WEIGHT: 165 LBS | SYSTOLIC BLOOD PRESSURE: 130 MMHG | BODY MASS INDEX: 31.15 KG/M2

## 2020-05-14 DIAGNOSIS — Z95.0 PRESENCE OF CARDIAC PACEMAKER: ICD-10-CM

## 2020-05-14 DIAGNOSIS — I10 ESSENTIAL HYPERTENSION: Primary | ICD-10-CM

## 2020-05-14 DIAGNOSIS — R60.0 BILATERAL LOWER EXTREMITY EDEMA: ICD-10-CM

## 2020-05-14 DIAGNOSIS — E78.2 MIXED HYPERLIPIDEMIA: ICD-10-CM

## 2020-05-14 DIAGNOSIS — I34.1 MVP (MITRAL VALVE PROLAPSE): ICD-10-CM

## 2020-05-14 DIAGNOSIS — I25.10 CORONARY ARTERY DISEASE INVOLVING NATIVE CORONARY ARTERY OF NATIVE HEART WITHOUT ANGINA PECTORIS: ICD-10-CM

## 2020-05-14 DIAGNOSIS — I48.0 PAROXYSMAL ATRIAL FIBRILLATION (HCC): ICD-10-CM

## 2020-05-14 DIAGNOSIS — I49.5 SINUS NODE DYSFUNCTION (HCC): ICD-10-CM

## 2020-05-14 DIAGNOSIS — Z95.0 CARDIAC PACEMAKER IN SITU: ICD-10-CM

## 2020-05-14 PROCEDURE — 93280 PM DEVICE PROGR EVAL DUAL: CPT | Performed by: NURSE PRACTITIONER

## 2020-05-14 PROCEDURE — 93000 ELECTROCARDIOGRAM COMPLETE: CPT | Performed by: NURSE PRACTITIONER

## 2020-05-14 PROCEDURE — 99214 OFFICE O/P EST MOD 30 MIN: CPT | Performed by: NURSE PRACTITIONER

## 2020-05-14 RX ORDER — CARVEDILOL 6.25 MG/1
6.25 TABLET ORAL 2 TIMES DAILY
Qty: 180 TABLET | Refills: 3 | Status: SHIPPED | OUTPATIENT
Start: 2020-05-14 | End: 2020-05-19

## 2020-05-14 RX ORDER — CARVEDILOL 6.25 MG/1
6.25 TABLET ORAL 2 TIMES DAILY
Qty: 180 TABLET | Refills: 3 | Status: SHIPPED | OUTPATIENT
Start: 2020-05-14 | End: 2020-05-14

## 2020-05-14 NOTE — PROGRESS NOTES
Monica Perea is a 75 y.o. female.  MRN #: [id number]    Referring Provider: Abdiel Juan MD    Chief Complaint:   Chief Complaint   Patient presents with   • Follow-up   • Coronary Artery Disease   • Hypertension   • Fatigue        History of Present Illness:  Ms Perea is a 75-year-old female that presents for cardiology follow-up.  Patient has history of essential hypertension, CAD, paroxysmal atrial fibrillation, bradycardia arrhythmia, status post pacemaker implantation April 2020.  With today's evaluation patient reports no more episodes of near syncope episodes and hypotension.  She does complain of persistent increased fatigue, and shortness of breath with exertion since her pacemaker implantation.  She also reports that with her shortness of breath and fatigue her blood pressure will increase , with the highest 150/90.  She denies any chest pain or chest pressure or palpitations or diaphoresis during these episodes.  Her main symptoms since the pacemaker insertion have been extreme fatigue and shortness of breath with minimal exertion.  Her carvedilol has been decreased to 3.125 mg twice daily per electrophysiologist.  She is also taking Norvasc 5 mg p.o. daily.  She does report increased lower extremity edema.  He does take hydrochlorothiazide 25 mg p.o. daily.    The patient presents today with their self who contributes to the history of their care.     The following portions of the patient's history were reviewed and updated as appropriate: allergies, current medications, past family history, past medical history, past social history, past surgical history and problem list.     Review of Systems:     Review of Systems   Constitutional: Positive for fatigue. Negative for activity change, appetite change, diaphoresis, unexpected weight gain and unexpected weight loss.   HENT: Negative.    Eyes: Negative.  Negative for blurred vision, double vision, photophobia and visual disturbance.    Respiratory: Positive for shortness of breath. Negative for apnea, cough, chest tightness and wheezing.    Cardiovascular: Positive for leg swelling. Negative for chest pain and palpitations.   Gastrointestinal: Negative.  Negative for abdominal distention, abdominal pain, nausea, vomiting, GERD and indigestion.   Endocrine: Negative.  Negative for cold intolerance, heat intolerance, polydipsia, polyphagia and polyuria.   Genitourinary: Negative.  Negative for decreased libido, frequency, genital sores, hematuria and urgency.   Musculoskeletal: Negative.  Negative for back pain, joint swelling, myalgias, neck pain and neck stiffness.   Skin: Negative.  Negative for color change, pallor, rash and bruise.   Allergic/Immunologic: Negative.  Negative for environmental allergies, food allergies and immunocompromised state.   Neurological: Negative.  Negative for dizziness, tremors, seizures, syncope, facial asymmetry, speech difficulty, weakness, light-headedness and numbness.   Hematological: Negative.  Negative for adenopathy. Does not bruise/bleed easily.   Psychiatric/Behavioral: Negative.  Negative for agitation, decreased concentration, self-injury, sleep disturbance, suicidal ideas, negative for hyperactivity and stress. The patient is not nervous/anxious.    All other systems reviewed and are negative.         Current Outpatient Medications:   •  amLODIPine (NORVASC) 5 MG tablet, Take 1 tablet by mouth Daily for 360 days., Disp: 30 tablet, Rfl: 11  •  Calcium Carbonate-Vitamin D (CALCIUM PLUS VITAMIN D PO), Take  by mouth Daily., Disp: , Rfl:   •  clobetasol (TEMOVATE) 0.05 % ointment, Apply  topically to the appropriate area as directed Every 12 (Twelve) Hours., Disp: 60 g, Rfl: 3  •  coenzyme Q10 100 MG capsule, Take 100 mg by mouth Daily., Disp: , Rfl:   •  flecainide (TAMBOCOR) 50 MG tablet, Take 1 tablet by mouth 2 (Two) Times a Day for 90 days., Disp: 180 tablet, Rfl: 0  •  hydroCHLOROthiazide  "(HYDRODIURIL) 25 MG tablet, Take 1 tablet by mouth Daily., Disp: 30 tablet, Rfl: 11  •  melatonin 5 MG tablet tablet, 5 mg Every Night., Disp: , Rfl:   •  Omega-3 Fatty Acids (FISH OIL) 1000 MG capsule capsule, Take 1,200 mg by mouth 2 (Two) Times a Day With Meals., Disp: , Rfl:   •  pravastatin (PRAVACHOL) 40 MG tablet, Take 1 tablet by mouth Daily. as directed, Disp: 90 tablet, Rfl: 3  •  rivaroxaban (XARELTO) 20 MG tablet, Take 1 tablet by mouth Daily., Disp: 30 tablet, Rfl: 5  •  TRAVATAN Z 0.004 % solution ophthalmic solution, INSTILL 1 DROP INTO EACH EYE NIGHTLY AS DIRECTED, Disp: , Rfl: 6  •  carvedilol (COREG) 6.25 MG tablet, Take 1 tablet by mouth 2 (Two) Times a Day., Disp: 180 tablet, Rfl: 3  •  Elastic Bandages & Supports (JOBST ACTIVE 20-30MMHG MEDIUM) misc, 1 application Daily., Disp: 2 each, Rfl: 6    Vitals:    05/14/20 0855   BP: 130/78   BP Location: Right arm   Patient Position: Sitting   Cuff Size: Adult   Pulse: 62   SpO2: 97%   Weight: 74.8 kg (165 lb)   Height: 154.9 cm (61\")       Physical Exam:     Physical Exam   Constitutional: She is oriented to person, place, and time. She appears well-developed and well-nourished. No distress.   HENT:   Head: Normocephalic and atraumatic.   Eyes: Pupils are equal, round, and reactive to light. Conjunctivae are normal. No scleral icterus.   Neck: Normal range of motion. Neck supple. No JVD present. Carotid bruit is not present. No tracheal deviation present. No thyroid mass and no thyromegaly present.   Cardiovascular: Normal rate, regular rhythm, S1 normal, S2 normal, normal heart sounds and intact distal pulses. PMI is not displaced. Exam reveals no gallop and no friction rub.   No murmur heard.  Pulses:       Carotid pulses are 1+ on the right side, and 1+ on the left side.  Twelve-lead EKG shows an underlying normal sinus rhythm with paced rhythm at a rate of 63 bpm.   Pulmonary/Chest: Effort normal and breath sounds normal. No respiratory distress. " She has no wheezes. She has no rales. She exhibits no tenderness.   Abdominal: Soft. Bowel sounds are normal. She exhibits no distension and no mass. There is no tenderness.   Musculoskeletal: Normal range of motion. She exhibits no edema.   Neurological: She is alert and oriented to person, place, and time. No cranial nerve deficit or sensory deficit.   Skin: Skin is warm and dry. Capillary refill takes less than 2 seconds. No rash noted. She is not diaphoretic.   Psychiatric: She has a normal mood and affect. Her behavior is normal. Judgment and thought content normal.   Nursing note and vitals reviewed.        ECG 12 Lead  Date/Time: 5/14/2020 10:54 AM  Performed by: Delfino Thomas Jr., APRN  Authorized by: Delfino Thomas Jr., TERRY   Comparison: not compared with previous ECG   Rhythm: sinus rhythm and paced  Rate: normal  BPM: 63  Conduction: conduction normal  ST Segments: ST segments normal  T Waves: T waves normal  QRS axis: normal  Other: no other findings    Clinical impression: abnormal EKG  Comments: Status post pacemaker implantation 4/28/2020.  Patient is on carvedilol 6.25 mg twice daily, Norvasc 50 mg daily.            Results:   Pacemaker interrogation done to evaluate for her fatigue and shortness of breath, minute ventilation response factor was increased from 10-11.    Reviewed medication regimen.  Carvedilol increased to original dose of 6.25 mg twice daily.    Reviewed twelve-lead EKG which shows a paced rhythm with underlying sinus rhythm with a rate of 63 bpm.    Assessment/Plan:   Pacemaker interrogation with minute ventilation response factor increased from 10-11.  Carvedilol increased to 6.25 mg twice daily.  Follow-up 1 month for recheck or as needed for any cardiac related issues.  Monica was seen today for follow-up, coronary artery disease, hypertension and fatigue.    Diagnoses and all orders for this visit:    Essential hypertension  -     Discontinue: carvedilol (COREG)  6.25 MG tablet; Take 1 tablet by mouth 2 (Two) Times a Day.  -     Elastic Bandages & Supports (JOBST ACTIVE 20-30MMHG MEDIUM) misc; 1 application Daily.  -     carvedilol (COREG) 6.25 MG tablet; Take 1 tablet by mouth 2 (Two) Times a Day.    Mixed hyperlipidemia  -     Discontinue: carvedilol (COREG) 6.25 MG tablet; Take 1 tablet by mouth 2 (Two) Times a Day.  -     Elastic Bandages & Supports (JOBST ACTIVE 20-30MMHG MEDIUM) misc; 1 application Daily.  -     carvedilol (COREG) 6.25 MG tablet; Take 1 tablet by mouth 2 (Two) Times a Day.    Coronary artery disease involving native coronary artery of native heart without angina pectoris  -     Discontinue: carvedilol (COREG) 6.25 MG tablet; Take 1 tablet by mouth 2 (Two) Times a Day.  -     Elastic Bandages & Supports (JOBST ACTIVE 20-30MMHG MEDIUM) misc; 1 application Daily.  -     carvedilol (COREG) 6.25 MG tablet; Take 1 tablet by mouth 2 (Two) Times a Day.    MVP (mitral valve prolapse)  -     Discontinue: carvedilol (COREG) 6.25 MG tablet; Take 1 tablet by mouth 2 (Two) Times a Day.  -     Elastic Bandages & Supports (JOBST ACTIVE 20-30MMHG MEDIUM) misc; 1 application Daily.  -     carvedilol (COREG) 6.25 MG tablet; Take 1 tablet by mouth 2 (Two) Times a Day.    Paroxysmal atrial fibrillation (CMS/HCC)  -     Discontinue: carvedilol (COREG) 6.25 MG tablet; Take 1 tablet by mouth 2 (Two) Times a Day.  -     Elastic Bandages & Supports (JOBST ACTIVE 20-30MMHG MEDIUM) misc; 1 application Daily.  -     carvedilol (COREG) 6.25 MG tablet; Take 1 tablet by mouth 2 (Two) Times a Day.    Sinus node dysfunction (CMS/HCC)  -     Discontinue: carvedilol (COREG) 6.25 MG tablet; Take 1 tablet by mouth 2 (Two) Times a Day.  -     Elastic Bandages & Supports (JOBST ACTIVE 20-30MMHG MEDIUM) misc; 1 application Daily.  -     carvedilol (COREG) 6.25 MG tablet; Take 1 tablet by mouth 2 (Two) Times a Day.    Presence of cardiac pacemaker  -     Discontinue: carvedilol (COREG) 6.25 MG  tablet; Take 1 tablet by mouth 2 (Two) Times a Day.  -     Elastic Bandages & Supports (JOBST ACTIVE 20-30MMHG MEDIUM) misc; 1 application Daily.  -     carvedilol (COREG) 6.25 MG tablet; Take 1 tablet by mouth 2 (Two) Times a Day.    Bilateral lower extremity edema  -     Discontinue: carvedilol (COREG) 6.25 MG tablet; Take 1 tablet by mouth 2 (Two) Times a Day.  -     Elastic Bandages & Supports (JOBST ACTIVE 20-30MMHG MEDIUM) misc; 1 application Daily.  -     carvedilol (COREG) 6.25 MG tablet; Take 1 tablet by mouth 2 (Two) Times a Day.    Other orders  -     ECG 12 Lead        Return in about 1 month (around 6/14/2020) for pacemaker interrogation.    TERRY Andrade

## 2020-05-19 ENCOUNTER — OFFICE VISIT (OUTPATIENT)
Dept: CARDIOLOGY | Facility: CLINIC | Age: 75
End: 2020-05-19

## 2020-05-19 VITALS
SYSTOLIC BLOOD PRESSURE: 142 MMHG | WEIGHT: 165 LBS | HEART RATE: 73 BPM | DIASTOLIC BLOOD PRESSURE: 78 MMHG | HEIGHT: 61 IN | OXYGEN SATURATION: 98 % | BODY MASS INDEX: 31.15 KG/M2

## 2020-05-19 DIAGNOSIS — I48.0 PAROXYSMAL ATRIAL FIBRILLATION (HCC): ICD-10-CM

## 2020-05-19 DIAGNOSIS — E78.2 MIXED HYPERLIPIDEMIA: ICD-10-CM

## 2020-05-19 DIAGNOSIS — I34.1 MVP (MITRAL VALVE PROLAPSE): ICD-10-CM

## 2020-05-19 DIAGNOSIS — I10 ESSENTIAL HYPERTENSION: Primary | ICD-10-CM

## 2020-05-19 DIAGNOSIS — R60.0 BILATERAL LOWER EXTREMITY EDEMA: ICD-10-CM

## 2020-05-19 DIAGNOSIS — I49.5 SINUS NODE DYSFUNCTION (HCC): ICD-10-CM

## 2020-05-19 DIAGNOSIS — R00.2 PALPITATIONS: ICD-10-CM

## 2020-05-19 DIAGNOSIS — Z95.0 PRESENCE OF CARDIAC PACEMAKER: ICD-10-CM

## 2020-05-19 DIAGNOSIS — I25.10 CORONARY ARTERY DISEASE INVOLVING NATIVE CORONARY ARTERY OF NATIVE HEART WITHOUT ANGINA PECTORIS: ICD-10-CM

## 2020-05-19 PROCEDURE — 93000 ELECTROCARDIOGRAM COMPLETE: CPT | Performed by: NURSE PRACTITIONER

## 2020-05-19 PROCEDURE — 99214 OFFICE O/P EST MOD 30 MIN: CPT | Performed by: NURSE PRACTITIONER

## 2020-05-19 RX ORDER — CARVEDILOL 12.5 MG/1
12.5 TABLET ORAL 2 TIMES DAILY
Qty: 120 TABLET | Refills: 0 | Status: SHIPPED | OUTPATIENT
Start: 2020-05-19 | End: 2020-06-12 | Stop reason: ALTCHOICE

## 2020-05-19 NOTE — PROGRESS NOTES
"Monica Perea is a 75 y.o. female.  MRN #: [id number]    Referring Provider: Abdiel Juan MD    Chief Complaint:   Chief Complaint   Patient presents with   • Follow-up   • Palpitations        History of Present Illness:  Ms Perea is a 75-year-old female that presents for follow-up for second time in 2 weeks for repeated episodes of palpitation and continued extreme fatigue post pacemaker implantation.  Patient had pacemaker implanted April 28, 2024 atrial fibrillation with slow ventricular response, symptomatic bradycardia, and hypotension.  Prior to pacemaker implantation patient was started on flecainide 50 mg twice daily for palpitation symptoms.  Ms. Perea does relate that \"my palpitations and my symptoms worsened after starting flecainide\".  She wishes to discontinue her flecainide due to her side effects.  She had been on carvedilol 6.25 mg as well as Norvasc for hypertension control.  She had in office pacemaker interrogation done May 14, 2020 which showed appropriate pacemaker parameters.  Her minute ventilation response factor was increased from 10-11.  Patient reports that she has a significant amount of symptomatic palpitations that usually occur in the evening time.  She denies any episodes of vertigo or near syncope or syncopal episodes.      The patient presents today with their self who contributes to the history of their care.     The following portions of the patient's history were reviewed and updated as appropriate: allergies, current medications, past family history, past medical history, past social history, past surgical history and problem list.     Review of Systems:     Review of Systems   Constitutional: Positive for activity change and fatigue. Negative for appetite change, diaphoresis, unexpected weight gain and unexpected weight loss.   HENT: Negative.    Eyes: Negative.  Negative for blurred vision, double vision, photophobia and visual disturbance.   Respiratory: " Negative.  Negative for apnea, cough, chest tightness, shortness of breath and wheezing.    Cardiovascular: Positive for chest pain and palpitations. Negative for leg swelling.   Gastrointestinal: Negative.  Negative for abdominal distention, abdominal pain, nausea, vomiting, GERD and indigestion.   Endocrine: Negative.  Negative for cold intolerance, heat intolerance, polydipsia, polyphagia and polyuria.   Genitourinary: Negative.  Negative for decreased libido, frequency, genital sores, hematuria and urgency.   Musculoskeletal: Negative.  Negative for back pain, joint swelling, myalgias, neck pain and neck stiffness.   Skin: Negative.  Negative for color change, pallor, rash and bruise.   Allergic/Immunologic: Negative.  Negative for environmental allergies, food allergies and immunocompromised state.   Neurological: Negative.  Negative for dizziness, tremors, seizures, syncope, facial asymmetry, speech difficulty, weakness, light-headedness and numbness.   Hematological: Negative.  Negative for adenopathy. Does not bruise/bleed easily.   Psychiatric/Behavioral: Negative.  Negative for agitation, decreased concentration, self-injury, sleep disturbance, suicidal ideas, negative for hyperactivity and stress. The patient is not nervous/anxious.    All other systems reviewed and are negative.         Current Outpatient Medications:   •  amLODIPine (NORVASC) 5 MG tablet, Take 1 tablet by mouth Daily for 360 days., Disp: 30 tablet, Rfl: 11  •  Calcium Carbonate-Vitamin D (CALCIUM PLUS VITAMIN D PO), Take  by mouth Daily., Disp: , Rfl:   •  carvedilol (COREG) 12.5 MG tablet, Take 1 tablet by mouth 2 (Two) Times a Day., Disp: 120 tablet, Rfl: 0  •  clobetasol (TEMOVATE) 0.05 % ointment, Apply  topically to the appropriate area as directed Every 12 (Twelve) Hours., Disp: 60 g, Rfl: 3  •  coenzyme Q10 100 MG capsule, Take 100 mg by mouth Daily., Disp: , Rfl:   •  Elastic Bandages & Supports (JOBST ACTIVE 20-30MMHG MEDIUM)  "misc, 1 application Daily., Disp: 2 each, Rfl: 6  •  hydroCHLOROthiazide (HYDRODIURIL) 25 MG tablet, Take 1 tablet by mouth Daily., Disp: 30 tablet, Rfl: 11  •  melatonin 5 MG tablet tablet, 5 mg Every Night., Disp: , Rfl:   •  Omega-3 Fatty Acids (FISH OIL) 1000 MG capsule capsule, Take 1,200 mg by mouth 2 (Two) Times a Day With Meals., Disp: , Rfl:   •  pravastatin (PRAVACHOL) 40 MG tablet, Take 1 tablet by mouth Daily. as directed, Disp: 90 tablet, Rfl: 3  •  rivaroxaban (XARELTO) 20 MG tablet, Take 1 tablet by mouth Daily., Disp: 30 tablet, Rfl: 5  •  TRAVATAN Z 0.004 % solution ophthalmic solution, INSTILL 1 DROP INTO EACH EYE NIGHTLY AS DIRECTED, Disp: , Rfl: 6    Vitals:    05/19/20 1450 05/19/20 1454   BP: 142/78 142/78   BP Location: Right arm Left arm   Patient Position: Sitting Sitting   Cuff Size: Adult Adult   Pulse: 73    SpO2: 98%    Weight: 74.8 kg (165 lb)    Height: 154.9 cm (61\")        Physical Exam:     Physical Exam   Constitutional: She is oriented to person, place, and time. She appears well-developed and well-nourished. No distress.   HENT:   Head: Normocephalic and atraumatic.   Eyes: Pupils are equal, round, and reactive to light. Conjunctivae are normal. No scleral icterus.   Neck: Normal range of motion. Neck supple. No JVD present. Carotid bruit is not present. No thyromegaly present.   Cardiovascular: Normal rate, regular rhythm, S1 normal, S2 normal, normal heart sounds and intact distal pulses. PMI is not displaced. Exam reveals no gallop and no friction rub.   No murmur heard.  Pulmonary/Chest: Effort normal and breath sounds normal. No respiratory distress. She has no wheezes. She has no rales. She exhibits no tenderness.   Abdominal: Soft. Bowel sounds are normal. She exhibits no distension and no mass. There is no tenderness.   Musculoskeletal: Normal range of motion. She exhibits no edema.   Neurological: She is alert and oriented to person, place, and time.   Skin: Skin is warm " and dry. Capillary refill takes less than 2 seconds. No rash noted. She is not diaphoretic.   Psychiatric: She has a normal mood and affect. Her behavior is normal. Judgment and thought content normal.   Nursing note and vitals reviewed.        ECG 12 Lead  Date/Time: 5/19/2020 4:16 PM  Performed by: Delfino Thomas Jr., APRN  Authorized by: Delfino Thomas Jr., TERRY   Comparison: compared with previous ECG from 5/14/2020  Similar to previous ECG  Comparison to previous ECG: No acute changes  Rhythm: sinus rhythm and paced  Rate: normal  BPM: 67  Conduction: conduction normal  Other: no other findings  Pacing: atrial sensed rhythm  Clinical impression: abnormal EKG  Comments: Continued palpitation symptoms.  Flecainide discontinued and beta-blocker increased.            Results:   Reviewed medication regimen and updated  Twelve-lead EKG shows a paced rhythm with an underlying sinus rhythm.  Blood pressure 142/78.  Heart rate of 73, EKG heart rate of 67.    Assessment/Plan:   Due to her increased side effects after starting flecainide we will discontinue.  Increase carvedilol to 12.5 mg twice daily.  MCOT 30-day monitoring.  Due to a malfunction in her home monitoring device for her pacemaker, her pacemaker has not been monitored.Dr Thomas's has been alerted to this.  Monica was seen today for follow-up and palpitations.    Diagnoses and all orders for this visit:    Essential hypertension  -     Cancel: Holter Monitor - 72 Hour Up To 21 Days; Future  -     carvedilol (COREG) 12.5 MG tablet; Take 1 tablet by mouth 2 (Two) Times a Day.  -     Mobile Cardiac Outpatient Telemetry; Future    Mixed hyperlipidemia  -     Cancel: Holter Monitor - 72 Hour Up To 21 Days; Future  -     carvedilol (COREG) 12.5 MG tablet; Take 1 tablet by mouth 2 (Two) Times a Day.  -     Mobile Cardiac Outpatient Telemetry; Future    Coronary artery disease involving native coronary artery of native heart without angina pectoris  -      Cancel: Holter Monitor - 72 Hour Up To 21 Days; Future  -     carvedilol (COREG) 12.5 MG tablet; Take 1 tablet by mouth 2 (Two) Times a Day.  -     Mobile Cardiac Outpatient Telemetry; Future    MVP (mitral valve prolapse)  -     Cancel: Holter Monitor - 72 Hour Up To 21 Days; Future  -     carvedilol (COREG) 12.5 MG tablet; Take 1 tablet by mouth 2 (Two) Times a Day.  -     Mobile Cardiac Outpatient Telemetry; Future    Paroxysmal atrial fibrillation (CMS/HCC)  -     Cancel: Holter Monitor - 72 Hour Up To 21 Days; Future  -     carvedilol (COREG) 12.5 MG tablet; Take 1 tablet by mouth 2 (Two) Times a Day.  -     Mobile Cardiac Outpatient Telemetry; Future    Palpitations  -     Cancel: Holter Monitor - 72 Hour Up To 21 Days; Future  -     carvedilol (COREG) 12.5 MG tablet; Take 1 tablet by mouth 2 (Two) Times a Day.  -     Mobile Cardiac Outpatient Telemetry; Future    Presence of cardiac pacemaker  -     Cancel: Holter Monitor - 72 Hour Up To 21 Days; Future  -     carvedilol (COREG) 12.5 MG tablet; Take 1 tablet by mouth 2 (Two) Times a Day.  -     Mobile Cardiac Outpatient Telemetry; Future    Sinus node dysfunction (CMS/HCC)  -     carvedilol (COREG) 12.5 MG tablet; Take 1 tablet by mouth 2 (Two) Times a Day.  -     Mobile Cardiac Outpatient Telemetry; Future    Bilateral lower extremity edema  -     carvedilol (COREG) 12.5 MG tablet; Take 1 tablet by mouth 2 (Two) Times a Day.  -     Mobile Cardiac Outpatient Telemetry; Future    Other orders  -     ECG 12 Lead        Return in about 1 month (around 6/19/2020).    TERRY Andrade

## 2020-05-20 ENCOUNTER — TELEPHONE (OUTPATIENT)
Dept: CARDIOLOGY | Facility: CLINIC | Age: 75
End: 2020-05-20

## 2020-05-20 NOTE — TELEPHONE ENCOUNTER
Called pt to assist with [employer] home monitor and she is waiting on cell adapter from [employer].  She will call me Friday if she hasn't received it and I will check into to see what is going on.

## 2020-05-20 NOTE — PROGRESS NOTES
Reji Hernandez.    Ms Eliazar's [employer] remote monitoring per the patient is not working.  Would you be able to trouble shoot this please?

## 2020-05-21 NOTE — PROGRESS NOTES
Elham, can you please touch base with Ms. Perea and tell her that we detected  atrial fibrillation with increased heart rate on her Holter monitor.  Not sure if she experienced any palpitations during this time.  But that we did touch base with  and wants to start an antiarrhythmic medication ,Rhythmol SR 225mg BID. Thanks.

## 2020-05-22 ENCOUNTER — TELEPHONE (OUTPATIENT)
Dept: CARDIOLOGY | Facility: CLINIC | Age: 75
End: 2020-05-22

## 2020-05-22 RX ORDER — PROPAFENONE HYDROCHLORIDE 225 MG/1
225 CAPSULE, EXTENDED RELEASE ORAL 2 TIMES DAILY
Qty: 60 CAPSULE | Refills: 6 | Status: SHIPPED | OUTPATIENT
Start: 2020-05-22 | End: 2020-06-09

## 2020-05-22 NOTE — TELEPHONE ENCOUNTER
Called pt to start her on Rhythmol 225mg bid .  Spoke to pt and she is aware and verbalized understanding.

## 2020-05-22 NOTE — TELEPHONE ENCOUNTER
Elham, can you please touch base with Ms. Perea and tell her that we detected  atrial fibrillation with increased heart rate on her Holter monitor.  Not sure if she experienced any palpitations during this time.  But that we did touch base with  and wants to start an antiarrhythmic medication ,Rhythmol SR 225mg BID. Thanks.     ----- Message from ETRRY Denise Jr. sent at 5/21/2020  4:03 PM EDT -----      ----- Message -----  From: Villa Thomas DO  Sent: 5/20/2020   9:07 AM EDT  To: TERRY Denise Jr., #        ----- Message -----  From: Delfino Thomas Jr., APRN  Sent: 5/19/2020   4:22 PM EDT  To: Villa Thomas DO

## 2020-05-29 ENCOUNTER — TELEPHONE (OUTPATIENT)
Dept: CARDIOLOGY | Facility: CLINIC | Age: 75
End: 2020-05-29

## 2020-05-29 DIAGNOSIS — I48.0 PAROXYSMAL ATRIAL FIBRILLATION (HCC): ICD-10-CM

## 2020-05-29 DIAGNOSIS — R00.2 PALPITATIONS: ICD-10-CM

## 2020-05-29 DIAGNOSIS — I25.10 CORONARY ARTERY DISEASE INVOLVING NATIVE CORONARY ARTERY OF NATIVE HEART WITHOUT ANGINA PECTORIS: ICD-10-CM

## 2020-05-29 DIAGNOSIS — I34.1 MVP (MITRAL VALVE PROLAPSE): ICD-10-CM

## 2020-05-29 NOTE — TELEPHONE ENCOUNTER
I hope she pushed her alert button if she was having symptoms to see if any correlation on EKG and her symptoms. If she is doing well with carvedilol 6.25 and Rhythmol 225 mg........... Leave as is.

## 2020-05-29 NOTE — TELEPHONE ENCOUNTER
She stated that she has not been recording when she feels anything but I reminded her when she called to do so even if its a few moments after. Pt notified to keep meds how she has been taking them

## 2020-05-29 NOTE — TELEPHONE ENCOUNTER
Pt called stating that she went to [place] yesterday and after she left she was very tired. Pt checked her HR and O2, HR was 104 and her O2 was 94. Pt state she did have some palpitations and SOA. Pt is currently Wearing an MCOT. Pt also states she was taking carvedilol 12.5 and Rythmol 225 and has cut down Carvedilol to 6.25 due fatigue and palpitations and is doing fine now.

## 2020-06-09 ENCOUNTER — OFFICE VISIT (OUTPATIENT)
Dept: CARDIOLOGY | Facility: CLINIC | Age: 75
End: 2020-06-09

## 2020-06-09 VITALS
OXYGEN SATURATION: 96 % | HEART RATE: 60 BPM | DIASTOLIC BLOOD PRESSURE: 80 MMHG | WEIGHT: 167 LBS | HEIGHT: 61 IN | BODY MASS INDEX: 31.53 KG/M2 | SYSTOLIC BLOOD PRESSURE: 130 MMHG

## 2020-06-09 DIAGNOSIS — I49.5 SINUS NODE DYSFUNCTION (HCC): ICD-10-CM

## 2020-06-09 DIAGNOSIS — I10 ESSENTIAL HYPERTENSION: Primary | ICD-10-CM

## 2020-06-09 DIAGNOSIS — I34.1 MVP (MITRAL VALVE PROLAPSE): ICD-10-CM

## 2020-06-09 DIAGNOSIS — I48.0 PAROXYSMAL ATRIAL FIBRILLATION (HCC): ICD-10-CM

## 2020-06-09 DIAGNOSIS — I25.10 CORONARY ARTERY DISEASE INVOLVING NATIVE CORONARY ARTERY OF NATIVE HEART WITHOUT ANGINA PECTORIS: ICD-10-CM

## 2020-06-09 DIAGNOSIS — Z95.0 PRESENCE OF CARDIAC PACEMAKER: ICD-10-CM

## 2020-06-09 DIAGNOSIS — E78.2 MIXED HYPERLIPIDEMIA: ICD-10-CM

## 2020-06-09 DIAGNOSIS — R00.2 PALPITATIONS: ICD-10-CM

## 2020-06-09 PROCEDURE — 99214 OFFICE O/P EST MOD 30 MIN: CPT | Performed by: NURSE PRACTITIONER

## 2020-06-09 RX ORDER — PROPAFENONE HYDROCHLORIDE 325 MG/1
325 CAPSULE, EXTENDED RELEASE ORAL 2 TIMES DAILY
Qty: 60 CAPSULE | Refills: 1 | Status: SHIPPED | OUTPATIENT
Start: 2020-06-09 | End: 2020-07-20

## 2020-06-09 NOTE — PATIENT INSTRUCTIONS
Increase Rythmol to 325mg twice daily  Increase carvedilol to 12.5mg twice daily .   Follow up one month 07/2020

## 2020-06-09 NOTE — PROGRESS NOTES
"Monica Perea is a 75 y.o. female.  MRN #: [id number]    Referring Provider: Abdiel Juan MD    Chief Complaint:   Chief Complaint   Patient presents with   • Follow-up     holter on now also   • Hypertension   • Coronary Artery Disease   • Palpitations     still having after pace maker has been placed         History of Present Illness:  Ms Perea is a 75-year-old female that presents for follow-up for continued symptoms of palpitations and chest pain despite pacemaker insertion April 2020.  Patient initially had sinus node dysfunction with symptomatic bradycardia and near syncope.  Despite insertion of pacemaker patient continues to have extreme fatigue, shortness of breath with exertion, palpitations with exertion and with body position changes.  We did place her on a MCOT 30-day monitor to correlate symptoms with heart rhythm.  Patient has had numerous reported symptoms while wearing heart monitor.  Patient has been started on Rythmol 25 mg twice daily and she does report \"some improvement\" but reports she continues to have frequent episodes of chest pain and palpitations, particularly with exertion.    The patient presents today with their self who contributes to the history of their care.     The following portions of the patient's history were reviewed and updated as appropriate: allergies, current medications, past family history, past medical history, past social history, past surgical history and problem list.     Review of Systems:     Review of Systems   Constitutional: Positive for fatigue. Negative for activity change, appetite change, diaphoresis, unexpected weight gain and unexpected weight loss.   HENT: Negative.    Eyes: Negative.  Negative for blurred vision, double vision, photophobia and visual disturbance.   Respiratory: Positive for shortness of breath. Negative for apnea, cough, chest tightness and wheezing.    Cardiovascular: Positive for chest pain and palpitations. Negative for " leg swelling.   Gastrointestinal: Negative for abdominal distention, abdominal pain, nausea, vomiting, GERD and indigestion.   Endocrine: Negative.  Negative for cold intolerance, heat intolerance, polydipsia, polyphagia and polyuria.   Genitourinary: Negative.  Negative for decreased libido, frequency, genital sores, hematuria, urgency and urinary incontinence.   Musculoskeletal: Negative.  Negative for back pain, joint swelling, myalgias, neck pain and neck stiffness.   Skin: Negative.  Negative for color change, pallor, rash and bruise.   Allergic/Immunologic: Negative.  Negative for environmental allergies, food allergies and immunocompromised state.   Neurological: Negative for dizziness, tremors, seizures, syncope, facial asymmetry, speech difficulty, weakness, light-headedness, numbness, headache, memory problem and confusion.   Hematological: Negative for adenopathy. Does not bruise/bleed easily.   Psychiatric/Behavioral: Negative for agitation, decreased concentration, self-injury, sleep disturbance, negative for hyperactivity and stress. The patient is not nervous/anxious.    All other systems reviewed and are negative.         Current Outpatient Medications:   •  amLODIPine (NORVASC) 5 MG tablet, Take 1 tablet by mouth Daily for 360 days., Disp: 30 tablet, Rfl: 11  •  Calcium Carbonate-Vitamin D (CALCIUM PLUS VITAMIN D PO), Take  by mouth Daily., Disp: , Rfl:   •  carvedilol (COREG) 12.5 MG tablet, Take 1 tablet by mouth 2 (Two) Times a Day., Disp: 120 tablet, Rfl: 0  •  clobetasol (TEMOVATE) 0.05 % ointment, Apply  topically to the appropriate area as directed Every 12 (Twelve) Hours., Disp: 60 g, Rfl: 3  •  coenzyme Q10 100 MG capsule, Take 100 mg by mouth Daily., Disp: , Rfl:   •  Elastic Bandages & Supports (JOBST ACTIVE 20-30MMHG MEDIUM) misc, 1 application Daily., Disp: 2 each, Rfl: 3  •  hydroCHLOROthiazide (HYDRODIURIL) 25 MG tablet, Take 1 tablet by mouth Daily., Disp: 30 tablet, Rfl: 11  •   "melatonin 5 MG tablet tablet, 5 mg Every Night., Disp: , Rfl:   •  Omega-3 Fatty Acids (FISH OIL) 1000 MG capsule capsule, Take 1,200 mg by mouth 2 (Two) Times a Day With Meals., Disp: , Rfl:   •  pravastatin (PRAVACHOL) 40 MG tablet, Take 1 tablet by mouth Daily. as directed, Disp: 90 tablet, Rfl: 3  •  rivaroxaban (XARELTO) 20 MG tablet, Take 1 tablet by mouth Daily., Disp: 30 tablet, Rfl: 5  •  TRAVATAN Z 0.004 % solution ophthalmic solution, INSTILL 1 DROP INTO EACH EYE NIGHTLY AS DIRECTED, Disp: , Rfl: 6  •  propafenone SR (Rythmol SR) 325 MG 12 hr capsule, Take 1 capsule by mouth 2 (Two) Times a Day for 60 days., Disp: 60 capsule, Rfl: 1    Vitals:    06/09/20 1056   BP: 130/80   BP Location: Left arm   Patient Position: Sitting   Pulse: 60   SpO2: 96%   Weight: 75.8 kg (167 lb)   Height: 154.9 cm (61\")       Physical Exam:     Physical Exam   Constitutional: She is oriented to person, place, and time. She appears well-developed and well-nourished. No distress.   HENT:   Head: Normocephalic and atraumatic.   Eyes: Pupils are equal, round, and reactive to light. Conjunctivae are normal. No scleral icterus.   Neck: Normal range of motion. Neck supple. No JVD present. Carotid bruit is not present. No tracheal deviation present. No thyromegaly present.   Cardiovascular: Normal rate, regular rhythm, S1 normal, S2 normal, normal heart sounds and intact distal pulses. PMI is not displaced. Exam reveals no gallop and no friction rub.   No murmur heard.  Pulses:       Carotid pulses are 1+ on the right side, and 1+ on the left side.  Pulmonary/Chest: Effort normal and breath sounds normal. No respiratory distress. She has no wheezes. She has no rales. She exhibits no tenderness.   Abdominal: Soft. Bowel sounds are normal. She exhibits no mass. There is no tenderness.   Musculoskeletal: Normal range of motion. She exhibits no edema.   Neurological: She is alert and oriented to person, place, and time.   Skin: Skin is " warm and dry. Capillary refill takes less than 2 seconds. No rash noted. She is not diaphoretic.   Psychiatric: She has a normal mood and affect. Her behavior is normal. Judgment and thought content normal.   Nursing note and vitals reviewed.      Procedures    Results:   Reviewed medication regimen and updated.  Vital signs blood pressure 130/80, heart rate 60 and regular rate and rhythm.    Assessment/Plan:   Rythmol increased from 225 mg twice daily to 325 mg twice daily.  Her Carvedilol will be increased to 12.5mg BID   Will reevaluate in 1 month.   does express concerns regarding her antiarrhythmic medication Rythmol she will be able to afford it.  Monica was seen today for follow-up, hypertension, coronary artery disease and palpitations.    Diagnoses and all orders for this visit:    Essential hypertension  Blood pressure stable at present 130/80.  Norvasc 5 mg p.o. Daily  Carvedilol 12.5 mg twice daily  Hydrochlorothiazide 25 mg daily  Mixed hyperlipidemia  Pravachol 40 mg p.o. nightly  Coronary artery disease involving native coronary artery of native heart without angina pectoris  Myocardial perfusion stress test from January 2019 shows no evidence of myocardial perfusion ischemia.  MVP (mitral valve prolapse)    Paroxysmal atrial fibrillation (CMS/HCC)  Appropriately anticoagulated with Xarelto 20 mg daily  Sinus node dysfunction (CMS/HCC)    Presence of cardiac pacemaker    Other orders  -     propafenone SR (Rythmol SR) 325 MG 12 hr capsule; Take 1 capsule by mouth 2 (Two) Times a Day for 60 days.        Return in about 1 month (around 7/9/2020).    TERRY Andrade

## 2020-06-10 ENCOUNTER — TELEPHONE (OUTPATIENT)
Dept: CARDIOLOGY | Facility: CLINIC | Age: 75
End: 2020-06-10

## 2020-06-10 NOTE — TELEPHONE ENCOUNTER
Patient states that since the dose of Coreg was increased to 12.5 mg BID yesterday, that she has felt extremely weak. I asked her what her BP was today and she had not checked it. I  Instructed her to keep a log of her BP readings twice a day and when she feels symptomatic. She agreed and verbalized understanding. I also transferred [facility] to [facility] to make sure that they are receiving information from her pacemaker home monitor. She is not sure that it has been working properly.

## 2020-06-10 NOTE — TELEPHONE ENCOUNTER
Let us keep the Coreg at 6.25 mg but please do start the increased dose of the Rythmol(propafenone).

## 2020-06-10 NOTE — TELEPHONE ENCOUNTER
Patient was seen yesterday and Coreg was increased to 12.5 mg.  Patient states she is feeling very weak today. She has not started propafenone yet due to pharmacy being out.Advise.

## 2020-06-12 RX ORDER — METOPROLOL SUCCINATE 25 MG/1
TABLET, EXTENDED RELEASE ORAL
Qty: 30 TABLET | Refills: 11 | Status: SHIPPED | OUTPATIENT
Start: 2020-06-12 | End: 2020-06-23 | Stop reason: ALTCHOICE

## 2020-06-12 NOTE — TELEPHONE ENCOUNTER
What does her HR increase to? Lets stop carvedilol and start Toprol XL tablets 25mg , 1/2 tablet (12.5mg) daily at in evening.

## 2020-06-12 NOTE — TELEPHONE ENCOUNTER
Contacted patient. States she is doing a little better. She states that if she gets up to do anything her hr increases and she does not have any energy.

## 2020-06-23 ENCOUNTER — OFFICE VISIT (OUTPATIENT)
Dept: CARDIOLOGY | Facility: CLINIC | Age: 75
End: 2020-06-23

## 2020-06-23 VITALS
DIASTOLIC BLOOD PRESSURE: 74 MMHG | HEART RATE: 59 BPM | BODY MASS INDEX: 31.91 KG/M2 | SYSTOLIC BLOOD PRESSURE: 130 MMHG | HEIGHT: 61 IN | OXYGEN SATURATION: 95 % | WEIGHT: 169 LBS

## 2020-06-23 DIAGNOSIS — R00.2 PALPITATIONS: ICD-10-CM

## 2020-06-23 DIAGNOSIS — I49.5 SINUS NODE DYSFUNCTION (HCC): ICD-10-CM

## 2020-06-23 DIAGNOSIS — I48.0 PAROXYSMAL ATRIAL FIBRILLATION (HCC): ICD-10-CM

## 2020-06-23 DIAGNOSIS — I34.1 MVP (MITRAL VALVE PROLAPSE): ICD-10-CM

## 2020-06-23 DIAGNOSIS — E78.2 MIXED HYPERLIPIDEMIA: ICD-10-CM

## 2020-06-23 DIAGNOSIS — I10 ESSENTIAL HYPERTENSION: Primary | ICD-10-CM

## 2020-06-23 DIAGNOSIS — I25.10 CORONARY ARTERY DISEASE INVOLVING NATIVE CORONARY ARTERY OF NATIVE HEART WITHOUT ANGINA PECTORIS: ICD-10-CM

## 2020-06-23 DIAGNOSIS — Z95.0 PRESENCE OF CARDIAC PACEMAKER: ICD-10-CM

## 2020-06-23 PROCEDURE — 99214 OFFICE O/P EST MOD 30 MIN: CPT | Performed by: NURSE PRACTITIONER

## 2020-06-23 PROCEDURE — 93000 ELECTROCARDIOGRAM COMPLETE: CPT | Performed by: NURSE PRACTITIONER

## 2020-06-23 RX ORDER — ATENOLOL 25 MG/1
12.5 TABLET ORAL DAILY
Qty: 30 TABLET | Refills: 0 | Status: SHIPPED | OUTPATIENT
Start: 2020-06-23 | End: 2020-07-08 | Stop reason: ALTCHOICE

## 2020-06-23 NOTE — PROGRESS NOTES
"Monica Perea is a 75 y.o. female.  MRN #: [id number]    Referring Provider: Abdiel Juan MD    Chief Complaint:   Chief Complaint   Patient presents with   • Follow-up   • Hypertension        History of Present Illness:  Ms Perea is a 75-year-old female that presents in follow-up for history of essential hypertension, elevated lipids, continued palpitation symptoms, history of mitral valve prolapse, paroxysmal atrial fibrillation and sinus node dysfunction, and pacemaker implantation in April 2020.  Prior to patient's pacemaker implantation, she had had frequent episodes of bradycardia arrhythmias with near syncopal episodes and hypotension.  Ever since pacemaker implantation patient has continued to have palpitation symptoms associated with \"chest burning sensation\".  She has had numerous medication adjustments and changes to no avail.  She originally was on flecainide and was intolerant with increased episodes of palpitations and extreme fatigue.  She was then changed to Rythmol which is what she is presently taking.  Patient also has been taking metoprolol 25 mg daily.  With today's visit patient still continues to experience palpitation symptoms and she also will experience symptoms of palpitations and increased heart rate with body position changes, in particular bending over and standing upright.  She has not had any syncopal episodes but has felt like she was \"near to pass out\".  Patient's pacemaker does have an in-home transmitter/monitor, however malfunctioned at her home.  We then placed a 30-day MCOT to evaluate for any correlation between her symptoms and her heart pattern.  While wearing her 30-day cardiac monitor she reported 75 symptoms of palpitation and \"feelings of increased heart rate\".  All of her symptoms did not correlate with any changes in her cardiac monitor.  With today's visit she now is questioning as to whether her metoprolol is causing her symptoms of extreme fatigue and " fluctuation of heart rate.  Patient had myocardial perfusion vasodilator stress test January 2020 which was negative for any EKG or perfusion ischemia.  Patient is questioning as to whether she may need a cardiac cath procedure.  She is also stating that she did not tolerate her flecainide anti-arrhythmia and has been switched to Rythmol but now states that she cannot afford the Rythmol medication.      The patient presents today with their self who contributes to the history of their care.     The following portions of the patient's history were reviewed and updated as appropriate: allergies, current medications, past family history, past medical history, past social history, past surgical history and problem list.     Review of Systems:     Review of Systems       Current Outpatient Medications:   •  amLODIPine (NORVASC) 5 MG tablet, Take 1 tablet by mouth Daily for 360 days., Disp: 30 tablet, Rfl: 11  •  Calcium Carbonate-Vitamin D (CALCIUM PLUS VITAMIN D PO), Take  by mouth Daily., Disp: , Rfl:   •  clobetasol (TEMOVATE) 0.05 % ointment, Apply  topically to the appropriate area as directed Every 12 (Twelve) Hours., Disp: 60 g, Rfl: 3  •  coenzyme Q10 100 MG capsule, Take 100 mg by mouth Daily., Disp: , Rfl:   •  hydroCHLOROthiazide (HYDRODIURIL) 25 MG tablet, Take 1 tablet by mouth Daily., Disp: 30 tablet, Rfl: 11  •  melatonin 5 MG tablet tablet, 5 mg Every Night., Disp: , Rfl:   •  Omega-3 Fatty Acids (FISH OIL) 1000 MG capsule capsule, Take 1,200 mg by mouth 2 (Two) Times a Day With Meals., Disp: , Rfl:   •  pravastatin (PRAVACHOL) 40 MG tablet, Take 1 tablet by mouth Daily. as directed, Disp: 90 tablet, Rfl: 3  •  propafenone SR (Rythmol SR) 325 MG 12 hr capsule, Take 1 capsule by mouth 2 (Two) Times a Day for 60 days., Disp: 60 capsule, Rfl: 1  •  rivaroxaban (XARELTO) 20 MG tablet, Take 1 tablet by mouth Daily., Disp: 30 tablet, Rfl: 5  •  TRAVATAN Z 0.004 % solution ophthalmic solution, INSTILL 1 DROP  "INTO EACH EYE NIGHTLY AS DIRECTED, Disp: , Rfl: 6  •  atenolol (TENORMIN) 25 MG tablet, Take 0.5 tablets by mouth Daily for 30 days., Disp: 30 tablet, Rfl: 0    Vitals:    06/23/20 0951   BP: 130/74   BP Location: Right arm   Patient Position: Sitting   Cuff Size: Adult   Pulse: 59   SpO2: 95%   Weight: 76.7 kg (169 lb)   Height: 154.9 cm (61\")       Physical Exam:   While in my presence patient twice had episodes of palpitations and upon auscultating her heart sounds and checking her apical as well as radial pulse, both times her heart rate remained in the 60s.      Physical Exam   Constitutional: She is oriented to person, place, and time. She appears well-developed and well-nourished. She appears distressed.   HENT:   Head: Normocephalic and atraumatic.   Eyes: Pupils are equal, round, and reactive to light. Conjunctivae are normal. No scleral icterus.   Neck: Normal range of motion. Neck supple. No JVD present. Carotid bruit is not present. No tracheal deviation present. No thyromegaly present.   Cardiovascular: Normal rate, S1 normal, S2 normal, normal heart sounds and intact distal pulses. An irregular rhythm present. PMI is not displaced. Exam reveals no gallop and no friction rub.   No murmur heard.  EKG shows a paced rhythm, presently atrial fibrillation controlled rate at 73 beats per minute.   Pulmonary/Chest: Effort normal and breath sounds normal. No respiratory distress. She has no wheezes. She has no rales. She exhibits no tenderness.   Abdominal: Soft. Bowel sounds are normal. She exhibits no distension and no mass. There is no tenderness.   Musculoskeletal: Normal range of motion. She exhibits no edema.   Neurological: She is alert and oriented to person, place, and time.   Skin: Skin is warm and dry. Capillary refill takes less than 2 seconds. No rash noted. She is not diaphoretic.   Psychiatric: She has a normal mood and affect. Her behavior is normal. Judgment and thought content normal. " "  Nursing note and vitals reviewed.        ECG 12 Lead  Date/Time: 6/23/2020 1:11 PM  Performed by: Delfino Thomas Jr., APRN  Authorized by: Delfino Thomas Jr., TERRY   Comparison: compared with previous ECG from 5/19/2020  Similar to previous ECG  Comparison to previous ECG: No acute changes, underlying atrial fibrillation with paced rhythm.  Rhythm: atrial fibrillation and paced  Rate: normal  BPM: 73    Clinical impression: abnormal EKG  Comments: Paced rhythm with underlying atrial fibrillation.  Patient is currently on with all therapy.            Results:   Reviewed patient's medication regimen and updated.  Reviewed recent 30-day MCOT interpretation which shows the patient had 75 symptoms which did not correlate with any cardiac arrhythmia.  Vital signs today remain at acceptable parameter 130/74.    Assessment/Plan:   Ms Perea has had medication changes due to intolerance, she was intolerant to flecainide and she is presently on Rythmol.  She had been on beta-blocker therapy with carvedilol but was intolerant, and is now intolerant to metoprolol.  We will try atenolol 12.5 mg daily.  Patient had a pacemaker interrogation approximately 1 month ago due to her symptoms of extreme fatigue and palpitations and she had stable pacemaker parameters.    Ms Perea is also questioning whether she has \"clogged arteries\".  I explained to her that her myocardial perfusion stress test did not indicate any ischemia.  She is inquiring as to whether she may need to have a cardiac cath procedure done.    Follow-up 1 month with Dr.Breeding Anderson was seen today for follow-up and hypertension.    Diagnoses and all orders for this visit:    Essential hypertension  -     atenolol (TENORMIN) 25 MG tablet; Take 0.5 tablets by mouth Daily for 30 days.  -     ECG 12 Lead    Mixed hyperlipidemia  -     atenolol (TENORMIN) 25 MG tablet; Take 0.5 tablets by mouth Daily for 30 days.  -     ECG 12 " Lead    Palpitations  -     atenolol (TENORMIN) 25 MG tablet; Take 0.5 tablets by mouth Daily for 30 days.  -     ECG 12 Lead    Coronary artery disease involving native coronary artery of native heart without angina pectoris  -     atenolol (TENORMIN) 25 MG tablet; Take 0.5 tablets by mouth Daily for 30 days.  -     ECG 12 Lead    MVP (mitral valve prolapse)  -     atenolol (TENORMIN) 25 MG tablet; Take 0.5 tablets by mouth Daily for 30 days.  -     ECG 12 Lead    Paroxysmal atrial fibrillation (CMS/HCC)  -     atenolol (TENORMIN) 25 MG tablet; Take 0.5 tablets by mouth Daily for 30 days.  -     ECG 12 Lead    Presence of cardiac pacemaker  -     atenolol (TENORMIN) 25 MG tablet; Take 0.5 tablets by mouth Daily for 30 days.  -     ECG 12 Lead    Sinus node dysfunction (CMS/HCC)  -     atenolol (TENORMIN) 25 MG tablet; Take 0.5 tablets by mouth Daily for 30 days.  -     ECG 12 Lead        Return for F/U with Breeding.    TERRY Andrade

## 2020-06-30 ENCOUNTER — OFFICE VISIT (OUTPATIENT)
Dept: INTERNAL MEDICINE | Facility: CLINIC | Age: 75
End: 2020-06-30

## 2020-06-30 VITALS
RESPIRATION RATE: 16 BRPM | HEIGHT: 61 IN | HEART RATE: 66 BPM | SYSTOLIC BLOOD PRESSURE: 124 MMHG | DIASTOLIC BLOOD PRESSURE: 82 MMHG | TEMPERATURE: 96.6 F | WEIGHT: 166.12 LBS | OXYGEN SATURATION: 97 % | BODY MASS INDEX: 31.36 KG/M2

## 2020-06-30 DIAGNOSIS — H40.9 GLAUCOMA, UNSPECIFIED GLAUCOMA TYPE, UNSPECIFIED LATERALITY: ICD-10-CM

## 2020-06-30 DIAGNOSIS — G47.00 INSOMNIA, UNSPECIFIED TYPE: ICD-10-CM

## 2020-06-30 DIAGNOSIS — E55.9 VITAMIN D DEFICIENCY: ICD-10-CM

## 2020-06-30 DIAGNOSIS — M54.50 LOW BACK PAIN WITHOUT SCIATICA, UNSPECIFIED BACK PAIN LATERALITY, UNSPECIFIED CHRONICITY: ICD-10-CM

## 2020-06-30 DIAGNOSIS — E78.2 MIXED HYPERLIPIDEMIA: ICD-10-CM

## 2020-06-30 DIAGNOSIS — Z95.0 PRESENCE OF CARDIAC PACEMAKER: ICD-10-CM

## 2020-06-30 DIAGNOSIS — E66.01 MORBIDLY OBESE (HCC): ICD-10-CM

## 2020-06-30 DIAGNOSIS — I25.10 CORONARY ARTERY DISEASE INVOLVING NATIVE CORONARY ARTERY OF NATIVE HEART WITHOUT ANGINA PECTORIS: ICD-10-CM

## 2020-06-30 DIAGNOSIS — N30.10 INTERSTITIAL CYSTITIS: ICD-10-CM

## 2020-06-30 DIAGNOSIS — I48.0 PAROXYSMAL ATRIAL FIBRILLATION (HCC): ICD-10-CM

## 2020-06-30 DIAGNOSIS — I49.5 SINUS NODE DYSFUNCTION (HCC): ICD-10-CM

## 2020-06-30 DIAGNOSIS — Z78.0 MENOPAUSE: ICD-10-CM

## 2020-06-30 DIAGNOSIS — G44.311 INTRACTABLE ACUTE POST-TRAUMATIC HEADACHE: ICD-10-CM

## 2020-06-30 DIAGNOSIS — M79.89 LEG SWELLING: ICD-10-CM

## 2020-06-30 DIAGNOSIS — I34.1 MVP (MITRAL VALVE PROLAPSE): ICD-10-CM

## 2020-06-30 DIAGNOSIS — G47.33 OSA (OBSTRUCTIVE SLEEP APNEA): ICD-10-CM

## 2020-06-30 DIAGNOSIS — I10 ESSENTIAL HYPERTENSION: Primary | ICD-10-CM

## 2020-06-30 DIAGNOSIS — M85.80 OSTEOPENIA, UNSPECIFIED LOCATION: ICD-10-CM

## 2020-06-30 DIAGNOSIS — Z92.29 HX OF LONG TERM USE OF BLOOD THINNERS: ICD-10-CM

## 2020-06-30 DIAGNOSIS — R00.2 PALPITATIONS: ICD-10-CM

## 2020-06-30 DIAGNOSIS — L40.9 PSORIASIS: ICD-10-CM

## 2020-06-30 DIAGNOSIS — F41.9 ANXIETY: ICD-10-CM

## 2020-06-30 DIAGNOSIS — N95.2 ATROPHIC VAGINITIS: ICD-10-CM

## 2020-06-30 DIAGNOSIS — K57.90 DIVERTICULOSIS OF INTESTINE WITHOUT BLEEDING, UNSPECIFIED INTESTINAL TRACT LOCATION: ICD-10-CM

## 2020-06-30 PROBLEM — M79.604 ACUTE PAIN OF RIGHT LOWER EXTREMITY: Status: RESOLVED | Noted: 2020-03-16 | Resolved: 2020-06-30

## 2020-06-30 LAB
ALBUMIN SERPL-MCNC: 4.5 G/DL (ref 3.5–5.2)
ALBUMIN/GLOB SERPL: 1.4 G/DL
ALP SERPL-CCNC: 73 U/L (ref 39–117)
ALT SERPL-CCNC: 10 U/L (ref 1–33)
AST SERPL-CCNC: 12 U/L (ref 1–32)
BASOPHILS # BLD AUTO: 0.07 10*3/MM3 (ref 0–0.2)
BASOPHILS NFR BLD AUTO: 1.4 % (ref 0–1.5)
BILIRUB SERPL-MCNC: 0.4 MG/DL (ref 0.2–1.2)
BUN SERPL-MCNC: 18 MG/DL (ref 8–23)
BUN/CREAT SERPL: 19.4 (ref 7–25)
CALCIUM SERPL-MCNC: 9.6 MG/DL (ref 8.6–10.5)
CHLORIDE SERPL-SCNC: 101 MMOL/L (ref 98–107)
CO2 SERPL-SCNC: 31 MMOL/L (ref 22–29)
CREAT SERPL-MCNC: 0.93 MG/DL (ref 0.57–1)
EOSINOPHIL # BLD AUTO: 0.31 10*3/MM3 (ref 0–0.4)
EOSINOPHIL NFR BLD AUTO: 6.4 % (ref 0.3–6.2)
ERYTHROCYTE [DISTWIDTH] IN BLOOD BY AUTOMATED COUNT: 13 % (ref 12.3–15.4)
GLOBULIN SER CALC-MCNC: 3.2 GM/DL
GLUCOSE SERPL-MCNC: 106 MG/DL (ref 65–99)
HCT VFR BLD AUTO: 36.4 % (ref 34–46.6)
HGB BLD-MCNC: 11.9 G/DL (ref 12–15.9)
IMM GRANULOCYTES # BLD AUTO: 0.01 10*3/MM3 (ref 0–0.05)
IMM GRANULOCYTES NFR BLD AUTO: 0.2 % (ref 0–0.5)
LYMPHOCYTES # BLD AUTO: 1.63 10*3/MM3 (ref 0.7–3.1)
LYMPHOCYTES NFR BLD AUTO: 33.7 % (ref 19.6–45.3)
MAGNESIUM SERPL-MCNC: 2 MG/DL (ref 1.6–2.4)
MCH RBC QN AUTO: 28.5 PG (ref 26.6–33)
MCHC RBC AUTO-ENTMCNC: 32.7 G/DL (ref 31.5–35.7)
MCV RBC AUTO: 87.1 FL (ref 79–97)
MONOCYTES # BLD AUTO: 0.61 10*3/MM3 (ref 0.1–0.9)
MONOCYTES NFR BLD AUTO: 12.6 % (ref 5–12)
NEUTROPHILS # BLD AUTO: 2.21 10*3/MM3 (ref 1.7–7)
NEUTROPHILS NFR BLD AUTO: 45.7 % (ref 42.7–76)
NRBC BLD AUTO-RTO: 0 /100 WBC (ref 0–0.2)
PLATELET # BLD AUTO: 247 10*3/MM3 (ref 140–450)
POTASSIUM SERPL-SCNC: 3.8 MMOL/L (ref 3.5–5.2)
PROT SERPL-MCNC: 7.7 G/DL (ref 6–8.5)
RBC # BLD AUTO: 4.18 10*6/MM3 (ref 3.77–5.28)
SODIUM SERPL-SCNC: 141 MMOL/L (ref 136–145)
TSH SERPL DL<=0.005 MIU/L-ACNC: 2.03 UIU/ML (ref 0.27–4.2)
WBC # BLD AUTO: 4.84 10*3/MM3 (ref 3.4–10.8)

## 2020-06-30 PROCEDURE — 96160 PT-FOCUSED HLTH RISK ASSMT: CPT | Performed by: INTERNAL MEDICINE

## 2020-06-30 PROCEDURE — 99397 PER PM REEVAL EST PAT 65+ YR: CPT | Performed by: INTERNAL MEDICINE

## 2020-06-30 PROCEDURE — G0439 PPPS, SUBSEQ VISIT: HCPCS | Performed by: INTERNAL MEDICINE

## 2020-06-30 NOTE — PROGRESS NOTES
The ABCs of the Annual Wellness Visit  Subsequent Medicare Wellness Visit    Chief Complaint   Patient presents with   • Medicare Wellness-subsequent       Subjective   History of Present Illness:  Monica Perea is a 75 y.o. female who presents for a Subsequent Medicare Wellness Visit.    HEALTH RISK ASSESSMENT    Recent Hospitalizations:  No hospitalization(s) within the last year.    Current Medical Providers:  Patient Care Team:  Abdiel Juan MD as PCP - General  Abdiel Juan MD as PCP - Family Medicine  Breeding, Tico DELGADO MD as Consulting Physician (Cardiology)  Delfino Thomas Jr., APRN as Nurse Practitioner (Interventional Cardiology)  Villa Thomas DO as Consulting Physician (Cardiology)    Smoking Status:  Social History     Tobacco Use   Smoking Status Former Smoker   • Last attempt to quit:    • Years since quittin.5   Smokeless Tobacco Never Used       Alcohol Consumption:  Social History     Substance and Sexual Activity   Alcohol Use No    Comment: quit in        Depression Screen:   PHQ-2/PHQ-9 Depression Screening 2019   Little interest or pleasure in doing things 0   Feeling down, depressed, or hopeless 0   Trouble falling or staying asleep, or sleeping too much -   Feeling tired or having little energy -   Poor appetite or overeating -   Feeling bad about yourself - or that you are a failure or have let yourself or your family down -   Trouble concentrating on things, such as reading the newspaper or watching television -   Moving or speaking so slowly that other people could have noticed. Or the opposite - being so fidgety or restless that you have been moving around a lot more than usual -   Thoughts that you would be better off dead, or of hurting yourself in some way -   Total Score 0   If you checked off any problems, how difficult have these problems made it for you to do your work, take care of things at home, or get along with other people? -       Fall  Risk Screen:  BIPIN Fall Risk Assessment has not been completed.    Health Habits and Functional and Cognitive Screening:  Functional & Cognitive Status 6/30/2020   Do you have difficulty preparing food and eating? No   Do you have difficulty bathing yourself, getting dressed or grooming yourself? No   Do you have difficulty using the toilet? No   Do you have difficulty moving around from place to place? No   Do you have trouble with steps or getting out of a bed or a chair? No   Current Diet Unhealthy Diet   Dental Exam Up to date   Eye Exam Up to date   Exercise (times per week) 0 times per week   Current Exercise Activities Include None   Do you need help using the phone?  No   Are you deaf or do you have serious difficulty hearing?  No   Do you need help with transportation? No   Do you need help shopping? No   Do you need help preparing meals?  No   Do you need help with housework?  No   Do you need help with laundry? No   Do you need help taking your medications? No   Do you need help managing money? No   Do you ever drive or ride in a car without wearing a seat belt? No   Have you felt unusual stress, anger or loneliness in the last month? Yes   Who do you live with? Spouse   If you need help, do you have trouble finding someone available to you? No   Have you been bothered in the last four weeks by sexual problems? No   Do you have difficulty concentrating, remembering or making decisions? No         Does the patient have evidence of cognitive impairment? No    Asprin use counseling:Does not need ASA (and currently is not on it)    Age-appropriate Screening Schedule:  Refer to the list below for future screening recommendations based on patient's age, sex and/or medical conditions. Orders for these recommended tests are listed in the plan section. The patient has been provided with a written plan.    Health Maintenance   Topic Date Due   • TDAP/TD VACCINES (1 - Tdap) 01/03/1956   • DXA SCAN  06/28/2020   •  ZOSTER VACCINE (1 of 2) 07/09/2021 (Originally 1/3/1997)   • INFLUENZA VACCINE  08/01/2020   • LIPID PANEL  03/05/2021   • MAMMOGRAM  02/19/2022   • COLONOSCOPY  07/18/2026          The following portions of the patient's history were reviewed and updated as appropriate: allergies, current medications, past family history, past medical history, past social history, past surgical history and problem list.    Outpatient Medications Prior to Visit   Medication Sig Dispense Refill   • amLODIPine (NORVASC) 5 MG tablet Take 1 tablet by mouth Daily for 360 days. 30 tablet 11   • atenolol (TENORMIN) 25 MG tablet Take 0.5 tablets by mouth Daily for 30 days. 30 tablet 0   • Calcium Carbonate-Vitamin D (CALCIUM PLUS VITAMIN D PO) Take  by mouth Daily.     • clobetasol (TEMOVATE) 0.05 % ointment Apply  topically to the appropriate area as directed Every 12 (Twelve) Hours. 60 g 3   • coenzyme Q10 100 MG capsule Take 100 mg by mouth Daily.     • hydroCHLOROthiazide (HYDRODIURIL) 25 MG tablet Take 1 tablet by mouth Daily. 30 tablet 11   • melatonin 5 MG tablet tablet 5 mg Every Night.     • Omega-3 Fatty Acids (FISH OIL) 1000 MG capsule capsule Take 1,200 mg by mouth 2 (Two) Times a Day With Meals.     • pravastatin (PRAVACHOL) 40 MG tablet Take 1 tablet by mouth Daily. as directed 90 tablet 3   • propafenone SR (Rythmol SR) 325 MG 12 hr capsule Take 1 capsule by mouth 2 (Two) Times a Day for 60 days. 60 capsule 1   • rivaroxaban (XARELTO) 20 MG tablet Take 1 tablet by mouth Daily. 30 tablet 5   • TRAVATAN Z 0.004 % solution ophthalmic solution INSTILL 1 DROP INTO EACH EYE NIGHTLY AS DIRECTED  6     No facility-administered medications prior to visit.        Patient Active Problem List   Diagnosis   • Essential hypertension   • Diverticulosis of intestine   • Glaucoma   • Hyperlipidemia   • Low back pain   • Osteopenia   • Palpitations   • Atrophic vaginitis   • MOON (obstructive sleep apnea)   • Psoriasis   • CAD (coronary artery  "disease)   • Interstitial cystitis   • Anxiety   • MVP (mitral valve prolapse)   • Insomnia   • Paroxysmal atrial fibrillation (CMS/HCC)   • Vitamin D deficiency   • Leg swelling   • Hx of long term use of blood thinners   • Morbidly obese (CMS/HCC)   • Sinus node dysfunction (CMS/HCC)   • Presence of cardiac pacemaker       Advanced Care Planning:  ACP discussion was held with the patient during this visit. Patient has an advance directive (not in EMR), copy requested.    Review of Systems   Constitutional: Negative.    HENT: Negative.    Eyes: Negative.    Respiratory: Negative.    Cardiovascular: Positive for palpitations and leg swelling.   Gastrointestinal: Negative.    Endocrine: Negative.    Genitourinary: Negative.    Musculoskeletal: Negative.    Skin: Negative.    Allergic/Immunologic: Negative.    Neurological: Negative.    Hematological: Negative.    Psychiatric/Behavioral: Negative.        Compared to one year ago, the patient feels her physical health is the same.  Compared to one year ago, the patient feels her mental health is the same.    Reviewed chart for potential of high risk medication in the elderly: yes  Reviewed chart for potential of harmful drug interactions in the elderly:no    Objective         Vitals:    06/30/20 1013   BP: 124/82   Pulse: 66   Resp: 16   Temp: 96.6 °F (35.9 °C)   TempSrc: Temporal   SpO2: 97%   Weight: 75.4 kg (166 lb 1.9 oz)   Height: 154.9 cm (60.98\")   PainSc: 0-No pain       Body mass index is 31.4 kg/m².  Discussed the patient's BMI with her. The BMI is above average; BMI management plan is completed.    Physical Exam   Constitutional: She is oriented to person, place, and time. She appears well-developed and well-nourished.   HENT:   Head: Normocephalic and atraumatic.   Right Ear: External ear normal.   Left Ear: External ear normal.   Nose: Nose normal.   Mouth/Throat: Oropharynx is clear and moist.   Eyes: Pupils are equal, round, and reactive to light. " Conjunctivae and EOM are normal.   Neck: Normal range of motion. Neck supple.   Cardiovascular: Normal rate, regular rhythm, normal heart sounds and intact distal pulses.   Pulmonary/Chest: Effort normal and breath sounds normal.   Abdominal: Soft. Bowel sounds are normal.   Musculoskeletal: Normal range of motion.   Neurological: She is alert and oriented to person, place, and time. She has normal reflexes.   Skin: Skin is warm and dry.   Psychiatric: She has a normal mood and affect. Her behavior is normal. Judgment and thought content normal.             Assessment/Plan   Medicare Risks and Personalized Health Plan  CMS Preventative Services Quick Reference  Advance Directive Discussion    The above risks/problems have been discussed with the patient.  Pertinent information has been shared with the patient in the After Visit Summary.  Follow up plans and orders are seen below in the Assessment/Plan Section.    Diagnoses and all orders for this visit:    1. Essential hypertension (Primary)    2. Mixed hyperlipidemia    3. Palpitations    4. Coronary artery disease involving native coronary artery of native heart without angina pectoris  -     CBC & Differential  -     Comprehensive Metabolic Panel  -     TSH  -     Magnesium    5. Paroxysmal atrial fibrillation (CMS/HCC)  -     CBC & Differential  -     Comprehensive Metabolic Panel  -     TSH  -     Magnesium    6. Sinus node dysfunction (CMS/[facility])    7. Presence of cardiac pacemaker    8. Vitamin D deficiency    9. Low back pain without sciatica, unspecified back pain laterality, unspecified chronicity    10. Anxiety    11. Leg swelling    12. MOON (obstructive sleep apnea)    13. Diverticulosis of intestine without bleeding, unspecified intestinal tract location    14. Morbidly obese (CMS/[facility])    15. MVP (mitral valve prolapse)    16. Intractable acute post-traumatic headache    17. Osteopenia, unspecified location    18. Psoriasis    19. Interstitial  cystitis    20. Atrophic vaginitis    21. Glaucoma, unspecified glaucoma type, unspecified laterality    22. Insomnia, unspecified type    23. Hx of long term use of blood thinners    24. Menopause  -     DEXA Bone Density Axial      Follow Up:  No follow-ups on file.     An After Visit Summary and PPPS were given to the patient.        ----Below is to historical records for reference only:      Cad MILD BLOCKAGE 2009 30%.  stress test normal, follow up with cardio,   Colon 7/18/16: Left-sided diverticulosis. Colon polyps. Internal hemorrhoids. Repeat 5 year       Diverticulitis hx.    Pneumovax 2015,and zostavax and shingrix refused and explained risks, PNEUMOVAX TODAY   Psoriasis refill clobetasole   Knees pain, OA probable, glucosamine continue   Foot fungal infection s/p med by derm  Interstitial cystitis UA normal follow uro   leg swelling on hydrochlorothiazide to 25 mg daily.,

## 2020-07-07 DIAGNOSIS — R00.2 PALPITATIONS: ICD-10-CM

## 2020-07-07 DIAGNOSIS — I25.10 CORONARY ARTERY DISEASE INVOLVING NATIVE CORONARY ARTERY OF NATIVE HEART WITHOUT ANGINA PECTORIS: ICD-10-CM

## 2020-07-07 DIAGNOSIS — I34.1 MVP (MITRAL VALVE PROLAPSE): ICD-10-CM

## 2020-07-07 DIAGNOSIS — I48.0 PAROXYSMAL ATRIAL FIBRILLATION (HCC): ICD-10-CM

## 2020-07-07 NOTE — TELEPHONE ENCOUNTER
Called Pt to do screening for appt. Pt asked if there was any samples of Xarelto. Samples was sent aside ready for her at check in.

## 2020-07-08 ENCOUNTER — OFFICE VISIT (OUTPATIENT)
Dept: CARDIOLOGY | Facility: CLINIC | Age: 75
End: 2020-07-08

## 2020-07-08 ENCOUNTER — APPOINTMENT (OUTPATIENT)
Dept: BONE DENSITY | Facility: HOSPITAL | Age: 75
End: 2020-07-08

## 2020-07-08 VITALS
SYSTOLIC BLOOD PRESSURE: 120 MMHG | HEART RATE: 71 BPM | OXYGEN SATURATION: 97 % | DIASTOLIC BLOOD PRESSURE: 60 MMHG | WEIGHT: 165 LBS | BODY MASS INDEX: 31.15 KG/M2 | HEIGHT: 61 IN

## 2020-07-08 DIAGNOSIS — I48.0 PAROXYSMAL ATRIAL FIBRILLATION (HCC): ICD-10-CM

## 2020-07-08 DIAGNOSIS — I49.5 SINUS NODE DYSFUNCTION (HCC): ICD-10-CM

## 2020-07-08 DIAGNOSIS — I25.10 CORONARY ARTERY DISEASE INVOLVING NATIVE CORONARY ARTERY OF NATIVE HEART WITHOUT ANGINA PECTORIS: Primary | ICD-10-CM

## 2020-07-08 DIAGNOSIS — I10 ESSENTIAL HYPERTENSION: ICD-10-CM

## 2020-07-08 PROCEDURE — 93000 ELECTROCARDIOGRAM COMPLETE: CPT | Performed by: INTERNAL MEDICINE

## 2020-07-08 PROCEDURE — 77080 DXA BONE DENSITY AXIAL: CPT

## 2020-07-08 PROCEDURE — 99215 OFFICE O/P EST HI 40 MIN: CPT | Performed by: INTERNAL MEDICINE

## 2020-07-08 RX ORDER — DILTIAZEM HYDROCHLORIDE 240 MG/1
240 CAPSULE, COATED, EXTENDED RELEASE ORAL DAILY
Qty: 30 CAPSULE | Refills: 11 | Status: SHIPPED | OUTPATIENT
Start: 2020-07-08 | End: 2020-07-14 | Stop reason: ALTCHOICE

## 2020-07-09 ENCOUNTER — OFFICE VISIT (OUTPATIENT)
Dept: INTERNAL MEDICINE | Facility: CLINIC | Age: 75
End: 2020-07-09

## 2020-07-09 ENCOUNTER — TELEPHONE (OUTPATIENT)
Dept: CARDIOLOGY | Facility: CLINIC | Age: 75
End: 2020-07-09

## 2020-07-09 VITALS
SYSTOLIC BLOOD PRESSURE: 120 MMHG | RESPIRATION RATE: 16 BRPM | DIASTOLIC BLOOD PRESSURE: 80 MMHG | TEMPERATURE: 98.2 F | HEART RATE: 68 BPM | HEIGHT: 61 IN | BODY MASS INDEX: 31.6 KG/M2 | OXYGEN SATURATION: 98 % | WEIGHT: 167.4 LBS

## 2020-07-09 DIAGNOSIS — F41.9 ANXIETY: Primary | ICD-10-CM

## 2020-07-09 PROCEDURE — 99213 OFFICE O/P EST LOW 20 MIN: CPT | Performed by: INTERNAL MEDICINE

## 2020-07-09 RX ORDER — ALPRAZOLAM 0.5 MG/1
0.5 TABLET ORAL 2 TIMES DAILY PRN
Qty: 15 TABLET | Refills: 0 | Status: SHIPPED | OUTPATIENT
Start: 2020-07-09 | End: 2020-10-16

## 2020-07-09 NOTE — PROGRESS NOTES
Subjective   Monica Perea is a 75 y.o. female.     Chief Complaint   Patient presents with   • Anxiety     Onset couple months, pt c/o being very nervous and states she feels like she has panic attacks. Pt would like medication to help her but not a narcotic.        History of Present Illness   Patient here for follow-up.  Patient complains of palpitation underwent cardiac work-up showed a negative EKG for any irregular rhythm while patient had episodes of palpitation.  Denies any chest pain short of breath.  Xanax helped palpitation great deal.  Cardiologist discussed with me on the phone considering patient might have anxiety attack.    Current Outpatient Medications:   •  amLODIPine (NORVASC) 5 MG tablet, Take 1 tablet by mouth Daily for 360 days., Disp: 30 tablet, Rfl: 11  •  Calcium Carbonate-Vitamin D (CALCIUM PLUS VITAMIN D PO), Take  by mouth Daily., Disp: , Rfl:   •  clobetasol (TEMOVATE) 0.05 % ointment, Apply  topically to the appropriate area as directed Every 12 (Twelve) Hours., Disp: 60 g, Rfl: 3  •  coenzyme Q10 100 MG capsule, Take 100 mg by mouth Daily., Disp: , Rfl:   •  dilTIAZem CD (CARDIZEM CD) 240 MG 24 hr capsule, Take 1 capsule by mouth Daily., Disp: 30 capsule, Rfl: 11  •  hydroCHLOROthiazide (HYDRODIURIL) 25 MG tablet, Take 1 tablet by mouth Daily., Disp: 30 tablet, Rfl: 11  •  melatonin 5 MG tablet tablet, 5 mg Every Night., Disp: , Rfl:   •  Omega-3 Fatty Acids (FISH OIL) 1000 MG capsule capsule, Take 1,200 mg by mouth 2 (Two) Times a Day With Meals., Disp: , Rfl:   •  pravastatin (PRAVACHOL) 40 MG tablet, Take 1 tablet by mouth Daily. as directed, Disp: 90 tablet, Rfl: 3  •  propafenone SR (Rythmol SR) 325 MG 12 hr capsule, Take 1 capsule by mouth 2 (Two) Times a Day for 60 days., Disp: 60 capsule, Rfl: 1  •  rivaroxaban (XARELTO) 20 MG tablet, Take 1 tablet by mouth Daily., Disp: 30 tablet, Rfl: 5  •  TRAVATAN Z 0.004 % solution ophthalmic solution, INSTILL 1 DROP INTO EACH EYE  NIGHTLY AS DIRECTED, Disp: , Rfl: 6  •  ALPRAZolam (Xanax) 0.5 MG tablet, Take 1 tablet by mouth 2 (Two) Times a Day As Needed for Anxiety., Disp: 15 tablet, Rfl: 0  •  sertraline (Zoloft) 50 MG tablet, Take 1 tablet by mouth Daily., Disp: 30 tablet, Rfl: 0    The following portions of the patient's history were reviewed and updated as appropriate: allergies, current medications, past family history, past medical history, past social history, past surgical history and problem list.    Review of Systems   Constitutional: Negative.    Respiratory: Negative.    Cardiovascular: Negative.    Gastrointestinal: Negative.    Musculoskeletal: Negative.    Skin: Negative.    Neurological: Negative.    Psychiatric/Behavioral: Positive for agitation. The patient is nervous/anxious.        Objective   Physical Exam   Constitutional: She is oriented to person, place, and time. She appears well-developed and well-nourished.   Neck: Neck supple.   Cardiovascular: Normal rate, regular rhythm and normal heart sounds.   Pulmonary/Chest: Effort normal and breath sounds normal.   Abdominal: Soft. Bowel sounds are normal.   Neurological: She is alert and oriented to person, place, and time.   Psychiatric: Her behavior is normal.   Patient is nervous       All tests have been reviewed.    Assessment/Plan   Diagnoses and all orders for this visit:    Anxiety  -     ALPRAZolam (Xanax) 0.5 MG tablet; Take 1 tablet by mouth 2 (Two) Times a Day As Needed for Anxiety.  -     sertraline (Zoloft) 50 MG tablet; Take 1 tablet by mouth Daily.        2 week

## 2020-07-09 NOTE — TELEPHONE ENCOUNTER
"Her request. She said the nurses and schedulers in [place] were giving her \"the run around\" and she expected better service after having a pacemaker placed. Plus she says she can no longer pay for rhythmol even though it is 100% effective and wants Dr. Thomas to give her a cheaper medicine that is equally effective. She has a PCP and I spoke with him. He is addressing her anxiety."

## 2020-07-10 NOTE — TELEPHONE ENCOUNTER
Spoke with Jorge and relayed message from Dr. Charles.  She will forward telephone note to nurse and get issue resolved.

## 2020-07-14 ENCOUNTER — TELEPHONE (OUTPATIENT)
Dept: CARDIOLOGY | Facility: CLINIC | Age: 75
End: 2020-07-14

## 2020-07-14 DIAGNOSIS — I10 ESSENTIAL HYPERTENSION: Primary | ICD-10-CM

## 2020-07-14 DIAGNOSIS — I48.0 PAROXYSMAL ATRIAL FIBRILLATION (HCC): ICD-10-CM

## 2020-07-14 RX ORDER — DILTIAZEM HYDROCHLORIDE 360 MG/1
360 CAPSULE, EXTENDED RELEASE ORAL DAILY
Qty: 90 CAPSULE | Refills: 3 | Status: SHIPPED | OUTPATIENT
Start: 2020-07-14 | End: 2020-08-12 | Stop reason: SINTOL

## 2020-07-14 NOTE — TELEPHONE ENCOUNTER
Pt called stating that she believes she needs her Diltiazem dosing raised, Pt states her BP has been running 130/xx sitting, 159/87 when she is up and moving. Pt states she likes this medication and she does not feel the palpitations anymore. Please advise.

## 2020-07-15 ENCOUNTER — RESULTS ENCOUNTER (OUTPATIENT)
Dept: INTERNAL MEDICINE | Facility: CLINIC | Age: 75
End: 2020-07-15

## 2020-07-15 ENCOUNTER — OFFICE VISIT (OUTPATIENT)
Dept: INTERNAL MEDICINE | Facility: CLINIC | Age: 75
End: 2020-07-15

## 2020-07-15 VITALS
BODY MASS INDEX: 33.38 KG/M2 | SYSTOLIC BLOOD PRESSURE: 130 MMHG | HEART RATE: 77 BPM | HEIGHT: 60 IN | OXYGEN SATURATION: 97 % | DIASTOLIC BLOOD PRESSURE: 80 MMHG | TEMPERATURE: 98 F | WEIGHT: 170 LBS

## 2020-07-15 DIAGNOSIS — R39.9 URINARY TRACT INFECTION SYMPTOMS: ICD-10-CM

## 2020-07-15 DIAGNOSIS — N39.0 URINARY TRACT INFECTION WITH HEMATURIA, SITE UNSPECIFIED: Primary | ICD-10-CM

## 2020-07-15 DIAGNOSIS — R31.9 URINARY TRACT INFECTION WITH HEMATURIA, SITE UNSPECIFIED: Primary | ICD-10-CM

## 2020-07-15 DIAGNOSIS — E78.2 MIXED HYPERLIPIDEMIA: ICD-10-CM

## 2020-07-15 DIAGNOSIS — I10 ESSENTIAL HYPERTENSION: ICD-10-CM

## 2020-07-15 DIAGNOSIS — R60.0 BILATERAL LOWER EXTREMITY EDEMA: ICD-10-CM

## 2020-07-15 DIAGNOSIS — R31.9 URINARY TRACT INFECTION WITH HEMATURIA, SITE UNSPECIFIED: ICD-10-CM

## 2020-07-15 DIAGNOSIS — N39.0 URINARY TRACT INFECTION WITH HEMATURIA, SITE UNSPECIFIED: ICD-10-CM

## 2020-07-15 LAB
BILIRUB BLD-MCNC: ABNORMAL MG/DL
CLARITY, POC: ABNORMAL
COLOR UR: ABNORMAL
GLUCOSE UR STRIP-MCNC: NEGATIVE MG/DL
KETONES UR QL: NEGATIVE
LEUKOCYTE EST, POC: ABNORMAL
NITRITE UR-MCNC: POSITIVE MG/ML
PH UR: 6 [PH] (ref 5–8)
PROT UR STRIP-MCNC: NEGATIVE MG/DL
RBC # UR STRIP: ABNORMAL /UL
SP GR UR: 1.01 (ref 1–1.03)
UROBILINOGEN UR QL: ABNORMAL

## 2020-07-15 PROCEDURE — 81003 URINALYSIS AUTO W/O SCOPE: CPT | Performed by: NURSE PRACTITIONER

## 2020-07-15 PROCEDURE — 99214 OFFICE O/P EST MOD 30 MIN: CPT | Performed by: NURSE PRACTITIONER

## 2020-07-15 RX ORDER — CEFUROXIME AXETIL 500 MG/1
500 TABLET ORAL 2 TIMES DAILY
Qty: 14 TABLET | Refills: 0 | Status: SHIPPED | OUTPATIENT
Start: 2020-07-15 | End: 2020-07-22

## 2020-07-15 RX ORDER — NIACIN 100 MG
250 TABLET ORAL 2 TIMES DAILY WITH MEALS
COMMUNITY
End: 2020-11-16

## 2020-07-15 NOTE — PROGRESS NOTES
"Date: 07/15/2020    Name: Monica Perea  : 1945    Chief Complaint:   Chief Complaint   Patient presents with   • Urinary Tract Infection   • Edema     lower extremities       HPI:  Monica Perea is a 75 y.o. female presents with dysuria, urinary frequency and urgency.  Denies lower back pain, fever, chills, nausea, difficulty urinating.    She has noted bilateral lower extremity swelling.  Has been noted previously.  She has HTN, hyperlipidemia, a fib; takes medications as prescribed.  Has an appointment EP cardiologist on 20.  Denies palpitations, shortness of breath, headache, dizziness, diaphoresis, orthopnea, weakness, vision changes.  She has not made any changes in her diet or physical activity.    History:  The following portions of the patient's history were reviewed and updated as appropriate: allergies, current medications, past medical history, family history, surgical history, social history and problem list.     ROS:  Review of Systems   Constitutional: Negative.    Respiratory: Negative for wheezing.    Genitourinary: Negative for pelvic pressure.   Musculoskeletal: Negative for myalgias.   Skin: Negative for pallor and rash.       VS:  Vitals:    07/15/20 1429   BP: 130/80   Pulse: 77   Temp: 98 °F (36.7 °C)   SpO2: 97%   Weight: 77.1 kg (170 lb)   Height: 152.4 cm (60\")     Body mass index is 33.2 kg/m².    PE:  Physical Exam   Constitutional: She is oriented to person, place, and time. She appears well-developed and well-nourished. No distress.   HENT:   Head: Normocephalic.   Right Ear: External ear normal.   Left Ear: External ear normal.   Eyes: Pupils are equal, round, and reactive to light. Conjunctivae are normal.   Neck: Normal range of motion. Neck supple.   Cardiovascular: Normal rate, regular rhythm, normal heart sounds and intact distal pulses.   +1 BLE below knees   Pulmonary/Chest: Effort normal and breath sounds normal.   Abdominal: There is no tenderness. " There is no CVA tenderness.   Neurological: She is alert and oriented to person, place, and time.   Skin: Skin is warm. Capillary refill takes less than 2 seconds.       Brief Urine Lab Results  (Last result in the past 365 days)      Color   Clarity   Blood   Leuk Est   Nitrite   Protein   CREAT   Urine HCG        07/15/20 1448 Orange  Comment:  AZO Cloudy 1+ Moderate (2+) Positive Negative               Assessment/Plan:  Monica was seen today for urinary tract infection and edema.    Diagnoses and all orders for this visit:    Urinary tract infection with hematuria, site unspecified  -     Urine Culture - Urine, Urine, Clean Catch; Future  -     cefuroxime (Ceftin) 500 MG tablet; Take 1 tablet by mouth 2 (Two) Times a Day for 7 days.  Antibiotic choice based on 2 previous urine cultures.    Urinary tract infection symptoms  -     POCT urinalysis dipstick, automated    Bilateral lower extremity edema  -     Compression Knee High Stockings  - Keep legs elevated when not ambulating    Essential hypertension  Mixed hyperlipidemia        - Follow heart healthy diet.  Advised to reduce daily sodium intake to < 1500 mg per day.  Avoid processed & fast foods.        - Exercise as tolerated, with a goal of 30 minutes of moderate exercise most days.         - Take medications as prescribed.    Return for Next scheduled follow up.  She is scheduled to see Dr Juan on 7/27/20.

## 2020-07-20 ENCOUNTER — OFFICE VISIT (OUTPATIENT)
Dept: CARDIOLOGY | Facility: CLINIC | Age: 75
End: 2020-07-20

## 2020-07-20 VITALS
HEIGHT: 61 IN | WEIGHT: 167 LBS | OXYGEN SATURATION: 96 % | DIASTOLIC BLOOD PRESSURE: 82 MMHG | HEART RATE: 67 BPM | SYSTOLIC BLOOD PRESSURE: 138 MMHG | BODY MASS INDEX: 31.53 KG/M2

## 2020-07-20 DIAGNOSIS — E66.01 MORBIDLY OBESE (HCC): ICD-10-CM

## 2020-07-20 DIAGNOSIS — Z95.0 PRESENCE OF CARDIAC PACEMAKER: ICD-10-CM

## 2020-07-20 DIAGNOSIS — I48.0 PAROXYSMAL ATRIAL FIBRILLATION (HCC): ICD-10-CM

## 2020-07-20 DIAGNOSIS — I49.5 SINUS NODE DYSFUNCTION (HCC): Primary | ICD-10-CM

## 2020-07-20 PROCEDURE — 99024 POSTOP FOLLOW-UP VISIT: CPT | Performed by: INTERNAL MEDICINE

## 2020-07-20 RX ORDER — PROPAFENONE HYDROCHLORIDE 150 MG/1
150 TABLET, COATED ORAL EVERY 8 HOURS
Qty: 90 TABLET | Refills: 3 | Status: SHIPPED | OUTPATIENT
Start: 2020-07-20 | End: 2020-08-25

## 2020-07-20 RX ORDER — CETIRIZINE HYDROCHLORIDE 10 MG/1
10 TABLET ORAL DAILY
COMMUNITY

## 2020-07-20 NOTE — PROGRESS NOTES
Cardiac Electrophysiology Outpatient Follow Up Note            [facility]    Follow Up Office Visit      Monica Perea  [id number]  07/20/2020    Primary Care Physician: Abdiel Juan MD    Subjective     Chief Complaint:   Chief Complaint   Patient presents with   • Coronary Artery Disease   • Hypertension     Problem List:       1. Sinus node dysfunction  a. [employer] dual-chamber permanent pacemaker implant 4/28/2020  2. Paroxysmal Atrial Fibrillation   a. CHADSVASc = 2  b. Anticoagulated with Xarelto   c. ECHO 3/7/2019 EF 69%, mild MR / TR, LA 3.1 cm  d. Stress MPS 1/23/2020 without evidence of ischemia, EF 70%  e. 14 day Monitor Feb 2020 w/ HR , avg 62 bpm, AFib burden < 1%, 7 pauses > 3 sec and up to 6.6 sec in duration.   3. Essential Hypertension   4. Dyslipidemia   5. MOON   6. Mitral Valve Prolapse    This is a very pleasant 75-year-old female patient presents for follow-up today.  She has short episode of palpitations nothing terribly long.  She does complain about the cost of Rythmol it is really quite expensive for her.  Has had no difficulty with blood thinner certainly no bleeding issues no difficulty with bruising anything like this.  She has no chest pain nausea vomiting fevers chills syncope presyncope only mild palpitations.  Pacemaker site does not offer her any difficulty or pain.    Review of Systems:   Constitutional: No fevers or chills, no recent weight gain or weight loss or fatigue  Eyes: No visual loss, blurred vision, double vision, yellow sclerae.  ENT: No headaches, hearing loss, vertigo, congestion or sore throat.   Cardiovascular: Per HPI  Respiratory: No cough or wheezing, no sputum production, no hematemesis   Gastrointestinal: No abdominal pain, no nausea, vomiting, constipation, diarrhea, melena.   Genitourinary: No dysuria, hematuria or increased frequency.  Musculoskeletal:  No gait disturbance,  weakness or joint pain or stiffness  Integumentary: No rashes, urticaria, ulcers or sores.   Neurological: No headache, dizziness, syncope, paralysis, ataxia, no prior CVA/TIA  Psychiatric: No anxiety, or depression  Endocrine: No diaphoresis, cold or heat intolerance. No polyuria or polydipsia.   Hematologic/Lymphatic: No anemia, abnormal bruising or bleeding. No history of DVT/PE.      Past Medical History:   Past Medical History:   Diagnosis Date   • Anemia 2008   • B12 deficiency 2008   • Back pain 2010   • CAD (coronary artery disease)    • Cellulitis of foot    • Dyspnea    • GERD (gastroesophageal reflux disease)    • Glaucoma 2016   • Hematuria    • History of mammogram    • Hypercholesteremia 2005   • Hypertension    • Low back pain    • Menopause    • Neck strain    • Osteopenia    • Overweight    • Palpitations    • Postmenopausal atrophic vaginitis    • Sleep apnea 2012    discontinued CPAP use 2 months ago   • Tinea pedis        Past Surgical History:   Past Surgical History:   Procedure Laterality Date   • APPENDECTOMY  1964   • BREAST EXCISIONAL BIOPSY Bilateral     Biopsies in 1990's   • CARDIAC ELECTROPHYSIOLOGY PROCEDURE N/A 4/28/2020    Procedure: Pacemaker DC new. [facility] [place];  Surgeon: Villa Thomas DO;  Location: [facility] INVASIVE LOCATION;  Service: Cardiology;  Laterality: N/A;   • COLONOSCOPY          • HYSTERECTOMY  2000    total    • OOPHORECTOMY Bilateral 2000   • TUBAL ABDOMINAL LIGATION  1974       Family History:   Family History   Problem Relation Age of Onset   • Heart attack Other    • Arthritis Other    • Diabetes Other    • Hypertension Other    • Hodgkin's lymphoma Other    • Esophagitis Mother    • Heart failure Mother    • Esophagitis Maternal Aunt    • Breast cancer Maternal Aunt    • Colon cancer Maternal Grandmother 49   • Heart attack Father    • Breast cancer Sister 72   • Ovarian cancer Sister 74   • Esophageal cancer Neg Hx    • Stomach cancer Neg Hx    • Liver cancer Neg Hx     • Liver disease Neg Hx        Social History:   Social History     Socioeconomic History   • Marital status:      Spouse name: Not on file   • Number of children: Not on file   • Years of education: Not on file   • Highest education level: Not on file   Tobacco Use   • Smoking status: Former Smoker     Last attempt to quit:      Years since quittin.5   • Smokeless tobacco: Never Used   Substance and Sexual Activity   • Alcohol use: No     Comment: quit in    • Drug use: No   • Sexual activity: Defer       Medications:     Current Outpatient Medications:   •  ALPRAZolam (Xanax) 0.5 MG tablet, Take 1 tablet by mouth 2 (Two) Times a Day As Needed for Anxiety., Disp: 15 tablet, Rfl: 0  •  amLODIPine (NORVASC) 5 MG tablet, Take 1 tablet by mouth Daily for 360 days., Disp: 30 tablet, Rfl: 11  •  Calcium Carbonate-Vitamin D (CALCIUM PLUS VITAMIN D PO), Take  by mouth Daily., Disp: , Rfl:   •  cefuroxime (Ceftin) 500 MG tablet, Take 1 tablet by mouth 2 (Two) Times a Day for 7 days., Disp: 14 tablet, Rfl: 0  •  cetirizine (zyrTEC) 10 MG tablet, Take 10 mg by mouth Daily., Disp: , Rfl:   •  clobetasol (TEMOVATE) 0.05 % ointment, Apply  topically to the appropriate area as directed Every 12 (Twelve) Hours., Disp: 60 g, Rfl: 3  •  coenzyme Q10 100 MG capsule, Take 100 mg by mouth Daily., Disp: , Rfl:   •  dilTIAZem CD (Cardizem CD) 360 MG 24 hr capsule, Take 1 capsule by mouth Daily. (Patient taking differently: Take 240 mg by mouth Daily.), Disp: 90 capsule, Rfl: 3  •  hydroCHLOROthiazide (HYDRODIURIL) 25 MG tablet, Take 1 tablet by mouth Daily., Disp: 30 tablet, Rfl: 11  •  melatonin 5 MG tablet tablet, 5 mg Every Night., Disp: , Rfl:   •  niacin 100 MG tablet, Take 250 mg by mouth 2 (Two) Times a Day With Meals., Disp: , Rfl:   •  Omega-3 Fatty Acids (FISH OIL) 1000 MG capsule capsule, Take 1,200 mg by mouth 2 (Two) Times a Day With Meals., Disp: , Rfl:   •  propafenone SR (Rythmol SR) 325 MG 12 hr  "capsule, Take 1 capsule by mouth 2 (Two) Times a Day for 60 days. (Patient taking differently: Take 225 mg by mouth 2 (Two) Times a Day.), Disp: 60 capsule, Rfl: 1  •  rivaroxaban (XARELTO) 20 MG tablet, Take 1 tablet by mouth Daily., Disp: 30 tablet, Rfl: 5  •  sertraline (Zoloft) 50 MG tablet, Take 1 tablet by mouth Daily., Disp: 30 tablet, Rfl: 0  •  TRAVATAN Z 0.004 % solution ophthalmic solution, INSTILL 1 DROP INTO EACH EYE NIGHTLY AS DIRECTED, Disp: , Rfl: 6    Allergies:   Allergies   Allergen Reactions   • Macrobid [Nitrofurantoin Monohyd Macro] Rash   • Nitrofurantoin Hives   • Phenylephrine-Guaifenesin Hives   • Sulfa Antibiotics Hives       Objective     Physical Exam:  Vital Signs:   Vitals:    07/20/20 0928   BP: 138/82   BP Location: Left arm   Patient Position: Sitting   Pulse: 67   SpO2: 96%   Weight: 75.8 kg (167 lb)   Height: 154.9 cm (61\")     GEN: Well nourished, well-developed, no acute distress  HEENT: Normocephalic, atraumatic, moist mucous membranes  NECK: Supple, no JVD, no thyromegaly, no lymphadenopathy  CARD: Regular rate and rhythm, normal S1 & S2 are present.  No murmur, gallop or rubs are appreciated.  LUNGS: Clear to auscultation bilateraly, normal respiratory effort  ABDOMEN: Soft, nontender, normal bowel sounds  EXTREMITIES: No gross deformities, no clubbing, cyanosis.  Edema none  SKIN: Warm, dry  NEURO: No focal deficits, alert and oriented x 3  PSYCHIATRIC: Normal affect and mood, appropriate use of semantics and logic.        Lab Results   Component Value Date    GLUCOSE 107 (H) 04/28/2020    CALCIUM 9.6 06/30/2020     06/30/2020    K 3.8 06/30/2020    CO2 31.0 (H) 06/30/2020     06/30/2020    BUN 18 06/30/2020    CREATININE 0.93 06/30/2020    EGFRIFAFRI 72 06/30/2020    EGFRIFNONA 59 (L) 06/30/2020    BCR 19.4 06/30/2020    ANIONGAP 12.0 04/28/2020     Lab Results   Component Value Date    WBC 4.84 06/30/2020    HGB 11.9 (L) 06/30/2020    HCT 36.4 06/30/2020    MCV " 87.1 06/30/2020     06/30/2020     No results found for: INR, PROTIME  Lab Results   Component Value Date    TSH 2.030 06/30/2020       Cardiac Testing:     I personally viewed and interpreted the patient's EKG/Telemetry/lab data    Procedures    Tobacco Cessation: N/A  Obstructive Sleep Apnea Screening: N/A    Assessment & Plan      75-year-old female patient with a history of sinus node function probably treated with a dual-chamber permanent pacemaker appropriate anticoagulation with primary stroke and paroxysmal highly symptomatic A. fib suppressed effectively with Rythmol SR at 325 mg twice daily she however does have significant financial difficulty with his medication and because of this we will switch her to path known 150 mg 3 times daily.  She will continue Xarelto.    Overall from a heart rhythm perspective and that she is done exceptionally well and really her main goal here is to transition her to a more affordable it hopefully equivalent antiarrhythmic drug.  I will see her back otherwise in 1 years time.    There are no diagnoses linked to this encounter.        Follow Up:       Thank you for allowing me to participate in the care of your patient. Please to not hesitate to contact me with additional questions or concerns.        Villa Thomas, DO, FACC, RS  Cardiac Electrophysiologist

## 2020-07-24 ENCOUNTER — RESULTS ENCOUNTER (OUTPATIENT)
Dept: INTERNAL MEDICINE | Facility: CLINIC | Age: 75
End: 2020-07-24

## 2020-07-24 DIAGNOSIS — R39.9 URINARY TRACT INFECTION SYMPTOMS: ICD-10-CM

## 2020-07-24 DIAGNOSIS — N39.0 URINARY TRACT INFECTION WITH HEMATURIA, SITE UNSPECIFIED: ICD-10-CM

## 2020-07-24 DIAGNOSIS — R31.9 URINARY TRACT INFECTION WITH HEMATURIA, SITE UNSPECIFIED: ICD-10-CM

## 2020-07-27 ENCOUNTER — OFFICE VISIT (OUTPATIENT)
Dept: INTERNAL MEDICINE | Facility: CLINIC | Age: 75
End: 2020-07-27

## 2020-07-27 VITALS
WEIGHT: 170 LBS | BODY MASS INDEX: 32.1 KG/M2 | RESPIRATION RATE: 16 BRPM | DIASTOLIC BLOOD PRESSURE: 75 MMHG | HEART RATE: 64 BPM | TEMPERATURE: 98.4 F | SYSTOLIC BLOOD PRESSURE: 135 MMHG | HEIGHT: 61 IN | OXYGEN SATURATION: 97 %

## 2020-07-27 DIAGNOSIS — M79.89 LEG SWELLING: ICD-10-CM

## 2020-07-27 DIAGNOSIS — I48.0 PAROXYSMAL ATRIAL FIBRILLATION (HCC): ICD-10-CM

## 2020-07-27 DIAGNOSIS — I25.10 CORONARY ARTERY DISEASE INVOLVING NATIVE CORONARY ARTERY OF NATIVE HEART WITHOUT ANGINA PECTORIS: ICD-10-CM

## 2020-07-27 DIAGNOSIS — F41.9 ANXIETY: ICD-10-CM

## 2020-07-27 DIAGNOSIS — R00.2 PALPITATIONS: ICD-10-CM

## 2020-07-27 DIAGNOSIS — F32.A DEPRESSION, UNSPECIFIED DEPRESSION TYPE: ICD-10-CM

## 2020-07-27 DIAGNOSIS — I10 ESSENTIAL HYPERTENSION: Primary | ICD-10-CM

## 2020-07-27 DIAGNOSIS — R53.83 OTHER FATIGUE: ICD-10-CM

## 2020-07-27 DIAGNOSIS — G47.30 SLEEP APNEA, UNSPECIFIED TYPE: ICD-10-CM

## 2020-07-27 DIAGNOSIS — Z95.0 PRESENCE OF CARDIAC PACEMAKER: ICD-10-CM

## 2020-07-27 DIAGNOSIS — E78.2 MIXED HYPERLIPIDEMIA: ICD-10-CM

## 2020-07-27 DIAGNOSIS — I34.1 MVP (MITRAL VALVE PROLAPSE): ICD-10-CM

## 2020-07-27 PROCEDURE — 99214 OFFICE O/P EST MOD 30 MIN: CPT | Performed by: INTERNAL MEDICINE

## 2020-07-27 RX ORDER — POTASSIUM CHLORIDE 750 MG/1
10 TABLET, EXTENDED RELEASE ORAL DAILY
Qty: 30 TABLET | Refills: 1 | Status: SHIPPED | OUTPATIENT
Start: 2020-07-27 | End: 2020-09-18 | Stop reason: SDUPTHER

## 2020-07-27 RX ORDER — BUPROPION HYDROCHLORIDE 150 MG/1
150 TABLET ORAL DAILY
Qty: 30 TABLET | Refills: 1 | Status: SHIPPED | OUTPATIENT
Start: 2020-07-27 | End: 2020-08-12

## 2020-07-27 RX ORDER — FUROSEMIDE 20 MG/1
20 TABLET ORAL DAILY
Qty: 30 TABLET | Refills: 1 | Status: SHIPPED | OUTPATIENT
Start: 2020-07-27 | End: 2020-08-12

## 2020-07-27 NOTE — PROGRESS NOTES
Subjective   Monica Perea is a 75 y.o. female.     Chief Complaint   Patient presents with   • Anxiety   • Edema       History of Present Illness   Patient here for follow-up.  The blood pressure stable now.  The palpitation improved after sertraline.  CAD stable.  Leg swelling gradually getting worse.  Patient is already on hydrochlorothiazide for blood pressure control.  Still has mild anxiety even though improved on sertraline and also has some depression no suicidal thoughts.  Patient complains of feeling very tired no energy.  Sleep apnea stable on CPAP.    Current Outpatient Medications:   •  ALPRAZolam (Xanax) 0.5 MG tablet, Take 1 tablet by mouth 2 (Two) Times a Day As Needed for Anxiety., Disp: 15 tablet, Rfl: 0  •  amLODIPine (NORVASC) 5 MG tablet, Take 1 tablet by mouth Daily for 360 days., Disp: 30 tablet, Rfl: 11  •  Calcium Carbonate-Vitamin D (CALCIUM PLUS VITAMIN D PO), Take  by mouth Daily., Disp: , Rfl:   •  cetirizine (zyrTEC) 10 MG tablet, Take 10 mg by mouth Daily., Disp: , Rfl:   •  clobetasol (TEMOVATE) 0.05 % ointment, Apply  topically to the appropriate area as directed Every 12 (Twelve) Hours., Disp: 60 g, Rfl: 3  •  coenzyme Q10 100 MG capsule, Take 100 mg by mouth Daily., Disp: , Rfl:   •  dilTIAZem CD (Cardizem CD) 360 MG 24 hr capsule, Take 1 capsule by mouth Daily., Disp: 90 capsule, Rfl: 3  •  hydroCHLOROthiazide (HYDRODIURIL) 25 MG tablet, Take 1 tablet by mouth Daily., Disp: 30 tablet, Rfl: 11  •  melatonin 5 MG tablet tablet, 5 mg Every Night., Disp: , Rfl:   •  niacin 100 MG tablet, Take 250 mg by mouth 2 (Two) Times a Day With Meals., Disp: , Rfl:   •  Omega-3 Fatty Acids (FISH OIL) 1000 MG capsule capsule, Take 1,200 mg by mouth 2 (Two) Times a Day With Meals., Disp: , Rfl:   •  propafenone (RYTHMOL) 150 MG tablet, Take 1 tablet by mouth Every 8 (Eight) Hours for 90 days., Disp: 90 tablet, Rfl: 3  •  rivaroxaban (XARELTO) 20 MG tablet, Take 1 tablet by mouth Daily.,  Disp: 30 tablet, Rfl: 5  •  sertraline (Zoloft) 50 MG tablet, Take 1 tablet by mouth Daily., Disp: 30 tablet, Rfl: 0  •  TRAVATAN Z 0.004 % solution ophthalmic solution, INSTILL 1 DROP INTO EACH EYE NIGHTLY AS DIRECTED, Disp: , Rfl: 6  •  buPROPion XL (Wellbutrin XL) 150 MG 24 hr tablet, Take 1 tablet by mouth Daily., Disp: 30 tablet, Rfl: 1  •  furosemide (Lasix) 20 MG tablet, Take 1 tablet by mouth Daily., Disp: 30 tablet, Rfl: 1  •  potassium chloride (K-DUR,KLOR-CON) 10 MEQ CR tablet, Take 1 tablet by mouth Daily., Disp: 30 tablet, Rfl: 1    The following portions of the patient's history were reviewed and updated as appropriate: allergies, current medications, past family history, past medical history, past social history, past surgical history and problem list.    Review of Systems   Constitutional: Positive for fatigue.   Respiratory: Negative.    Cardiovascular: Positive for leg swelling.   Gastrointestinal: Negative.    Musculoskeletal: Negative.    Skin: Negative.    Neurological: Negative.    Psychiatric/Behavioral: The patient is nervous/anxious.        Objective   Physical Exam   Constitutional: She is oriented to person, place, and time. She appears well-developed and well-nourished.   Neck: Neck supple.   Cardiovascular: Normal rate, regular rhythm and normal heart sounds.   Pulmonary/Chest: Effort normal and breath sounds normal.   Abdominal: Bowel sounds are normal.   Musculoskeletal: She exhibits edema.   Neurological: She is alert and oriented to person, place, and time.   Skin: Skin is warm.   Psychiatric: She has a normal mood and affect.       All tests have been reviewed.    Assessment/Plan   Diagnoses and all orders for this visit:    Essential hypertension cont med    Mixed hyperlipidemia patient refuses medicine    Palpitations improved after surgery.    Coronary artery disease involving native coronary artery of native heart without angina pectoris follow-up with cardiology    Paroxysmal  atrial fibrillation (CMS/HCC)    MVP (mitral valve prolapse)    Presence of cardiac pacemaker    Leg swelling for as needed use  -     furosemide (Lasix) 20 MG tablet; Take 1 tablet by mouth Daily.  -     potassium chloride (K-DUR,KLOR-CON) 10 MEQ CR tablet; Take 1 tablet by mouth Daily.    Anxiety continue medicine    Other fatigue if Wellbutrin no better we may refer patient to sleep doctor and also check proBNP    Depression, unspecified depression type  -     buPROPion XL (Wellbutrin XL) 150 MG 24 hr tablet; Take 1 tablet by mouth Daily.    Sleep apnea, unspecified type continue CPAP for now    Follow-up in 3 weeks

## 2020-08-11 DIAGNOSIS — I34.1 MVP (MITRAL VALVE PROLAPSE): ICD-10-CM

## 2020-08-11 DIAGNOSIS — I48.0 PAROXYSMAL ATRIAL FIBRILLATION (HCC): ICD-10-CM

## 2020-08-11 DIAGNOSIS — R00.2 PALPITATIONS: ICD-10-CM

## 2020-08-11 DIAGNOSIS — I25.10 CORONARY ARTERY DISEASE INVOLVING NATIVE CORONARY ARTERY OF NATIVE HEART WITHOUT ANGINA PECTORIS: ICD-10-CM

## 2020-08-12 ENCOUNTER — OFFICE VISIT (OUTPATIENT)
Dept: CARDIOLOGY | Facility: CLINIC | Age: 75
End: 2020-08-12

## 2020-08-12 VITALS
WEIGHT: 170 LBS | DIASTOLIC BLOOD PRESSURE: 60 MMHG | BODY MASS INDEX: 33.38 KG/M2 | SYSTOLIC BLOOD PRESSURE: 110 MMHG | HEART RATE: 61 BPM | OXYGEN SATURATION: 96 % | HEIGHT: 60 IN

## 2020-08-12 DIAGNOSIS — I34.1 MVP (MITRAL VALVE PROLAPSE): ICD-10-CM

## 2020-08-12 DIAGNOSIS — R00.2 PALPITATIONS: Primary | ICD-10-CM

## 2020-08-12 DIAGNOSIS — I49.5 SINUS NODE DYSFUNCTION (HCC): ICD-10-CM

## 2020-08-12 DIAGNOSIS — Z95.0 PRESENCE OF CARDIAC PACEMAKER: ICD-10-CM

## 2020-08-12 DIAGNOSIS — I25.10 CORONARY ARTERY DISEASE INVOLVING NATIVE CORONARY ARTERY OF NATIVE HEART WITHOUT ANGINA PECTORIS: ICD-10-CM

## 2020-08-12 DIAGNOSIS — I10 ESSENTIAL HYPERTENSION: ICD-10-CM

## 2020-08-12 DIAGNOSIS — R06.09 DYSPNEA ON EXERTION: ICD-10-CM

## 2020-08-12 PROCEDURE — 93000 ELECTROCARDIOGRAM COMPLETE: CPT | Performed by: NURSE PRACTITIONER

## 2020-08-12 PROCEDURE — 99214 OFFICE O/P EST MOD 30 MIN: CPT | Performed by: NURSE PRACTITIONER

## 2020-08-12 RX ORDER — FUROSEMIDE 40 MG/1
40 TABLET ORAL DAILY
Qty: 30 TABLET | Refills: 11 | Status: SHIPPED | OUTPATIENT
Start: 2020-08-12 | End: 2020-10-09

## 2020-08-12 NOTE — PATIENT INSTRUCTIONS
STOP DILTIAZEM   START VERAPAMIL (CALAN)   INCREASE LASIX TO 40MG DAILY   CONTINUE COMPRESSION  SOCKS.

## 2020-08-12 NOTE — PROGRESS NOTES
"Monica Perea is a 75 y.o. female.  MRN #: [id number]    Referring Provider: Abdiel Juan MD    Chief Complaint:   Chief Complaint   Patient presents with   • Follow-up   • Edema        History of Present Illness:  Ms Perea is a 75-year-old female that presents for one-month cardiac follow-up after medication change.  Patient has history of essential hypertension, elevated lipids, palpitations, CAD, mitral valve prolapse, paroxysmal atrial fibrillation and sinus node dysfunction, cardiac pacemaker implantation April 2020.  Patient has had intolerance to numerous medications.  Patient had pacemaker implantation in April 2020 and has continued to have symptoms of palpitations.  Patient states that she will monitor her heart rate at home and with body position changes she will note an increase in heart rate to 120 bpm.  She also is complaining of continued debilitating fatigue, shortness of breath with minimal exertion.  Patient had been placed on Cardizem 360 mg p.o. daily for heart palpitation and \"increased heart rate\" symptoms.  With today's visit she reports numerous symptoms/side effects from her cardia exam including increased fatigue shortness of breath, confusion.  She also reports an increase in her lower extremity edema after starting her Cardizem.  She had been started on Cardizem but also is taking Norvasc 5 mg daily.  She reports that she has continued lower extremity edema, she is wearing lower extremity compression socks on a daily basis, Lasix 20 mg p.o. daily as needed edema in addition to hydrochlorothiazide.  Patient had been evaluated 1 month ago per  and had been placed on Cardizem  mg daily for palpitation and increased heart rate symptoms.  Patient also relates that she was started on antianxiety medications, sertraline and BuSpar thinking that her symptoms were secondary to anxiety.  Patient reports that she has discontinued taking these medications.  Patient also " "reports that she has had an increased cough of late, in combination of her lower extremity edema, patient states \"I believe I have fluid around my heart\".     The patient presents today with their self who contributes to the history of their care.     The following portions of the patient's history were reviewed and updated as appropriate: allergies, current medications, past family history, past medical history, past social history, past surgical history and problem list.     Review of Systems:     Review of Systems   Constitutional: Positive for fatigue. Negative for activity change, appetite change, diaphoresis, unexpected weight gain and unexpected weight loss.   Eyes: Negative for blurred vision, double vision, photophobia and visual disturbance.   Respiratory: Positive for shortness of breath. Negative for apnea, cough, chest tightness and wheezing.    Cardiovascular: Positive for palpitations and leg swelling. Negative for chest pain.        Cardiac pacemaker    Gastrointestinal: Negative for abdominal distention, abdominal pain, nausea, vomiting, GERD and indigestion.   Endocrine: Negative for cold intolerance, heat intolerance, polydipsia, polyphagia and polyuria.   Genitourinary: Negative for decreased libido, frequency, genital sores, hematuria and urgency.   Musculoskeletal: Negative for back pain, joint swelling, myalgias, neck pain and neck stiffness.   Skin: Negative for color change, dry skin, pallor and bruise.   Neurological: Positive for confusion. Negative for dizziness, tremors, seizures, syncope, facial asymmetry, speech difficulty, weakness, light-headedness, numbness, headache and memory problem.   Hematological: Negative for adenopathy. Does not bruise/bleed easily.   Psychiatric/Behavioral: Negative for agitation, decreased concentration, sleep disturbance, negative for hyperactivity and stress. The patient is nervous/anxious.           Current Outpatient Medications:   •  ALPRAZolam " "(Xanax) 0.5 MG tablet, Take 1 tablet by mouth 2 (Two) Times a Day As Needed for Anxiety., Disp: 15 tablet, Rfl: 0  •  Calcium Carbonate-Vitamin D (CALCIUM PLUS VITAMIN D PO), Take  by mouth Daily., Disp: , Rfl:   •  cetirizine (zyrTEC) 10 MG tablet, Take 10 mg by mouth Daily., Disp: , Rfl:   •  clobetasol (TEMOVATE) 0.05 % ointment, Apply  topically to the appropriate area as directed Every 12 (Twelve) Hours., Disp: 60 g, Rfl: 3  •  coenzyme Q10 100 MG capsule, Take 100 mg by mouth Daily., Disp: , Rfl:   •  hydroCHLOROthiazide (HYDRODIURIL) 25 MG tablet, Take 1 tablet by mouth Daily., Disp: 30 tablet, Rfl: 11  •  melatonin 5 MG tablet tablet, 5 mg Every Night., Disp: , Rfl:   •  niacin 100 MG tablet, Take 250 mg by mouth 2 (Two) Times a Day With Meals., Disp: , Rfl:   •  Omega-3 Fatty Acids (FISH OIL) 1000 MG capsule capsule, Take 1,200 mg by mouth 2 (Two) Times a Day With Meals., Disp: , Rfl:   •  potassium chloride (K-DUR,KLOR-CON) 10 MEQ CR tablet, Take 1 tablet by mouth Daily., Disp: 30 tablet, Rfl: 1  •  propafenone (RYTHMOL) 150 MG tablet, Take 1 tablet by mouth Every 8 (Eight) Hours for 90 days., Disp: 90 tablet, Rfl: 3  •  rivaroxaban (XARELTO) 20 MG tablet, Take 1 tablet by mouth Daily., Disp: 30 tablet, Rfl: 5  •  TRAVATAN Z 0.004 % solution ophthalmic solution, INSTILL 1 DROP INTO EACH EYE NIGHTLY AS DIRECTED, Disp: , Rfl: 6  •  furosemide (LASIX) 40 MG tablet, Take 1 tablet by mouth Daily., Disp: 30 tablet, Rfl: 11  •  rosuvastatin (Crestor) 5 MG tablet, Take 1 tablet by mouth Daily., Disp: 30 tablet, Rfl: 1  •  verapamil SR (CALAN-SR) 180 MG CR tablet, 1 daily po, Disp: 30 tablet, Rfl: 3    Vitals:    08/12/20 1353   BP: 110/60   BP Location: Left arm   Patient Position: Sitting   Cuff Size: Adult   Pulse: 61   SpO2: 96%   Weight: 77.1 kg (170 lb)   Height: 152.4 cm (60\")       Physical Exam:     Physical Exam   Constitutional: She is oriented to person, place, and time. She appears well-developed and " well-nourished.   HENT:   Head: Normocephalic and atraumatic.   Eyes: Conjunctivae are normal. No scleral icterus.   Neck: Normal range of motion. Neck supple. No JVD present. No thyromegaly present.   Cardiovascular: Normal rate, regular rhythm, normal heart sounds and intact distal pulses. PMI is not displaced. Exam reveals no gallop and no friction rub.   No murmur heard.  Pulmonary/Chest: Effort normal and breath sounds normal. No respiratory distress. She has no wheezes. She has no rales. She exhibits no tenderness.   Abdominal: Soft. Bowel sounds are normal. She exhibits no mass. There is no tenderness.   Musculoskeletal: Normal range of motion. She exhibits no edema.   Neurological: She is alert and oriented to person, place, and time.   Skin: Skin is warm and dry. Capillary refill takes less than 2 seconds. No rash noted.   Psychiatric: She has a normal mood and affect. Her behavior is normal. Judgment and thought content normal.   Nursing note and vitals reviewed.        ECG 12 Lead  Date/Time: 8/12/2020 10:42 AM  Performed by: Delfino Thomas Jr., APRN  Authorized by: Delfino Thomas Jr., APRN   Comparison: compared with previous ECG from 7/20/2020  Similar to previous ECG  Comparison to previous ECG: No acute changes  Rhythm: paced  Rate: bradycardic  BPM: 59  QRS axis: normal  Pacing: atrial sensed rhythm  Clinical impression: abnormal EKG  Comments: Atrial paced rhythm, no acute changes            Results:   Vital signs stable, blood pressure 110/60, heart rate 61 regular rate and rhythm.  EKG shows a normal paced rhythm.    Assessment/Plan:    Reviewed medication regimen and updated.  Explained that we are running out of alternatives regarding medication regimen.  Her lower extremity edema may be due to the fact that she is taking 2 calcium channel blockers.  It was also explained that her increased heart rate that she is monitoring at home may be artifact, but patient is emphatic that this  "is \"my heart\".  Patient has been instructed on the reduction of salt and to keep sodium intake less than 1500 mg per 24 hours.  Patient does state that she drinks Gatorade on a daily basis for \"electrolyte replacement\".  She was advised that this particular drink contains sodium and may be the reason why she is retaining fluid.   I will increase her Lasix to 40 mg daily despite her request to discontinue her Lasix.  Will discontinue Cardizem CD and replaced with Calan SR 120mg( [facility])   45 MINUTES TIME SPENT DISCUSSING AND ANSWERING QUESTIONS.  Monica was seen today for follow-up and edema.    Diagnoses and all orders for this visit:    Palpitations  -     Discontinue: verapamil SR (CALAN-SR) 120 MG CR tablet; Take 1 tablet by mouth Every Night for 30 days.  -     furosemide (LASIX) 40 MG tablet; Take 1 tablet by mouth Daily.  -     Adult Transthoracic Echo Complete W/ Cont if Necessary Per Protocol; Future  -     XR Chest 2 View; Future    Coronary artery disease involving native coronary artery of native heart without angina pectoris  -     Discontinue: verapamil SR (CALAN-SR) 120 MG CR tablet; Take 1 tablet by mouth Every Night for 30 days.  -     furosemide (LASIX) 40 MG tablet; Take 1 tablet by mouth Daily.  -     Adult Transthoracic Echo Complete W/ Cont if Necessary Per Protocol; Future  -     XR Chest 2 View; Future    MVP (mitral valve prolapse)  -     Discontinue: verapamil SR (CALAN-SR) 120 MG CR tablet; Take 1 tablet by mouth Every Night for 30 days.  -     furosemide (LASIX) 40 MG tablet; Take 1 tablet by mouth Daily.  -     Adult Transthoracic Echo Complete W/ Cont if Necessary Per Protocol; Future  -     XR Chest 2 View; Future    Presence of cardiac pacemaker  -     Discontinue: verapamil SR (CALAN-SR) 120 MG CR tablet; Take 1 tablet by mouth Every Night for 30 days.  -     furosemide (LASIX) 40 MG tablet; Take 1 tablet by mouth Daily.  -     Adult Transthoracic Echo Complete W/ Cont if Necessary Per " Protocol; Future  -     XR Chest 2 View; Future    Sinus node dysfunction (CMS/HCC)  -     Discontinue: verapamil SR (CALAN-SR) 120 MG CR tablet; Take 1 tablet by mouth Every Night for 30 days.  -     furosemide (LASIX) 40 MG tablet; Take 1 tablet by mouth Daily.  -     Adult Transthoracic Echo Complete W/ Cont if Necessary Per Protocol; Future  -     XR Chest 2 View; Future    Essential hypertension  -     Discontinue: verapamil SR (CALAN-SR) 120 MG CR tablet; Take 1 tablet by mouth Every Night for 30 days.  -     furosemide (LASIX) 40 MG tablet; Take 1 tablet by mouth Daily.  -     Adult Transthoracic Echo Complete W/ Cont if Necessary Per Protocol; Future  -     XR Chest 2 View; Future    Dyspnea on exertion  -     Discontinue: verapamil SR (CALAN-SR) 120 MG CR tablet; Take 1 tablet by mouth Every Night for 30 days.  -     furosemide (LASIX) 40 MG tablet; Take 1 tablet by mouth Daily.  -     Adult Transthoracic Echo Complete W/ Cont if Necessary Per Protocol; Future  -     XR Chest 2 View; Future    Other orders  -     ECG 12 Lead        Return in about 1 month (around 9/12/2020) for F/U with [facility].    Delfino Thomas, APRN

## 2020-08-18 ENCOUNTER — OFFICE VISIT (OUTPATIENT)
Dept: INTERNAL MEDICINE | Facility: CLINIC | Age: 75
End: 2020-08-18

## 2020-08-18 VITALS
OXYGEN SATURATION: 97 % | HEART RATE: 76 BPM | DIASTOLIC BLOOD PRESSURE: 82 MMHG | WEIGHT: 166.4 LBS | BODY MASS INDEX: 32.67 KG/M2 | SYSTOLIC BLOOD PRESSURE: 126 MMHG | RESPIRATION RATE: 16 BRPM | HEIGHT: 60 IN | TEMPERATURE: 98.4 F

## 2020-08-18 DIAGNOSIS — E78.2 MIXED HYPERLIPIDEMIA: ICD-10-CM

## 2020-08-18 DIAGNOSIS — M79.89 LEG SWELLING: ICD-10-CM

## 2020-08-18 DIAGNOSIS — Z95.0 PRESENCE OF CARDIAC PACEMAKER: ICD-10-CM

## 2020-08-18 DIAGNOSIS — I25.10 CORONARY ARTERY DISEASE INVOLVING NATIVE CORONARY ARTERY OF NATIVE HEART WITHOUT ANGINA PECTORIS: ICD-10-CM

## 2020-08-18 DIAGNOSIS — R00.2 PALPITATIONS: ICD-10-CM

## 2020-08-18 DIAGNOSIS — R06.09 DYSPNEA ON EXERTION: ICD-10-CM

## 2020-08-18 DIAGNOSIS — I48.0 PAROXYSMAL ATRIAL FIBRILLATION (HCC): ICD-10-CM

## 2020-08-18 DIAGNOSIS — G47.30 SLEEP APNEA, UNSPECIFIED TYPE: ICD-10-CM

## 2020-08-18 DIAGNOSIS — I10 ESSENTIAL HYPERTENSION: Primary | ICD-10-CM

## 2020-08-18 DIAGNOSIS — I34.1 MVP (MITRAL VALVE PROLAPSE): ICD-10-CM

## 2020-08-18 DIAGNOSIS — F41.9 ANXIETY: ICD-10-CM

## 2020-08-18 DIAGNOSIS — F32.A DEPRESSION, UNSPECIFIED DEPRESSION TYPE: ICD-10-CM

## 2020-08-18 DIAGNOSIS — I49.5 SINUS NODE DYSFUNCTION (HCC): ICD-10-CM

## 2020-08-18 PROBLEM — E66.01 MORBIDLY OBESE: Status: RESOLVED | Noted: 2020-03-16 | Resolved: 2020-08-18

## 2020-08-18 PROCEDURE — 99214 OFFICE O/P EST MOD 30 MIN: CPT | Performed by: INTERNAL MEDICINE

## 2020-08-18 RX ORDER — ROSUVASTATIN CALCIUM 5 MG/1
5 TABLET, COATED ORAL DAILY
Qty: 30 TABLET | Refills: 1 | Status: SHIPPED | OUTPATIENT
Start: 2020-08-18 | End: 2020-09-18 | Stop reason: SINTOL

## 2020-08-18 NOTE — PROGRESS NOTES
Subjective   Monica Perea is a 75 y.o. female.     Chief Complaint   Patient presents with   • Anxiety     3 wk f/u, pt not taking wellbutrin        History of Present Illness   Patient here for follow-up.  Leg edema improved after 40 mg of Lasix.  Patient still taking amlodipine.  Patient also taking hydrochlorothiazide for blood pressure.  Blood pressure today is 124.  Anxiety depression normal patient self discontinued the medication.  Hyperlipidemia unable to tolerate the Lipitor in the past patient is taking niacin.  Palpitation stable on medication.  Coronary artery disease is stable without chest pain or short of breath.  Atrial fibrillation stable on medication.   patient has pacemaker.    Current Outpatient Medications:   •  ALPRAZolam (Xanax) 0.5 MG tablet, Take 1 tablet by mouth 2 (Two) Times a Day As Needed for Anxiety., Disp: 15 tablet, Rfl: 0  •  Calcium Carbonate-Vitamin D (CALCIUM PLUS VITAMIN D PO), Take  by mouth Daily., Disp: , Rfl:   •  cetirizine (zyrTEC) 10 MG tablet, Take 10 mg by mouth Daily., Disp: , Rfl:   •  clobetasol (TEMOVATE) 0.05 % ointment, Apply  topically to the appropriate area as directed Every 12 (Twelve) Hours., Disp: 60 g, Rfl: 3  •  coenzyme Q10 100 MG capsule, Take 100 mg by mouth Daily., Disp: , Rfl:   •  furosemide (LASIX) 40 MG tablet, Take 1 tablet by mouth Daily., Disp: 30 tablet, Rfl: 11  •  hydroCHLOROthiazide (HYDRODIURIL) 25 MG tablet, Take 1 tablet by mouth Daily., Disp: 30 tablet, Rfl: 11  •  melatonin 5 MG tablet tablet, 5 mg Every Night., Disp: , Rfl:   •  niacin 100 MG tablet, Take 250 mg by mouth 2 (Two) Times a Day With Meals., Disp: , Rfl:   •  Omega-3 Fatty Acids (FISH OIL) 1000 MG capsule capsule, Take 1,200 mg by mouth 2 (Two) Times a Day With Meals., Disp: , Rfl:   •  potassium chloride (K-DUR,KLOR-CON) 10 MEQ CR tablet, Take 1 tablet by mouth Daily., Disp: 30 tablet, Rfl: 1  •  propafenone (RYTHMOL) 150 MG tablet, Take 1 tablet by mouth Every 8  (Eight) Hours for 90 days., Disp: 90 tablet, Rfl: 3  •  rivaroxaban (XARELTO) 20 MG tablet, Take 1 tablet by mouth Daily., Disp: 30 tablet, Rfl: 5  •  TRAVATAN Z 0.004 % solution ophthalmic solution, INSTILL 1 DROP INTO EACH EYE NIGHTLY AS DIRECTED, Disp: , Rfl: 6  •  verapamil SR (CALAN-SR) 180 MG CR tablet, 1 daily po, Disp: 30 tablet, Rfl: 3  •  rosuvastatin (Crestor) 5 MG tablet, Take 1 tablet by mouth Daily., Disp: 30 tablet, Rfl: 1    The following portions of the patient's history were reviewed and updated as appropriate: allergies, current medications, past family history, past medical history, past social history, past surgical history and problem list.    Review of Systems   Constitutional: Negative.    Respiratory: Negative.    Cardiovascular: Negative.    Gastrointestinal: Negative.    Musculoskeletal: Negative.    Skin: Negative.    Neurological: Negative.    Psychiatric/Behavioral: Negative.        Objective   Physical Exam   Constitutional: She is oriented to person, place, and time. She appears well-nourished.   Neck: Neck supple.   Cardiovascular: Normal rate, regular rhythm and normal heart sounds.   Pulmonary/Chest: Effort normal and breath sounds normal.   Abdominal: Bowel sounds are normal.   Neurological: She is alert and oriented to person, place, and time.   Skin: Skin is warm.   Psychiatric: She has a normal mood and affect.       All tests have been reviewed.    Assessment/Plan   Diagnoses and all orders for this visit:    Essential hypertension discontinue amlodipine 5 mg daily increase of verapamil to 180 mg from 120 mg.  Continue hydrochlorothiazide  -     verapamil SR (CALAN-SR) 180 MG CR tablet; 1 daily po    Mixed hyperlipidemia failed the Lipitor trial of Crestor may decrease to every other day if muscle pain  -     rosuvastatin (Crestor) 5 MG tablet; Take 1 tablet by mouth Daily.  -     Comprehensive Metabolic Panel  -     CK  -     Lipid Panel    Palpitations continue medication  -      verapamil SR (CALAN-SR) 180 MG CR tablet; 1 daily po    Coronary artery disease involving native coronary artery of native heart without angina pectoris  -     verapamil SR (CALAN-SR) 180 MG CR tablet; 1 daily po    MVP (mitral valve prolapse)  -     verapamil SR (CALAN-SR) 180 MG CR tablet; 1 daily po    Paroxysmal atrial fibrillation (CMS/HCC) continue medication    Sinus node dysfunction (CMS/HCC) pacemaker follow-up with cardiologist  -     verapamil SR (CALAN-SR) 180 MG CR tablet; 1 daily po    Presence of cardiac pacemaker  -     verapamil SR (CALAN-SR) 180 MG CR tablet; 1 daily po    Sleep apnea, unspecified type continue CPAP machine    Anxiety only Xanax as needed patient self discontinued Zoloft    Depression, unspecified depression type self discontinued Wellbutrin    Leg swelling may cut down Lasix to 20 mg if swelling much improved  -     Renal Function Panel    Dyspnea on exertion  -     verapamil SR (CALAN-SR) 180 MG CR tablet; 1 daily po  4 weeks after blood tests.    ----Below is to historical records for reference only:      Cad MILD BLOCKAGE 2009 30%.  stress test normal, follow up with cardio,   Colon 7/18/16: Left-sided diverticulosis. Colon polyps. Internal hemorrhoids. Repeat 5 year       Diverticulitis hx.    Pneumovax 2015,and zostavax and shingrix refused and explained risks, PNEUMOVAX TODAY   Psoriasis refill clobetasole   Knees pain, OA probable, glucosamine continue   Foot fungal infection s/p med by derm  Interstitial cystitis UA normal follow uro   leg swelling

## 2020-08-25 ENCOUNTER — OFFICE VISIT (OUTPATIENT)
Dept: CARDIOLOGY | Facility: CLINIC | Age: 75
End: 2020-08-25

## 2020-08-25 VITALS
OXYGEN SATURATION: 96 % | HEIGHT: 60 IN | HEART RATE: 60 BPM | SYSTOLIC BLOOD PRESSURE: 124 MMHG | BODY MASS INDEX: 32.79 KG/M2 | DIASTOLIC BLOOD PRESSURE: 78 MMHG | WEIGHT: 167 LBS

## 2020-08-25 DIAGNOSIS — I49.5 SINUS NODE DYSFUNCTION (HCC): ICD-10-CM

## 2020-08-25 DIAGNOSIS — Z95.0 PRESENCE OF CARDIAC PACEMAKER: ICD-10-CM

## 2020-08-25 DIAGNOSIS — I25.10 CORONARY ARTERY DISEASE INVOLVING NATIVE CORONARY ARTERY OF NATIVE HEART WITHOUT ANGINA PECTORIS: ICD-10-CM

## 2020-08-25 DIAGNOSIS — I34.1 MVP (MITRAL VALVE PROLAPSE): ICD-10-CM

## 2020-08-25 DIAGNOSIS — R00.2 PALPITATIONS: ICD-10-CM

## 2020-08-25 DIAGNOSIS — I10 ESSENTIAL HYPERTENSION: ICD-10-CM

## 2020-08-25 DIAGNOSIS — R06.09 DYSPNEA ON EXERTION: ICD-10-CM

## 2020-08-25 DIAGNOSIS — I10 ESSENTIAL HYPERTENSION: Primary | ICD-10-CM

## 2020-08-25 DIAGNOSIS — I48.0 PAROXYSMAL ATRIAL FIBRILLATION (HCC): ICD-10-CM

## 2020-08-25 DIAGNOSIS — E78.2 MIXED HYPERLIPIDEMIA: ICD-10-CM

## 2020-08-25 PROCEDURE — 93000 ELECTROCARDIOGRAM COMPLETE: CPT | Performed by: NURSE PRACTITIONER

## 2020-08-25 PROCEDURE — 99214 OFFICE O/P EST MOD 30 MIN: CPT | Performed by: NURSE PRACTITIONER

## 2020-08-25 RX ORDER — VERAPAMIL HYDROCHLORIDE 240 MG/1
240 TABLET, FILM COATED, EXTENDED RELEASE ORAL DAILY
Qty: 90 TABLET | Refills: 3 | Status: SHIPPED | OUTPATIENT
Start: 2020-08-25 | End: 2020-08-31 | Stop reason: ALTCHOICE

## 2020-08-25 RX ORDER — FLECAINIDE ACETATE 50 MG/1
100 TABLET ORAL 2 TIMES DAILY
Qty: 180 TABLET | Refills: 3 | Status: SHIPPED | OUTPATIENT
Start: 2020-08-25 | End: 2020-10-09 | Stop reason: SDUPTHER

## 2020-08-25 NOTE — PROGRESS NOTES
Monica Perea is a 75 y.o. female.  MRN #: [id number]    Referring Provider: Abdiel Juan MD    Chief Complaint: No chief complaint on file.       History of Present Illness:  Ms Irvin presents for follow-up, for pacemaker interrogation and for medication questions.  Patient presents with complaints of reoccurrence of fatigue, concerned that it may be her pacemaker.  She denies any chest pain, chest pressure at rest or with exertion, denies any diaphoresis.  Denies any unusual shortness of breath, weight gain or fluid retention.  She has had lower extremity edema secondary to her calcium channel blocker amlodipine.  After pacemaker insertion due to atrial fibrillation and bradycardia dysrhythmias, patient continues to have symptoms of palpitation and fatigue.  She has had numerous intolerances to antiarrhythmics.  She had been placed on additional calcium channel blocker therapy in addition to her amlodipine.  She had resultant increased lower extremity edema, and intolerance to her Cardizem.  She had been complaining of increased fatigue after the initiation of her Cardizem.  This was then changed to verapamil and she is tolerating this better.  She does report that her primary care physician did discontinue her amlodipine and increased her verapamil and she is tolerating this much better.  Her blood pressure is stabilized and she has less lower extremity edema.    The patient presents today with their self who contributes to the history of their care.     The following portions of the patient's history were reviewed and updated as appropriate: allergies, current medications, past family history, past medical history, past social history, past surgical history and problem list.     Review of Systems:     Review of Systems   Constitutional: Positive for fatigue. Negative for activity change, appetite change, diaphoresis, unexpected weight gain and unexpected weight loss.   HENT: Negative.    Eyes:  Negative.  Negative for blurred vision, double vision, photophobia and visual disturbance.   Respiratory: Positive for shortness of breath. Negative for apnea, cough, chest tightness and wheezing.    Cardiovascular: Positive for palpitations. Negative for chest pain and leg swelling.   Gastrointestinal: Negative for abdominal distention, abdominal pain, blood in stool, nausea, vomiting, GERD and indigestion.   Endocrine: Negative for cold intolerance, heat intolerance, polydipsia, polyphagia and polyuria.   Genitourinary: Negative.  Negative for decreased libido, frequency, genital sores, hematuria and urgency.   Musculoskeletal: Negative.  Negative for back pain, joint swelling, myalgias, neck pain and neck stiffness.   Skin: Negative.  Negative for color change, pallor and bruise.   Allergic/Immunologic: Negative.  Negative for environmental allergies, food allergies and immunocompromised state.   Neurological: Negative.  Negative for dizziness, tremors, seizures, syncope, facial asymmetry, speech difficulty, weakness, light-headedness and numbness.   Hematological: Negative.  Negative for adenopathy. Does not bruise/bleed easily.   Psychiatric/Behavioral: Negative.  Negative for agitation, decreased concentration, sleep disturbance, negative for hyperactivity and stress. The patient is not nervous/anxious.    All other systems reviewed and are negative.         Current Outpatient Medications:   •  ALPRAZolam (Xanax) 0.5 MG tablet, Take 1 tablet by mouth 2 (Two) Times a Day As Needed for Anxiety., Disp: 15 tablet, Rfl: 0  •  Calcium Carbonate-Vitamin D (CALCIUM PLUS VITAMIN D PO), Take  by mouth Daily., Disp: , Rfl:   •  cetirizine (zyrTEC) 10 MG tablet, Take 10 mg by mouth Daily., Disp: , Rfl:   •  clobetasol (TEMOVATE) 0.05 % ointment, Apply  topically to the appropriate area as directed Every 12 (Twelve) Hours., Disp: 60 g, Rfl: 3  •  coenzyme Q10 100 MG capsule, Take 100 mg by mouth Daily., Disp: , Rfl:   •   "furosemide (LASIX) 40 MG tablet, Take 1 tablet by mouth Daily., Disp: 30 tablet, Rfl: 11  •  hydroCHLOROthiazide (HYDRODIURIL) 25 MG tablet, Take 1 tablet by mouth Daily. (Patient taking differently: Take 12.5 mg by mouth Daily.), Disp: 30 tablet, Rfl: 11  •  melatonin 5 MG tablet tablet, 5 mg Every Night., Disp: , Rfl:   •  niacin 100 MG tablet, Take 250 mg by mouth 2 (Two) Times a Day With Meals., Disp: , Rfl:   •  Omega-3 Fatty Acids (FISH OIL) 1000 MG capsule capsule, Take 1,200 mg by mouth 2 (Two) Times a Day With Meals., Disp: , Rfl:   •  potassium chloride (K-DUR,KLOR-CON) 10 MEQ CR tablet, Take 1 tablet by mouth Daily., Disp: 30 tablet, Rfl: 1  •  rivaroxaban (XARELTO) 20 MG tablet, Take 1 tablet by mouth Daily., Disp: 30 tablet, Rfl: 5  •  rosuvastatin (Crestor) 5 MG tablet, Take 1 tablet by mouth Daily., Disp: 30 tablet, Rfl: 1  •  TRAVATAN Z 0.004 % solution ophthalmic solution, INSTILL 1 DROP INTO EACH EYE NIGHTLY AS DIRECTED, Disp: , Rfl: 6  •  flecainide (TAMBOCOR) 50 MG tablet, Take 2 tablets by mouth 2 (Two) Times a Day for 180 days., Disp: 180 tablet, Rfl: 3  •  verapamil SR (CALAN-SR) 240 MG CR tablet, Take 1 tablet by mouth Daily. 1 daily po, Disp: 90 tablet, Rfl: 3    Vitals:    08/25/20 1253   BP: 124/78   BP Location: Right arm   Patient Position: Sitting   Cuff Size: Adult   Pulse: 60   SpO2: 96%   Weight: 75.8 kg (167 lb)   Height: 152.4 cm (60\")       Physical Exam:     Physical Exam   Constitutional: She is oriented to person, place, and time. She appears well-developed and well-nourished. No distress.   HENT:   Head: Normocephalic and atraumatic.   Eyes: Conjunctivae are normal. No scleral icterus.   Neck: Normal range of motion. Neck supple. No JVD present. Carotid bruit is not present.   Cardiovascular: Normal rate, regular rhythm, S1 normal, S2 normal and intact distal pulses. PMI is not displaced. Exam reveals no gallop and no friction rub.   Murmur heard.   Systolic murmur is present " with a grade of 2/6.  Grade 2-3 systolic murmur right sternal border   Pulmonary/Chest: Effort normal and breath sounds normal. No respiratory distress. She has no wheezes. She has no rales. She exhibits no tenderness.   Abdominal: Soft. Bowel sounds are normal. She exhibits no mass. There is no tenderness.   Musculoskeletal: Normal range of motion. She exhibits edema.   1+ lower extremity anterior tibial and pedal edema   Neurological: She is alert and oriented to person, place, and time. No sensory deficit.   Skin: Skin is warm and dry. Capillary refill takes less than 2 seconds. No rash noted. She is not diaphoretic.   Psychiatric: She has a normal mood and affect. Her behavior is normal. Judgment and thought content normal.   Nursing note and vitals reviewed.        ECG 12 Lead  Date/Time: 8/25/2020 9:25 AM  Performed by: Delfino Thomas Jr., APRN  Authorized by: Delfino Thomas Jr., TERRY   Comparison: compared with previous ECG from 7/8/2020  Similar to previous ECG  Comparison to previous ECG: No acute changes  Rhythm: paced  Rate: normal  BPM: 60  Conduction: conduction normal  ST Segments: ST segments normal  T Waves: T waves normal  Pacing: atrial sensed rhythm  Clinical impression: abnormal EKG  Comments: Atrial paced rhythm.            Results:   Pacemaker Evaluation Report    August 26, 2020    Monica Perea    Primary MD:Abdiel Juan MD  Primary Cardiologist: Araceli BRITO  Implanting MD: William BRITO  :[employer]  Implant date: 04/28/2020    Reason for evaluation:routine  Indication for pacemaker: sinus node dysfunction        Diagnostic Data    RA RV LV   Atrial paced: 92 % Ventricular paced: 13 % Ventricular paced:  %   P = 6.6mv R = 13.6mv R =    Threshold .9v@.4ms Threshold 1.0v@.4ms  Threshold    Impedance 798 Impedance  820 Impedance         Battery status: satisfactory Magnet rate:  bpm Voltage: BOC V  9 years     HOME MONITORED? Yes  ACTIVELY TRANSMITTING? Yes      EVENTS: No events detected.           Final Parameters    Rate Drop:  Rate Smoothing:  Hysteresis:   Changes made: MTR decreased to 120 bpm                             MSR decreased to 120bpm  Conclusions: stable pacing and sensing thresholds    Follow up: 6 months      Assessment/Plan:   Pacemaker changes noted as above  Continue verapamil 240 mg p.o. Daily  Patient does request that her Rythmol be discontinued and that she be placed back on her flecainide therapy due to cost consideration.  Diagnoses and all orders for this visit:    Essential hypertension  Blood pressure stable at 124/78  Mixed hyperlipidemia  Coenzyme co-Q10 100 mg daily  Omega-3 fatty acids 1000 mg daily  Crestor 5 mg p.o. daily  Palpitations  Patient reports less palpitation symptoms after increasing verapamil.   Restart flecainide 100 mg p.o. daily  Coronary artery disease involving native coronary artery of native heart without angina pectoris  No reports of recent chest pain.  Patient states she takes aspirin 81 mg daily  MVP (mitral valve prolapse)  Trace to mild mitral valve regurg  Paroxysmal atrial fibrillation (CMS/HCC)  Paced rhythm  Sinus node dysfunction (CMS/HCC)  Paced rhythm with rate of 60  Presence of cardiac pacemaker  Stable pacemaker interrogation with today's visit.      Return in about 6 months (around 2/25/2021) for F/U with [facility].    Delfino Thomas, TERRY

## 2020-08-25 NOTE — PATIENT INSTRUCTIONS
STOP RHYTHMOL   START FLECANIDE 100mg twice daily   Continue verapamil 240mg daily   Follow up 6 months or as needed.

## 2020-08-31 ENCOUNTER — TELEPHONE (OUTPATIENT)
Dept: CARDIOLOGY | Facility: CLINIC | Age: 75
End: 2020-08-31

## 2020-08-31 DIAGNOSIS — I10 ESSENTIAL HYPERTENSION: Primary | ICD-10-CM

## 2020-08-31 RX ORDER — VERAPAMIL HYDROCHLORIDE 360 MG/1
360 CAPSULE, DELAYED RELEASE PELLETS ORAL NIGHTLY
Qty: 30 CAPSULE | Refills: 6 | Status: SHIPPED | OUTPATIENT
Start: 2020-08-31 | End: 2020-11-24 | Stop reason: SDUPTHER

## 2020-09-08 DIAGNOSIS — I34.1 MVP (MITRAL VALVE PROLAPSE): ICD-10-CM

## 2020-09-08 DIAGNOSIS — I25.10 CORONARY ARTERY DISEASE INVOLVING NATIVE CORONARY ARTERY OF NATIVE HEART WITHOUT ANGINA PECTORIS: ICD-10-CM

## 2020-09-08 DIAGNOSIS — I48.0 PAROXYSMAL ATRIAL FIBRILLATION (HCC): ICD-10-CM

## 2020-09-08 DIAGNOSIS — R00.2 PALPITATIONS: ICD-10-CM

## 2020-09-15 LAB
ALBUMIN SERPL-MCNC: 4.7 G/DL (ref 3.5–5.2)
ALBUMIN SERPL-MCNC: 4.7 G/DL (ref 3.5–5.2)
ALBUMIN/GLOB SERPL: 1.9 G/DL
ALP SERPL-CCNC: 83 U/L (ref 39–117)
ALT SERPL-CCNC: 18 U/L (ref 1–33)
AST SERPL-CCNC: 19 U/L (ref 1–32)
BILIRUB SERPL-MCNC: 0.5 MG/DL (ref 0–1.2)
BUN SERPL-MCNC: 14 MG/DL (ref 8–23)
BUN SERPL-MCNC: 14 MG/DL (ref 8–23)
BUN/CREAT SERPL: 13.7 (ref 7–25)
BUN/CREAT SERPL: 14.1 (ref 7–25)
CALCIUM SERPL-MCNC: 9.4 MG/DL (ref 8.6–10.5)
CALCIUM SERPL-MCNC: 9.4 MG/DL (ref 8.6–10.5)
CHLORIDE SERPL-SCNC: 97 MMOL/L (ref 98–107)
CHLORIDE SERPL-SCNC: 97 MMOL/L (ref 98–107)
CHOLEST SERPL-MCNC: 179 MG/DL (ref 0–200)
CK SERPL-CCNC: 45 U/L (ref 20–180)
CO2 SERPL-SCNC: 29.9 MMOL/L (ref 22–29)
CO2 SERPL-SCNC: 30.1 MMOL/L (ref 22–29)
CREAT SERPL-MCNC: 0.99 MG/DL (ref 0.57–1)
CREAT SERPL-MCNC: 1.02 MG/DL (ref 0.57–1)
GLOBULIN SER CALC-MCNC: 2.5 GM/DL
GLUCOSE SERPL-MCNC: 98 MG/DL (ref 65–99)
GLUCOSE SERPL-MCNC: 98 MG/DL (ref 65–99)
HDLC SERPL-MCNC: 89 MG/DL (ref 40–60)
LDLC SERPL CALC-MCNC: 77 MG/DL (ref 0–100)
PHOSPHATE SERPL-MCNC: 3.5 MG/DL (ref 2.5–4.5)
POTASSIUM SERPL-SCNC: 3.2 MMOL/L (ref 3.5–5.2)
POTASSIUM SERPL-SCNC: 3.3 MMOL/L (ref 3.5–5.2)
PROT SERPL-MCNC: 7.2 G/DL (ref 6–8.5)
SODIUM SERPL-SCNC: 138 MMOL/L (ref 136–145)
SODIUM SERPL-SCNC: 139 MMOL/L (ref 136–145)
TRIGL SERPL-MCNC: 67 MG/DL (ref 0–150)
VLDLC SERPL CALC-MCNC: 13.4 MG/DL

## 2020-09-16 ENCOUNTER — TRANSCRIBE ORDERS (OUTPATIENT)
Dept: INTERNAL MEDICINE | Facility: CLINIC | Age: 75
End: 2020-09-16

## 2020-09-16 DIAGNOSIS — Z12.31 VISIT FOR SCREENING MAMMOGRAM: Primary | ICD-10-CM

## 2020-09-18 ENCOUNTER — OFFICE VISIT (OUTPATIENT)
Dept: INTERNAL MEDICINE | Facility: CLINIC | Age: 75
End: 2020-09-18

## 2020-09-18 VITALS
DIASTOLIC BLOOD PRESSURE: 84 MMHG | OXYGEN SATURATION: 97 % | TEMPERATURE: 97.8 F | BODY MASS INDEX: 33.06 KG/M2 | HEART RATE: 63 BPM | SYSTOLIC BLOOD PRESSURE: 126 MMHG | HEIGHT: 60 IN | WEIGHT: 168.4 LBS

## 2020-09-18 DIAGNOSIS — R53.83 OTHER FATIGUE: ICD-10-CM

## 2020-09-18 DIAGNOSIS — M79.89 LEG SWELLING: ICD-10-CM

## 2020-09-18 DIAGNOSIS — I34.1 MVP (MITRAL VALVE PROLAPSE): ICD-10-CM

## 2020-09-18 DIAGNOSIS — E78.2 MIXED HYPERLIPIDEMIA: ICD-10-CM

## 2020-09-18 DIAGNOSIS — Z95.0 PRESENCE OF CARDIAC PACEMAKER: ICD-10-CM

## 2020-09-18 DIAGNOSIS — I25.10 CORONARY ARTERY DISEASE INVOLVING NATIVE CORONARY ARTERY OF NATIVE HEART WITHOUT ANGINA PECTORIS: ICD-10-CM

## 2020-09-18 DIAGNOSIS — E55.9 VITAMIN D DEFICIENCY: ICD-10-CM

## 2020-09-18 DIAGNOSIS — F32.A DEPRESSION, UNSPECIFIED DEPRESSION TYPE: ICD-10-CM

## 2020-09-18 DIAGNOSIS — F41.9 ANXIETY: ICD-10-CM

## 2020-09-18 DIAGNOSIS — G47.30 SLEEP APNEA, UNSPECIFIED TYPE: ICD-10-CM

## 2020-09-18 DIAGNOSIS — I48.0 PAROXYSMAL ATRIAL FIBRILLATION (HCC): ICD-10-CM

## 2020-09-18 DIAGNOSIS — Z23 NEED FOR INFLUENZA VACCINATION: Primary | ICD-10-CM

## 2020-09-18 DIAGNOSIS — R79.9 ABNORMAL FINDING OF BLOOD CHEMISTRY, UNSPECIFIED: ICD-10-CM

## 2020-09-18 DIAGNOSIS — I10 ESSENTIAL HYPERTENSION: ICD-10-CM

## 2020-09-18 DIAGNOSIS — I49.5 SINUS NODE DYSFUNCTION (HCC): ICD-10-CM

## 2020-09-18 DIAGNOSIS — R00.2 PALPITATIONS: ICD-10-CM

## 2020-09-18 PROCEDURE — G0008 ADMIN INFLUENZA VIRUS VAC: HCPCS | Performed by: INTERNAL MEDICINE

## 2020-09-18 PROCEDURE — 90694 VACC AIIV4 NO PRSRV 0.5ML IM: CPT | Performed by: INTERNAL MEDICINE

## 2020-09-18 PROCEDURE — 99214 OFFICE O/P EST MOD 30 MIN: CPT | Performed by: INTERNAL MEDICINE

## 2020-09-18 RX ORDER — HYDROCHLOROTHIAZIDE 25 MG/1
25 TABLET ORAL DAILY
Qty: 30 TABLET | Refills: 11 | Status: SHIPPED | OUTPATIENT
Start: 2020-09-18 | End: 2021-07-27 | Stop reason: SDUPTHER

## 2020-09-18 RX ORDER — POTASSIUM CHLORIDE 750 MG/1
10 TABLET, EXTENDED RELEASE ORAL DAILY
Qty: 30 TABLET | Refills: 2 | Status: SHIPPED | OUTPATIENT
Start: 2020-09-18 | End: 2020-10-09

## 2020-09-18 NOTE — PROGRESS NOTES
Subjective   Monica Perea is a 75 y.o. female.     Chief Complaint   Patient presents with   • Labs Only     4 wk f/u        History of Present Illness   Patient here for follow-up.  Patient complains of very tired no energy.  Patient states symptoms started about the time of flecainide restarted.  Blood pressure stable now.  Heart rate much improved after manipulation of pacemaker by cardiology.  Atrial fibrillation stable now.  Coronary arteries disease stable no chest pain no short of breath.  Sleep apnea on CPAP is stable.  Anxiety depression patient self discontinued the medication except the Xanax.  Lower extremity edema improved on water pill patient's potassium is low     Current Outpatient Medications:   •  ALPRAZolam (Xanax) 0.5 MG tablet, Take 1 tablet by mouth 2 (Two) Times a Day As Needed for Anxiety., Disp: 15 tablet, Rfl: 0  •  Calcium Carbonate-Vitamin D (CALCIUM PLUS VITAMIN D PO), Take  by mouth Daily., Disp: , Rfl:   •  cetirizine (zyrTEC) 10 MG tablet, Take 10 mg by mouth Daily., Disp: , Rfl:   •  clobetasol (TEMOVATE) 0.05 % ointment, Apply  topically to the appropriate area as directed Every 12 (Twelve) Hours., Disp: 60 g, Rfl: 3  •  coenzyme Q10 100 MG capsule, Take 100 mg by mouth Daily., Disp: , Rfl:   •  flecainide (TAMBOCOR) 50 MG tablet, Take 2 tablets by mouth 2 (Two) Times a Day for 180 days., Disp: 180 tablet, Rfl: 3  •  furosemide (LASIX) 40 MG tablet, Take 1 tablet by mouth Daily., Disp: 30 tablet, Rfl: 11  •  hydroCHLOROthiazide (HYDRODIURIL) 25 MG tablet, Take 1 tablet by mouth Daily., Disp: 30 tablet, Rfl: 11  •  melatonin 5 MG tablet tablet, 5 mg Every Night., Disp: , Rfl:   •  niacin 100 MG tablet, Take 250 mg by mouth 2 (Two) Times a Day With Meals., Disp: , Rfl:   •  Omega-3 Fatty Acids (FISH OIL) 1000 MG capsule capsule, Take 1,200 mg by mouth 2 (Two) Times a Day With Meals., Disp: , Rfl:   •  potassium chloride (K-DUR,KLOR-CON) 10 MEQ CR tablet, Take 1 tablet by  mouth Daily., Disp: 30 tablet, Rfl: 2  •  rivaroxaban (XARELTO) 20 MG tablet, Take 1 tablet by mouth Daily., Disp: 30 tablet, Rfl: 5  •  TRAVATAN Z 0.004 % solution ophthalmic solution, INSTILL 1 DROP INTO EACH EYE NIGHTLY AS DIRECTED, Disp: , Rfl: 6  •  verapamil ER (VERELAN) 360 MG 24 hr capsule, Take 1 capsule by mouth Every Night., Disp: 30 capsule, Rfl: 6    The following portions of the patient's history were reviewed and updated as appropriate: allergies, current medications, past family history, past medical history, past social history, past surgical history and problem list.    Review of Systems   Constitutional: Positive for fatigue.   Respiratory: Negative.    Cardiovascular: Negative.    Gastrointestinal: Negative.    Musculoskeletal: Negative.    Skin: Negative.    Neurological: Negative.    Psychiatric/Behavioral: Negative.        Objective   Physical Exam  Constitutional:       Appearance: She is normal weight.   HENT:      Head: Normocephalic and atraumatic.   Neck:      Musculoskeletal: Normal range of motion and neck supple.   Cardiovascular:      Rate and Rhythm: Normal rate and regular rhythm.      Heart sounds: Normal heart sounds.   Pulmonary:      Effort: Pulmonary effort is normal.      Breath sounds: Normal breath sounds.   Abdominal:      General: Bowel sounds are normal.   Skin:     General: Skin is warm.   Neurological:      Mental Status: She is alert and oriented to person, place, and time.         All tests have been reviewed.    Assessment/Plan   Diagnoses and all orders for this visit:                Essential hypertension cont meds low potassium lower Lasix to 20 and then change it to as needed basis repeat lab     Mixed hyperlipidemia failed the Lipitor  Crestor 5mg biw patient still unable to tolerate     Palpitations continue medication  -     verapamil SR (CALAN-SR) 180 MG CR tablet; 1 daily po     Coronary artery disease involving native coronary artery of native heart without  angina pectoris  -     verapamil SR (CALAN-SR) 180 MG CR tablet; 1 daily po     MVP (mitral valve prolapse)  -     verapamil SR (CALAN-SR) 180 MG CR tablet; 1 daily po     Paroxysmal atrial fibrillation (CMS/HCC) continue medication     Sinus node dysfunction (CMS/HCC) pacemaker follow-up with cardiologist     Sleep apnea,  continue CPAP machine     Anxiety depression only Xanax as needed patient self discontinued Zoloft and Wellbutrin     Leg swelling improved,  cut down Lasix to 20 mg changed to as needed do lab    Fatigue 1 yr,  , do lab, ?relate flecainide patient is going to discuss with cardiology for possible changing of medication     4 weeks after blood tests.     ----Below is to historical records for reference only:      Cad MILD BLOCKAGE 2009 30%.  stress test normal, follow up with cardio,   Colon 7/18/16: Left-sided diverticulosis. Colon polyps. Internal hemorrhoids. Repeat 5 year       Diverticulitis hx.    Pneumovax 2015,and zostavax and shingrix refused and explained risks, PNEUMOVAX TODAY   Psoriasis refill clobetasole   Knees pain, OA probable, glucosamine continue   Foot fungal infection s/p med by derm  Interstitial cystitis UA normal follow uro   leg swelling

## 2020-09-19 DIAGNOSIS — M79.89 LEG SWELLING: ICD-10-CM

## 2020-09-19 RX ORDER — POTASSIUM CHLORIDE 750 MG/1
10 TABLET, EXTENDED RELEASE ORAL DAILY
Qty: 30 TABLET | Refills: 2 | OUTPATIENT
Start: 2020-09-19

## 2020-10-03 LAB
25(OH)D3+25(OH)D2 SERPL-MCNC: 42.6 NG/ML (ref 30–100)
BASOPHILS # BLD AUTO: 0.07 10*3/MM3 (ref 0–0.2)
BASOPHILS NFR BLD AUTO: 1.7 % (ref 0–1.5)
BUN SERPL-MCNC: 12 MG/DL (ref 8–23)
BUN/CREAT SERPL: 11.8 (ref 7–25)
CALCIUM SERPL-MCNC: 9.4 MG/DL (ref 8.6–10.5)
CHLORIDE SERPL-SCNC: 103 MMOL/L (ref 98–107)
CO2 SERPL-SCNC: 29.1 MMOL/L (ref 22–29)
CREAT SERPL-MCNC: 1.02 MG/DL (ref 0.57–1)
CRP SERPL-MCNC: 0.28 MG/DL (ref 0–0.5)
EOSINOPHIL # BLD AUTO: 0.39 10*3/MM3 (ref 0–0.4)
EOSINOPHIL NFR BLD AUTO: 9.6 % (ref 0.3–6.2)
ERYTHROCYTE [DISTWIDTH] IN BLOOD BY AUTOMATED COUNT: 14.6 % (ref 12.3–15.4)
ERYTHROCYTE [SEDIMENTATION RATE] IN BLOOD BY WESTERGREN METHOD: 31 MM/HR (ref 0–30)
FOLATE SERPL-MCNC: 16.1 NG/ML (ref 4.78–24.2)
GLUCOSE SERPL-MCNC: 96 MG/DL (ref 65–99)
HCT VFR BLD AUTO: 36.2 % (ref 34–46.6)
HGB BLD-MCNC: 11.7 G/DL (ref 12–15.9)
IMM GRANULOCYTES # BLD AUTO: 0.01 10*3/MM3 (ref 0–0.05)
IMM GRANULOCYTES NFR BLD AUTO: 0.2 % (ref 0–0.5)
IRON SATN MFR SERPL: 8 % (ref 20–50)
IRON SERPL-MCNC: 42 MCG/DL (ref 37–145)
LYMPHOCYTES # BLD AUTO: 1.1 10*3/MM3 (ref 0.7–3.1)
LYMPHOCYTES NFR BLD AUTO: 27.2 % (ref 19.6–45.3)
MCH RBC QN AUTO: 28.9 PG (ref 26.6–33)
MCHC RBC AUTO-ENTMCNC: 32.3 G/DL (ref 31.5–35.7)
MCV RBC AUTO: 89.4 FL (ref 79–97)
MONOCYTES # BLD AUTO: 0.48 10*3/MM3 (ref 0.1–0.9)
MONOCYTES NFR BLD AUTO: 11.9 % (ref 5–12)
NEUTROPHILS # BLD AUTO: 2 10*3/MM3 (ref 1.7–7)
NEUTROPHILS NFR BLD AUTO: 49.4 % (ref 42.7–76)
NRBC BLD AUTO-RTO: 0 /100 WBC (ref 0–0.2)
PLATELET # BLD AUTO: 218 10*3/MM3 (ref 140–450)
POTASSIUM SERPL-SCNC: 3.9 MMOL/L (ref 3.5–5.2)
RBC # BLD AUTO: 4.05 10*6/MM3 (ref 3.77–5.28)
SODIUM SERPL-SCNC: 141 MMOL/L (ref 136–145)
TIBC SERPL-MCNC: 507 MCG/DL
UIBC SERPL-MCNC: 465 MCG/DL (ref 112–346)
VIT B12 SERPL-MCNC: 726 PG/ML (ref 211–946)
WBC # BLD AUTO: 4.05 10*3/MM3 (ref 3.4–10.8)

## 2020-10-09 ENCOUNTER — OFFICE VISIT (OUTPATIENT)
Dept: INTERNAL MEDICINE | Facility: CLINIC | Age: 75
End: 2020-10-09

## 2020-10-09 VITALS
HEART RATE: 63 BPM | HEIGHT: 60 IN | WEIGHT: 167 LBS | SYSTOLIC BLOOD PRESSURE: 120 MMHG | BODY MASS INDEX: 32.79 KG/M2 | TEMPERATURE: 97.1 F | RESPIRATION RATE: 16 BRPM | DIASTOLIC BLOOD PRESSURE: 76 MMHG | OXYGEN SATURATION: 96 %

## 2020-10-09 DIAGNOSIS — Z95.0 PRESENCE OF CARDIAC PACEMAKER: ICD-10-CM

## 2020-10-09 DIAGNOSIS — I25.10 CORONARY ARTERY DISEASE INVOLVING NATIVE CORONARY ARTERY OF NATIVE HEART WITHOUT ANGINA PECTORIS: Primary | ICD-10-CM

## 2020-10-09 DIAGNOSIS — I10 ESSENTIAL HYPERTENSION: ICD-10-CM

## 2020-10-09 DIAGNOSIS — F32.A DEPRESSION, UNSPECIFIED DEPRESSION TYPE: ICD-10-CM

## 2020-10-09 DIAGNOSIS — G47.30 SLEEP APNEA, UNSPECIFIED TYPE: ICD-10-CM

## 2020-10-09 DIAGNOSIS — M54.50 LOW BACK PAIN WITHOUT SCIATICA, UNSPECIFIED BACK PAIN LATERALITY, UNSPECIFIED CHRONICITY: ICD-10-CM

## 2020-10-09 DIAGNOSIS — I49.5 SINUS NODE DYSFUNCTION (HCC): ICD-10-CM

## 2020-10-09 DIAGNOSIS — I48.0 PAROXYSMAL ATRIAL FIBRILLATION (HCC): ICD-10-CM

## 2020-10-09 DIAGNOSIS — E78.2 MIXED HYPERLIPIDEMIA: ICD-10-CM

## 2020-10-09 DIAGNOSIS — M79.89 LEG SWELLING: ICD-10-CM

## 2020-10-09 DIAGNOSIS — F41.9 ANXIETY: ICD-10-CM

## 2020-10-09 PROCEDURE — 99214 OFFICE O/P EST MOD 30 MIN: CPT | Performed by: INTERNAL MEDICINE

## 2020-10-09 RX ORDER — FLECAINIDE ACETATE 100 MG/1
100 TABLET ORAL 2 TIMES DAILY
COMMUNITY
Start: 2020-09-19 | End: 2021-04-26

## 2020-10-09 NOTE — PROGRESS NOTES
Subjective   Monica Perea is a 75 y.o. female.     Chief Complaint   Patient presents with   • Hypertension   • Hyperlipidemia       History of Present Illness   Patient here for follow-up.  Hypertension stable on medication.  Hyperlipidemia patient is taking pravastatin every other day patient states that the helping.  Palpitation stable on medication.  The CAD stable without chest pain.  Atrial fibrillation stable on medication.  Fatigue improved after lower the heart rate on pacemaker by cardiologist.  Sleep apnea on CPAP machine stable.  Anxiety depression patient declined to take medication except occasional Xanax.  Leg swelling improved after water pill.  Fatigue improved.    Current Outpatient Medications:   •  Calcium Carbonate-Vitamin D (CALCIUM PLUS VITAMIN D PO), Take  by mouth Daily., Disp: , Rfl:   •  cetirizine (zyrTEC) 10 MG tablet, Take 10 mg by mouth Daily., Disp: , Rfl:   •  clobetasol (TEMOVATE) 0.05 % ointment, Apply  topically to the appropriate area as directed Every 12 (Twelve) Hours., Disp: 60 g, Rfl: 3  •  coenzyme Q10 100 MG capsule, Take 100 mg by mouth Daily., Disp: , Rfl:   •  hydroCHLOROthiazide (HYDRODIURIL) 25 MG tablet, Take 1 tablet by mouth Daily., Disp: 30 tablet, Rfl: 11  •  melatonin 5 MG tablet tablet, 5 mg Every Night., Disp: , Rfl:   •  niacin 100 MG tablet, Take 250 mg by mouth 2 (Two) Times a Day With Meals., Disp: , Rfl:   •  Omega-3 Fatty Acids (FISH OIL) 1000 MG capsule capsule, Take 1,200 mg by mouth 2 (Two) Times a Day With Meals., Disp: , Rfl:   •  rivaroxaban (XARELTO) 20 MG tablet, Take 1 tablet by mouth Daily., Disp: 30 tablet, Rfl: 5  •  TRAVATAN Z 0.004 % solution ophthalmic solution, INSTILL 1 DROP INTO EACH EYE NIGHTLY AS DIRECTED, Disp: , Rfl: 6  •  verapamil ER (VERELAN) 360 MG 24 hr capsule, Take 1 capsule by mouth Every Night., Disp: 30 capsule, Rfl: 6  •  ALPRAZolam (Xanax) 0.5 MG tablet, Take 1 tablet by mouth 2 (Two) Times a Day As Needed for  Anxiety., Disp: 15 tablet, Rfl: 0  •  flecainide (TAMBOCOR) 100 MG tablet, Take 100 mg by mouth 2 (Two) Times a Day., Disp: , Rfl:     The following portions of the patient's history were reviewed and updated as appropriate: allergies, current medications, past family history, past medical history, past social history, past surgical history and problem list.    Review of Systems   Constitutional: Negative.    Respiratory: Negative.    Cardiovascular: Negative.    Gastrointestinal: Negative.    Musculoskeletal: Negative.    Skin: Negative.    Neurological: Negative.    Psychiatric/Behavioral: Negative.        Objective   Physical Exam  Constitutional:       Appearance: Normal appearance. She is obese.   Neck:      Musculoskeletal: Neck supple.   Cardiovascular:      Rate and Rhythm: Normal rate and regular rhythm.      Heart sounds: Normal heart sounds.   Pulmonary:      Effort: Pulmonary effort is normal.      Breath sounds: Normal breath sounds.   Abdominal:      General: Bowel sounds are normal.   Skin:     General: Skin is warm.   Neurological:      General: No focal deficit present.      Mental Status: She is alert and oriented to person, place, and time.         All tests have been reviewed.    Assessment/Plan   There are no diagnoses linked to this encounter.             Essential hypertension cont meds      Mixed hyperlipidemia failed the Lipitor  Crestor 5mg biw . On pravastatin qod and able to tolerate     Palpitations continue medication, improved     Coronary artery disease involving native coronary artery of native heart without angina pectoris     MVP (mitral valve prolapse)     Paroxysmal atrial fibrillation (CMS/HCC) continue medication     Sinus node dysfunction (CMS/HCC) pacemaker follow-up with cardiologist     Sleep apnea,  continue CPAP machine     Anxiety depression only Xanax as needed patient self discontinued Zoloft and Wellbutrin     Leg swelling improved,   Lasix 20 mg  as needed doing  well     Fatigue improved    2 mo      4 weeks after blood tests.     ----Below is to historical records for reference only:      Cad MILD BLOCKAGE 2009 30%.  stress test normal, follow up with cardio,   Colon 7/18/16: Left-sided diverticulosis. Colon polyps. Internal hemorrhoids. Repeat 5 year       Diverticulitis hx.    Pneumovax 2015,and zostavax and shingrix refused and explained risks, PNEUMOVAX TODAY   Psoriasis refill clobetasole   Knees pain, OA probable, glucosamine continue   Foot fungal infection s/p med by derm  Interstitial cystitis UA normal follow uro   leg swelling

## 2020-10-16 ENCOUNTER — OFFICE VISIT (OUTPATIENT)
Dept: CARDIOLOGY | Facility: CLINIC | Age: 75
End: 2020-10-16

## 2020-10-16 VITALS
DIASTOLIC BLOOD PRESSURE: 84 MMHG | HEART RATE: 71 BPM | OXYGEN SATURATION: 95 % | HEIGHT: 60 IN | SYSTOLIC BLOOD PRESSURE: 130 MMHG | WEIGHT: 168 LBS | BODY MASS INDEX: 32.98 KG/M2

## 2020-10-16 DIAGNOSIS — I49.5 SINUS NODE DYSFUNCTION (HCC): ICD-10-CM

## 2020-10-16 DIAGNOSIS — I25.10 CORONARY ARTERY DISEASE INVOLVING NATIVE CORONARY ARTERY OF NATIVE HEART WITHOUT ANGINA PECTORIS: ICD-10-CM

## 2020-10-16 DIAGNOSIS — I48.0 PAROXYSMAL ATRIAL FIBRILLATION (HCC): ICD-10-CM

## 2020-10-16 DIAGNOSIS — R00.2 PALPITATIONS: ICD-10-CM

## 2020-10-16 DIAGNOSIS — I10 ESSENTIAL HYPERTENSION: Primary | ICD-10-CM

## 2020-10-16 DIAGNOSIS — Z95.0 PRESENCE OF CARDIAC PACEMAKER: ICD-10-CM

## 2020-10-16 DIAGNOSIS — E78.2 MIXED HYPERLIPIDEMIA: ICD-10-CM

## 2020-10-16 PROCEDURE — 99214 OFFICE O/P EST MOD 30 MIN: CPT | Performed by: NURSE PRACTITIONER

## 2020-10-16 PROCEDURE — 93000 ELECTROCARDIOGRAM COMPLETE: CPT | Performed by: NURSE PRACTITIONER

## 2020-10-16 RX ORDER — HYDRALAZINE HYDROCHLORIDE 10 MG/1
10 TABLET, FILM COATED ORAL 3 TIMES DAILY
Qty: 90 TABLET | Refills: 3 | Status: SHIPPED | OUTPATIENT
Start: 2020-10-16 | End: 2020-10-16

## 2020-10-16 RX ORDER — HYDRALAZINE HYDROCHLORIDE 10 MG/1
10 TABLET, FILM COATED ORAL 2 TIMES DAILY
Qty: 60 TABLET | Refills: 3 | Status: SHIPPED | OUTPATIENT
Start: 2020-10-16 | End: 2021-01-15 | Stop reason: SDUPTHER

## 2020-10-16 NOTE — PROGRESS NOTES
"Monica Perea is a 75 y.o. female.  MRN #: [id number]    Referring Provider: Abdiel Juan MD    Chief Complaint:   Chief Complaint   Patient presents with   • Follow-up   • Hypertension        History of Present Illness:  HPI   Ms Perea is a 75-year-old female that presents for evaluation of sporadic blood pressure with episodes of hypertension approaching 160-170 systolic over 90s to 100 diastolic.  Patient has always had difficult to manage blood pressure and has been on numerous antihypertensives, but also with several medications that she has intolerance or side effects.  She is presently on hydrochlorothiazide 25 mg p.o. daily, and verapamil  mg daily which had been regulating her blood pressure quite well.  Patient also has pacemaker in situ due to paroxysmal atrial fibrillation, and sinus node dysfunction.  She is on flecainide 100 mg twice daily, and anticoagulated with Xarelto 20 mg daily.  Patient also reports episodes of palpitations that she experiences with increased heart rate between 110 and 120 with minimal exertion.  Patient reports that she \"feels these palpitations and they are uncomfortable\".  Her next pacemaker interrogation is due in 4 months.       The patient presents today with their self who contributes to the history of their care.     The following portions of the patient's history were reviewed and updated as appropriate: allergies, current medications, past family history, past medical history, past social history, past surgical history and problem list.     Prior cardiac testin2020: Lexiscan stress test:  Rest ECG Baseline ECG of normal sinus rhythm noted. Normal baseline ECG noted at rest.   There was no ST segment deviation noted.   Stress Description A pharmacological stress test was performed using regadenoson without low-level exercise.   The patient reached the end of the protocol.   The patient reported no symptoms during the stress test.   Blood " pressure demonstrated a normal response to stress. Heart rate demonstrated a normal response to stress.   Stress ECG Stress ECG of normal sinus rhythm noted. Normal ECG with no significant stress induced changes noted.   There was no ST segment deviation noted during stress.   There were no arrhythmias during stress.   There were no significant arrhythmias noted during stress.      Stress ECG was interpretable and is consistent with a normal stress ECG.   Recovery ECG During recovery, the patient complained of no significant symptoms following stress.   Sinus rhythm was noted during recovery. Normal ECG with no significant recovery phase changes noted. There was no ST segment deviation noted during recovery.   There were no significant arrhythmias during recovery.   Stress Findings No ECG evidence of myocardial ischemia.Negative clinical evidence of myocardial ischemia. Findings consistent with a normal ECG stress test      Study Impression Myocardial perfusion imaging indicates a normal myocardial perfusion study with no evidence of ischemia. Impressions are consistent with a low risk study.   Rest Perfusion Defect 1 No rest myocardial perfusion defect noted.   Stress Perfusion Defect 1 No stress myocardial perfusion defect noted.   Ventricle Size / Description Left ventricular ejection fraction is hyperdynamic (Calculated EF > 70%). Normal LV cavity size. Normal LV wall motion noted. Normal RV cavity size.       8/21/2020: Echocardiogram:  · Left ventricular wall thickness is consistent with mild concentric hypertrophy.  · Estimated EF = 65%.  · Left ventricular systolic function is normal.  · Calculated right ventricular systolic pressure from tricuspid regurgitation is 33 mmHg  Review of Systems:     Review of Systems   Constitutional: Positive for fatigue. Negative for activity change, appetite change, diaphoresis, unexpected weight gain and unexpected weight loss.   HENT: Negative.    Eyes: Negative.  Negative  for blurred vision, double vision, photophobia and visual disturbance.   Respiratory: Negative.  Negative for apnea, cough, chest tightness, shortness of breath and wheezing.    Cardiovascular: Positive for chest pain and palpitations. Negative for leg swelling.        History of essential hypertension,   Gastrointestinal: Negative for abdominal distention, abdominal pain, nausea, vomiting, GERD and indigestion.   Endocrine: Negative for cold intolerance, heat intolerance, polydipsia, polyphagia and polyuria.   Genitourinary: Negative.  Negative for decreased libido, frequency, genital sores, hematuria and urgency.   Musculoskeletal: Negative.  Negative for back pain, joint swelling, myalgias, neck pain and neck stiffness.   Skin: Negative.  Negative for color change, pallor, rash and bruise.   Allergic/Immunologic: Negative.  Negative for environmental allergies, food allergies and immunocompromised state.   Neurological: Negative.  Negative for dizziness, tremors, seizures, syncope, facial asymmetry, speech difficulty, weakness, light-headedness and numbness.   Hematological: Negative.  Negative for adenopathy. Does not bruise/bleed easily.   Psychiatric/Behavioral: Negative.  Negative for agitation, decreased concentration, self-injury, sleep disturbance, suicidal ideas, negative for hyperactivity and stress. The patient is not nervous/anxious.    All other systems reviewed and are negative.         Current Outpatient Medications:   •  Calcium Carbonate-Vitamin D (CALCIUM PLUS VITAMIN D PO), Take  by mouth Daily., Disp: , Rfl:   •  cetirizine (zyrTEC) 10 MG tablet, Take 10 mg by mouth Daily., Disp: , Rfl:   •  clobetasol (TEMOVATE) 0.05 % ointment, Apply  topically to the appropriate area as directed Every 12 (Twelve) Hours., Disp: 60 g, Rfl: 3  •  coenzyme Q10 100 MG capsule, Take 100 mg by mouth Daily., Disp: , Rfl:   •  flecainide (TAMBOCOR) 100 MG tablet, Take 100 mg by mouth 2 (Two) Times a Day., Disp: ,  "Rfl:   •  hydroCHLOROthiazide (HYDRODIURIL) 25 MG tablet, Take 1 tablet by mouth Daily., Disp: 30 tablet, Rfl: 11  •  melatonin 5 MG tablet tablet, 5 mg Every Night., Disp: , Rfl:   •  niacin 100 MG tablet, Take 250 mg by mouth 2 (Two) Times a Day With Meals., Disp: , Rfl:   •  Omega-3 Fatty Acids (FISH OIL) 1000 MG capsule capsule, Take 1,200 mg by mouth 2 (Two) Times a Day With Meals., Disp: , Rfl:   •  rivaroxaban (XARELTO) 20 MG tablet, Take 1 tablet by mouth Daily., Disp: 30 tablet, Rfl: 5  •  TRAVATAN Z 0.004 % solution ophthalmic solution, INSTILL 1 DROP INTO EACH EYE NIGHTLY AS DIRECTED, Disp: , Rfl: 6  •  verapamil ER (VERELAN) 360 MG 24 hr capsule, Take 1 capsule by mouth Every Night., Disp: 30 capsule, Rfl: 6  •  hydrALAZINE (APRESOLINE) 10 MG tablet, Take 1 tablet by mouth 2 (two) times a day., Disp: 60 tablet, Rfl: 3    Vitals:    10/16/20 0820   BP: 130/84   BP Location: Left arm   Patient Position: Sitting   Cuff Size: Adult   Pulse: 71   SpO2: 95%   Weight: 76.2 kg (168 lb)   Height: 152.4 cm (60\")       Physical Exam:     Physical Exam  Vitals signs and nursing note reviewed.   Constitutional:       General: She is not in acute distress.     Appearance: Normal appearance. She is well-developed. She is not ill-appearing.   HENT:      Head: Normocephalic and atraumatic.      Nose: Nose normal.      Mouth/Throat:      Mouth: Mucous membranes are moist.   Eyes:      General: No scleral icterus.     Conjunctiva/sclera: Conjunctivae normal.   Neck:      Musculoskeletal: Normal range of motion and neck supple.      Vascular: No carotid bruit or JVD.   Cardiovascular:      Rate and Rhythm: Normal rate and regular rhythm.      Chest Wall: PMI is not displaced.      Heart sounds: Normal heart sounds, S1 normal and S2 normal. No murmur. No friction rub. No gallop.    Pulmonary:      Effort: Pulmonary effort is normal. No respiratory distress.      Breath sounds: Normal breath sounds. No wheezing or rales. "   Chest:      Chest wall: No tenderness.   Abdominal:      General: Bowel sounds are normal.      Palpations: Abdomen is soft. There is no mass.      Tenderness: There is no abdominal tenderness.   Musculoskeletal: Normal range of motion.      Right lower leg: No edema.      Left lower leg: No edema.   Skin:     General: Skin is warm and dry.      Capillary Refill: Capillary refill takes less than 2 seconds.      Findings: No rash.   Neurological:      General: No focal deficit present.      Mental Status: She is alert and oriented to person, place, and time.   Psychiatric:         Mood and Affect: Mood normal.         Behavior: Behavior normal.         Thought Content: Thought content normal.         Judgment: Judgment normal.           ECG 12 Lead    Date/Time: 10/16/2020 12:27 PM  Performed by: Delfino Thomas Jr., APRN  Authorized by: Delfino Thomas Jr., APRN   Comparison: not compared with previous ECG   Rhythm: sinus rhythm  Rate: normal  BPM: 70  Conduction: 1st degree AV block    Clinical impression: abnormal EKG  Comments: Twelve-lead EKG with no acute findings, patient has pacemaker in situ.  Presently on flecainide therapy.            Results:   Vital signs at present, blood pressure 130/84, heart rate 71 regular rate and rhythm.  Reviewed medication regimen.    Assessment/Plan:   Will add Apresoline 10 mg twice daily.  Continue verapamil at 360 mg daily  Will obtain renal artery ultrasound due to labile hypertension.    Diagnoses and all orders for this visit:    1. Essential hypertension (Primary)  At present 130/84.  Will add Apresoline 10 mg p.o. twice daily  Continue verapamil 360 mg daily  2. Mixed hyperlipidemia  Not presently on statin therapy  Omega-3 fatty acid 1000 mg daily  Atorvastatin every other day  3. Palpitations  Patient reports palpitations continue with exertion.  4. Coronary artery disease involving native coronary artery of native heart without angina pectoris  Denies any  anginal type chest pain.  5. Paroxysmal atrial fibrillation (CMS/HCC)  Presently in a sinus paced rhythm  6. Presence of cardiac pacemaker  Stable pacemaker interrogation August 2020  7. Sinus node dysfunction (CMS/HCC)  Pacemaker in situ      Return in about 3 months (around 1/16/2021).    TERRY Andrade

## 2020-10-26 ENCOUNTER — HOSPITAL ENCOUNTER (OUTPATIENT)
Dept: ULTRASOUND IMAGING | Facility: HOSPITAL | Age: 75
End: 2020-10-26

## 2020-10-27 ENCOUNTER — HOSPITAL ENCOUNTER (OUTPATIENT)
Dept: ULTRASOUND IMAGING | Facility: HOSPITAL | Age: 75
Discharge: HOME | End: 2020-10-27
Admitting: NURSE PRACTITIONER

## 2020-10-27 DIAGNOSIS — Z95.0 PRESENCE OF CARDIAC PACEMAKER: ICD-10-CM

## 2020-10-27 DIAGNOSIS — R00.2 PALPITATIONS: ICD-10-CM

## 2020-10-27 DIAGNOSIS — I48.0 PAROXYSMAL ATRIAL FIBRILLATION (HCC): ICD-10-CM

## 2020-10-27 DIAGNOSIS — E78.2 MIXED HYPERLIPIDEMIA: ICD-10-CM

## 2020-10-27 DIAGNOSIS — I49.5 SINUS NODE DYSFUNCTION (HCC): ICD-10-CM

## 2020-10-27 DIAGNOSIS — I25.10 CORONARY ARTERY DISEASE INVOLVING NATIVE CORONARY ARTERY OF NATIVE HEART WITHOUT ANGINA PECTORIS: ICD-10-CM

## 2020-10-27 DIAGNOSIS — I10 ESSENTIAL HYPERTENSION: ICD-10-CM

## 2020-10-27 PROCEDURE — 93975 VASCULAR STUDY: CPT

## 2020-10-29 DIAGNOSIS — E78.2 MIXED HYPERLIPIDEMIA: ICD-10-CM

## 2020-10-29 RX ORDER — ROSUVASTATIN CALCIUM 5 MG/1
TABLET, COATED ORAL
Qty: 30 TABLET | Refills: 1 | Status: SHIPPED | OUTPATIENT
Start: 2020-10-29 | End: 2020-11-05 | Stop reason: ALTCHOICE

## 2020-11-04 ENCOUNTER — TELEPHONE (OUTPATIENT)
Dept: CARDIOLOGY | Facility: CLINIC | Age: 75
End: 2020-11-04

## 2020-11-04 DIAGNOSIS — E78.2 MIXED HYPERLIPIDEMIA: ICD-10-CM

## 2020-11-04 DIAGNOSIS — I34.1 MVP (MITRAL VALVE PROLAPSE): ICD-10-CM

## 2020-11-04 DIAGNOSIS — I48.0 PAROXYSMAL ATRIAL FIBRILLATION (HCC): ICD-10-CM

## 2020-11-04 DIAGNOSIS — I25.10 CORONARY ARTERY DISEASE INVOLVING NATIVE CORONARY ARTERY OF NATIVE HEART WITHOUT ANGINA PECTORIS: ICD-10-CM

## 2020-11-04 DIAGNOSIS — I10 ESSENTIAL HYPERTENSION: ICD-10-CM

## 2020-11-04 DIAGNOSIS — R00.2 PALPITATIONS: ICD-10-CM

## 2020-11-04 DIAGNOSIS — I25.10 CORONARY ARTERY DISEASE INVOLVING NATIVE CORONARY ARTERY OF NATIVE HEART WITHOUT ANGINA PECTORIS: Primary | ICD-10-CM

## 2020-11-16 ENCOUNTER — OFFICE VISIT (OUTPATIENT)
Dept: CARDIOLOGY | Facility: CLINIC | Age: 75
End: 2020-11-16

## 2020-11-16 VITALS
HEART RATE: 61 BPM | WEIGHT: 170 LBS | OXYGEN SATURATION: 95 % | HEIGHT: 60 IN | BODY MASS INDEX: 33.38 KG/M2 | SYSTOLIC BLOOD PRESSURE: 120 MMHG | DIASTOLIC BLOOD PRESSURE: 80 MMHG

## 2020-11-16 DIAGNOSIS — R00.2 PALPITATIONS: ICD-10-CM

## 2020-11-16 DIAGNOSIS — I48.0 PAROXYSMAL ATRIAL FIBRILLATION (HCC): ICD-10-CM

## 2020-11-16 DIAGNOSIS — Z95.0 CARDIAC PACEMAKER IN SITU: ICD-10-CM

## 2020-11-16 DIAGNOSIS — I10 ESSENTIAL HYPERTENSION: ICD-10-CM

## 2020-11-16 DIAGNOSIS — R06.09 DYSPNEA ON EXERTION: ICD-10-CM

## 2020-11-16 DIAGNOSIS — E78.5 DYSLIPIDEMIA: ICD-10-CM

## 2020-11-16 DIAGNOSIS — I49.5 SINUS NODE DYSFUNCTION (HCC): ICD-10-CM

## 2020-11-16 DIAGNOSIS — I25.10 CORONARY ARTERY DISEASE INVOLVING NATIVE CORONARY ARTERY OF NATIVE HEART WITHOUT ANGINA PECTORIS: Primary | ICD-10-CM

## 2020-11-16 PROCEDURE — 93000 ELECTROCARDIOGRAM COMPLETE: CPT | Performed by: INTERNAL MEDICINE

## 2020-11-16 PROCEDURE — 93280 PM DEVICE PROGR EVAL DUAL: CPT | Performed by: INTERNAL MEDICINE

## 2020-11-16 PROCEDURE — 99214 OFFICE O/P EST MOD 30 MIN: CPT | Performed by: INTERNAL MEDICINE

## 2020-11-16 RX ORDER — FERROUS SULFATE 325(65) MG
325 TABLET ORAL
COMMUNITY
End: 2021-01-18

## 2020-11-16 RX ORDER — POTASSIUM CHLORIDE 20 MEQ/1
20 TABLET, EXTENDED RELEASE ORAL 2 TIMES DAILY
Qty: 60 TABLET | Refills: 11 | Status: SHIPPED | OUTPATIENT
Start: 2020-11-16 | End: 2021-02-01

## 2020-11-16 RX ORDER — BEMPEDOIC ACID AND EZETIMIBE 180; 10 MG/1; MG/1
TABLET, FILM COATED ORAL DAILY
COMMUNITY
End: 2020-12-09

## 2020-11-16 NOTE — PROGRESS NOTES
"    Subjective:     Encounter Date:11/16/2020      Patient ID: Monica Perea is a 75 y.o. female.    Chief Complaint: Fatigue  HPI  This is a 75-year-old female patient who presents to cardiology clinic for routine follow-up and pacemaker interrogation.  The patient has a normally functioning [employer] dual-chamber rate responsive pacemaker L311;DDD-r:60.  The patient is paced 90% of the time in the right atrium and 28% of the time in the right ventricle.  Right atrial lead interrogation shows pacing down to 30 bpm.  The pacing threshold is 0.9 V at 0.4 ms.  Lead impedance is 775 ohms.  Right ventricular lead interrogation shows R wave sensing of 14.9 mV with a pacing threshold of 1.0 V at 0.4 ms.  Lead impedance is 852 ohms.  The patient has an estimated 8.5 years of generator longevity.  She is actively transmitting.  She has had no mode switches or high heart rate events.  The patient has aggressive in the rate response based on respiratory motion.  She has been decreased to 9 secondary to ongoing complaints of fast heart rate with minimal exertion.  The patient indicates that she has been experiencing profound fatigue.  She felt that this was due to her verapamil therapy.  The patient indicates that without consulting any physician she discontinued her verapamil for 5 days.  However, the patient indicates that she felt that her heart was \"racing\" and she resumed therapy.  The patient has discontinued statin based cholesterol-lowering therapy.  She has refused consideration for PCSK9 inhibitor therapy.  Overall, cholesterol-lowering therapy is of lesser concern as her LDL cholesterol is only 77 mg/dL and her HDL cholesterol is approximately 90 mg/dL.  The patient has no chest discomfort at rest or with activity.  There is no exertional chest arm neck jaw shoulder or back discomfort.  There is no orthopnea PND or lower extremity edema.  There is no dizziness or syncope.  The following portions of " the patient's history were reviewed and updated as appropriate: allergies, current medications, past family history, past medical history, past social history, past surgical history and problem  Review of Systems   Constitution: Positive for malaise/fatigue. Negative for chills, diaphoresis, fever, weight gain and weight loss.   HENT: Negative for ear discharge, hearing loss, hoarse voice and nosebleeds.    Eyes: Negative for discharge, double vision, pain and photophobia.   Cardiovascular: Positive for dyspnea on exertion and palpitations. Negative for chest pain, claudication, cyanosis, irregular heartbeat, leg swelling, near-syncope, orthopnea, paroxysmal nocturnal dyspnea and syncope.   Respiratory: Negative for cough, hemoptysis, shortness of breath, sputum production and wheezing.    Endocrine: Negative for cold intolerance, heat intolerance, polydipsia, polyphagia and polyuria.   Hematologic/Lymphatic: Negative for adenopathy and bleeding problem. Does not bruise/bleed easily.   Skin: Negative for color change, flushing, itching and rash.   Musculoskeletal: Negative for muscle cramps, muscle weakness, myalgias and stiffness.   Gastrointestinal: Negative for abdominal pain, diarrhea, hematemesis, hematochezia, nausea and vomiting.   Genitourinary: Negative for dysuria, frequency and nocturia.   Neurological: Negative for focal weakness, loss of balance, numbness, paresthesias and seizures.   Psychiatric/Behavioral: Negative for altered mental status, hallucinations and suicidal ideas.   Allergic/Immunologic: Negative for HIV exposure, hives and persistent infections.           Current Outpatient Medications:   •  Bempedoic Acid-Ezetimibe (Nexlizet) 180-10 MG tablet, Take  by mouth Daily., Disp: , Rfl:   •  Calcium Carbonate-Vitamin D (CALCIUM PLUS VITAMIN D PO), Take  by mouth Daily., Disp: , Rfl:   •  cetirizine (zyrTEC) 10 MG tablet, Take 10 mg by mouth Daily., Disp: , Rfl:   •  Cholecalciferol (VITAMIN D3  PO), Take  by mouth., Disp: , Rfl:   •  clobetasol (TEMOVATE) 0.05 % ointment, Apply  topically to the appropriate area as directed Every 12 (Twelve) Hours., Disp: 60 g, Rfl: 3  •  coenzyme Q10 100 MG capsule, Take 100 mg by mouth Daily., Disp: , Rfl:   •  ferrous sulfate 325 (65 FE) MG tablet, Take 325 mg by mouth Daily With Breakfast., Disp: , Rfl:   •  flecainide (TAMBOCOR) 100 MG tablet, Take 100 mg by mouth 2 (Two) Times a Day., Disp: , Rfl:   •  hydrALAZINE (APRESOLINE) 10 MG tablet, Take 1 tablet by mouth 2 (two) times a day., Disp: 60 tablet, Rfl: 3  •  hydroCHLOROthiazide (HYDRODIURIL) 25 MG tablet, Take 1 tablet by mouth Daily., Disp: 30 tablet, Rfl: 11  •  melatonin 5 MG tablet tablet, 5 mg Every Night., Disp: , Rfl:   •  Omega-3 Fatty Acids (FISH OIL) 1000 MG capsule capsule, Take 1,200 mg by mouth 2 (Two) Times a Day With Meals., Disp: , Rfl:   •  rivaroxaban (XARELTO) 20 MG tablet, Take 1 tablet by mouth Daily., Disp: 30 tablet, Rfl: 5  •  TRAVATAN Z 0.004 % solution ophthalmic solution, INSTILL 1 DROP INTO EACH EYE NIGHTLY AS DIRECTED, Disp: , Rfl: 6  •  verapamil ER (VERELAN) 360 MG 24 hr capsule, Take 1 capsule by mouth Every Night., Disp: 30 capsule, Rfl: 6  •  Zinc Sulfate (ZINC 15 PO), Take  by mouth., Disp: , Rfl:     Objective:   Vitals signs and nursing note reviewed.   Constitutional:       Appearance: Healthy appearance. Not in distress.   Neck:      Vascular: No JVR. JVD normal.   Pulmonary:      Effort: Pulmonary effort is normal.      Breath sounds: Normal breath sounds. No wheezing. No rhonchi. No rales.   Chest:      Chest wall: Not tender to palpatation.   Cardiovascular:      PMI at left midclavicular line. Normal rate. Regular rhythm. Normal S1. Normal S2.      Murmurs: There is no murmur.      No gallop. No click. No rub.   Pulses:     Intact distal pulses.   Edema:     Peripheral edema absent.   Abdominal:      General: Bowel sounds are normal.      Palpations: Abdomen is soft.      " Tenderness: There is no abdominal tenderness.   Musculoskeletal: Normal range of motion.         General: No tenderness.      Extremities: No clubbing present.  Skin:     General: Skin is warm and dry. There is no cyanosis.      Coloration: Skin is not pale.   Neurological:      General: No focal deficit present.      Mental Status: Alert and oriented to person, place and time.       Blood pressure 120/80, pulse 61, height 152.4 cm (60\"), weight 77.1 kg (170 lb), SpO2 95 %, not currently breastfeeding.   Lab Review:     Assessment:       1. Coronary artery disease involving native coronary artery of native heart without angina pectoris  This is a presumptive diagnosis as the patient has had no documented coronary artery disease or ischemic events.  The patient's last 3 vasodilator nuclear stress test have shown no evidence of ischemia or infarction.    2. Palpitations  The patient has ongoing palpitations.  Interrogation of her pacemaker histogram shows no evidence of recurrent atrial fibrillation over the last 6 months.  She has had no mode switches or high heart rate events.  There is concerned that her exertional palpitations may be due to an overaggressive setting on her \"rate response\" sensor.  This has been reprogrammed by the pacemaker representative.    3. Paroxysmal atrial fibrillation (CMS/HCC)  No evidence of recurrent arrhythmia.    4. Essential hypertension  Excellent blood pressure control.  The patient has significant hypokalemia related to her thiazide-type diuretic therapy.  I suspect this is more likely the culprit for her myalgias than statin based cholesterol-lowering therapy.    5. Sinus node dysfunction (CMS/HCC)  Normal pacemaker function.    6. Dyspnea on exertion  Multiple prior vasodilator nuclear stress test shows no evidence of inducible ischemia.  I suspect that at least some of her symptoms are side effects related to heart rate and blood pressure control medications.  This is almost " "certainly the explanation for her fatigue.  Some of the patient's dyspnea with activity is certainly due to hypertensive related cardiac disease.  Her echocardiogram demonstrates concentric left ventricular hypertrophy with grade 1 diastolic dysfunction at rest with no evidence of elevated resting mean left atrial pressure.  The patient has noted that her blood pressure typically goes up with activity and I suspect that with exertion she probably has some increase in her mean left atrial pressure.    7. Dyslipidemia  The patient refuses to take statin based cholesterol-lowering therapy or consider the option of PCSK9 inhibitor therapy.  This is of lesser concern as her LDL cholesterol is 77 mg/dL with an HDL cholesterol of 87 mg/dL.  I would not press the issue of cholesterol-lowering therapy further.      ECG 12 Lead    Date/Time: 11/16/2020 11:44 AM  Performed by: Tico Charles MD  Authorized by: Tico Charles MD   Comparison: compared with previous ECG   Similar to previous ECG  Rhythm: paced  Rate: normal  QRS axis: normal    Clinical impression: abnormal EKG            Plan:     The patient has been educated that all medications for heart rate control will result in fatigue as a side effect.  She has been advised that this is the anticipated \"trade-off\" in order to keep her heart rate controlled.  The patient has been counseled to eat foods rich in potassium with specific examples given.  I have recommended starting K. Dur 20 mEq orally twice per day.  No additional cardiovascular testing is warranted.  The patient has been educated that the key to minimizing her symptoms is adequate blood pressure control.  The patient has been counseled regarding the importance of sodium restriction.  She has been educated that diuretic therapy for treatment of hypertension and hypertensive related cardiac disease does not work except in the context of fluid and sodium restriction.  I have recommended a less than " 1.5 L/day fluid restriction and a less than 1200 mg/day sodium restriction.  She has been counseled regarding foods and beverages that are high in sodium content with instructions to avoid these.

## 2020-11-18 ENCOUNTER — TELEPHONE (OUTPATIENT)
Dept: CARDIOLOGY | Facility: CLINIC | Age: 75
End: 2020-11-18

## 2020-11-18 NOTE — TELEPHONE ENCOUNTER
Patient states that her B/P is running 146/107,148/91,155/82 (patient states she took the B/P readings 10 minutes apart). Patient also thinks it has something to do with getting her pacemaker adjusted at her last visit.  Advise.

## 2020-11-18 NOTE — TELEPHONE ENCOUNTER
No... she is wrong about that. Have her take her BP every 1/2 hour and keep a diary. Taking her BP every 3 minutes is too often. Detail Level: Detailed

## 2020-11-24 RX ORDER — VERAPAMIL HYDROCHLORIDE 180 MG/1
360 CAPSULE, EXTENDED RELEASE ORAL DAILY
Qty: 180 CAPSULE | Refills: 3 | OUTPATIENT
Start: 2020-11-24 | End: 2020-12-21 | Stop reason: ALTCHOICE

## 2020-12-02 DIAGNOSIS — I48.0 PAROXYSMAL ATRIAL FIBRILLATION (HCC): ICD-10-CM

## 2020-12-02 DIAGNOSIS — R00.2 PALPITATIONS: ICD-10-CM

## 2020-12-02 DIAGNOSIS — I25.10 CORONARY ARTERY DISEASE INVOLVING NATIVE CORONARY ARTERY OF NATIVE HEART WITHOUT ANGINA PECTORIS: ICD-10-CM

## 2020-12-02 DIAGNOSIS — I34.1 MVP (MITRAL VALVE PROLAPSE): ICD-10-CM

## 2020-12-09 ENCOUNTER — OFFICE VISIT (OUTPATIENT)
Dept: INTERNAL MEDICINE | Facility: CLINIC | Age: 75
End: 2020-12-09

## 2020-12-09 ENCOUNTER — HOSPITAL ENCOUNTER (OUTPATIENT)
Dept: MAMMOGRAPHY | Facility: HOSPITAL | Age: 75
Discharge: HOME | End: 2020-12-09
Admitting: INTERNAL MEDICINE

## 2020-12-09 VITALS
OXYGEN SATURATION: 98 % | SYSTOLIC BLOOD PRESSURE: 118 MMHG | WEIGHT: 167.8 LBS | DIASTOLIC BLOOD PRESSURE: 84 MMHG | BODY MASS INDEX: 32.94 KG/M2 | HEIGHT: 60 IN | TEMPERATURE: 97.5 F | HEART RATE: 62 BPM

## 2020-12-09 DIAGNOSIS — E78.2 MIXED HYPERLIPIDEMIA: ICD-10-CM

## 2020-12-09 DIAGNOSIS — Z12.31 VISIT FOR SCREENING MAMMOGRAM: ICD-10-CM

## 2020-12-09 DIAGNOSIS — I10 ESSENTIAL HYPERTENSION: Primary | ICD-10-CM

## 2020-12-09 DIAGNOSIS — F41.9 ANXIETY: ICD-10-CM

## 2020-12-09 DIAGNOSIS — Z71.85 VACCINE COUNSELING: ICD-10-CM

## 2020-12-09 DIAGNOSIS — Z23 NEED FOR VACCINATION: ICD-10-CM

## 2020-12-09 DIAGNOSIS — I48.0 PAROXYSMAL ATRIAL FIBRILLATION (HCC): ICD-10-CM

## 2020-12-09 DIAGNOSIS — F32.A DEPRESSION, UNSPECIFIED DEPRESSION TYPE: ICD-10-CM

## 2020-12-09 DIAGNOSIS — M79.89 LEG SWELLING: ICD-10-CM

## 2020-12-09 DIAGNOSIS — R00.2 PALPITATIONS: ICD-10-CM

## 2020-12-09 PROCEDURE — 77067 SCR MAMMO BI INCL CAD: CPT | Performed by: RADIOLOGY

## 2020-12-09 PROCEDURE — 77063 BREAST TOMOSYNTHESIS BI: CPT

## 2020-12-09 PROCEDURE — 90732 PPSV23 VACC 2 YRS+ SUBQ/IM: CPT | Performed by: INTERNAL MEDICINE

## 2020-12-09 PROCEDURE — 77063 BREAST TOMOSYNTHESIS BI: CPT | Performed by: RADIOLOGY

## 2020-12-09 PROCEDURE — 99214 OFFICE O/P EST MOD 30 MIN: CPT | Performed by: INTERNAL MEDICINE

## 2020-12-09 PROCEDURE — G0009 ADMIN PNEUMOCOCCAL VACCINE: HCPCS | Performed by: INTERNAL MEDICINE

## 2020-12-09 PROCEDURE — 77067 SCR MAMMO BI INCL CAD: CPT

## 2020-12-09 RX ORDER — PNEUMOCOCCAL VACCINE POLYVALENT 25; 25; 25; 25; 25; 25; 25; 25; 25; 25; 25; 25; 25; 25; 25; 25; 25; 25; 25; 25; 25; 25; 25 UG/.5ML; UG/.5ML; UG/.5ML; UG/.5ML; UG/.5ML; UG/.5ML; UG/.5ML; UG/.5ML; UG/.5ML; UG/.5ML; UG/.5ML; UG/.5ML; UG/.5ML; UG/.5ML; UG/.5ML; UG/.5ML; UG/.5ML; UG/.5ML; UG/.5ML; UG/.5ML; UG/.5ML; UG/.5ML; UG/.5ML
0.5 INJECTION, SOLUTION INTRAMUSCULAR; SUBCUTANEOUS ONCE
Qty: 0.5 ML | Refills: 0 | Status: SHIPPED | OUTPATIENT
Start: 2020-12-09 | End: 2020-12-09

## 2020-12-09 RX ORDER — ALPRAZOLAM 0.5 MG/1
0.5 TABLET ORAL 2 TIMES DAILY PRN
Qty: 20 TABLET | Refills: 0 | Status: SHIPPED | OUTPATIENT
Start: 2020-12-09 | End: 2021-01-18

## 2020-12-09 NOTE — PROGRESS NOTES
Subjective   Monica Perea is a 75 y.o. female.     Chief Complaint   Patient presents with   • Hypertension     2 mo f/u    • Hyperlipidemia       History of Present Illness   Patient here for follow-up.  Hypertension stable on medication.  Hyperlipidemia stable on medication.  Anxiety stable occasional panic attack patient requests refill for Xanax.  Depression stable on medication.  Leg swelling resolved after medication.  The patient still complains palpitation with minimal exertion patient does have atrial fibrillation with pacemaker.    Current Outpatient Medications:   •  Bempedoic Acid-Ezetimibe 180-10 MG tablet, Take 1 tablet by mouth Daily., Disp: 30 tablet, Rfl: 6  •  Calcium Carbonate-Vitamin D (CALCIUM PLUS VITAMIN D PO), Take  by mouth Daily., Disp: , Rfl:   •  cetirizine (zyrTEC) 10 MG tablet, Take 10 mg by mouth Daily., Disp: , Rfl:   •  Cholecalciferol (VITAMIN D3 PO), Take  by mouth., Disp: , Rfl:   •  clobetasol (TEMOVATE) 0.05 % ointment, Apply  topically to the appropriate area as directed Every 12 (Twelve) Hours., Disp: 60 g, Rfl: 3  •  coenzyme Q10 100 MG capsule, Take 100 mg by mouth Daily., Disp: , Rfl:   •  ferrous sulfate 325 (65 FE) MG tablet, Take 325 mg by mouth Daily With Breakfast., Disp: , Rfl:   •  flecainide (TAMBOCOR) 100 MG tablet, Take 100 mg by mouth 2 (Two) Times a Day., Disp: , Rfl:   •  hydrALAZINE (APRESOLINE) 10 MG tablet, Take 1 tablet by mouth 2 (two) times a day., Disp: 60 tablet, Rfl: 3  •  hydroCHLOROthiazide (HYDRODIURIL) 25 MG tablet, Take 1 tablet by mouth Daily., Disp: 30 tablet, Rfl: 11  •  melatonin 5 MG tablet tablet, 5 mg Every Night., Disp: , Rfl:   •  Omega-3 Fatty Acids (FISH OIL) 1000 MG capsule capsule, Take 1,200 mg by mouth 2 (Two) Times a Day With Meals., Disp: , Rfl:   •  potassium chloride (K-DUR,KLOR-CON) 20 MEQ CR tablet, Take 1 tablet by mouth 2 (Two) Times a Day., Disp: 60 tablet, Rfl: 11  •  rivaroxaban (XARELTO) 20 MG tablet, Take 1  tablet by mouth Daily., Disp: 30 tablet, Rfl: 5  •  TRAVATAN Z 0.004 % solution ophthalmic solution, INSTILL 1 DROP INTO EACH EYE NIGHTLY AS DIRECTED, Disp: , Rfl: 6  •  verapamil ER (VERELAN) 180 MG 24 hr capsule, Take 2 capsules by mouth Daily., Disp: 180 capsule, Rfl: 3  •  Zinc Sulfate (ZINC 15 PO), Take  by mouth., Disp: , Rfl:     The following portions of the patient's history were reviewed and updated as appropriate: allergies, current medications, past family history, past medical history, past social history, past surgical history and problem list.    Review of Systems   Constitutional: Negative.    Respiratory: Negative.    Cardiovascular: Negative.    Gastrointestinal: Negative.    Musculoskeletal: Negative.    Skin: Negative.    Neurological: Negative.    Psychiatric/Behavioral: Negative.        Objective   Physical Exam  Neck:      Musculoskeletal: Neck supple.   Cardiovascular:      Rate and Rhythm: Normal rate and regular rhythm.      Heart sounds: Normal heart sounds.   Pulmonary:      Effort: Pulmonary effort is normal.      Breath sounds: Normal breath sounds.   Abdominal:      General: Bowel sounds are normal.   Skin:     General: Skin is warm.   Neurological:      Mental Status: She is alert and oriented to person, place, and time.         All tests have been reviewed.    Assessment/Plan   Diagnoses and all orders for this visit:    Essential hypertension cont med    Mixed hyperlipidemia cont med    Anxiety cont med, refill xanax prn use average 1 a month    Depression,stable     Leg swelling resolved on med    Paroxysmal atrial fibrillation (CMS/HCC) cont med    Palpitations follow up with cardio     4 months          ----Below is to historical records for reference only:      Cad MILD BLOCKAGE 2009 30%.  stress test normal, follow up with cardio,   Colon 7/18/16: Left-sided diverticulosis. Colon polyps. Internal hemorrhoids. Repeat 5 year       Diverticulitis hx.   Failed crestor and  lipitor   Pneumovax 2015,and zostavax and shingrix refused and explained risks, PNEUMOVAX TODAY   Psoriasis refill clobetasole   Knees pain, OA probable, glucosamine continue   Foot fungal infection s/p med by derm  Interstitial cystitis UA normal follow uro   leg swelling

## 2020-12-21 ENCOUNTER — OFFICE VISIT (OUTPATIENT)
Dept: CARDIOLOGY | Facility: CLINIC | Age: 75
End: 2020-12-21

## 2020-12-21 VITALS
WEIGHT: 168.4 LBS | DIASTOLIC BLOOD PRESSURE: 78 MMHG | BODY MASS INDEX: 33.06 KG/M2 | HEART RATE: 75 BPM | SYSTOLIC BLOOD PRESSURE: 120 MMHG | OXYGEN SATURATION: 97 % | TEMPERATURE: 98 F | HEIGHT: 60 IN

## 2020-12-21 DIAGNOSIS — I48.0 PAROXYSMAL ATRIAL FIBRILLATION (HCC): ICD-10-CM

## 2020-12-21 DIAGNOSIS — I34.1 MVP (MITRAL VALVE PROLAPSE): ICD-10-CM

## 2020-12-21 DIAGNOSIS — I10 ESSENTIAL HYPERTENSION: Primary | ICD-10-CM

## 2020-12-21 DIAGNOSIS — G47.33 OBSTRUCTIVE SLEEP APNEA SYNDROME: ICD-10-CM

## 2020-12-21 DIAGNOSIS — I10 ESSENTIAL HYPERTENSION: ICD-10-CM

## 2020-12-21 DIAGNOSIS — Z95.0 PRESENCE OF CARDIAC PACEMAKER: ICD-10-CM

## 2020-12-21 DIAGNOSIS — I49.5 SINUS NODE DYSFUNCTION (HCC): Primary | ICD-10-CM

## 2020-12-21 PROCEDURE — 99214 OFFICE O/P EST MOD 30 MIN: CPT | Performed by: INTERNAL MEDICINE

## 2020-12-21 PROCEDURE — 93280 PM DEVICE PROGR EVAL DUAL: CPT | Performed by: INTERNAL MEDICINE

## 2020-12-21 NOTE — TELEPHONE ENCOUNTER
Patient states tablet is cheaper and is working the best for her. Ok'd by Dr. Charles Rx sent to [facility]

## 2020-12-21 NOTE — PROGRESS NOTES
Cardiac Electrophysiology Outpatient Follow Up Note            [facility]    Follow Up Office Visit      Monica Perea  [id number]  12/21/2020  [unfilled]  [unfilled]    Primary Care Physician: Abdiel Juan MD    Referred By: No ref. provider found    Subjective     Chief Complaint: Sinus no dysfunction paroxysmal atrial fibrillation hypertension dyslipidemia  Chief Complaint   Patient presents with   • Sinus Node Dysfunction     Problem List:       1. Sinus node dysfunction  a. [employer] dual-chamber permanent pacemaker implant 4/28/2020  2. Paroxysmal Atrial Fibrillation   a. CHADSVASc = 2  b. Anticoagulated with Xarelto   c. ECHO 3/7/2019 EF 69%, mild MR / TR, LA 3.1 cm  d. Stress MPS 1/23/2020 without evidence of ischemia, EF 70%  e. 14 day Monitor Feb 2020 w/ HR , avg 62 bpm, AFib burden < 1%, 7 pauses > 3 sec and up to 6.6 sec in duration.   3. Essential Hypertension   4. Dyslipidemia    5. MOON     History of Present Illness:   Monica Perea is a 75 y.o. female who presents to my electrophysiology clinic for follow up of the above complaints.  Overall she think she is doing well.  She does note that when she bends over or goes for a walk her heart rate picks up a little bit too much for her taste she says.  She gets fatigued when exercising or exerting herself but she thinks that she is out of shape.  No chest pain nausea vomit fevers or chills syncope presyncope.  Her blood pressure varies at home.  She follows with this with Dr. Charles closely.      Review of Systems:   Constitutional: No fevers or chills, no recent weight gain or weight loss or fatigue  Eyes: No visual loss, blurred vision, double vision, yellow sclerae.  ENT: No headaches, hearing loss, vertigo, congestion or sore throat.   Cardiovascular: Per HPI  Respiratory: No cough or wheezing, no sputum production, no hematemesis   Gastrointestinal: No  abdominal pain, no nausea, vomiting, constipation, diarrhea, melena.   Genitourinary: No dysuria, hematuria or increased frequency.  Musculoskeletal:  No gait disturbance, weakness or joint pain or stiffness  Integumentary: No rashes, urticaria, ulcers or sores.   Neurological: No headache, dizziness, syncope, paralysis, ataxia, no prior CVA/TIA  Psychiatric: No anxiety, or depression  Endocrine: No diaphoresis, cold or heat intolerance. No polyuria or polydipsia.   Hematologic/Lymphatic: No anemia, abnormal bruising or bleeding. No history of DVT/PE.    Past Medical History:   Past Medical History:   Diagnosis Date   • Anemia 2008   • B12 deficiency 2008   • Back pain 2010   • CAD (coronary artery disease)    • Cellulitis of foot    • Dyspnea    • GERD (gastroesophageal reflux disease)    • Glaucoma 2016   • Hematuria    • History of mammogram    • Hypercholesteremia 2005   • Hypertension    • Low back pain    • Menopause    • Neck strain    • Osteopenia    • Overweight    • Palpitations    • Postmenopausal atrophic vaginitis    • Sleep apnea 2012    discontinued CPAP use 2 months ago   • Tinea pedis        Past Surgical History:   Past Surgical History:   Procedure Laterality Date   • APPENDECTOMY  1964   • BREAST EXCISIONAL BIOPSY Bilateral 1990'S   • CARDIAC ELECTROPHYSIOLOGY PROCEDURE N/A 4/28/2020    Procedure: Pacemaker DC new. [facility] [place]  Surgeon: Villa Thomas DO;  Location: [facility] INVASIVE LOCATION;  Service: Cardiology;  Laterality: N/A;   • COLONOSCOPY          • HYSTERECTOMY  2000    total    • OOPHORECTOMY Bilateral 2000   • TUBAL ABDOMINAL LIGATION  1974       Family History:   Family History   Problem Relation Age of Onset   • Heart attack Other    • Arthritis Other    • Diabetes Other    • Hypertension Other    • Hodgkin's lymphoma Other    • Esophagitis Mother    • Heart failure Mother    • Esophagitis Maternal Aunt    • Breast cancer Maternal Aunt         DX AGE 50'S   • Colon cancer Maternal  Grandmother 49   • Heart attack Father    • Breast cancer Sister 72   • Ovarian cancer Sister 74   • Esophageal cancer Neg Hx    • Stomach cancer Neg Hx    • Liver cancer Neg Hx    • Liver disease Neg Hx        Social History:   Social History     Socioeconomic History   • Marital status:      Spouse name: Not on file   • Number of children: Not on file   • Years of education: Not on file   • Highest education level: Not on file   Tobacco Use   • Smoking status: Former Smoker     Quit date:      Years since quittin.9   • Smokeless tobacco: Never Used   Substance and Sexual Activity   • Alcohol use: No     Comment: quit in    • Drug use: No   • Sexual activity: Defer       Medications:     Current Outpatient Medications:   •  ALPRAZolam (Xanax) 0.5 MG tablet, Take 1 tablet by mouth 2 (Two) Times a Day As Needed for Anxiety., Disp: 20 tablet, Rfl: 0  •  Bempedoic Acid-Ezetimibe 180-10 MG tablet, Take 1 tablet by mouth Daily., Disp: 30 tablet, Rfl: 6  •  Calcium Carbonate-Vitamin D (CALCIUM PLUS VITAMIN D PO), Take  by mouth Daily., Disp: , Rfl:   •  cetirizine (zyrTEC) 10 MG tablet, Take 10 mg by mouth Daily., Disp: , Rfl:   •  Cholecalciferol (Vitamin D3) 125 MCG (5000 UT) tablet dispersible, Take 5,000 Units by mouth Daily., Disp: , Rfl:   •  clobetasol (TEMOVATE) 0.05 % ointment, Apply  topically to the appropriate area as directed Every 12 (Twelve) Hours., Disp: 60 g, Rfl: 3  •  coenzyme Q10 100 MG capsule, Take 100 mg by mouth Daily., Disp: , Rfl:   •  ferrous sulfate 325 (65 FE) MG tablet, Take 325 mg by mouth Daily With Breakfast., Disp: , Rfl:   •  flecainide (TAMBOCOR) 100 MG tablet, Take 100 mg by mouth 2 (Two) Times a Day., Disp: , Rfl:   •  hydrALAZINE (APRESOLINE) 10 MG tablet, Take 1 tablet by mouth 2 (two) times a day. (Patient taking differently: Take 10 mg by mouth 3 (Three) Times a Day.), Disp: 60 tablet, Rfl: 3  •  hydroCHLOROthiazide (HYDRODIURIL) 25 MG tablet, Take 1 tablet by  "mouth Daily., Disp: 30 tablet, Rfl: 11  •  melatonin 5 MG tablet tablet, 5 mg Every Night., Disp: , Rfl:   •  Omega-3 Fatty Acids (FISH OIL) 1000 MG capsule capsule, Take 1,200 mg by mouth 2 (Two) Times a Day With Meals., Disp: , Rfl:   •  potassium chloride (K-DUR,KLOR-CON) 20 MEQ CR tablet, Take 1 tablet by mouth 2 (Two) Times a Day., Disp: 60 tablet, Rfl: 11  •  rivaroxaban (XARELTO) 20 MG tablet, Take 1 tablet by mouth Daily., Disp: 30 tablet, Rfl: 5  •  TRAVATAN Z 0.004 % solution ophthalmic solution, INSTILL 1 DROP INTO EACH EYE NIGHTLY AS DIRECTED, Disp: , Rfl: 6  •  verapamil ER (VERELAN) 180 MG 24 hr capsule, Take 2 capsules by mouth Daily., Disp: 180 capsule, Rfl: 3  •  Zinc Sulfate (ZINC 15 PO), Take  by mouth., Disp: , Rfl:     Allergies:   Allergies   Allergen Reactions   • Macrobid [Nitrofurantoin Monohyd Macro] Rash   • Nitrofurantoin Hives   • Phenylephrine-Guaifenesin Hives   • Sulfa Antibiotics Hives   • Statins Myalgia       Objective     Physical Exam:  Vital Signs:   Vitals:    12/21/20 1033   BP: 120/78   BP Location: Left arm   Patient Position: Sitting   Pulse: 75   Temp: 98 °F (36.7 °C)   SpO2: 97%   Weight: 76.4 kg (168 lb 6.4 oz)   Height: 152.4 cm (60\")     GEN: Well nourished, well-developed, no acute distress  HEENT: Normocephalic, atraumatic, moist mucous membranes  NECK: Supple, no JVD, no thyromegaly, no lymphadenopathy  CARD: Regular rate and rhythm, normal S1 & S2 are present.  No murmur, gallop or rubs are appreciated.  LUNGS: Clear to auscultation bilateraly, normal respiratory effort  ABDOMEN: Soft, nontender, normal bowel sounds  EXTREMITIES: No gross deformities, no clubbing, cyanosis.  Edema none  SKIN: Warm, dry  NEURO: No focal deficits, alert and oriented x 3  PSYCHIATRIC: Normal affect and mood, appropriate use of semantics and logic.        Lab Results   Component Value Date    GLUCOSE 107 (H) 04/28/2020    CALCIUM 9.4 10/02/2020     10/02/2020    K 3.9 10/02/2020 "    CO2 29.1 (H) 10/02/2020     10/02/2020    BUN 12 10/02/2020    CREATININE 1.02 (H) 10/02/2020    EGFRIFAFRI 64 10/02/2020    EGFRIFNONA 53 (L) 10/02/2020    BCR 11.8 10/02/2020    ANIONGAP 12.0 04/28/2020     Lab Results   Component Value Date    WBC 4.05 10/02/2020    HGB 11.7 (L) 10/02/2020    HCT 36.2 10/02/2020    MCV 89.4 10/02/2020     10/02/2020     No results found for: INR, PROTIME  Lab Results   Component Value Date    TSH 2.030 06/30/2020       Cardiac Testing:     I personally viewed and interpreted the patient's EKG/Telemetry/lab data    Procedures    Tobacco Cessation: N/A  Obstructive Sleep Apnea Screening: N/A    Assessment & Plan        Diagnoses and all orders for this visit:    1. Sinus node dysfunction (CMS/HCC) (Primary)    2. Presence of cardiac pacemaker    3. Paroxysmal atrial fibrillation (CMS/HCC)    4. MVP (mitral valve prolapse)    5. Essential hypertension    6. Obstructive sleep apnea syndrome         Diagnosis Plan   1. Sinus node dysfunction (CMS/HCC)   [employer] dual-chamber permanent pacemaker in situ.  Appropriately programmed.  She is a little bit overly aggressive rate response programmed.  We will decrease the sensor activity slightly.  I think this will improve her symptoms.  Overall pacemaker function is normal.  Adequate pacing sensing impedance values pacing threshold and sensing thresholds are also adequate.  Normal device function.   2. Presence of cardiac pacemaker   pacemaker function normal as outlined above.   3. Paroxysmal atrial fibrillation (CMS/HCC)   appropriate anticoagulation for the primary prevention of stroke.  Symptoms beautifully suppressed with flecainide and verapamil.  Continue present therapy.   4. MVP (mitral valve prolapse)   no murmur observed today.  Clinically silent.  Not much of an issue in the long run.  No need for antibiotic prophylaxis.   5. Essential hypertension   blood pressure well controlled today.  Somewhat  dynamic at home.  Defer further management to Dr. Charles.   6. Obstructive sleep apnea syndrome   treated optimally.  Doing well.         Follow Up: 1 year      Thank you for allowing me to participate in the care of your patient. Please to not hesitate to contact me with additional questions or concerns.        Villa Thomas DO, FACC, [facility]  Cardiac Electrophysiologist

## 2021-01-11 ENCOUNTER — TELEPHONE (OUTPATIENT)
Dept: CARDIOLOGY | Facility: CLINIC | Age: 76
End: 2021-01-11

## 2021-01-11 NOTE — TELEPHONE ENCOUNTER
"Pt sent a manual transmission that showed no events. Pt states when her blood pressure goes up her heart rate never changes. She cannot remember what her blood pressure was today put states it was \"low\" when she got up this morning. She walked to her kitchen at lunch time and her blood pressure went \"up\". Pt states her blood pressure has not been \"normal\" since getting her pacemaker.  "

## 2021-01-11 NOTE — TELEPHONE ENCOUNTER
Patient saw Dr. Thomas on 12/21/20. She states that since they made adjustments to her pacemaker at that visit she has not felt well. She is also having issues with hypertension and would like to schedule an appointment with Dr. Thomas to disuss these issues. I did transfer her to the device clinic so they could see if she has had any episodes that would possibly correlate with her symptoms. I left a message for scheduling to call her with a f/u appointment with Dr. Thomas.

## 2021-01-18 ENCOUNTER — OFFICE VISIT (OUTPATIENT)
Dept: CARDIOLOGY | Facility: CLINIC | Age: 76
End: 2021-01-18

## 2021-01-18 VITALS
HEIGHT: 61 IN | HEART RATE: 63 BPM | OXYGEN SATURATION: 97 % | WEIGHT: 164.2 LBS | SYSTOLIC BLOOD PRESSURE: 122 MMHG | BODY MASS INDEX: 31 KG/M2 | DIASTOLIC BLOOD PRESSURE: 82 MMHG

## 2021-01-18 DIAGNOSIS — I10 ESSENTIAL HYPERTENSION: ICD-10-CM

## 2021-01-18 DIAGNOSIS — I49.5 SINUS NODE DYSFUNCTION (HCC): Primary | ICD-10-CM

## 2021-01-18 DIAGNOSIS — Z79.899 LONG TERM CURRENT USE OF ANTIARRHYTHMIC DRUG: ICD-10-CM

## 2021-01-18 DIAGNOSIS — Z79.01 CHRONIC ANTICOAGULATION: ICD-10-CM

## 2021-01-18 DIAGNOSIS — I48.0 PAROXYSMAL ATRIAL FIBRILLATION (HCC): ICD-10-CM

## 2021-01-18 PROCEDURE — 99214 OFFICE O/P EST MOD 30 MIN: CPT | Performed by: INTERNAL MEDICINE

## 2021-01-18 PROCEDURE — 93280 PM DEVICE PROGR EVAL DUAL: CPT | Performed by: INTERNAL MEDICINE

## 2021-01-18 RX ORDER — MULTIVIT-MIN/IRON/FOLIC ACID/K 18-600-40
1 CAPSULE ORAL DAILY
COMMUNITY
End: 2021-04-26

## 2021-01-18 RX ORDER — GLUCOSAMINE/D3/BOSWELLIA SERRA 1500MG-400
1 TABLET ORAL DAILY
COMMUNITY
End: 2021-04-26

## 2021-01-18 RX ORDER — HYDRALAZINE HYDROCHLORIDE 10 MG/1
10 TABLET, FILM COATED ORAL 3 TIMES DAILY
Qty: 270 TABLET | Refills: 3 | Status: SHIPPED | OUTPATIENT
Start: 2021-01-18 | End: 2021-07-27 | Stop reason: SDUPTHER

## 2021-01-18 RX ORDER — VITAMIN E (DL,TOCOPHERYL ACET) 180 MG
5000 CAPSULE ORAL DAILY
COMMUNITY
End: 2021-02-01

## 2021-01-18 NOTE — PROGRESS NOTES
Cardiac Electrophysiology Outpatient Follow Up Note            [facility] at [facility]    Follow Up Office Visit      Monica Perea  [id number]  01/18/2021    Primary Care Physician: Abdiel Juan MD    Referred By: No ref. provider found    Subjective     Chief Complaint:   Diagnoses and all orders for this visit:    1. Sinus node dysfunction (CMS/HCC) (Primary)    2. Paroxysmal atrial fibrillation (CMS/HCC)    3. Essential hypertension    4. Long term current use of antiarrhythmic drug    5. Chronic anticoagulation      Chief Complaint   Patient presents with   • Atrial Fibrillation     Problem List:       1. Sinus node dysfunction  a. [employer] dual-chamber permanent pacemaker implant 4/28/2020  2. Paroxysmal Atrial Fibrillation   a. CHADSVASc = 2  b. Anticoagulated with Xarelto   c. ECHO 3/7/2019 EF 69%, mild MR / TR, LA 3.1 cm  d. Stress MPS 1/23/2020 without evidence of ischemia, EF 70%  e. 14 day Monitor Feb 2020 w/ HR , avg 62 bpm, AFib burden < 1%, 7 pauses > 3 sec and up to 6.6 sec in duration.   3. Essential Hypertension   4. Dyslipidemia   5. MOON patient    History of Present Illness:   Monica Perea is a 76 y.o. female who presents to my electrophysiology clinic for follow up of paroxysmal atrial fibrillation and sinus node dysfunction status post dual-chamber permanent pacemaker implant.  Patient noted to recently be seen in office on 12/21/2020 with Dr. Thomas.  At that time she reported complaints of her heart rate picking up when walking or bending over.  Patient's rate response on her pacemaker was decreased to try to improve possible symptoms from her pacemaker.  Patient is seen back today for continued complaints of her heart pounding when she gets up and walks.  Patient reports associated shortness of breath and occasional dizziness.  Patient reports she continuously checks her blood pressure during these episodes and  while at rest her blood pressure is perfect and when she gets up and begins to move around it increases.  Patient reports this morning she experimented and continue to walk faster and check her blood pressure repeatedly with her blood pressure getting up to 160/100 mmHg.  Patient reports she has 4 different blood pressure cuffs that she will check against 1 another with and they all run pretty close to the same.  Patient admits compliance to Xarelto therapy for stroke prevention with her history of atrial fibrillation with reported occasional nosebleeds that resolve with a few minutes of pressure.  Patient denies ever having to present to hospital for uncontrolled nosebleed or any other excessive bleeding.      Review of Systems:   Constitutional: No fevers or chills, no recent weight gain or weight loss or fatigue  Eyes: No visual loss, blurred vision, double vision, yellow sclerae.  ENT: No headaches, hearing loss, vertigo, congestion or sore throat.   Cardiovascular: Per HPI  Respiratory: No cough or wheezing, no sputum production, no hematemesis   Gastrointestinal: No abdominal pain, no nausea, vomiting, constipation, diarrhea, melena.   Genitourinary: No dysuria, hematuria or increased frequency.  Musculoskeletal:  No gait disturbance, weakness or joint pain or stiffness  Integumentary: No rashes, urticaria, ulcers or sores.   Neurological: No headache, syncope, paralysis, ataxia, no prior CVA/TIA  Psychiatric: No anxiety, or depression  Endocrine: No diaphoresis, cold or heat intolerance. No polyuria or polydipsia.   Hematologic/Lymphatic: No anemia, abnormal bruising or bleeding. No history of DVT/PE.      Past Medical History:   Past Medical History:   Diagnosis Date   • Anemia 2008   • B12 deficiency 2008   • Back pain 2010   • CAD (coronary artery disease)    • Cellulitis of foot    • Dyspnea    • GERD (gastroesophageal reflux disease)    • Glaucoma 2016   • Hematuria    • History of mammogram    •  Hypercholesteremia    • Hypertension    • Low back pain    • Menopause    • Neck strain    • Osteopenia    • Overweight    • Palpitations    • Postmenopausal atrophic vaginitis    • Sleep apnea     discontinued CPAP use 2 months ago   • Tinea pedis        Past Surgical History:   Past Surgical History:   Procedure Laterality Date   • APPENDECTOMY     • BREAST EXCISIONAL BIOPSY Bilateral    • CARDIAC ELECTROPHYSIOLOGY PROCEDURE N/A 2020    Procedure: Pacemaker DC new. BSC;  Surgeon: Villa Thomas DO;  Location: [facility] INVASIVE LOCATION;  Service: Cardiology;  Laterality: N/A;   • COLONOSCOPY          • HYSTERECTOMY      total    • OOPHORECTOMY Bilateral    • TUBAL ABDOMINAL LIGATION         Family History:   Family History   Problem Relation Age of Onset   • Heart attack Other    • Arthritis Other    • Diabetes Other    • Hypertension Other    • Hodgkin's lymphoma Other    • Esophagitis Mother    • Heart failure Mother    • Esophagitis Maternal Aunt    • Breast cancer Maternal Aunt         DX AGE 50'S   • Colon cancer Maternal Grandmother 49   • Heart attack Father    • Breast cancer Sister 72   • Ovarian cancer Sister 74   • Esophageal cancer Neg Hx    • Stomach cancer Neg Hx    • Liver cancer Neg Hx    • Liver disease Neg Hx        Social History:   Social History     Socioeconomic History   • Marital status:      Spouse name: Not on file   • Number of children: Not on file   • Years of education: Not on file   • Highest education level: Not on file   Tobacco Use   • Smoking status: Former Smoker     Packs/day: 1.00     Types: Cigarettes     Quit date:      Years since quittin.0   • Smokeless tobacco: Never Used   Substance and Sexual Activity   • Alcohol use: No     Comment: quit in    • Drug use: No   • Sexual activity: Defer       Medications:     Current Outpatient Medications:   •  Ascorbic Acid (Vitamin C) 500 MG capsule, Take 1 tablet by mouth  Daily., Disp: , Rfl:   •  Biotin 04215 MCG tablet, Take 1 tablet by mouth Daily., Disp: , Rfl:   •  Calcium Carbonate-Vitamin D (CALCIUM PLUS VITAMIN D PO), Take 1 tablet by mouth Daily., Disp: , Rfl:   •  cetirizine (zyrTEC) 10 MG tablet, Take 10 mg by mouth Daily., Disp: , Rfl:   •  Cholecalciferol (Vitamin D3) 125 MCG (5000 UT) tablet dispersible, Take 5,000 Units by mouth Daily., Disp: , Rfl:   •  clobetasol (TEMOVATE) 0.05 % ointment, Apply  topically to the appropriate area as directed Every 12 (Twelve) Hours., Disp: 60 g, Rfl: 3  •  coenzyme Q10 100 MG capsule, Take 100 mg by mouth Daily., Disp: , Rfl:   •  flecainide (TAMBOCOR) 100 MG tablet, Take 100 mg by mouth 2 (Two) Times a Day., Disp: , Rfl:   •  hydrALAZINE (APRESOLINE) 10 MG tablet, Take 1 tablet by mouth 3 (Three) Times a Day., Disp: 270 tablet, Rfl: 3  •  hydroCHLOROthiazide (HYDRODIURIL) 25 MG tablet, Take 1 tablet by mouth Daily., Disp: 30 tablet, Rfl: 11  •  melatonin 5 MG tablet tablet, Take 5 mg by mouth At Night As Needed., Disp: , Rfl:   •  Multiple Vitamins-Minerals (Hair Skin Nails) capsule, Take 5,000 mcg by mouth Daily., Disp: , Rfl:   •  potassium chloride (K-DUR,KLOR-CON) 20 MEQ CR tablet, Take 1 tablet by mouth 2 (Two) Times a Day. (Patient taking differently: Take 20 mEq by mouth Daily.), Disp: 60 tablet, Rfl: 11  •  rivaroxaban (XARELTO) 20 MG tablet, Take 1 tablet by mouth Daily., Disp: 30 tablet, Rfl: 5  •  TRAVATAN Z 0.004 % solution ophthalmic solution, INSTILL 1 DROP INTO EACH EYE NIGHTLY AS DIRECTED, Disp: , Rfl: 6  •  verapamil SR (CALAN-SR) 180 MG CR tablet, Take 1 tablet by mouth 2 (two) times a day., Disp: 180 tablet, Rfl: 3  •  Zinc 50 MG capsule, Take 1 tablet by mouth Daily., Disp: , Rfl:     Allergies:   Allergies   Allergen Reactions   • Macrobid [Nitrofurantoin Monohyd Macro] Rash   • Nitrofurantoin Hives   • Phenylephrine-Guaifenesin Hives   • Sulfa Antibiotics Hives   • Statins Myalgia       Objective   Vital  "Signs:   Vitals:    01/18/21 1438   BP: 122/82   BP Location: Left arm   Patient Position: Sitting   Pulse: 63   SpO2: 97%   Weight: 74.5 kg (164 lb 3.2 oz)   Height: 154.9 cm (61\")       PHYSICAL EXAM  General appearance: Awake, alert, cooperative  Head: Normocephalic, without obvious abnormality, atraumatic  Eyes: Conjunctivae/corneas clear, EOMs intact  Neck: no adenopathy, no carotid bruit, no JVD and thyroid: not enlarged  Lungs: clear to auscultation bilaterally and no rhonchi or crackles\", ' symmetric  Heart: regular rate and rhythm, S1, S2 normal, no murmur, click, rub or gallop  Abdomen: Soft, non-tender, bowel sounds normal,  no organomegaly  Extremities: extremities normal, atraumatic, no cyanosis or edema  Skin: Skin color, turgor normal, no rashes or lesions  Neurologic: Grossly normal     Lab Results   Component Value Date    GLUCOSE 107 (H) 04/28/2020    CALCIUM 9.4 10/02/2020     10/02/2020    K 3.9 10/02/2020    CO2 29.1 (H) 10/02/2020     10/02/2020    BUN 12 10/02/2020    CREATININE 1.02 (H) 10/02/2020    EGFRIFAFRI 64 10/02/2020    EGFRIFNONA 53 (L) 10/02/2020    BCR 11.8 10/02/2020    ANIONGAP 12.0 04/28/2020     Lab Results   Component Value Date    WBC 4.05 10/02/2020    HGB 11.7 (L) 10/02/2020    HCT 36.2 10/02/2020    MCV 89.4 10/02/2020     10/02/2020     No results found for: INR, PROTIME  Lab Results   Component Value Date    TSH 2.030 06/30/2020       Cardiac Testing:     I personally viewed and interpreted the patient's EKG/Telemetry/lab data      ECG 12 Lead    Date/Time: 1/18/2021 2:56 PM  Performed by: Brock Ferrari APRN  Authorized by: Brock Ferrari APRN   Comparison: compared with previous ECG from 12/21/2020  Similar to previous ECG  Rhythm: paced  BPM: 60          Device interrogation: [employer] dual-chamber permanent pacemaker at DDDR  ppm, RA paced 91%, RV paced 2%, acceptable lead threshold and impedance, battery voltage " with 9 years remaining, no arrhythmia events detected.  0% AMS      Assessment & Plan    Diagnoses and all orders for this visit:    1. Sinus node dysfunction (CMS/HCC) (Primary)    2. Paroxysmal atrial fibrillation (CMS/HCC)    3. Essential hypertension    4. Long term current use of antiarrhythmic drug    5. Chronic anticoagulation         Diagnosis Plan   1. Sinus node dysfunction (CMS/HCC)  Pacemaker device interrogation today with normal function.  There are no arrhythmias detected on patient's device interrogation today to account for the pounding she describes in her chest with activity.       2. Paroxysmal atrial fibrillation (CMS/HCC)  Controlled on current antiarrhythmic therapy.    Device interrogation with 0% mode switch    Continue flecainide 100 mg twice daily     3. Essential hypertension  Controlled in office today with reported recent labile BP control on current medical therapy at home.    Patient does report uncontrolled blood pressure with activity up to 160/100 mmHg with overexertion.     Recent normal stress evaluation and echocardiogram.    Continue verapamil, hydralazine, and HCTZ as prescribed.  Follow-up with Dr. Charles for further evaluation of her blood pressure.     4. Long term current use of antiarrhythmic drug  Tolerating flecainide therapy with acceptable QRS duration on EKG in office today     5. Chronic anticoagulation  Tolerating Xarelto therapy without noted side effects, signs of major bleeding, or TIA/strokelike symptoms.    Continue Xarelto 20 mg daily       I spent 35 minutes in consultation with this patient which included more than 65% of this time in direct face-to-face counseling, physical examination and discussion of my assessment and findings and shared decision making with the patient.      Follow Up: 6 months with [employer] device check      Thank you for allowing me to participate in the care of your patient. Please to not hesitate to contact me with  additional questions or concerns.        Electronically signed by TERRY Rocha, 01/18/21, 3:29 PM EST.

## 2021-01-29 RX ORDER — MULTIVITAMIN WITH IRON
TABLET ORAL
COMMUNITY
End: 2021-04-26

## 2021-02-01 ENCOUNTER — OFFICE VISIT (OUTPATIENT)
Dept: CARDIOLOGY | Facility: CLINIC | Age: 76
End: 2021-02-01

## 2021-02-01 VITALS
WEIGHT: 166 LBS | OXYGEN SATURATION: 96 % | SYSTOLIC BLOOD PRESSURE: 120 MMHG | HEIGHT: 61 IN | HEART RATE: 61 BPM | BODY MASS INDEX: 31.34 KG/M2 | DIASTOLIC BLOOD PRESSURE: 68 MMHG

## 2021-02-01 DIAGNOSIS — R00.2 PALPITATIONS: ICD-10-CM

## 2021-02-01 DIAGNOSIS — I48.0 PAROXYSMAL ATRIAL FIBRILLATION (HCC): ICD-10-CM

## 2021-02-01 DIAGNOSIS — I25.10 CORONARY ARTERY DISEASE INVOLVING NATIVE CORONARY ARTERY OF NATIVE HEART WITHOUT ANGINA PECTORIS: ICD-10-CM

## 2021-02-01 DIAGNOSIS — I34.1 MVP (MITRAL VALVE PROLAPSE): ICD-10-CM

## 2021-02-01 PROCEDURE — 93000 ELECTROCARDIOGRAM COMPLETE: CPT | Performed by: INTERNAL MEDICINE

## 2021-02-01 PROCEDURE — 99214 OFFICE O/P EST MOD 30 MIN: CPT | Performed by: INTERNAL MEDICINE

## 2021-02-01 RX ORDER — DILTIAZEM HYDROCHLORIDE 240 MG/1
240 CAPSULE, COATED, EXTENDED RELEASE ORAL DAILY
Qty: 30 CAPSULE | Refills: 11 | Status: SHIPPED | OUTPATIENT
Start: 2021-02-01 | End: 2021-04-26 | Stop reason: SDUPTHER

## 2021-02-01 RX ORDER — CLONIDINE HYDROCHLORIDE 0.1 MG/1
0.1 TABLET ORAL EVERY 6 HOURS PRN
Qty: 60 TABLET | Refills: 11 | Status: SHIPPED | OUTPATIENT
Start: 2021-02-01 | End: 2022-01-25

## 2021-02-01 NOTE — PROGRESS NOTES
"    Subjective:     Encounter Date:02/01/2021      Patient ID: Monica Perea is a 76 y.o. female.    Chief Complaint: Palpitations  HPI  This is a 76-year-old female patient who was recently evaluated by electrophysiology for persistent ongoing palpitations.  The patient indicates that at rest she has no symptoms.  She reports that with physical activity she feels a \"pounding\" sensation in her chest which is invariably accompanied by an increase in her blood pressure to 150 mmHg systolic and/or 100-110 mmHg diastolic.  The patient's pacemaker was interrogated by electrophysiology and review of the histogram data demonstrated no evidence of recurrent atrial fibrillation or other atrial or ventricular arrhythmias.  The patient is maintained on a combination of flecainide and high-dose verapamil.  She has been experiencing progressively worsening constipation requiring use of magnesium citrate for bowel movements.  She reports compliance with her medications with no perceived side effects.  She remains a non-smoker.  The following portions of the patient's history were reviewed and updated as appropriate: allergies, current medications, past family history, past medical history, past social history, past surgical history and problem  Review of Systems   Constitution: Negative for chills, diaphoresis, fever, malaise/fatigue, weight gain and weight loss.   HENT: Negative for ear discharge, hearing loss, hoarse voice and nosebleeds.    Eyes: Negative for discharge, double vision, pain and photophobia.   Cardiovascular: Positive for palpitations. Negative for chest pain, claudication, cyanosis, dyspnea on exertion, irregular heartbeat, leg swelling, near-syncope, orthopnea, paroxysmal nocturnal dyspnea and syncope.   Respiratory: Negative for cough, hemoptysis, shortness of breath, sputum production and wheezing.    Endocrine: Negative for cold intolerance, heat intolerance, polydipsia, polyphagia and polyuria. "   Hematologic/Lymphatic: Negative for adenopathy and bleeding problem. Does not bruise/bleed easily.   Skin: Negative for color change, flushing, itching and rash.   Musculoskeletal: Negative for muscle cramps, muscle weakness, myalgias and stiffness.   Gastrointestinal: Positive for constipation. Negative for abdominal pain, diarrhea, hematemesis, hematochezia, nausea and vomiting.   Genitourinary: Negative for dysuria, frequency and nocturia.   Neurological: Negative for focal weakness, loss of balance, numbness, paresthesias and seizures.   Psychiatric/Behavioral: Negative for altered mental status, hallucinations and suicidal ideas.   Allergic/Immunologic: Negative for HIV exposure, hives and persistent infections.           Current Outpatient Medications:   •  Ascorbic Acid (Vitamin C) 500 MG capsule, Take 1 tablet by mouth Daily., Disp: , Rfl:   •  Biotin 14187 MCG tablet, Take 1 tablet by mouth Daily., Disp: , Rfl:   •  Calcium Carbonate-Vitamin D (CALCIUM PLUS VITAMIN D PO), Take 1 tablet by mouth Daily., Disp: , Rfl:   •  cetirizine (zyrTEC) 10 MG tablet, Take 10 mg by mouth Daily., Disp: , Rfl:   •  Cholecalciferol (Vitamin D3) 125 MCG (5000 UT) tablet dispersible, Take 5,000 Units by mouth Daily., Disp: , Rfl:   •  clobetasol (TEMOVATE) 0.05 % ointment, Apply  topically to the appropriate area as directed Every 12 (Twelve) Hours., Disp: 60 g, Rfl: 3  •  coenzyme Q10 100 MG capsule, Take 100 mg by mouth Daily., Disp: , Rfl:   •  COLLAGEN PO, Take  by mouth., Disp: , Rfl:   •  Docusate Calcium (STOOL SOFTENER PO), Take  by mouth., Disp: , Rfl:   •  flecainide (TAMBOCOR) 100 MG tablet, Take 100 mg by mouth 2 (Two) Times a Day., Disp: , Rfl:   •  hydrALAZINE (APRESOLINE) 10 MG tablet, Take 1 tablet by mouth 3 (Three) Times a Day., Disp: 270 tablet, Rfl: 3  •  hydroCHLOROthiazide (HYDRODIURIL) 25 MG tablet, Take 1 tablet by mouth Daily., Disp: 30 tablet, Rfl: 11  •  Magnesium 250 MG tablet, Take  by mouth.,  "Disp: , Rfl:   •  magnesium citrate solution, Take 296 mL by mouth 1 (One) Time., Disp: , Rfl:   •  melatonin 5 MG tablet tablet, Take 5 mg by mouth At Night As Needed., Disp: , Rfl:   •  Plant Sterols and Stanols (CHOLEST OFF PO), Take  by mouth., Disp: , Rfl:   •  rivaroxaban (XARELTO) 20 MG tablet, Take 1 tablet by mouth Daily., Disp: 30 tablet, Rfl: 5  •  TRAVATAN Z 0.004 % solution ophthalmic solution, INSTILL 1 DROP INTO EACH EYE NIGHTLY AS DIRECTED, Disp: , Rfl: 6  •  cloNIDine (Catapres) 0.1 MG tablet, Take 1 tablet by mouth Every 6 (Six) Hours As Needed for High Blood Pressure (SBP>150mmHg and/or DBP>100mm Hg)., Disp: 60 tablet, Rfl: 11  •  dilTIAZem CD (CARDIZEM CD) 240 MG 24 hr capsule, Take 1 capsule by mouth Daily., Disp: 30 capsule, Rfl: 11    Objective:   Vitals signs and nursing note reviewed.   Constitutional:       Appearance: Healthy appearance. Not in distress.   Neck:      Vascular: No JVR. JVD normal.   Pulmonary:      Effort: Pulmonary effort is normal.      Breath sounds: Normal breath sounds. No wheezing. No rhonchi. No rales.   Chest:      Chest wall: Not tender to palpatation.   Cardiovascular:      PMI at left midclavicular line. Normal rate. Regular rhythm. Normal S1. Normal S2.      Murmurs: There is no murmur.      No gallop. No click. No rub.   Pulses:     Intact distal pulses.   Edema:     Peripheral edema absent.   Abdominal:      General: Bowel sounds are normal.      Palpations: Abdomen is soft.      Tenderness: There is no abdominal tenderness.   Musculoskeletal: Normal range of motion.         General: No tenderness.   Skin:     General: Skin is warm and dry.   Neurological:      General: No focal deficit present.      Mental Status: Alert and oriented to person, place and time.       Blood pressure 120/68, pulse 61, height 154.9 cm (61\"), weight 75.3 kg (166 lb), SpO2 96 %, not currently breastfeeding.   Lab Review:     Assessment:       1. Palpitations  The electrophysiologist " "in [facility] does not feel the patient has had any recurrent arrhythmia which was my opinion.  His opinion is concurrent with mind that her symptoms of palpitations is a manifestation of accelerated hypertension during physical activities.  - rivaroxaban (XARELTO) 20 MG tablet; Take 1 tablet by mouth Daily.  Dispense: 30 tablet; Refill: 5    2. Coronary artery disease involving native coronary artery of native heart without angina pectoris  Stable and angina free.  - rivaroxaban (XARELTO) 20 MG tablet; Take 1 tablet by mouth Daily.  Dispense: 30 tablet; Refill: 5    3. MVP (mitral valve prolapse)  The patient does not demonstrate the typical \"click-murmur\" complex of mitral valve prolapse on auscultation of her heart during today's cardiac examination.  - rivaroxaban (XARELTO) 20 MG tablet; Take 1 tablet by mouth Daily.  Dispense: 30 tablet; Refill: 5    4. Paroxysmal atrial fibrillation (CMS/HCC)  Rhythm management strategy.  Severe constipation with use of high-dose verapamil.  Maintaining normal sinus rhythm without evidence of recurrent arrhythmia.  - rivaroxaban (XARELTO) 20 MG tablet; Take 1 tablet by mouth Daily.  Dispense: 30 tablet; Refill: 5      ECG 12 Lead    Date/Time: 2/1/2021 1:37 PM  Performed by: Tico Charles MD  Authorized by: Tico Charles MD   Comparison: compared with previous ECG   Similar to previous ECG  Rhythm: sinus rhythm  Rate: normal  QRS axis: normal  Other findings: poor R wave progression    Clinical impression: normal ECG            Plan:     The patient has been given a prescription for clonidine 0.1 mg every 6 hours as needed for systolic blood pressure greater than 150 mmHg and/or diastolic blood pressure greater than 100 mmHg.  The patient has been educated that if she feels that her blood pressure is elevated and this is confirmed with home blood pressure detection that she should first lay down and rest for 10 to 15 minutes.  If her blood pressure remains " greater than the parameters described above she is instructed to use oral clonidine as directed.  Given her severe constipation with verapamil I have recommended discontinuation of long-acting verapamil and in its place the addition of Cardizem  mg orally once per day.  The patient has been advised that it will take a minimum of 7 days for the verapamil to be eliminated from her system and she should not expect her constipation to improve until after that timeframe has elapsed.  No further cardiovascular testing is indicated at this time.  The patient will have her routine formal pacemaker interrogation in 6 months.

## 2021-03-03 DIAGNOSIS — I34.1 MVP (MITRAL VALVE PROLAPSE): ICD-10-CM

## 2021-03-03 DIAGNOSIS — I48.0 PAROXYSMAL ATRIAL FIBRILLATION (HCC): ICD-10-CM

## 2021-03-03 DIAGNOSIS — R00.2 PALPITATIONS: ICD-10-CM

## 2021-03-03 DIAGNOSIS — I25.10 CORONARY ARTERY DISEASE INVOLVING NATIVE CORONARY ARTERY OF NATIVE HEART WITHOUT ANGINA PECTORIS: ICD-10-CM

## 2021-03-14 PROCEDURE — 93294 REM INTERROG EVL PM/LDLS PM: CPT | Performed by: INTERNAL MEDICINE

## 2021-03-14 PROCEDURE — 93296 REM INTERROG EVL PM/IDS: CPT | Performed by: INTERNAL MEDICINE

## 2021-03-24 DIAGNOSIS — R00.2 PALPITATIONS: ICD-10-CM

## 2021-03-24 DIAGNOSIS — I34.1 MVP (MITRAL VALVE PROLAPSE): ICD-10-CM

## 2021-03-24 DIAGNOSIS — I48.0 PAROXYSMAL ATRIAL FIBRILLATION (HCC): ICD-10-CM

## 2021-03-24 DIAGNOSIS — I25.10 CORONARY ARTERY DISEASE INVOLVING NATIVE CORONARY ARTERY OF NATIVE HEART WITHOUT ANGINA PECTORIS: ICD-10-CM

## 2021-04-12 ENCOUNTER — OFFICE VISIT (OUTPATIENT)
Dept: INTERNAL MEDICINE | Facility: CLINIC | Age: 76
End: 2021-04-12

## 2021-04-12 ENCOUNTER — HOSPITAL ENCOUNTER (OUTPATIENT)
Dept: GENERAL RADIOLOGY | Facility: HOSPITAL | Age: 76
Discharge: HOME | End: 2021-04-12
Admitting: INTERNAL MEDICINE

## 2021-04-12 VITALS
HEIGHT: 61 IN | OXYGEN SATURATION: 97 % | WEIGHT: 167 LBS | TEMPERATURE: 96.4 F | BODY MASS INDEX: 31.53 KG/M2 | DIASTOLIC BLOOD PRESSURE: 80 MMHG | SYSTOLIC BLOOD PRESSURE: 120 MMHG | HEART RATE: 64 BPM

## 2021-04-12 DIAGNOSIS — M54.41 MIDLINE LOW BACK PAIN WITH RIGHT-SIDED SCIATICA, UNSPECIFIED CHRONICITY: ICD-10-CM

## 2021-04-12 DIAGNOSIS — D64.9 ANEMIA, UNSPECIFIED TYPE: ICD-10-CM

## 2021-04-12 DIAGNOSIS — T78.40XA ALLERGY, INITIAL ENCOUNTER: ICD-10-CM

## 2021-04-12 DIAGNOSIS — E78.2 MIXED HYPERLIPIDEMIA: Primary | ICD-10-CM

## 2021-04-12 DIAGNOSIS — E61.1 IRON DEFICIENCY: ICD-10-CM

## 2021-04-12 DIAGNOSIS — I10 ESSENTIAL HYPERTENSION: ICD-10-CM

## 2021-04-12 DIAGNOSIS — I48.0 PAROXYSMAL ATRIAL FIBRILLATION (HCC): ICD-10-CM

## 2021-04-12 PROBLEM — E78.5 DYSLIPIDEMIA: Status: RESOLVED | Noted: 2020-11-16 | Resolved: 2021-04-12

## 2021-04-12 PROCEDURE — 99214 OFFICE O/P EST MOD 30 MIN: CPT | Performed by: INTERNAL MEDICINE

## 2021-04-12 PROCEDURE — 72100 X-RAY EXAM L-S SPINE 2/3 VWS: CPT

## 2021-04-12 RX ORDER — CHLORAL HYDRATE 500 MG
CAPSULE ORAL
COMMUNITY
End: 2021-04-26

## 2021-04-12 RX ORDER — ALPRAZOLAM 0.25 MG/1
0.25 TABLET ORAL 2 TIMES DAILY PRN
COMMUNITY
End: 2021-04-26

## 2021-04-12 RX ORDER — LANOLIN ALCOHOL/MO/W.PET/CERES
1000 CREAM (GRAM) TOPICAL DAILY
COMMUNITY
End: 2021-04-26

## 2021-04-12 RX ORDER — ATORVASTATIN CALCIUM 40 MG/1
40 TABLET, FILM COATED ORAL DAILY
COMMUNITY
End: 2021-04-26

## 2021-04-12 NOTE — PROGRESS NOTES
Subjective   Monica Perea is a 76 y.o. female.     Chief Complaint   Patient presents with   • Hyperlipidemia   • Hypertension   • Fatigue     has tried OTC supplements without relief   • Leg Pain     worse at night       History of Present Illness   Patient here for follow-up.  Hypertension stable on medication hyperlipidemia stable on medication patient complains of right leg pain radiate from the gluteal area to the knee joint.  Elevate lower extremities helped.  Shooting pain.  Patient also complains of feeling tired for 2 years flecainide made it worse patient self discontinued the medicine.  Now patient complaints Xarelto caused fatigue.  Blood test that showed hemoglobin slightly decreased and iron saturation slightly decreased.  Patient also complains some right ear pain discomfort    Current Outpatient Medications:   •  ALPRAZolam (XANAX) 0.25 MG tablet, Take 0.25 mg by mouth 2 (Two) Times a Day As Needed for Anxiety., Disp: , Rfl:   •  atorvastatin (LIPITOR) 40 MG tablet, Take 40 mg by mouth Daily., Disp: , Rfl:   •  Calcium Carbonate-Vitamin D (CALCIUM PLUS VITAMIN D PO), Take 1 tablet by mouth Daily., Disp: , Rfl:   •  cetirizine (zyrTEC) 10 MG tablet, Take 10 mg by mouth Daily., Disp: , Rfl:   •  Cholecalciferol (Vitamin D3) 125 MCG (5000 UT) tablet dispersible, Take 5,000 Units by mouth Daily., Disp: , Rfl:   •  clobetasol (TEMOVATE) 0.05 % ointment, Apply  topically to the appropriate area as directed Every 12 (Twelve) Hours., Disp: 60 g, Rfl: 3  •  cloNIDine (Catapres) 0.1 MG tablet, Take 1 tablet by mouth Every 6 (Six) Hours As Needed for High Blood Pressure (SBP>150mmHg and/or DBP>100mm Hg)., Disp: 60 tablet, Rfl: 11  •  coenzyme Q10 100 MG capsule, Take 100 mg by mouth Daily., Disp: , Rfl:   •  dilTIAZem CD (CARDIZEM CD) 240 MG 24 hr capsule, Take 1 capsule by mouth Daily., Disp: 30 capsule, Rfl: 11  •  Docusate Calcium (STOOL SOFTENER PO), Take  by mouth., Disp: , Rfl:   •  hydrALAZINE  (APRESOLINE) 10 MG tablet, Take 1 tablet by mouth 3 (Three) Times a Day., Disp: 270 tablet, Rfl: 3  •  hydroCHLOROthiazide (HYDRODIURIL) 25 MG tablet, Take 1 tablet by mouth Daily., Disp: 30 tablet, Rfl: 11  •  Magnesium 250 MG tablet, Take  by mouth., Disp: , Rfl:   •  melatonin 5 MG tablet tablet, Take 5 mg by mouth At Night As Needed., Disp: , Rfl:   •  Omega-3 Fatty Acids (fish oil) 1000 MG capsule capsule, Take  by mouth Daily With Breakfast., Disp: , Rfl:   •  Potassium 99 MG tablet, Take  by mouth., Disp: , Rfl:   •  rivaroxaban (XARELTO) 20 MG tablet, Take 1 tablet by mouth Daily., Disp: 30 tablet, Rfl: 5  •  TRAVATAN Z 0.004 % solution ophthalmic solution, INSTILL 1 DROP INTO EACH EYE NIGHTLY AS DIRECTED, Disp: , Rfl: 6  •  vitamin B-12 (CYANOCOBALAMIN) 1000 MCG tablet, Take 1,000 mcg by mouth Daily., Disp: , Rfl:   •  Ascorbic Acid (Vitamin C) 500 MG capsule, Take 1 tablet by mouth Daily., Disp: , Rfl:   •  Biotin 65562 MCG tablet, Take 1 tablet by mouth Daily., Disp: , Rfl:   •  COLLAGEN PO, Take  by mouth., Disp: , Rfl:   •  flecainide (TAMBOCOR) 100 MG tablet, Take 100 mg by mouth 2 (Two) Times a Day., Disp: , Rfl:   •  Iron, Ferrous Gluconate, 256 (28 Fe) MG tablet, Take 1 tablet by mouth Daily., Disp: 30 tablet, Rfl: 2  •  magnesium citrate solution, Take 296 mL by mouth 1 (One) Time., Disp: , Rfl:   •  Plant Sterols and Stanols (CHOLEST OFF PO), Take  by mouth., Disp: , Rfl:     The following portions of the patient's history were reviewed and updated as appropriate: allergies, current medications, past family history, past medical history, past social history, past surgical history and problem list.    Review of Systems   Constitutional: Negative.    HENT: Positive for ear pain.    Respiratory: Negative.    Cardiovascular: Negative.    Gastrointestinal: Negative.    Musculoskeletal: Positive for back pain.   Skin: Negative.    Neurological: Negative.    Psychiatric/Behavioral: Negative.         Objective   Physical Exam  HENT:      Head: Normocephalic and atraumatic.      Comments: Bulging tympanic membrane  Cardiovascular:      Rate and Rhythm: Normal rate and regular rhythm.      Heart sounds: Normal heart sounds.   Pulmonary:      Effort: Pulmonary effort is normal.      Breath sounds: Normal breath sounds.   Abdominal:      General: Bowel sounds are normal.   Musculoskeletal:         General: Tenderness present.      Cervical back: Neck supple.   Skin:     General: Skin is warm.   Neurological:      Mental Status: She is alert and oriented to person, place, and time.   Psychiatric:         Mood and Affect: Mood normal.         Behavior: Behavior normal.         All tests have been reviewed.    Assessment/Plan   Diagnoses and all orders for this visit:    Mixed hyperlipidemia  hold Lipitor did not help with the fatigue.    Essential hypertension continue medication    Paroxysmal atrial fibrillation (CMS/HCC) self discontinued the flecainide on blood thinner patient is going to discuss more with cardiologist    Midline low back pain with right-sided sciatica, unspecified chronicity  -     XR Spine Lumbar 2 or 3 View  -     Ambulatory Referral to Physical Therapy    Allergy, initial encounter patient complains of ear pain bulging tympanic membrane recommend Flonase    Iron deficiency mildly low iron saturation initiate supplement.  -     Iron, Ferrous Gluconate, 256 (28 Fe) MG tablet; Take 1 tablet by mouth Daily.    Anemia, unspecified type continue to watch hemoglobin now 11.7.    Other orders  -     ALPRAZolam (XANAX) 0.25 MG tablet; Take 0.25 mg by mouth 2 (Two) Times a Day As Needed for Anxiety.  -     atorvastatin (LIPITOR) 40 MG tablet; Take 40 mg by mouth Daily.  -     Omega-3 Fatty Acids (fish oil) 1000 MG capsule capsule; Take  by mouth Daily With Breakfast.  -     vitamin B-12 (CYANOCOBALAMIN) 1000 MCG tablet; Take 1,000 mcg by mouth Daily.  -     Potassium 99 MG tablet; Take  by  mouth.      3 week         ----Below is to historical records for reference only:      Cad MILD BLOCKAGE 2009 30%.  stress test normal, follow up with cardio,   Colon 7/18/16: Left-sided diverticulosis. Colon polyps. Internal hemorrhoids. Repeat 5 year       Diverticulitis hx.   Failed crestor and lipitor   Pneumovax 2015,and zostavax and shingrix refused and explained risks, PNEUMOVAX TODAY   Psoriasis refill clobetasole   Knees pain, OA probable, glucosamine continue   Foot fungal infection s/p med by derm  Interstitial cystitis UA normal follow uro   leg swelling

## 2021-04-14 DIAGNOSIS — E61.1 IRON DEFICIENCY: ICD-10-CM

## 2021-04-26 ENCOUNTER — OFFICE VISIT (OUTPATIENT)
Dept: CARDIOLOGY | Facility: CLINIC | Age: 76
End: 2021-04-26

## 2021-04-26 VITALS
HEIGHT: 61 IN | BODY MASS INDEX: 32.1 KG/M2 | WEIGHT: 170 LBS | SYSTOLIC BLOOD PRESSURE: 120 MMHG | OXYGEN SATURATION: 97 % | HEART RATE: 71 BPM | DIASTOLIC BLOOD PRESSURE: 70 MMHG

## 2021-04-26 DIAGNOSIS — I49.5 SINUS NODE DYSFUNCTION (HCC): ICD-10-CM

## 2021-04-26 DIAGNOSIS — Z95.0 CARDIAC PACEMAKER IN SITU: ICD-10-CM

## 2021-04-26 DIAGNOSIS — J42 CHRONIC BRONCHITIS, UNSPECIFIED CHRONIC BRONCHITIS TYPE (HCC): ICD-10-CM

## 2021-04-26 DIAGNOSIS — I48.0 PAROXYSMAL ATRIAL FIBRILLATION (HCC): Primary | ICD-10-CM

## 2021-04-26 PROCEDURE — 93000 ELECTROCARDIOGRAM COMPLETE: CPT | Performed by: INTERNAL MEDICINE

## 2021-04-26 PROCEDURE — 93280 PM DEVICE PROGR EVAL DUAL: CPT | Performed by: INTERNAL MEDICINE

## 2021-04-26 PROCEDURE — 99213 OFFICE O/P EST LOW 20 MIN: CPT | Performed by: INTERNAL MEDICINE

## 2021-04-26 RX ORDER — DILTIAZEM HYDROCHLORIDE 240 MG/1
240 CAPSULE, COATED, EXTENDED RELEASE ORAL DAILY
Qty: 90 CAPSULE | Refills: 3 | Status: SHIPPED | OUTPATIENT
Start: 2021-04-26 | End: 2021-07-27 | Stop reason: SDUPTHER

## 2021-04-26 RX ORDER — PRAVASTATIN SODIUM 40 MG
40 TABLET ORAL DAILY
COMMUNITY
End: 2021-07-27 | Stop reason: SDUPTHER

## 2021-04-26 RX ORDER — FLUOROURACIL 50 MG/G
CREAM TOPICAL 2 TIMES DAILY
COMMUNITY
End: 2022-01-13

## 2021-04-26 RX ORDER — MULTIPLE VITAMINS W/ MINERALS TAB 9MG-400MCG
1 TAB ORAL DAILY
COMMUNITY
End: 2021-08-05

## 2021-04-26 NOTE — PROGRESS NOTES
Subjective:     Encounter Date:04/26/2021      Patient ID: Monica Perea is a 76 y.o. female.    Chief Complaint: Atrial fibrillation  HPI  This is a 76-year-old female patient who presents to cardiology clinic for routine follow-up and pacemaker interrogation.  The patient has a normally functioning [employer] dual-chamber rate responsive pacemaker.  Model number: L3 1 1.  She is paced 72% of the time the right atrium and less than 1% of the time in the right ventricle.  Right atrial and right ventricular lead interrogation shows normal function in regards to sensing, pacing threshold and lead impedance.  She has an estimated 9 years of generator longevity.  The patient has had 113 mode switches since her last visit.  Prior to initiating flecainide therapy the patient was having episodes lasting several hours.  During flecainide therapy the patient's A. fib burden decreased to less than 1% with episodes lasting only a few seconds.  However, the patient decided that she was unable to tolerate flecainide therapy due to nonspecific side effects which in retrospect has absolutely nothing to do with flecainide therapy.  The patient was demonstrated to have symptoms during auto threshold testing.  This feature has now been programmed to off.  The patient indicates that she has had to use her as needed clonidine on 5 occasions in the last 6 months.  Overall her blood pressure control appears to be excellent.  The patient is convinced that her feelings of fatigue is due to anticoagulation therapy with Xarelto.  She requests usage of Plavix instead.  The patient has been educated that aspirin and/or Plavix will not protect her from cardioembolic events related to paroxysmal atrial fibrillation due to her elevated Chads 2 vascular score.  She has had no bleeding complications related to anticoagulation therapy.  There have been no signs or symptoms to suggest stroke, TIA or other cardioembolic event.  The  following portions of the patient's history were reviewed and updated as appropriate: allergies, current medications, past family history, past medical history, past social history, past surgical history and problem  Review of Systems   Constitutional: Negative for chills, diaphoresis, fever, malaise/fatigue, weight gain and weight loss.   HENT: Negative for ear discharge, hearing loss, hoarse voice and nosebleeds.    Eyes: Negative for discharge, double vision, pain and photophobia.   Cardiovascular: Negative for chest pain, claudication, cyanosis, dyspnea on exertion, irregular heartbeat, leg swelling, near-syncope, orthopnea, palpitations, paroxysmal nocturnal dyspnea and syncope.   Respiratory: Negative for cough, hemoptysis, shortness of breath, sputum production and wheezing.    Endocrine: Negative for cold intolerance, heat intolerance, polydipsia, polyphagia and polyuria.   Hematologic/Lymphatic: Negative for adenopathy and bleeding problem. Does not bruise/bleed easily.   Skin: Negative for color change, flushing, itching and rash.   Musculoskeletal: Negative for muscle cramps, muscle weakness, myalgias and stiffness.   Gastrointestinal: Negative for abdominal pain, diarrhea, hematemesis, hematochezia, nausea and vomiting.   Genitourinary: Negative for dysuria, frequency and nocturia.   Neurological: Negative for focal weakness, loss of balance, numbness, paresthesias and seizures.   Psychiatric/Behavioral: Negative for altered mental status, hallucinations and suicidal ideas.   Allergic/Immunologic: Negative for HIV exposure, hives and persistent infections.           Current Outpatient Medications:   •  Calcium Carbonate-Vitamin D (CALCIUM PLUS VITAMIN D PO), Take 1 tablet by mouth Daily., Disp: , Rfl:   •  cetirizine (zyrTEC) 10 MG tablet, Take 10 mg by mouth Daily., Disp: , Rfl:   •  clobetasol (TEMOVATE) 0.05 % ointment, Apply  topically to the appropriate area as directed Every 12 (Twelve) Hours.,  Disp: 60 g, Rfl: 3  •  cloNIDine (Catapres) 0.1 MG tablet, Take 1 tablet by mouth Every 6 (Six) Hours As Needed for High Blood Pressure (SBP>150mmHg and/or DBP>100mm Hg)., Disp: 60 tablet, Rfl: 11  •  coenzyme Q10 100 MG capsule, Take 100 mg by mouth Daily., Disp: , Rfl:   •  dilTIAZem CD (CARDIZEM CD) 240 MG 24 hr capsule, Take 1 capsule by mouth Daily., Disp: 90 capsule, Rfl: 3  •  fluorouracil (EFUDEX) 5 % cream, Apply  topically to the appropriate area as directed 2 (Two) Times a Day., Disp: , Rfl:   •  hydrALAZINE (APRESOLINE) 10 MG tablet, Take 1 tablet by mouth 3 (Three) Times a Day., Disp: 270 tablet, Rfl: 3  •  hydroCHLOROthiazide (HYDRODIURIL) 25 MG tablet, Take 1 tablet by mouth Daily., Disp: 30 tablet, Rfl: 11  •  multivitamin with minerals (DAILY MULTI PO), Take 1 tablet by mouth Daily., Disp: , Rfl:   •  pravastatin (PRAVACHOL) 40 MG tablet, Take 40 mg by mouth Daily., Disp: , Rfl:   •  rivaroxaban (XARELTO) 20 MG tablet, Take 1 tablet by mouth Daily., Disp: 30 tablet, Rfl: 5  •  TRAVATAN Z 0.004 % solution ophthalmic solution, INSTILL 1 DROP INTO EACH EYE NIGHTLY AS DIRECTED, Disp: , Rfl: 6    Objective:   Vitals and nursing note reviewed.   Constitutional:       Appearance: Healthy appearance. Not in distress.   Neck:      Vascular: No JVR. JVD normal.   Pulmonary:      Effort: Pulmonary effort is normal.      Breath sounds: Normal breath sounds. No wheezing. No rhonchi. No rales.   Chest:      Chest wall: Not tender to palpatation.   Cardiovascular:      PMI at left midclavicular line. Normal rate. Regular rhythm. Normal S1. Normal S2.      Murmurs: There is no murmur.      No gallop. No click. No rub.   Pulses:     Intact distal pulses.   Edema:     Peripheral edema absent.   Abdominal:      General: Bowel sounds are normal.      Palpations: Abdomen is soft.      Tenderness: There is no abdominal tenderness.   Musculoskeletal: Normal range of motion.         General: No tenderness. Skin:      "General: Skin is warm and dry.   Neurological:      General: No focal deficit present.      Mental Status: Alert and oriented to person, place and time.       Blood pressure 120/70, pulse 71, height 154.9 cm (61\"), weight 77.1 kg (170 lb), SpO2 97 %, not currently breastfeeding.   Lab Review:     Assessment:       1. Paroxysmal atrial fibrillation (CMS/HCC)  Asymptomatic.  Rate control and anticoagulation strategy.  Intolerant to flecainide therapy due to psychosomatic issues unrelated to medication.  Class Ic antiarrhythmic drug therapy was highly effective at A. fib suppression.    2. Sinus node dysfunction (CMS/HCC)  Normal pacemaker function.    3. Chronic bronchitis, unspecified chronic bronchitis type (CMS/HCC)  Stable symptoms.      ECG 12 Lead    Date/Time: 4/26/2021 3:43 PM  Performed by: Tico Charles MD  Authorized by: Tico Charles MD   Comparison: compared with previous ECG   Similar to previous ECG  Rhythm: sinus rhythm  Rate: normal  QRS axis: normal  Other findings: poor R wave progression    Clinical impression: abnormal EKG            Plan:     No changes to her medications have been made at today's visit.  No additional cardiovascular testing is warranted.      "

## 2021-05-03 ENCOUNTER — OFFICE VISIT (OUTPATIENT)
Dept: INTERNAL MEDICINE | Facility: CLINIC | Age: 76
End: 2021-05-03

## 2021-05-03 VITALS
DIASTOLIC BLOOD PRESSURE: 76 MMHG | WEIGHT: 169 LBS | OXYGEN SATURATION: 98 % | TEMPERATURE: 97.8 F | HEART RATE: 72 BPM | SYSTOLIC BLOOD PRESSURE: 122 MMHG | BODY MASS INDEX: 31.91 KG/M2 | HEIGHT: 61 IN

## 2021-05-03 DIAGNOSIS — E55.9 VITAMIN D DEFICIENCY, UNSPECIFIED: ICD-10-CM

## 2021-05-03 DIAGNOSIS — Z00.00 WELLNESS EXAMINATION: Primary | ICD-10-CM

## 2021-05-03 PROCEDURE — 99214 OFFICE O/P EST MOD 30 MIN: CPT | Performed by: INTERNAL MEDICINE

## 2021-05-03 NOTE — PROGRESS NOTES
Subjective   Monica Perea is a 76 y.o. female.     Chief Complaint   Patient presents with   • Hyperlipidemia   • Hypertension       History of Present Illness   Patient here for follow-up.  Hyperlipidemia stable on medication.  Patient is atrial fibrillation is controlled now after adjustment of her pacemaker.  Patient unable to tolerate flecainide.  Blood pressure stable on medication.  Low back pain patient here to start the physical therapy x-ray showed arthritis only.  Iron deficient patient just started on supplement.  Hemoglobin still is slightly low.  Allergies stable now.  Weight is still elevated.    Current Outpatient Medications:   •  Calcium Carbonate-Vitamin D (CALCIUM PLUS VITAMIN D PO), Take 1 tablet by mouth Daily., Disp: , Rfl:   •  cetirizine (zyrTEC) 10 MG tablet, Take 10 mg by mouth Daily., Disp: , Rfl:   •  clobetasol (TEMOVATE) 0.05 % ointment, Apply  topically to the appropriate area as directed Every 12 (Twelve) Hours., Disp: 60 g, Rfl: 3  •  cloNIDine (Catapres) 0.1 MG tablet, Take 1 tablet by mouth Every 6 (Six) Hours As Needed for High Blood Pressure (SBP>150mmHg and/or DBP>100mm Hg)., Disp: 60 tablet, Rfl: 11  •  coenzyme Q10 100 MG capsule, Take 100 mg by mouth Daily., Disp: , Rfl:   •  dilTIAZem CD (CARDIZEM CD) 240 MG 24 hr capsule, Take 1 capsule by mouth Daily., Disp: 90 capsule, Rfl: 3  •  Ferrous Sulfate (Iron) 28 MG tablet, Take  by mouth., Disp: , Rfl:   •  fluorouracil (EFUDEX) 5 % cream, Apply  topically to the appropriate area as directed 2 (Two) Times a Day., Disp: , Rfl:   •  hydrALAZINE (APRESOLINE) 10 MG tablet, Take 1 tablet by mouth 3 (Three) Times a Day., Disp: 270 tablet, Rfl: 3  •  hydroCHLOROthiazide (HYDRODIURIL) 25 MG tablet, Take 1 tablet by mouth Daily., Disp: 30 tablet, Rfl: 11  •  multivitamin with minerals (DAILY MULTI PO), Take 1 tablet by mouth Daily., Disp: , Rfl:   •  pravastatin (PRAVACHOL) 40 MG tablet, Take 40 mg by mouth Daily., Disp: , Rfl:    •  rivaroxaban (XARELTO) 20 MG tablet, Take 1 tablet by mouth Daily., Disp: 30 tablet, Rfl: 5  •  TRAVATAN Z 0.004 % solution ophthalmic solution, INSTILL 1 DROP INTO EACH EYE NIGHTLY AS DIRECTED, Disp: , Rfl: 6    The following portions of the patient's history were reviewed and updated as appropriate: allergies, current medications, past family history, past medical history, past social history, past surgical history and problem list.    Review of Systems   Constitutional: Positive for fatigue.   Respiratory: Negative.    Cardiovascular: Negative.    Gastrointestinal: Negative.    Musculoskeletal: Negative.    Skin: Negative.    Neurological: Negative.    Psychiatric/Behavioral: Negative.        Objective   Physical Exam  Constitutional:       Appearance: Normal appearance. She is obese.   Cardiovascular:      Rate and Rhythm: Normal rate and regular rhythm.      Heart sounds: Normal heart sounds.   Pulmonary:      Effort: Pulmonary effort is normal.      Breath sounds: Normal breath sounds.   Abdominal:      General: Bowel sounds are normal.   Musculoskeletal:      Cervical back: Neck supple.   Skin:     General: Skin is warm.   Neurological:      Mental Status: She is alert and oriented to person, place, and time.   Psychiatric:         Mood and Affect: Mood normal.         Behavior: Behavior normal.         All tests have been reviewed.    Assessment/Plan   There are no diagnoses linked to this encounter.          Mixed hyperlipidemia cont med  Essential hypertension continue medication  Paroxysmal atrial fibrillation (CMS/HCC) self discontinued the flecainide on blood thinner follow with cardiologist     Midline low back pain with right-sided sciatica, unspecified chronicity  -     XR Spine Lumbar 2 or 3 View: DJD   -     pending  Physical Therapy    Fatigue due to cardiac issue per cardio      Allergy, cont Flonase    Fatigue do lab workup     Iron deficiency mildly low iron saturation initiate  supplement.  -     Iron, Ferrous Gluconate, 256 (28 Fe) MG tablet; Take 1 tablet by mouth Daily.repeat lab     Anemia, unspecified type continue to watch hemoglobin now 11.7. repeat     2 mo wellness after labs     ----Below is to historical records for reference only:      Cad MILD BLOCKAGE 2009 30%.  stress test normal, follow up with cardio,   Colon 7/18/16: Left-sided diverticulosis. Colon polyps. Internal hemorrhoids. Repeat 5 year       Diverticulitis hx.   Failed crestor and lipitor   Pneumovax 2015,and zostavax and shingrix refused and explained risks, PNEUMOVAX TODAY   Psoriasis refill clobetasole   Knees pain, OA probable, glucosamine continue   Foot fungal infection s/p med by derm  Interstitial cystitis UA normal follow uro   leg swelling

## 2021-05-04 ENCOUNTER — TELEPHONE (OUTPATIENT)
Dept: INTERNAL MEDICINE | Facility: CLINIC | Age: 76
End: 2021-05-04

## 2021-05-04 NOTE — TELEPHONE ENCOUNTER
Caller: Monica Perea    Relationship: Self    Best call back number: [phone]    What is the best time to reach you: ASAP    Who are you requesting to speak with (clinical staff, provider,  specific staff member): CLINICAL    What was the call regarding: PT CALLED REQUESTING A resmed west fx courtney NASAL MASK, TUBING AND HEAD GEAR    SHE STATED DR. VIDAL HAD PREVIOUSLY PRESCRIBED THIS AN HAS NOW RETIRED    PT STATED SHE GETS THIS THROUGH [facility]    Do you require a callback: YES

## 2021-05-05 ENCOUNTER — TREATMENT (OUTPATIENT)
Dept: PHYSICAL THERAPY | Facility: CLINIC | Age: 76
End: 2021-05-05

## 2021-05-05 DIAGNOSIS — M54.50 LUMBAR PAIN: Primary | ICD-10-CM

## 2021-05-05 DIAGNOSIS — M70.61 GREATER TROCHANTERIC BURSITIS OF RIGHT HIP: ICD-10-CM

## 2021-05-05 PROCEDURE — 97161 PT EVAL LOW COMPLEX 20 MIN: CPT | Performed by: PHYSICAL THERAPIST

## 2021-05-05 PROCEDURE — 97530 THERAPEUTIC ACTIVITIES: CPT | Performed by: PHYSICAL THERAPIST

## 2021-05-05 NOTE — PROGRESS NOTES
Physical Therapy Initial Evaluation and Plan of Care      Patient: Monica Perea   : 1945  Diagnosis/ICD-10 Code:  No primary diagnosis found.  Referring practitioner: Abdiel Juan MD    Subjective Evaluation    History of Present Illness  Date of onset: 2019  Mechanism of injury: Pt reports that she has a pacemaker to help Afib that was put in 2 years ago that has made her very fatigued. Pt reports that her back has been bothering her for a couple of years and wakes up with R leg pain from her hip to her knee. Pt reports that standing still like doing dishes and cooking makes pain worse. Pt reports she sitting makes pain better.       Patient Occupation: retired Pain  Current pain ratin  At best pain ratin  At worst pain ratin  Location: lumbar spine  Quality: throbbing  Relieving factors: rest and change in position  Aggravating factors: prolonged positioning, standing and sleeping  Progression: no change    Social Support  Lives in: one-story house    Diagnostic Tests  X-ray: abnormal (degeneration)    Patient Goals  Patient goals for therapy: decreased pain, increased motion and increased strength  Patient goal: Pt wants to be able to stand and wash dishes without back pain.            Objective          Palpation   Left   Hypertonic in the lumbar paraspinals.     Right   Hypertonic in the lumbar paraspinals.     Tenderness     Lumbar Spine  No tenderness in the spinous process.     Left Hip   Tenderness in the greater trochanter. No tenderness in the PSIS.     Right Hip   Tenderness in the greater trochanter. No tenderness in the PSIS.     Additional Tenderness Details  GT worse on the R side.     ITB tenderness on both sides.     Neurological Testing     Sensation     Lumbar   Left   Intact: light touch    Right   Intact: light touch    Reflexes   Left   Patellar (L4): normal (2+)  Achilles (S1): normal (2+)    Right   Patellar (L4): normal (2+)  Achilles (S1): normal  (2+)    Active Range of Motion     Lumbar   Flexion: WFL and with pain  Extension: WFL  Left lateral flexion: WFL and with pain  Right lateral flexion: WFL and with pain  Left rotation: WFL  Right rotation: WFL    Strength/Myotome Testing     Right Hip   Planes of Motion   Abduction: 3+    Tests     Lumbar     Left   Negative passive SLR.     Right   Negative passive SLR.           Assessment & Plan     Assessment  Impairments: activity intolerance, impaired physical strength, lacks appropriate home exercise program and pain with function  Assessment details: Pt is a 76 year old female that presents to PT with pain in the low back with standing and pain in the R leg with lying down on her side. Pt with no neuro signs or symptoms noted with activities in the clinic. Pt has weakness in the R hip abductors. Pt with tenderness over the greater trochanters and ITBs bilaterally but worse on the R side. Pt with no tenderness through the lumbar spine although has notable hypertonicity in the lumbar muscles. Pt seems to have weakness and stiffness in the back and weakness in the hips causing the GT to flare up. Pt would benefit from PT to help address the above deficits. Pt educate on diagnosis and treatment.   Prognosis: good  Prognosis details: Short Term Goals (2 weeks)  1. Pt will demonstrate independence with HEP  2. Pt will have 4-/5 strength in the R hip abductors  3. Pt will have 50% less tenderness in the hip GTs bilaterally    Long Term Goals (6 weeks)  1. Pt will have 4/5 strength in hip abductors bilaterally to help reduce stress on GTs  2. Pt will be able to stand for 15 mins without pain greater than 2/10 in the low back  3. Pt will be able to sleep through the night without waking due to R leg pain.  Functional Limitations: lifting, sleeping, walking, uncomfortable because of pain and standing  Plan  Therapy options: will be seen for skilled physical therapy services  Planned modality interventions:  cryotherapy, thermotherapy (hydrocollator packs), ultrasound and TENS  Planned therapy interventions: abdominal trunk stabilization, balance/weight-bearing training, body mechanics training, flexibility, functional ROM exercises, home exercise program, joint mobilization, manual therapy, motor coordination training, neuromuscular re-education, soft tissue mobilization, spinal/joint mobilization, strengthening, stretching and therapeutic activities  Frequency: 2x week  Duration in weeks: 8  Treatment plan discussed with: patient        Manual Therapy:         mins  55637;  Therapeutic Exercise:         mins  59007;     Neuromuscular Mckenzie:        mins  15621;    Therapeutic Activity:     12     mins  67976;     Gait Training:           mins  73550;     Ultrasound:          mins  66818;    Electrical Stimulation:         mins  96323 ( );  Dry Needling          mins self-pay    Timed Treatment:   12   mins   Total Treatment:     42   mins    PT SIGNATURE: Thang Iraheta, IRAIS   DATE TREATMENT INITIATED: 5/5/2021    Initial Certification  Certification Period: 8/3/2021  I certify that the therapy services are furnished while this patient is under my care.  The services outlined above are required by this patient, and will be reviewed every 90 days.     PHYSICIAN: Abdiel Juan MD      DATE:     Please sign and return via fax to [phone].. Thank you, [facility].         - - -

## 2021-05-13 ENCOUNTER — TREATMENT (OUTPATIENT)
Dept: PHYSICAL THERAPY | Facility: CLINIC | Age: 76
End: 2021-05-13

## 2021-05-13 DIAGNOSIS — M54.50 LUMBAR PAIN: Primary | ICD-10-CM

## 2021-05-13 DIAGNOSIS — M70.61 GREATER TROCHANTERIC BURSITIS OF RIGHT HIP: ICD-10-CM

## 2021-05-13 PROCEDURE — 97110 THERAPEUTIC EXERCISES: CPT | Performed by: PHYSICAL THERAPIST

## 2021-05-13 PROCEDURE — 97140 MANUAL THERAPY 1/> REGIONS: CPT | Performed by: PHYSICAL THERAPIST

## 2021-05-13 NOTE — PROGRESS NOTES
Physical Therapy Daily Progress Note      Visit #: 2    Monica Perea reports 0/10 pain today at rest.  Pt reports that she continues to be pretty comfortable at rest although still has pain at night when lying down on her hips. Pt reports that she is not sure she is doing her HEP rigth.         Objective Pt present to PT today with no distress at rest.     Pt with tenderness in the GTs and ITBs bilaterally.     Pt with hypomobility and tenderness in the lumbar SPs and paraspinals.     Pt HEP adjusted to help improve back stretching and comfort.       See Exercise, Manual, and Modality Logs for complete treatment.     Assessment/Plan  Pt continues to have pain in the back and hips extending down her legs. Pt is dealing with other medical issues with her skin and face and her therapy has taken a back seat to that for now. Pt will continue with PT to help improve back and hip pain, stability, and function.       Progress per Plan of Care  Progress as tolerated    Visit Diagnosis:    ICD-10-CM ICD-9-CM   1. Lumbar pain  M54.5 724.2   2. Greater trochanteric bursitis of right hip  M70.61 726.5            Manual Therapy:    16     mins  20918;  Therapeutic Exercise:    16     mins  64835;     Neuromuscular Mckenzie:        mins  93765;    Therapeutic Activity:          mins  24356;     Gait Training:           mins  41404;     Ultrasound:          mins  82232;    Electrical Stimulation:         mins  35613 ( );  Dry Needling          mins self-pay  Iontophoresis          mins 13513      Timed Treatment:   32   mins   Total Treatment:     48   mins    Thang Iraheta, PT  Physical Therapist

## 2021-05-17 ENCOUNTER — TREATMENT (OUTPATIENT)
Dept: PHYSICAL THERAPY | Facility: CLINIC | Age: 76
End: 2021-05-17

## 2021-05-17 DIAGNOSIS — M54.50 LUMBAR PAIN: Primary | ICD-10-CM

## 2021-05-17 DIAGNOSIS — M70.61 GREATER TROCHANTERIC BURSITIS OF RIGHT HIP: ICD-10-CM

## 2021-05-17 PROCEDURE — 97110 THERAPEUTIC EXERCISES: CPT | Performed by: PHYSICAL THERAPIST

## 2021-05-17 PROCEDURE — 97140 MANUAL THERAPY 1/> REGIONS: CPT | Performed by: PHYSICAL THERAPIST

## 2021-05-17 NOTE — PROGRESS NOTES
Physical Therapy Daily Progress Note      Visit #: 3    Monica Perea reports 0/10 pain today at rest.  Pt reports that she felt a lot more limber after last session. Pt reports that she has been doing well with her HEP and the different exercises are easier on her breathing.         Objective Pt present to PT today with no distress at rest.     Pt tolerated activities well today without increased pain in the low back or legs.       See Exercise, Manual, and Modality Logs for complete treatment.     Assessment/Plan  Pt continues to respond well to activities to improve back mobility and hip strength and motion. Pt would benefit from PT to help increase lumbar function, activity tolerance, and improve pain and decrease LE and hip pain with sleeping.       Progress per Plan of Care      Visit Diagnosis:    ICD-10-CM ICD-9-CM   1. Lumbar pain  M54.5 724.2   2. Greater trochanteric bursitis of right hip  M70.61 726.5            Manual Therapy:    17     mins  03419;  Therapeutic Exercise:    20     mins  93383;     Neuromuscular Mckenzie:        mins  39046;    Therapeutic Activity:          mins  65246;     Gait Training:           mins  41807;     Ultrasound:          mins  99650;    Electrical Stimulation:         mins  93372 ( );  Dry Needling          mins self-pay  Iontophoresis          mins 10773      Timed Treatment:   37   mins   Total Treatment:     54   mins    Thang Iraheta, PT  Physical Therapist

## 2021-05-21 ENCOUNTER — TREATMENT (OUTPATIENT)
Dept: PHYSICAL THERAPY | Facility: CLINIC | Age: 76
End: 2021-05-21

## 2021-05-21 DIAGNOSIS — M54.50 LUMBAR PAIN: Primary | ICD-10-CM

## 2021-05-21 DIAGNOSIS — M70.61 GREATER TROCHANTERIC BURSITIS OF RIGHT HIP: ICD-10-CM

## 2021-05-21 PROCEDURE — 97140 MANUAL THERAPY 1/> REGIONS: CPT | Performed by: PHYSICAL THERAPIST

## 2021-05-21 PROCEDURE — 97110 THERAPEUTIC EXERCISES: CPT | Performed by: PHYSICAL THERAPIST

## 2021-05-25 ENCOUNTER — TREATMENT (OUTPATIENT)
Dept: PHYSICAL THERAPY | Facility: CLINIC | Age: 76
End: 2021-05-25

## 2021-05-25 DIAGNOSIS — M54.50 LUMBAR PAIN: Primary | ICD-10-CM

## 2021-05-25 DIAGNOSIS — M70.61 GREATER TROCHANTERIC BURSITIS OF RIGHT HIP: ICD-10-CM

## 2021-05-25 PROCEDURE — 97110 THERAPEUTIC EXERCISES: CPT | Performed by: PHYSICAL THERAPIST

## 2021-05-25 PROCEDURE — 97140 MANUAL THERAPY 1/> REGIONS: CPT | Performed by: PHYSICAL THERAPIST

## 2021-05-25 NOTE — PROGRESS NOTES
Physical Therapy Daily Progress Note      Visit #: 5    Monica Perea reports 0/10 pain today at rest.  Pt reports that her back and legs are feeling better. Pt states that she feels like she has more energy than she has in years. Pt reports that she feels like she will be ready to end PT after this week.         Objective Pt present to PT today with no distress at rest.     Pt with no increased pain in the back or hips following activities.       See Exercise, Manual, and Modality Logs for complete treatment.     Assessment/Plan  Pt continues to do well without pain in the back or hips with activities at home or in clinic. Pt to continue through this week and then be discharged to independent care with HEP.       Progress per Plan of Care      Visit Diagnosis:    ICD-10-CM ICD-9-CM   1. Lumbar pain  M54.5 724.2   2. Greater trochanteric bursitis of right hip  M70.61 726.5            Manual Therapy:    15     mins  95908;  Therapeutic Exercise:    25     mins  70086;     Neuromuscular Mckenzie:        mins  67540;    Therapeutic Activity:          mins  32382;     Gait Training:           mins  59762;     Ultrasound:          mins  63734;    Electrical Stimulation:         mins  52634 ( );  Dry Needling          mins self-pay  Iontophoresis          mins 74230      Timed Treatment:   40   mins   Total Treatment:     43   mins    Thang Iraheta, PT  Physical Therapist

## 2021-05-27 ENCOUNTER — TREATMENT (OUTPATIENT)
Dept: PHYSICAL THERAPY | Facility: CLINIC | Age: 76
End: 2021-05-27

## 2021-05-27 DIAGNOSIS — M54.50 LUMBAR PAIN: Primary | ICD-10-CM

## 2021-05-27 DIAGNOSIS — M70.61 GREATER TROCHANTERIC BURSITIS OF RIGHT HIP: ICD-10-CM

## 2021-05-27 PROCEDURE — 97110 THERAPEUTIC EXERCISES: CPT | Performed by: PHYSICAL THERAPIST

## 2021-05-27 PROCEDURE — 97140 MANUAL THERAPY 1/> REGIONS: CPT | Performed by: PHYSICAL THERAPIST

## 2021-05-27 NOTE — PROGRESS NOTES
Physical Therapy Daily Progress Note      Visit #: 6    Monica Perea reports 0/10 pain today at rest.  Pt reports that she is doing well and has not been having any pain. Pt reports that she did fine without ice last session and will continue HEP on her own after being discharged today. Pt states that she feels like she is ready to be discharged and has had a lot of improvement with PT.         Objective Pt present to PT today with no distress at rest.     Pt with decreased tenderness over GT and ITB on the LEs.     Pt with no increased pain with activities in the clinic today.     Pt provided with updated HEP.       See Exercise, Manual, and Modality Logs for complete treatment.     Assessment/Plan  Pt continues to do well since beginning PT and has been pain free at home and has much more energy per pt report. Pt to continue with exercises independently to help maintain progress. Pt to be discharge from PT.       Other  D/c pt    Visit Diagnosis:    ICD-10-CM ICD-9-CM   1. Lumbar pain  M54.5 724.2   2. Greater trochanteric bursitis of right hip  M70.61 726.5            Manual Therapy:   12      mins  57806;  Therapeutic Exercise:    35     mins  42381;     Neuromuscular Mckenzie:        mins  64774;    Therapeutic Activity:          mins  09769;     Gait Training:           mins  72412;     Ultrasound:          mins  36686;    Electrical Stimulation:         mins  64481 ( );  Dry Needling          mins self-pay  Iontophoresis          mins 98261      Timed Treatment:   47   mins   Total Treatment:     51   mins    Thang Iraheta, PT  Physical Therapist

## 2021-06-08 DIAGNOSIS — I48.0 PAROXYSMAL ATRIAL FIBRILLATION (HCC): ICD-10-CM

## 2021-06-08 DIAGNOSIS — R00.2 PALPITATIONS: ICD-10-CM

## 2021-06-08 DIAGNOSIS — I25.10 CORONARY ARTERY DISEASE INVOLVING NATIVE CORONARY ARTERY OF NATIVE HEART WITHOUT ANGINA PECTORIS: ICD-10-CM

## 2021-06-08 DIAGNOSIS — I34.1 MVP (MITRAL VALVE PROLAPSE): ICD-10-CM

## 2021-06-13 PROCEDURE — 93296 REM INTERROG EVL PM/IDS: CPT | Performed by: INTERNAL MEDICINE

## 2021-06-13 PROCEDURE — 93294 REM INTERROG EVL PM/LDLS PM: CPT | Performed by: INTERNAL MEDICINE

## 2021-07-06 NOTE — PROGRESS NOTES
Level II trauma paged.    Subjective   Monica Perea is a 74 y.o. female.     Chief Complaint   Patient presents with   • Follow-up     1 month f/u   • Urinary Tract Infection     pt states that her bladder feels full and she is having abdominal pain       History of Present Illness    Patient here for follow-up of.  Recent heart rate goes up in the seeing cardiologist thought to be atrial fibrillation patient is on Xarelto and metoprolol seeing cardiologist.  Hyperlipidemia stable on medication.  Coronary artery disease is stable now.  Weight is still elevated.  Urinary pressure dysuria urine test is normal patient used to see the urologist of 4 urethral dilation.  Anxiety stable on medication.  Hip discomfort patient here to do physical therapy.  Blood test also showed a B12 slightly decreased.    Current Outpatient Medications:   •  ALPRAZolam (XANAX) 0.25 MG tablet, Take 1 tablet by mouth Daily As Needed for Anxiety., Disp: 15 tablet, Rfl: 1  •  amLODIPine (NORVASC) 5 MG tablet, Take 1 tablet by mouth Daily., Disp: 30 tablet, Rfl: 1  •  aspirin (ASPIRIN LOW DOSE) 81 MG tablet, Take  by mouth Daily., Disp: , Rfl:   •  Calcium Carbonate-Vitamin D (CALCIUM PLUS VITAMIN D PO), Take  by mouth Daily., Disp: , Rfl:   •  clobetasol (TEMOVATE) 0.05 % ointment, Apply  topically to the appropriate area as directed Every 12 (Twelve) Hours., Disp: 60 g, Rfl: 3  •  coenzyme Q10 100 MG capsule, Take 100 mg by mouth Daily., Disp: , Rfl:   •  hydrochlorothiazide (HYDRODIURIL) 25 MG tablet, Take 1 tablet by mouth Daily., Disp: 30 tablet, Rfl: 1  •  magnesium oxide (MAGOX) 400 (241.3 MG) MG tablet tablet, Take 250 mg by mouth 2 (Two) Times a Day., Disp: , Rfl:   •  melatonin 5 MG tablet tablet, , Disp: , Rfl:   •  metoprolol tartrate (LOPRESSOR) 25 MG tablet, Take 1 tablet by mouth 2 (Two) Times a Day. (Patient taking differently: Take  by mouth 2 (Two) Times a Day. Pt is taking 1/2 tab at night), Disp: 60 tablet, Rfl: 0  •  Omega-3 Fatty Acids  (FISH OIL) 1000 MG capsule capsule, Take 1,200 mg by mouth 2 (Two) Times a Day With Meals., Disp: , Rfl:   •  Oxymetazoline HCl (NASAL DECONGESTANT SPRAY NA), , Disp: , Rfl:   •  pravastatin (PRAVACHOL) 40 MG tablet, TAKE 1 TABLET BY MOUTH DAILY AS DIRECTED, Disp: 90 tablet, Rfl: 3  •  rivaroxaban (XARELTO) 20 MG tablet, Take 1 tablet by mouth Daily., Disp: 30 tablet, Rfl: 0  •  sertraline (ZOLOFT) 50 MG tablet, Take 1 tablet by mouth Daily., Disp: 30 tablet, Rfl: 1  •  TRAVATAN Z 0.004 % solution ophthalmic solution, INSTILL 1 DROP INTO EACH EYE NIGHTLY AS DIRECTED, Disp: , Rfl: 6  •  TURMERIC PO, Take  by mouth Daily., Disp: , Rfl:     The following portions of the patient's history were reviewed and updated as appropriate: allergies, current medications, past family history, past medical history, past social history, past surgical history and problem list.    Review of Systems   Constitutional: Negative.    Respiratory: Negative.    Cardiovascular: Negative.    Gastrointestinal: Negative.    Musculoskeletal: Negative.    Skin: Negative.    Neurological: Negative.    Psychiatric/Behavioral: Negative.        Objective   Physical Exam   Constitutional: She is oriented to person, place, and time. She appears well-developed and well-nourished.   Neck: Neck supple.   Cardiovascular: Normal rate, regular rhythm and normal heart sounds.   Pulmonary/Chest: Effort normal and breath sounds normal.   Abdominal: Bowel sounds are normal.   Neurological: She is alert and oriented to person, place, and time.   Skin: Skin is warm.   Psychiatric: She has a normal mood and affect.       All tests have been reviewed.    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  stress test normal, follow up with cardio,   Colon 7/18/16: Left-sided diverticulosis. Colon polyps. Internal hemorrhoids. Repeat 5 year       Diverticulitis hx.    Pneumovax 2015,and zostavax and shingrix refused and explained risks  Psoriasis refill clobetasole   Constipation cont otc fiber  Knees pain, OA probable, glucosamine continue   Osteopenia due bone density   Foot fungal infection s/p med by derm  Allergy  -- not currently taking claritin  A fib continue med  Interstitial cystitis UA normal follow uro  Bilateral lower extremity edema worse in the evening we will lower amlodipine to 5 mg daily (due to leg swelling) and to increase hydrochlorothiazide to 25 mg daily., edema resolved  Eczema continue med   Lumbar radiculopathy, yet to do PT

## 2021-07-15 ENCOUNTER — OFFICE VISIT (OUTPATIENT)
Dept: INTERNAL MEDICINE | Facility: CLINIC | Age: 76
End: 2021-07-15

## 2021-07-15 VITALS
HEIGHT: 61 IN | HEART RATE: 66 BPM | OXYGEN SATURATION: 98 % | TEMPERATURE: 95.9 F | WEIGHT: 167 LBS | SYSTOLIC BLOOD PRESSURE: 120 MMHG | DIASTOLIC BLOOD PRESSURE: 80 MMHG | BODY MASS INDEX: 31.53 KG/M2

## 2021-07-15 DIAGNOSIS — L40.9 PSORIASIS: ICD-10-CM

## 2021-07-15 DIAGNOSIS — H40.9 GLAUCOMA, UNSPECIFIED GLAUCOMA TYPE, UNSPECIFIED LATERALITY: ICD-10-CM

## 2021-07-15 DIAGNOSIS — E61.1 IRON DEFICIENCY: ICD-10-CM

## 2021-07-15 DIAGNOSIS — E78.2 MIXED HYPERLIPIDEMIA: ICD-10-CM

## 2021-07-15 DIAGNOSIS — D64.9 ANEMIA, UNSPECIFIED TYPE: ICD-10-CM

## 2021-07-15 DIAGNOSIS — M79.89 LEG SWELLING: ICD-10-CM

## 2021-07-15 DIAGNOSIS — N95.2 ATROPHIC VAGINITIS: ICD-10-CM

## 2021-07-15 DIAGNOSIS — F41.9 ANXIETY: ICD-10-CM

## 2021-07-15 DIAGNOSIS — G47.33 OBSTRUCTIVE SLEEP APNEA SYNDROME: ICD-10-CM

## 2021-07-15 DIAGNOSIS — R53.83 OTHER FATIGUE: ICD-10-CM

## 2021-07-15 DIAGNOSIS — I49.5 SINUS NODE DYSFUNCTION (HCC): ICD-10-CM

## 2021-07-15 DIAGNOSIS — I48.0 PAROXYSMAL ATRIAL FIBRILLATION (HCC): ICD-10-CM

## 2021-07-15 DIAGNOSIS — N30.10 INTERSTITIAL CYSTITIS: ICD-10-CM

## 2021-07-15 DIAGNOSIS — R00.2 PALPITATIONS: ICD-10-CM

## 2021-07-15 DIAGNOSIS — T78.40XA ALLERGY, INITIAL ENCOUNTER: Primary | ICD-10-CM

## 2021-07-15 DIAGNOSIS — I10 ESSENTIAL HYPERTENSION: ICD-10-CM

## 2021-07-15 DIAGNOSIS — K57.90 DIVERTICULOSIS OF INTESTINE WITHOUT BLEEDING, UNSPECIFIED INTESTINAL TRACT LOCATION: ICD-10-CM

## 2021-07-15 DIAGNOSIS — E55.9 VITAMIN D DEFICIENCY: ICD-10-CM

## 2021-07-15 DIAGNOSIS — M85.80 OSTEOPENIA, UNSPECIFIED LOCATION: ICD-10-CM

## 2021-07-15 DIAGNOSIS — Z95.0 PRESENCE OF CARDIAC PACEMAKER: ICD-10-CM

## 2021-07-15 DIAGNOSIS — Z12.11 COLON CANCER SCREENING: ICD-10-CM

## 2021-07-15 DIAGNOSIS — Z79.899 LONG TERM CURRENT USE OF ANTIARRHYTHMIC DRUG: ICD-10-CM

## 2021-07-15 DIAGNOSIS — I34.1 MVP (MITRAL VALVE PROLAPSE): ICD-10-CM

## 2021-07-15 DIAGNOSIS — G47.00 INSOMNIA, UNSPECIFIED TYPE: ICD-10-CM

## 2021-07-15 DIAGNOSIS — Z79.01 CHRONIC ANTICOAGULATION: ICD-10-CM

## 2021-07-15 DIAGNOSIS — I25.10 CORONARY ARTERY DISEASE INVOLVING NATIVE CORONARY ARTERY OF NATIVE HEART WITHOUT ANGINA PECTORIS: ICD-10-CM

## 2021-07-15 DIAGNOSIS — F32.A DEPRESSION, UNSPECIFIED DEPRESSION TYPE: ICD-10-CM

## 2021-07-15 DIAGNOSIS — M54.50 LOW BACK PAIN WITHOUT SCIATICA, UNSPECIFIED BACK PAIN LATERALITY, UNSPECIFIED CHRONICITY: ICD-10-CM

## 2021-07-15 DIAGNOSIS — R06.09 DYSPNEA ON EXERTION: ICD-10-CM

## 2021-07-15 PROBLEM — J42 CHRONIC BRONCHITIS, UNSPECIFIED CHRONIC BRONCHITIS TYPE: Status: RESOLVED | Noted: 2021-04-26 | Resolved: 2021-07-15

## 2021-07-15 PROBLEM — Z92.29 HX OF LONG TERM USE OF BLOOD THINNERS: Status: RESOLVED | Noted: 2020-03-16 | Resolved: 2021-07-15

## 2021-07-15 PROCEDURE — 1170F FXNL STATUS ASSESSED: CPT | Performed by: INTERNAL MEDICINE

## 2021-07-15 PROCEDURE — 96160 PT-FOCUSED HLTH RISK ASSMT: CPT | Performed by: INTERNAL MEDICINE

## 2021-07-15 PROCEDURE — 1126F AMNT PAIN NOTED NONE PRSNT: CPT | Performed by: INTERNAL MEDICINE

## 2021-07-15 PROCEDURE — 99397 PER PM REEVAL EST PAT 65+ YR: CPT | Performed by: INTERNAL MEDICINE

## 2021-07-15 PROCEDURE — G0439 PPPS, SUBSEQ VISIT: HCPCS | Performed by: INTERNAL MEDICINE

## 2021-07-15 PROCEDURE — 1159F MED LIST DOCD IN RCRD: CPT | Performed by: INTERNAL MEDICINE

## 2021-07-15 NOTE — PROGRESS NOTES
The ABCs of the Annual Wellness Visit  Subsequent Medicare Wellness Visit    Chief Complaint   Patient presents with   • Follow-up   • Hyperlipidemia   • Hypertension       Subjective   History of Present Illness:  Monica Perea is a 76 y.o. female who presents for a Subsequent Medicare Wellness Visit.    HEALTH RISK ASSESSMENT    Recent Hospitalizations:  No hospitalization(s) within the last year.    Current Medical Providers:  Patient Care Team:  Abdiel Juan MD as PCP - General (Internal Medicine)  Tico Charles MD as Consulting Physician (Cardiology)  Delfino Thomas Jr., APRN as Nurse Practitioner (Interventional Cardiology)  Villa Thomas DO as Consulting Physician (Cardiology)    Smoking Status:  Social History     Tobacco Use   Smoking Status Former Smoker   • Packs/day: 1.00   • Types: Cigarettes   • Quit date:    • Years since quittin.5   Smokeless Tobacco Never Used       Alcohol Consumption:  Social History     Substance and Sexual Activity   Alcohol Use No    Comment: quit in        Depression Screen:   PHQ-2/PHQ-9 Depression Screening 7/15/2021   Little interest or pleasure in doing things 0   Feeling down, depressed, or hopeless 0   Trouble falling or staying asleep, or sleeping too much -   Feeling tired or having little energy -   Poor appetite or overeating -   Feeling bad about yourself - or that you are a failure or have let yourself or your family down -   Trouble concentrating on things, such as reading the newspaper or watching television -   Moving or speaking so slowly that other people could have noticed. Or the opposite - being so fidgety or restless that you have been moving around a lot more than usual -   Thoughts that you would be better off dead, or of hurting yourself in some way -   Total Score 0   If you checked off any problems, how difficult have these problems made it for you to do your work, take care of things at home, or get along with  other people? -       Fall Risk Screen:  BIPIN Fall Risk Assessment was completed, and patient is at LOW risk for falls.Assessment completed on:4/12/2021    Health Habits and Functional and Cognitive Screening:  Functional & Cognitive Status 6/30/2020   Do you have difficulty preparing food and eating? No   Do you have difficulty bathing yourself, getting dressed or grooming yourself? No   Do you have difficulty using the toilet? No   Do you have difficulty moving around from place to place? No   Do you have trouble with steps or getting out of a bed or a chair? No   Current Diet Unhealthy Diet   Dental Exam Up to date   Eye Exam Up to date   Exercise (times per week) 0 times per week   Current Exercise Activities Include None   Do you need help using the phone?  No   Are you deaf or do you have serious difficulty hearing?  No   Do you need help with transportation? No   Do you need help shopping? No   Do you need help preparing meals?  No   Do you need help with housework?  No   Do you need help with laundry? No   Do you need help taking your medications? No   Do you need help managing money? No   Do you ever drive or ride in a car without wearing a seat belt? No   Have you felt unusual stress, anger or loneliness in the last month? Yes   Who do you live with? Spouse   If you need help, do you have trouble finding someone available to you? No   Have you been bothered in the last four weeks by sexual problems? No   Do you have difficulty concentrating, remembering or making decisions? No         Does the patient have evidence of cognitive impairment? No    Asprin use counseling:Does not need ASA (and currently is not on it)    Age-appropriate Screening Schedule:  Refer to the list below for future screening recommendations based on patient's age, sex and/or medical conditions. Orders for these recommended tests are listed in the plan section. The patient has been provided with a written plan.    Health Maintenance    Topic Date Due   • TDAP/TD VACCINES (1 - Tdap) Never done   • ZOSTER VACCINE (1 of 2) Never done   • INFLUENZA VACCINE  08/01/2021   • LIPID PANEL  09/15/2021   • DXA SCAN  07/08/2022   • MAMMOGRAM  12/09/2022          The following portions of the patient's history were reviewed and updated as appropriate: allergies, current medications, past family history, past medical history, past social history, past surgical history and problem list.    Outpatient Medications Prior to Visit   Medication Sig Dispense Refill   • Calcium Carbonate-Vitamin D (CALCIUM PLUS VITAMIN D PO) Take 1 tablet by mouth Daily.     • cetirizine (zyrTEC) 10 MG tablet Take 10 mg by mouth Daily.     • clobetasol (TEMOVATE) 0.05 % ointment Apply  topically to the appropriate area as directed Every 12 (Twelve) Hours. 60 g 3   • cloNIDine (Catapres) 0.1 MG tablet Take 1 tablet by mouth Every 6 (Six) Hours As Needed for High Blood Pressure (SBP>150mmHg and/or DBP>100mm Hg). 60 tablet 11   • coenzyme Q10 100 MG capsule Take 100 mg by mouth Daily.     • dilTIAZem CD (CARDIZEM CD) 240 MG 24 hr capsule Take 1 capsule by mouth Daily. 90 capsule 3   • Ferrous Sulfate (Iron) 28 MG tablet Take  by mouth.     • fluorouracil (EFUDEX) 5 % cream Apply  topically to the appropriate area as directed 2 (Two) Times a Day.     • hydrALAZINE (APRESOLINE) 10 MG tablet Take 1 tablet by mouth 3 (Three) Times a Day. 270 tablet 3   • hydroCHLOROthiazide (HYDRODIURIL) 25 MG tablet Take 1 tablet by mouth Daily. 30 tablet 11   • multivitamin with minerals (DAILY MULTI PO) Take 1 tablet by mouth Daily.     • pravastatin (PRAVACHOL) 40 MG tablet Take 40 mg by mouth Daily.     • rivaroxaban (XARELTO) 20 MG tablet Take 1 tablet by mouth Daily. 30 tablet 5   • TRAVATAN Z 0.004 % solution ophthalmic solution INSTILL 1 DROP INTO EACH EYE NIGHTLY AS DIRECTED  6     No facility-administered medications prior to visit.       Patient Active Problem List   Diagnosis   • Essential  "hypertension   • Diverticulosis of intestine   • Dyspnea on exertion   • Other fatigue   • Glaucoma   • Hyperlipidemia   • Low back pain   • Osteopenia   • Palpitations   • Atrophic vaginitis   • Sleep apnea   • Psoriasis   • CAD (coronary artery disease)   • Interstitial cystitis   • Anxiety   • MVP (mitral valve prolapse)   • Insomnia   • Paroxysmal atrial fibrillation (CMS/HCC)   • Vitamin D deficiency   • Leg swelling   • Sinus node dysfunction (CMS/HCC)   • Presence of cardiac pacemaker   • Depression   • Chronic anticoagulation   • Long term current use of antiarrhythmic drug   • Allergies   • Iron deficiency   • Anemia       Advanced Care Planning:  ACP discussion was held with the patient during this visit. Patient does not have an advance directive, information provided.    Review of Systems   Constitutional: Positive for fatigue.   HENT: Negative.    Eyes: Negative.    Respiratory: Negative.    Cardiovascular: Negative.    Gastrointestinal: Negative.    Endocrine: Negative.    Genitourinary: Negative.    Musculoskeletal: Negative.    Skin: Negative.    Allergic/Immunologic: Negative.    Neurological: Negative.    Hematological: Negative.    Psychiatric/Behavioral: Negative.        Compared to one year ago, the patient feels her physical health is the same.  Compared to one year ago, the patient feels her mental health is the same.    Reviewed chart for potential of high risk medication in the elderly: yes  Reviewed chart for potential of harmful drug interactions in the elderly:no    Objective         Vitals:    07/15/21 0832   BP: 120/80   Pulse: 66   Temp: 95.9 °F (35.5 °C)   SpO2: 98%   Weight: 75.8 kg (167 lb)   Height: 154.9 cm (61\")       Body mass index is 31.55 kg/m².  Discussed the patient's BMI with her. The BMI is above average; BMI management plan is completed.    Physical Exam  Constitutional:       Appearance: Normal appearance. She is well-developed. She is obese.   HENT:      Head: " Normocephalic and atraumatic.      Right Ear: Tympanic membrane, ear canal and external ear normal.      Left Ear: Tympanic membrane, ear canal and external ear normal.      Nose: Nose normal.   Eyes:      Conjunctiva/sclera: Conjunctivae normal.      Pupils: Pupils are equal, round, and reactive to light.   Cardiovascular:      Rate and Rhythm: Normal rate and regular rhythm.      Heart sounds: Normal heart sounds.   Pulmonary:      Effort: Pulmonary effort is normal.      Breath sounds: Normal breath sounds.   Abdominal:      General: Bowel sounds are normal.      Palpations: Abdomen is soft.   Genitourinary:     Vagina: Normal.   Musculoskeletal:         General: Normal range of motion.      Cervical back: Normal range of motion and neck supple.   Skin:     General: Skin is warm and dry.   Neurological:      General: No focal deficit present.      Mental Status: She is alert and oriented to person, place, and time. Mental status is at baseline.      Deep Tendon Reflexes: Reflexes are normal and symmetric.   Psychiatric:         Mood and Affect: Mood normal.         Behavior: Behavior normal.         Thought Content: Thought content normal.         Judgment: Judgment normal.               Assessment/Plan   Medicare Risks and Personalized Health Plan  CMS Preventative Services Quick Reference  Advance Directive Discussion    The above risks/problems have been discussed with the patient.  Pertinent information has been shared with the patient in the After Visit Summary.  Follow up plans and orders are seen below in the Assessment/Plan Section.    Diagnoses and all orders for this visit:    1. Allergy, initial encounter (Primary)    2. Sinus node dysfunction (CMS/HCC)    3. Presence of cardiac pacemaker    4. Paroxysmal atrial fibrillation (CMS/HCC)    5. Palpitations    6. MVP (mitral valve prolapse)    7. Long term current use of antiarrhythmic drug    8. Mixed hyperlipidemia    9. Essential hypertension cont  med    10. Dyspnea on exertion resolved    11. Coronary artery disease involving native coronary artery of native heart without angina pectoris    12. Chronic anticoagulation    13. Vitamin D deficiency    14. Glaucoma, unspecified glaucoma type, unspecified laterality    15. Diverticulosis of intestine without bleeding, unspecified intestinal tract location    16. Interstitial cystitis    17. Atrophic vaginitis    18. Iron deficiency do lab    19. Anemia, unspecified type co lab    20. Depression, stable without med    21. Anxiety stable    22. Osteopenia, oscal D start     23. Low back pain without sciatica, unspecified back pain laterality, unspecified chronicity    24. Psoriasis    25. Obstructive sleep apnea syndrome    26. Insomnia, unspecified type    27. Other fatigue    28. Leg swelling resolved      Follow Up:  Return in about 3 weeks (around 8/5/2021) for Recheck.     An After Visit Summary and PPPS were given to the patient.        ----Below is to historical records for reference only:      Cad MILD BLOCKAGE 2009 30%.  stress test normal, follow up with cardio,   Colon 7/18/16: Left-sided diverticulosis. Colon polyps. Internal hemorrhoids. Repeat 5 year       Diverticulitis hx.   Failed crestor and lipitor   Pneumovax 2015,and zostavax and shingrix refused and explained risks, PNEUMOVAX TODAY   Psoriasis refill clobetasole   Knees pain, OA probable, glucosamine continue   Foot fungal infection s/p med by derm  Interstitial cystitis UA normal follow uro   leg swelling

## 2021-07-16 LAB
25(OH)D3+25(OH)D2 SERPL-MCNC: 59.9 NG/ML (ref 30–100)
ALBUMIN SERPL-MCNC: 4.7 G/DL (ref 3.5–5.2)
ALBUMIN/GLOB SERPL: 1.7 G/DL
ALP SERPL-CCNC: 86 U/L (ref 39–117)
ALT SERPL-CCNC: 12 U/L (ref 1–33)
AST SERPL-CCNC: 13 U/L (ref 1–32)
BASOPHILS # BLD AUTO: 0.07 10*3/MM3 (ref 0–0.2)
BASOPHILS NFR BLD AUTO: 1.2 % (ref 0–1.5)
BILIRUB SERPL-MCNC: 0.2 MG/DL (ref 0–1.2)
BUN SERPL-MCNC: 11 MG/DL (ref 8–23)
BUN/CREAT SERPL: 13.1 (ref 7–25)
CALCIUM SERPL-MCNC: 10.1 MG/DL (ref 8.6–10.5)
CHLORIDE SERPL-SCNC: 104 MMOL/L (ref 98–107)
CHOLEST SERPL-MCNC: 178 MG/DL (ref 0–200)
CO2 SERPL-SCNC: 28.7 MMOL/L (ref 22–29)
CREAT SERPL-MCNC: 0.84 MG/DL (ref 0.57–1)
CRP SERPL-MCNC: 0.33 MG/DL (ref 0–0.5)
EOSINOPHIL # BLD AUTO: 0.5 10*3/MM3 (ref 0–0.4)
EOSINOPHIL NFR BLD AUTO: 8.5 % (ref 0.3–6.2)
ERYTHROCYTE [DISTWIDTH] IN BLOOD BY AUTOMATED COUNT: 12.6 % (ref 12.3–15.4)
ERYTHROCYTE [SEDIMENTATION RATE] IN BLOOD BY WESTERGREN METHOD: 9 MM/HR (ref 0–30)
FERRITIN SERPL-MCNC: 49.1 NG/ML (ref 13–150)
GLOBULIN SER CALC-MCNC: 2.7 GM/DL
GLUCOSE SERPL-MCNC: 104 MG/DL (ref 65–99)
HCT VFR BLD AUTO: 40.3 % (ref 34–46.6)
HDLC SERPL-MCNC: 66 MG/DL (ref 40–60)
HGB BLD-MCNC: 13.5 G/DL (ref 12–15.9)
IMM GRANULOCYTES # BLD AUTO: 0.02 10*3/MM3 (ref 0–0.05)
IMM GRANULOCYTES NFR BLD AUTO: 0.3 % (ref 0–0.5)
IRON SATN MFR SERPL: 15 % (ref 20–50)
IRON SERPL-MCNC: 68 MCG/DL (ref 37–145)
LDLC SERPL CALC-MCNC: 98 MG/DL (ref 0–100)
LYMPHOCYTES # BLD AUTO: 1.7 10*3/MM3 (ref 0.7–3.1)
LYMPHOCYTES NFR BLD AUTO: 29.1 % (ref 19.6–45.3)
MCH RBC QN AUTO: 31.3 PG (ref 26.6–33)
MCHC RBC AUTO-ENTMCNC: 33.5 G/DL (ref 31.5–35.7)
MCV RBC AUTO: 93.3 FL (ref 79–97)
MONOCYTES # BLD AUTO: 0.59 10*3/MM3 (ref 0.1–0.9)
MONOCYTES NFR BLD AUTO: 10.1 % (ref 5–12)
NEUTROPHILS # BLD AUTO: 2.97 10*3/MM3 (ref 1.7–7)
NEUTROPHILS NFR BLD AUTO: 50.8 % (ref 42.7–76)
NRBC BLD AUTO-RTO: 0 /100 WBC (ref 0–0.2)
PLATELET # BLD AUTO: 217 10*3/MM3 (ref 140–450)
POTASSIUM SERPL-SCNC: 3.8 MMOL/L (ref 3.5–5.2)
PROT SERPL-MCNC: 7.4 G/DL (ref 6–8.5)
RBC # BLD AUTO: 4.32 10*6/MM3 (ref 3.77–5.28)
SODIUM SERPL-SCNC: 142 MMOL/L (ref 136–145)
TIBC SERPL-MCNC: 446 MCG/DL
TRIGL SERPL-MCNC: 76 MG/DL (ref 0–150)
TSH SERPL DL<=0.005 MIU/L-ACNC: 1.93 UIU/ML (ref 0.27–4.2)
UIBC SERPL-MCNC: 378 MCG/DL (ref 112–346)
VIT B12 SERPL-MCNC: 725 PG/ML (ref 211–946)
VLDLC SERPL CALC-MCNC: 14 MG/DL (ref 5–40)
WBC # BLD AUTO: 5.85 10*3/MM3 (ref 3.4–10.8)

## 2021-07-27 ENCOUNTER — TELEPHONE (OUTPATIENT)
Dept: INTERNAL MEDICINE | Facility: CLINIC | Age: 76
End: 2021-07-27

## 2021-07-27 DIAGNOSIS — I48.0 PAROXYSMAL ATRIAL FIBRILLATION (HCC): ICD-10-CM

## 2021-07-27 DIAGNOSIS — I10 ESSENTIAL HYPERTENSION: ICD-10-CM

## 2021-07-27 DIAGNOSIS — I25.10 CORONARY ARTERY DISEASE INVOLVING NATIVE CORONARY ARTERY OF NATIVE HEART WITHOUT ANGINA PECTORIS: ICD-10-CM

## 2021-07-27 DIAGNOSIS — E78.2 MIXED HYPERLIPIDEMIA: ICD-10-CM

## 2021-07-27 DIAGNOSIS — I34.1 MVP (MITRAL VALVE PROLAPSE): ICD-10-CM

## 2021-07-27 DIAGNOSIS — R00.2 PALPITATIONS: ICD-10-CM

## 2021-07-27 RX ORDER — PRAVASTATIN SODIUM 40 MG
40 TABLET ORAL DAILY
Qty: 90 TABLET | Refills: 3 | Status: SHIPPED | OUTPATIENT
Start: 2021-07-27 | End: 2022-08-16

## 2021-07-27 RX ORDER — PRAVASTATIN SODIUM 40 MG
40 TABLET ORAL DAILY
Qty: 90 TABLET | Refills: 3 | Status: SHIPPED | OUTPATIENT
Start: 2021-07-27 | End: 2021-07-27

## 2021-07-27 RX ORDER — HYDROCHLOROTHIAZIDE 25 MG/1
25 TABLET ORAL DAILY
Qty: 90 TABLET | Refills: 3 | Status: SHIPPED | OUTPATIENT
Start: 2021-07-27 | End: 2022-06-08 | Stop reason: SDUPTHER

## 2021-07-27 RX ORDER — DILTIAZEM HYDROCHLORIDE 240 MG/1
240 CAPSULE, COATED, EXTENDED RELEASE ORAL DAILY
Qty: 90 CAPSULE | Refills: 3 | Status: SHIPPED | OUTPATIENT
Start: 2021-07-27 | End: 2022-06-08 | Stop reason: SDUPTHER

## 2021-07-27 RX ORDER — HYDROCHLOROTHIAZIDE 25 MG/1
25 TABLET ORAL DAILY
Qty: 90 TABLET | Refills: 3 | Status: SHIPPED | OUTPATIENT
Start: 2021-07-27 | End: 2021-07-27

## 2021-07-27 RX ORDER — HYDRALAZINE HYDROCHLORIDE 10 MG/1
10 TABLET, FILM COATED ORAL 3 TIMES DAILY
Qty: 270 TABLET | Refills: 3 | OUTPATIENT
Start: 2021-07-27

## 2021-07-27 RX ORDER — HYDRALAZINE HYDROCHLORIDE 10 MG/1
10 TABLET, FILM COATED ORAL 3 TIMES DAILY
Qty: 270 TABLET | Refills: 3 | Status: SHIPPED | OUTPATIENT
Start: 2021-07-27 | End: 2021-09-15

## 2021-07-27 RX ORDER — DILTIAZEM HYDROCHLORIDE 240 MG/1
240 CAPSULE, COATED, EXTENDED RELEASE ORAL DAILY
Qty: 90 CAPSULE | Refills: 3 | OUTPATIENT
Start: 2021-07-27

## 2021-07-27 NOTE — TELEPHONE ENCOUNTER
Sent requested medications to [facility]; explained to patient that certain medications were denied due to being filled by cardiology. Patient expressed understanding and stated she would request medication from specialty office.

## 2021-07-27 NOTE — TELEPHONE ENCOUNTER
Caller: Monica Perea    Relationship: Self    Best call back number: [phone]    What medications are you currently taking:   Current Outpatient Medications on File Prior to Visit   Medication Sig Dispense Refill   • Calcium Carbonate-Vitamin D (CALCIUM PLUS VITAMIN D PO) Take 1 tablet by mouth Daily.     • cetirizine (zyrTEC) 10 MG tablet Take 10 mg by mouth Daily.     • clobetasol (TEMOVATE) 0.05 % ointment Apply  topically to the appropriate area as directed Every 12 (Twelve) Hours. 60 g 3   • cloNIDine (Catapres) 0.1 MG tablet Take 1 tablet by mouth Every 6 (Six) Hours As Needed for High Blood Pressure (SBP>150mmHg and/or DBP>100mm Hg). 60 tablet 11   • coenzyme Q10 100 MG capsule Take 100 mg by mouth Daily.     • dilTIAZem CD (CARDIZEM CD) 240 MG 24 hr capsule Take 1 capsule by mouth Daily. 90 capsule 3   • Ferrous Sulfate (Iron) 28 MG tablet Take  by mouth.     • fluorouracil (EFUDEX) 5 % cream Apply  topically to the appropriate area as directed 2 (Two) Times a Day.     • hydrALAZINE (APRESOLINE) 10 MG tablet Take 1 tablet by mouth 3 (Three) Times a Day. 270 tablet 3   • hydroCHLOROthiazide (HYDRODIURIL) 25 MG tablet Take 1 tablet by mouth Daily. 90 tablet 3   • multivitamin with minerals (DAILY MULTI PO) Take 1 tablet by mouth Daily.     • pravastatin (PRAVACHOL) 40 MG tablet Take 1 tablet by mouth Daily. 90 tablet 3   • rivaroxaban (XARELTO) 20 MG tablet Take 1 tablet by mouth Daily. 30 tablet 5   • TRAVATAN Z 0.004 % solution ophthalmic solution INSTILL 1 DROP INTO EACH EYE NIGHTLY AS DIRECTED  6   • [DISCONTINUED] hydroCHLOROthiazide (HYDRODIURIL) 25 MG tablet Take 1 tablet by mouth Daily. 30 tablet 11   • [DISCONTINUED] pravastatin (PRAVACHOL) 40 MG tablet Take 40 mg by mouth Daily.       No current facility-administered medications on file prior to visit.        Which medication are you concerned about: HYDRALAZINE AND CARDIZEM PATIENT STATES DR MARTINEZ HAS ALWAYS PRESCRIBED THESE MEDICATIONS.  SHE DOESN'T WANT TO GO TO CARDIOLOGIST EVERY TIME SHE NEEDS THESE FILLED.PRAVASTATIN AND HCTZ WAS SUPPOSE TO GO TO [facility] MAIL ORDER NOT [facility] BECAUSE SHE GETS THEM FOR FREE THROUGH MAIL ORDER. PATIENT NEEDS THESE MEDICATIONS ASAP    Who prescribed you this medication: DR MARTINEZ

## 2021-07-27 NOTE — TELEPHONE ENCOUNTER
Caller: Eliazar Monica NORMA    Relationship: Self    Best call back number: [phone]  Medication needed:   Requested Prescriptions     Pending Prescriptions Disp Refills   • dilTIAZem CD (CARDIZEM CD) 240 MG 24 hr capsule 90 capsule 3     Sig: Take 1 capsule by mouth Daily.   • hydroCHLOROthiazide (HYDRODIURIL) 25 MG tablet 30 tablet 11     Sig: Take 1 tablet by mouth Daily.   • hydrALAZINE (APRESOLINE) 10 MG tablet 270 tablet 3     Sig: Take 1 tablet by mouth 3 (Three) Times a Day.   • pravastatin (PRAVACHOL) 40 MG tablet       Sig: Take 1 tablet by mouth Daily.       When do you need the refill by: ASAP    What additional details did the patient provide when requesting the medication: REQUESTING A 90 DAY SUPPLY ON ALL PRESCRIPTIONS    Does the patient have less than a 3 day supply:  [x] Yes  [] No    What is the patient's preferred pharmacy:    [facility] - [facility] [address] [facility] - [phone] [facility] [phone] FX

## 2021-07-28 RX ORDER — CLOBETASOL PROPIONATE 0.5 MG/G
OINTMENT TOPICAL EVERY 12 HOURS SCHEDULED
Qty: 60 G | Refills: 0 | Status: SHIPPED | OUTPATIENT
Start: 2021-07-28

## 2021-07-28 NOTE — TELEPHONE ENCOUNTER
Last office visit  07/15/2021  Next scheduled appointment 08/05/2021    Please advise if you would like to continue medication

## 2021-08-05 ENCOUNTER — OFFICE VISIT (OUTPATIENT)
Dept: INTERNAL MEDICINE | Facility: CLINIC | Age: 76
End: 2021-08-05

## 2021-08-05 VITALS
HEIGHT: 61 IN | OXYGEN SATURATION: 98 % | TEMPERATURE: 95.3 F | BODY MASS INDEX: 32.1 KG/M2 | WEIGHT: 170 LBS | SYSTOLIC BLOOD PRESSURE: 122 MMHG | DIASTOLIC BLOOD PRESSURE: 80 MMHG | HEART RATE: 64 BPM

## 2021-08-05 DIAGNOSIS — M79.89 LEG SWELLING: ICD-10-CM

## 2021-08-05 DIAGNOSIS — G47.33 OBSTRUCTIVE SLEEP APNEA SYNDROME: ICD-10-CM

## 2021-08-05 DIAGNOSIS — I10 ESSENTIAL HYPERTENSION: ICD-10-CM

## 2021-08-05 DIAGNOSIS — E61.1 IRON DEFICIENCY: ICD-10-CM

## 2021-08-05 DIAGNOSIS — I34.1 MVP (MITRAL VALVE PROLAPSE): Primary | ICD-10-CM

## 2021-08-05 DIAGNOSIS — R53.83 OTHER FATIGUE: ICD-10-CM

## 2021-08-05 DIAGNOSIS — D64.9 ANEMIA, UNSPECIFIED TYPE: ICD-10-CM

## 2021-08-05 DIAGNOSIS — I48.0 PAROXYSMAL ATRIAL FIBRILLATION (HCC): ICD-10-CM

## 2021-08-05 DIAGNOSIS — M85.80 OSTEOPENIA, UNSPECIFIED LOCATION: ICD-10-CM

## 2021-08-05 PROBLEM — F32.A DEPRESSION: Status: RESOLVED | Noted: 2020-07-27 | Resolved: 2021-08-05

## 2021-08-05 PROBLEM — G47.00 INSOMNIA: Status: RESOLVED | Noted: 2019-06-25 | Resolved: 2021-08-05

## 2021-08-05 PROBLEM — F41.9 ANXIETY: Status: RESOLVED | Noted: 2018-12-14 | Resolved: 2021-08-05

## 2021-08-05 PROCEDURE — 99214 OFFICE O/P EST MOD 30 MIN: CPT | Performed by: INTERNAL MEDICINE

## 2021-08-05 NOTE — PROGRESS NOTES
Subjective   Monica Perea is a 76 y.o. female.     Chief Complaint   Patient presents with   • Follow-up     recent lab results   • Hypertension   • Hyperlipidemia       History of Present Illness   Patient here for follow-up.  Hypertension stable on medication hyperlipidemia stable on medication atrial fibrillation stable on medication iron saturation slightly decreased.  Anemia resolved fatigue much improved leg edema trace sleep apnea CPAP helps.  Osteopenia on medication stable    Current Outpatient Medications:   •  Calcium Carbonate-Vitamin D (CALCIUM PLUS VITAMIN D PO), Take 1 tablet by mouth Daily., Disp: , Rfl:   •  cetirizine (zyrTEC) 10 MG tablet, Take 10 mg by mouth Daily., Disp: , Rfl:   •  clobetasol (TEMOVATE) 0.05 % ointment, APPLY  TOPICALLY TO THE APPROPRIATE AREA AS DIRECTED EVERY 12 (TWELVE) HOURS., Disp: 60 g, Rfl: 0  •  cloNIDine (Catapres) 0.1 MG tablet, Take 1 tablet by mouth Every 6 (Six) Hours As Needed for High Blood Pressure (SBP>150mmHg and/or DBP>100mm Hg)., Disp: 60 tablet, Rfl: 11  •  coenzyme Q10 100 MG capsule, Take 100 mg by mouth Daily., Disp: , Rfl:   •  dilTIAZem CD (CARDIZEM CD) 240 MG 24 hr capsule, Take 1 capsule by mouth Daily., Disp: 90 capsule, Rfl: 3  •  Ferrous Sulfate (Iron) 28 MG tablet, Take  by mouth., Disp: , Rfl:   •  fluorouracil (EFUDEX) 5 % cream, Apply  topically to the appropriate area as directed 2 (Two) Times a Day., Disp: , Rfl:   •  hydrALAZINE (APRESOLINE) 10 MG tablet, Take 1 tablet by mouth 3 (Three) Times a Day., Disp: 270 tablet, Rfl: 3  •  hydroCHLOROthiazide (HYDRODIURIL) 25 MG tablet, Take 1 tablet by mouth Daily., Disp: 90 tablet, Rfl: 3  •  pravastatin (PRAVACHOL) 40 MG tablet, Take 1 tablet by mouth Daily., Disp: 90 tablet, Rfl: 3  •  rivaroxaban (XARELTO) 20 MG tablet, Take 1 tablet by mouth Daily., Disp: 30 tablet, Rfl: 5  •  TRAVATAN Z 0.004 % solution ophthalmic solution, INSTILL 1 DROP INTO EACH EYE NIGHTLY AS DIRECTED, Disp: ,  Rfl: 6    The following portions of the patient's history were reviewed and updated as appropriate: allergies, current medications, past family history, past medical history, past social history, past surgical history and problem list.    Review of Systems   Constitutional: Positive for fatigue.   Respiratory: Negative.    Cardiovascular: Negative.    Gastrointestinal: Negative.    Musculoskeletal: Negative.    Skin: Negative.    Neurological: Negative.    Psychiatric/Behavioral: Negative.        Objective   Physical Exam  Cardiovascular:      Rate and Rhythm: Normal rate and regular rhythm.      Heart sounds: Normal heart sounds.   Pulmonary:      Effort: Pulmonary effort is normal.      Breath sounds: Normal breath sounds.   Abdominal:      General: Bowel sounds are normal.   Musculoskeletal:      Cervical back: Neck supple.   Skin:     General: Skin is warm.   Neurological:      Mental Status: She is alert and oriented to person, place, and time.   Psychiatric:         Mood and Affect: Mood normal.         Behavior: Behavior normal.         All tests have been reviewed.    Assessment/Plan   Diagnoses and all orders for this visit:    MVP (mitral valve prolapse)    Paroxysmal atrial fibrillation (CMS/HCC) cont med    Iron deficiency cont med    Anemia, unspecified type resolve    Essential hypertension cut down hydralazine to 1/2 tid     Other fatigue watch     Leg swelling trace    Obstructive sleep apnea syndrome cont cpap    Osteopenia, unspecified location cont med    3 mo            ----Below is to historical records for reference only:      Cad MILD BLOCKAGE 2009 30%.  stress test normal, follow up with cardio,   Colon 7/18/16: Left-sided diverticulosis. Colon polyps. Internal hemorrhoids. Repeat 5 year       Diverticulitis hx.   Failed crestor and lipitor   Pneumovax 2015,and zostavax and shingrix refused and explained risks, PNEUMOVAX TODAY   Psoriasis refill clobetasole   Hx of depression and anxiety  resolved without meds  Knees pain, OA probable, glucosamine continue   Foot fungal infection s/p med by derm  Interstitial cystitis UA normal follow uro   leg swelling

## 2021-08-09 ENCOUNTER — TELEPHONE (OUTPATIENT)
Dept: INTERNAL MEDICINE | Facility: CLINIC | Age: 76
End: 2021-08-09

## 2021-08-09 NOTE — TELEPHONE ENCOUNTER
Caller: JF    Relationship: REPRESENTATIVE FROM [employer]    Best call back number:     [phone]    What is the best time to reach you:     ANY TIME    Who are you requesting to speak with (clinical staff, provider,  specific staff member)    DR MARTINEZ    Do you know the name of the person who called:     N/A    What was the call regarding:     CALLER STATED ABNORMAL RESULTS FOR A RECENT COLOGUARD FOR PATIENT HAD BEEN FAXED TO THE OFFICE DR MARTINEZ'S ATTENTION    CALLER ASKED IF OFFICE RECEIVED FAX    PLEASE RETURN THE CALL TO CALLER AT TELEPHONE NUMBER INCLUDED ABOVE WITH CONFIRMATION THAT DR MARTINEZ RECEIVED THE ABNORMAL RESULTS FROM PATIENT'S RECENT COLOGUARD TEST    Do you require a callback:     YES    DR MARTINEZ

## 2021-08-10 NOTE — TELEPHONE ENCOUNTER
Have not received fax as of yet; if I do not receive it today- I will print report from site for PCP to review and contact patient.

## 2021-08-12 DIAGNOSIS — R19.5 POSITIVE COLORECTAL CANCER SCREENING USING COLOGUARD TEST: Primary | ICD-10-CM

## 2021-08-16 ENCOUNTER — TELEPHONE (OUTPATIENT)
Dept: INTERNAL MEDICINE | Facility: CLINIC | Age: 76
End: 2021-08-16

## 2021-08-16 NOTE — TELEPHONE ENCOUNTER
Caller: Monica Perea    Relationship: Self    Best call back number: [phone]    What is the best time to reach you: ANYTIME     Who are you requesting to speak with (clinical staff, provider,  specific staff member): DOCTOR MARTINEZ OR HIS NURSE         What was the call regarding: PATIENT STATES THAT SHE RECEIVED A CALL FROM DOCTOR MARTINEZ 08/12/2021 THE PATIENT STATES THAT THE CALL WAS TO TELL HER THAT SHE NEEDED TO HAVE A COLONOSCOPY THE PATIENT STATES THAT SHE USED TO SEE DOCTOR JONG FOR THE COLONOSCOPY BUT SINCE HE IS NOT LONGER PRACTICING  SHE WOULD LIKE TO SEE THE DOCTOR THAT TOOK HIS PLACE. PLEASE CALL THE PATIENT TO GIVE HER THE NAME AND CONTACT INFORMATION FOR THAT DOCTOR     Do you require a callback: YES

## 2021-09-15 ENCOUNTER — HOSPITAL ENCOUNTER (OUTPATIENT)
Dept: GENERAL RADIOLOGY | Facility: HOSPITAL | Age: 76
Discharge: HOME | End: 2021-09-15
Admitting: INTERNAL MEDICINE

## 2021-09-15 ENCOUNTER — OFFICE VISIT (OUTPATIENT)
Dept: INTERNAL MEDICINE | Facility: CLINIC | Age: 76
End: 2021-09-15

## 2021-09-15 VITALS
BODY MASS INDEX: 31.72 KG/M2 | HEART RATE: 63 BPM | OXYGEN SATURATION: 98 % | DIASTOLIC BLOOD PRESSURE: 80 MMHG | WEIGHT: 168 LBS | SYSTOLIC BLOOD PRESSURE: 120 MMHG | TEMPERATURE: 96 F | HEIGHT: 61 IN

## 2021-09-15 DIAGNOSIS — E55.9 VITAMIN D DEFICIENCY: ICD-10-CM

## 2021-09-15 DIAGNOSIS — G47.33 OBSTRUCTIVE SLEEP APNEA SYNDROME: ICD-10-CM

## 2021-09-15 DIAGNOSIS — I25.10 CORONARY ARTERY DISEASE INVOLVING NATIVE CORONARY ARTERY OF NATIVE HEART WITHOUT ANGINA PECTORIS: ICD-10-CM

## 2021-09-15 DIAGNOSIS — M54.50 LOW BACK PAIN WITHOUT SCIATICA, UNSPECIFIED BACK PAIN LATERALITY, UNSPECIFIED CHRONICITY: ICD-10-CM

## 2021-09-15 DIAGNOSIS — R06.09 DOE (DYSPNEA ON EXERTION): ICD-10-CM

## 2021-09-15 DIAGNOSIS — R73.9 HYPERGLYCEMIA: ICD-10-CM

## 2021-09-15 DIAGNOSIS — T78.40XA ALLERGY, INITIAL ENCOUNTER: Primary | ICD-10-CM

## 2021-09-15 DIAGNOSIS — E78.2 MIXED HYPERLIPIDEMIA: ICD-10-CM

## 2021-09-15 DIAGNOSIS — D64.9 ANEMIA, UNSPECIFIED TYPE: ICD-10-CM

## 2021-09-15 DIAGNOSIS — E61.1 IRON DEFICIENCY: ICD-10-CM

## 2021-09-15 DIAGNOSIS — L40.9 PSORIASIS: ICD-10-CM

## 2021-09-15 DIAGNOSIS — I48.0 PAROXYSMAL ATRIAL FIBRILLATION (HCC): ICD-10-CM

## 2021-09-15 DIAGNOSIS — N30.10 INTERSTITIAL CYSTITIS: ICD-10-CM

## 2021-09-15 DIAGNOSIS — R53.83 OTHER FATIGUE: ICD-10-CM

## 2021-09-15 DIAGNOSIS — M85.80 OSTEOPENIA, UNSPECIFIED LOCATION: ICD-10-CM

## 2021-09-15 DIAGNOSIS — Z95.0 PRESENCE OF CARDIAC PACEMAKER: ICD-10-CM

## 2021-09-15 DIAGNOSIS — I10 ESSENTIAL HYPERTENSION: ICD-10-CM

## 2021-09-15 DIAGNOSIS — N95.2 ATROPHIC VAGINITIS: ICD-10-CM

## 2021-09-15 PROBLEM — M79.89 LEG SWELLING: Status: RESOLVED | Noted: 2020-03-05 | Resolved: 2021-09-15

## 2021-09-15 PROCEDURE — 1170F FXNL STATUS ASSESSED: CPT | Performed by: INTERNAL MEDICINE

## 2021-09-15 PROCEDURE — 1159F MED LIST DOCD IN RCRD: CPT | Performed by: INTERNAL MEDICINE

## 2021-09-15 PROCEDURE — 71046 X-RAY EXAM CHEST 2 VIEWS: CPT

## 2021-09-15 PROCEDURE — 96160 PT-FOCUSED HLTH RISK ASSMT: CPT | Performed by: INTERNAL MEDICINE

## 2021-09-15 PROCEDURE — 99214 OFFICE O/P EST MOD 30 MIN: CPT | Performed by: INTERNAL MEDICINE

## 2021-09-15 RX ORDER — LATANOPROST 50 UG/ML
1 SOLUTION/ DROPS OPHTHALMIC NIGHTLY
COMMUNITY
Start: 2021-09-10

## 2021-09-15 NOTE — PROGRESS NOTES
The ABCs of the Annual Wellness Visit  Subsequent Medicare Wellness Visit    Chief Complaint   Patient presents with   • Follow-up   • Hypertension   • Hyperlipidemia      Subjective    History of Present Illness:  Moinca Perea is a 76 y.o. female who presents for a Subsequent Medicare Wellness Visit.    Patient here for follow-up.  Atrial fibrillation stable now on medication.  Anemia resolved iron is normal except iron saturation still low.  Blood pressure normal systolic blood pressure 120.  Hydralazine patient self cut down to half a tablet 3 times a day at home.  Patient still complains of feeling tired mainly short of breath with exertion.  Patient discussed with cardiologist thought not to be related to cardiology.  Osteopenia on bone density scan.  Sleep apnea stable on CPAP machine.    The following portions of the patient's history were reviewed and   updated as appropriate: allergies, current medications, past family history, past medical history, past social history, past surgical history and problem list.    Compared to one year ago, the patient feels her physical   health is the same.    Compared to one year ago, the patient feels her mental   health is the same.    Recent Hospitalizations:  She was not admitted to the hospital during the last year.       Current Medical Providers:  Patient Care Team:  Abdiel Juan MD as PCP - General (Internal Medicine)  Tico Charles MD as Consulting Physician (Cardiology)  Delfino Thomas Jr., APRN as Nurse Practitioner (Interventional Cardiology)  Villa Thomas DO as Consulting Physician (Cardiology)    Outpatient Medications Prior to Visit   Medication Sig Dispense Refill   • Calcium Carbonate-Vitamin D (CALCIUM PLUS VITAMIN D PO) Take 1 tablet by mouth Daily.     • cetirizine (zyrTEC) 10 MG tablet Take 10 mg by mouth Daily.     • clobetasol (TEMOVATE) 0.05 % ointment APPLY  TOPICALLY TO THE APPROPRIATE AREA AS DIRECTED EVERY 12 (TWELVE)  HOURS. 60 g 0   • cloNIDine (Catapres) 0.1 MG tablet Take 1 tablet by mouth Every 6 (Six) Hours As Needed for High Blood Pressure (SBP>150mmHg and/or DBP>100mm Hg). 60 tablet 11   • coenzyme Q10 100 MG capsule Take 100 mg by mouth Daily.     • dilTIAZem CD (CARDIZEM CD) 240 MG 24 hr capsule Take 1 capsule by mouth Daily. 90 capsule 3   • Ferrous Sulfate (Iron) 28 MG tablet Take  by mouth.     • fluorouracil (EFUDEX) 5 % cream Apply  topically to the appropriate area as directed 2 (Two) Times a Day.     • hydrALAZINE (APRESOLINE) 10 MG tablet Take 1 tablet by mouth 3 (Three) Times a Day. 270 tablet 3   • hydroCHLOROthiazide (HYDRODIURIL) 25 MG tablet Take 1 tablet by mouth Daily. 90 tablet 3   • latanoprost (XALATAN) 0.005 % ophthalmic solution      • pravastatin (PRAVACHOL) 40 MG tablet Take 1 tablet by mouth Daily. 90 tablet 3   • rivaroxaban (XARELTO) 20 MG tablet Take 1 tablet by mouth Daily. 30 tablet 5   • TRAVATAN Z 0.004 % solution ophthalmic solution INSTILL 1 DROP INTO EACH EYE NIGHTLY AS DIRECTED  6     No facility-administered medications prior to visit.       No opioid medication identified on active medication list. I have reviewed chart for other potential  high risk medication/s and harmful drug interactions in the elderly.          Aspirin is not on active medication list.  Aspirin use is not indicated based on review of current medical condition/s. Risk of harm outweighs potential benefits.  .    Patient Active Problem List   Diagnosis   • Essential hypertension   • Diverticulosis of intestine   • Other fatigue   • Glaucoma   • Hyperlipidemia   • Low back pain   • Osteopenia   • Atrophic vaginitis   • Sleep apnea   • Psoriasis   • CAD (coronary artery disease)   • Interstitial cystitis   • MVP (mitral valve prolapse)   • Paroxysmal atrial fibrillation (CMS/HCC)   • Vitamin D deficiency   • Sinus node dysfunction (CMS/HCC)   • Presence of cardiac pacemaker   • Chronic anticoagulation   • Long term  "current use of antiarrhythmic drug   • Allergies   • Iron deficiency   • Anemia   • Hyperglycemia     Advance Care Planning  Advance Directive is not on file.  ACP discussion was held with the patient during this visit. Patient has an advance directive (not in EMR), copy requested.    Review of Systems   Constitutional: Negative.    HENT: Negative.    Respiratory: Positive for shortness of breath.    Cardiovascular: Negative.    Gastrointestinal: Negative.    Genitourinary: Negative.    Musculoskeletal: Negative.    Skin: Negative.    Neurological: Negative.    Hematological: Negative.    Psychiatric/Behavioral: Negative.         Objective    Vitals:    09/15/21 0928   BP: 120/80   Pulse: 63   Temp: 96 °F (35.6 °C)   SpO2: 98%   Weight: 76.2 kg (168 lb)   Height: 154.9 cm (61\")     BMI Readings from Last 1 Encounters:   09/15/21 31.74 kg/m²   BMI is above normal parameters. Recommendations include: exercise counseling    Does the patient have evidence of cognitive impairment? No    Physical Exam  Constitutional:       Appearance: Normal appearance. She is obese.   HENT:      Head: Normocephalic and atraumatic.      Right Ear: Tympanic membrane, ear canal and external ear normal.      Left Ear: Tympanic membrane, ear canal and external ear normal.      Nose: Nose normal.      Mouth/Throat:      Mouth: Mucous membranes are moist.      Pharynx: Oropharynx is clear.   Eyes:      Conjunctiva/sclera: Conjunctivae normal.      Pupils: Pupils are equal, round, and reactive to light.   Cardiovascular:      Rate and Rhythm: Normal rate and regular rhythm.      Heart sounds: Normal heart sounds.   Pulmonary:      Effort: Pulmonary effort is normal.      Breath sounds: Normal breath sounds.   Abdominal:      General: Bowel sounds are normal.   Musculoskeletal:         General: Normal range of motion.      Cervical back: Neck supple.   Skin:     General: Skin is warm.   Neurological:      Mental Status: She is alert and oriented " to person, place, and time.   Psychiatric:         Mood and Affect: Mood normal.         Behavior: Behavior normal.       Lab Results   Component Value Date     (H) 07/15/2021    CHLPL 178 07/15/2021    TRIG 76 07/15/2021    HDL 66 (H) 07/15/2021    LDL 98 07/15/2021    VLDL 14 07/15/2021            HEALTH RISK ASSESSMENT    Smoking Status:  Social History     Tobacco Use   Smoking Status Former Smoker   • Packs/day: 1.00   • Types: Cigarettes   • Quit date:    • Years since quittin.7   Smokeless Tobacco Never Used     Alcohol Consumption:  Social History     Substance and Sexual Activity   Alcohol Use No    Comment: quit in      Fall Risk Screen:    BIPIN Fall Risk Assessment was completed, and patient is at LOW risk for falls.Assessment completed on:2021    Depression Screening:  PHQ-2/PHQ-9 Depression Screening 7/15/2021   Little interest or pleasure in doing things 0   Feeling down, depressed, or hopeless 0   Trouble falling or staying asleep, or sleeping too much -   Feeling tired or having little energy -   Poor appetite or overeating -   Feeling bad about yourself - or that you are a failure or have let yourself or your family down -   Trouble concentrating on things, such as reading the newspaper or watching television -   Moving or speaking so slowly that other people could have noticed. Or the opposite - being so fidgety or restless that you have been moving around a lot more than usual -   Thoughts that you would be better off dead, or of hurting yourself in some way -   Total Score 0   If you checked off any problems, how difficult have these problems made it for you to do your work, take care of things at home, or get along with other people? -       Health Habits and Functional and Cognitive Screening:  Functional & Cognitive Status 2020   Do you have difficulty preparing food and eating? No   Do you have difficulty bathing yourself, getting dressed or grooming yourself?  No   Do you have difficulty using the toilet? No   Do you have difficulty moving around from place to place? No   Do you have trouble with steps or getting out of a bed or a chair? No   Current Diet Unhealthy Diet   Dental Exam Up to date   Eye Exam Up to date   Exercise (times per week) 0 times per week   Current Exercise Activities Include None   Do you need help using the phone?  No   Are you deaf or do you have serious difficulty hearing?  No   Do you need help with transportation? No   Do you need help shopping? No   Do you need help preparing meals?  No   Do you need help with housework?  No   Do you need help with laundry? No   Do you need help taking your medications? No   Do you need help managing money? No   Do you ever drive or ride in a car without wearing a seat belt? No   Have you felt unusual stress, anger or loneliness in the last month? Yes   Who do you live with? Spouse   If you need help, do you have trouble finding someone available to you? No   Have you been bothered in the last four weeks by sexual problems? No   Do you have difficulty concentrating, remembering or making decisions? No       Age-appropriate Screening Schedule:  Refer to the list below for future screening recommendations based on patient's age, sex and/or medical conditions. Orders for these recommended tests are listed in the plan section. The patient has been provided with a written plan.    Health Maintenance   Topic Date Due   • TDAP/TD VACCINES (1 - Tdap) Never done   • ZOSTER VACCINE (1 of 2) Never done   • INFLUENZA VACCINE  10/01/2021   • DXA SCAN  07/08/2022   • LIPID PANEL  07/15/2022   • MAMMOGRAM  12/09/2022              Assessment/Plan   CMS Preventative Services Quick Reference  Risk Factors Identified During Encounter  Obesity/Overweight   The above risks/problems have been discussed with the patient.  Follow up actions/plans if indicated are seen below in the Assessment/Plan Section.  Pertinent information has  been shared with the patient in the After Visit Summary.    Diagnoses and all orders for this visit:    1. Allergy, initial encounter (Primary) continue medication  Continue  2. Paroxysmal atrial fibrillation (CMS/HCC) continue medication    3. Presence of cardiac pacemaker follow-up with cardiology    4. Mixed hyperlipidemia continue medication    5. Essential hypertension on hydralazine 5 tid and d/c hydralazine for now    6. Coronary artery disease involving native coronary artery of native heart without angina pectoris follow-up per cardiology    7. Vitamin D deficiency continue supplement    8. Iron deficiency iron normal saturation low, change to qod    9. Anemia, resolved    10. Obstructive sleep apnea syndrome continue CPAP    11. Osteopenia, unspecified location calcium and vitamin D    12. Psoriasis stable    13. Other fatigue only with exertion    14. Low back pain without sciatica, unspecified back pain laterality, unspecified chronicity stable    15. Atrophic vaginitis    16. Interstitial cystitis    17. Hyperglycemia good diet    JAIME rather than fatigue, start symbicort patient declines, do CXR PFT    2 months follow-up    Follow Up:   Return in about 6 months (around 3/15/2022) for Recheck.     An After Visit Summary and PPPS were made available to the patient.                   ----Below is to historical records for reference only:      Cad MILD BLOCKAGE 2009 30%.  stress test normal, follow up with cardio,   Colon 7/18/16: Left-sided diverticulosis. Colon polyps. Internal hemorrhoids. Repeat 5 year , pending now      Diverticulitis hx.   Failed crestor and lipitor  zostavax and shingrix refused and explained risks,   Psoriasis refill clobetasole   Hx of depression and anxiety resolved without meds  Knees pain, OA probable, glucosamine continue   Foot fungal infection s/p med by derm  Interstitial cystitis UA normal follow uro   leg swelling resolved.

## 2021-10-18 ENCOUNTER — OFFICE VISIT (OUTPATIENT)
Dept: GASTROENTEROLOGY | Facility: CLINIC | Age: 76
End: 2021-10-18

## 2021-10-18 VITALS
HEIGHT: 61 IN | BODY MASS INDEX: 32.1 KG/M2 | HEART RATE: 62 BPM | WEIGHT: 170 LBS | RESPIRATION RATE: 16 BRPM | DIASTOLIC BLOOD PRESSURE: 66 MMHG | SYSTOLIC BLOOD PRESSURE: 134 MMHG | TEMPERATURE: 97.3 F

## 2021-10-18 DIAGNOSIS — Z86.010 PERSONAL HISTORY OF COLONIC POLYPS: ICD-10-CM

## 2021-10-18 DIAGNOSIS — R13.19 ESOPHAGEAL DYSPHAGIA: Chronic | ICD-10-CM

## 2021-10-18 DIAGNOSIS — Z80.0 FAMILY HISTORY OF ESOPHAGEAL CANCER: Chronic | ICD-10-CM

## 2021-10-18 DIAGNOSIS — K59.04 CHRONIC IDIOPATHIC CONSTIPATION: Primary | Chronic | ICD-10-CM

## 2021-10-18 DIAGNOSIS — R19.5 POSITIVE COLORECTAL CANCER SCREENING USING COLOGUARD TEST: ICD-10-CM

## 2021-10-18 DIAGNOSIS — K62.5 RECTAL BLEEDING: Chronic | ICD-10-CM

## 2021-10-18 DIAGNOSIS — K21.9 GASTROESOPHAGEAL REFLUX DISEASE WITHOUT ESOPHAGITIS: Chronic | ICD-10-CM

## 2021-10-18 PROBLEM — Z86.0100 PERSONAL HISTORY OF COLONIC POLYPS: Status: ACTIVE | Noted: 2021-10-18

## 2021-10-18 PROCEDURE — 99214 OFFICE O/P EST MOD 30 MIN: CPT | Performed by: NURSE PRACTITIONER

## 2021-10-18 RX ORDER — SODIUM CHLORIDE 9 MG/ML
70 INJECTION, SOLUTION INTRAVENOUS CONTINUOUS PRN
Status: CANCELLED | OUTPATIENT
Start: 2021-10-18

## 2021-10-18 RX ORDER — ASPIRIN 81 MG
TABLET, DELAYED RELEASE (ENTERIC COATED) ORAL
Qty: 60 TABLET | Refills: 5 | Status: SHIPPED | OUTPATIENT
Start: 2021-10-18 | End: 2022-11-14

## 2021-10-18 RX ORDER — HYDRALAZINE HYDROCHLORIDE 10 MG/1
10 TABLET, FILM COATED ORAL 3 TIMES DAILY
COMMUNITY
End: 2022-01-13 | Stop reason: SDUPTHER

## 2021-10-18 RX ORDER — POLYETHYLENE GLYCOL 1450
POWDER (GRAM) MISCELLANEOUS
Qty: 500 G | Refills: 3 | Status: SHIPPED | OUTPATIENT
Start: 2021-10-18 | End: 2022-01-13

## 2021-10-18 NOTE — PROGRESS NOTES
New Patient Consult      Date: 10/18/2021   Patient Name: Monica Perea  MRN: [id number]  : 1945     Primary Care Provider: Abdiel Juan MD    Chief Complaint   Patient presents with   • Constipation     History of Present Illness: Monica Perea is a 76 y.o. female who is here today to establish care with Gastroenterology for constipation.     She has a history of constipation for many years. She takes 4 stool softeners at night as needed to have a bowel movement with moderate improvement. She has taken 2 milk of mag tablets in the past, but it caused diarrhea. She has occasional bright red blood on the tissue, but it is a small amount.  No abdominal pain. She had positive Cologuard test recently and low iron saturation, but H&H normal. She is now on an iron pill every other day.     She has occasional mild reflux, but does not need to take anything. She has occasional difficulty swallowing solids, she had 2 episodes a few weeks ago, none since.    Her last colonoscopy was in 2016 with polyps removed. No EGD in the past. Her grandmother had colon cancer at age 49. Her mother had esophageal cancer.     She has been on Xarelto over a year for history of A-fib. She has a pacemaker.    Subjective      Past Medical History:   Diagnosis Date   • A-fib (HCC)    • Anemia    • B12 deficiency    • Back pain    • CAD (coronary artery disease)    • Cellulitis of foot    • Colon polyp    • Dyspnea    • GERD (gastroesophageal reflux disease)    • Glaucoma    • Hematuria    • History of mammogram    • Hypercholesteremia    • Hypertension    • Iron deficiency    • Low back pain    • Menopause    • Neck strain    • Osteopenia    • Overweight    • Palpitations    • Postmenopausal atrophic vaginitis    • Sleep apnea     discontinued CPAP use 2 months ago   • Tinea pedis      Past Surgical History:   Procedure Laterality Date   • APPENDECTOMY     • BREAST EXCISIONAL BIOPSY  Bilateral    • CARDIAC ELECTROPHYSIOLOGY PROCEDURE N/A 2020    Procedure: Pacemaker DC new. [facility] [place]  Surgeon: Villa Thomas DO;  Location: [facility] INVASIVE LOCATION;  Service: Cardiology;  Laterality: N/A;   • COLONOSCOPY          • HYSTERECTOMY      total    • OOPHORECTOMY Bilateral    • TUBAL ABDOMINAL LIGATION       Family History   Problem Relation Age of Onset   • Heart attack Other    • Arthritis Other    • Diabetes Other    • Hypertension Other    • Hodgkin's lymphoma Other    • Esophagitis Mother    • Heart failure Mother    • Esophageal cancer Mother    • Esophagitis Maternal Aunt    • Breast cancer Maternal Aunt         DX AGE 50'S   • Colon cancer Maternal Grandmother 49   • Heart attack Father    • Breast cancer Sister 72   • Ovarian cancer Sister 74   • Stomach cancer Neg Hx    • Liver cancer Neg Hx    • Liver disease Neg Hx      Social History     Socioeconomic History   • Marital status:    Tobacco Use   • Smoking status: Former Smoker     Packs/day: 1.00     Types: Cigarettes     Quit date:      Years since quittin.8   • Smokeless tobacco: Never Used   Vaping Use   • Vaping Use: Never used   Substance and Sexual Activity   • Alcohol use: No     Comment: quit in    • Drug use: No   • Sexual activity: Defer       Current Outpatient Medications:   •  Calcium Carbonate-Vitamin D (CALCIUM PLUS VITAMIN D PO), Take 1 tablet by mouth Daily., Disp: , Rfl:   •  clobetasol (TEMOVATE) 0.05 % ointment, APPLY  TOPICALLY TO THE APPROPRIATE AREA AS DIRECTED EVERY 12 (TWELVE) HOURS., Disp: 60 g, Rfl: 0  •  cloNIDine (Catapres) 0.1 MG tablet, Take 1 tablet by mouth Every 6 (Six) Hours As Needed for High Blood Pressure (SBP>150mmHg and/or DBP>100mm Hg)., Disp: 60 tablet, Rfl: 11  •  coenzyme Q10 100 MG capsule, Take 100 mg by mouth Daily., Disp: , Rfl:   •  dilTIAZem CD (CARDIZEM CD) 240 MG 24 hr capsule, Take 1 capsule by mouth Daily., Disp: 90 capsule, Rfl: 3  •   Ferrous Sulfate (Iron) 28 MG tablet, Take  by mouth., Disp: , Rfl:   •  fluorouracil (EFUDEX) 5 % cream, Apply  topically to the appropriate area as directed 2 (Two) Times a Day., Disp: , Rfl:   •  hydrALAZINE (APRESOLINE) 10 MG tablet, Take 10 mg by mouth 3 (Three) Times a Day., Disp: , Rfl:   •  hydroCHLOROthiazide (HYDRODIURIL) 25 MG tablet, Take 1 tablet by mouth Daily., Disp: 90 tablet, Rfl: 3  •  latanoprost (XALATAN) 0.005 % ophthalmic solution, , Disp: , Rfl:   •  pravastatin (PRAVACHOL) 40 MG tablet, Take 1 tablet by mouth Daily., Disp: 90 tablet, Rfl: 3  •  rivaroxaban (XARELTO) 20 MG tablet, Take 1 tablet by mouth Daily., Disp: 30 tablet, Rfl: 5  •  cetirizine (zyrTEC) 10 MG tablet, Take 10 mg by mouth Daily., Disp: , Rfl:   •  Docusate Sodium 100 MG capsule, Take 1 - 2 tablets daily. Adjust to have 1-2 soft stools daily., Disp: 60 tablet, Rfl: 5  •  Polyethylene Glycol powder, Take 1 cap full (17 grams) in 8 oz of noncarbonated beverage and drink once daily, Disp: 500 g, Rfl: 3    Allergies   Allergen Reactions   • Macrobid [Nitrofurantoin Monohyd Macro] Rash   • Nitrofurantoin Hives   • Phenylephrine-Guaifenesin Hives   • Sulfa Antibiotics Hives   • Statins Myalgia     The following portions of the patient's history were reviewed and updated as appropriate: allergies, current medications, past family history, past medical history, past social history, past surgical history and problem list.    Objective     Physical Exam  Vitals and nursing note reviewed.   Constitutional:       General: She is not in acute distress.     Appearance: Normal appearance. She is well-developed.   HENT:      Head: Normocephalic and atraumatic.      Right Ear: Hearing normal.      Left Ear: Hearing normal.      Mouth/Throat:      Mouth: Mucous membranes are not pale, not dry and not cyanotic.   Eyes:      General: Lids are normal.      Conjunctiva/sclera: Conjunctivae normal.   Neck:      Trachea: Trachea normal.  "  Cardiovascular:      Rate and Rhythm: Normal rate and regular rhythm.      Heart sounds: Normal heart sounds.   Pulmonary:      Effort: Pulmonary effort is normal. No respiratory distress.      Breath sounds: Normal breath sounds.   Abdominal:      General: Bowel sounds are normal.      Palpations: Abdomen is soft. There is no mass.      Tenderness: There is no abdominal tenderness.      Hernia: No hernia is present.   Skin:     General: Skin is warm and dry.   Neurological:      Mental Status: She is alert and oriented to person, place, and time.   Psychiatric:         Mood and Affect: Mood normal.         Speech: Speech normal.         Behavior: Behavior normal. Behavior is cooperative.       Vitals:    10/18/21 0904   BP: 134/66   Pulse: 62   Resp: 16   Temp: 97.3 °F (36.3 °C)   Weight: 77.1 kg (170 lb)   Height: 154.9 cm (61\")     Body mass index is 32.12 kg/m².     Results Review:   I have reviewed the patient's new clinical and imaging results.    No visits with results within 90 Day(s) from this visit.   Latest known visit with results is:   Office Visit on 05/03/2021   Component Date Value Ref Range Status   • WBC 07/15/2021 5.85  3.40 - 10.80 10*3/mm3 Final   • RBC 07/15/2021 4.32  3.77 - 5.28 10*6/mm3 Final   • Hemoglobin 07/15/2021 13.5  12.0 - 15.9 g/dL Final   • Hematocrit 07/15/2021 40.3  34.0 - 46.6 % Final   • MCV 07/15/2021 93.3  79.0 - 97.0 fL Final   • MCH 07/15/2021 31.3  26.6 - 33.0 pg Final   • MCHC 07/15/2021 33.5  31.5 - 35.7 g/dL Final   • RDW 07/15/2021 12.6  12.3 - 15.4 % Final   • Platelets 07/15/2021 217  140 - 450 10*3/mm3 Final   • Neutrophil Rel % 07/15/2021 50.8  42.7 - 76.0 % Final   • Lymphocyte Rel % 07/15/2021 29.1  19.6 - 45.3 % Final   • Monocyte Rel % 07/15/2021 10.1  5.0 - 12.0 % Final   • Eosinophil Rel % 07/15/2021 8.5* 0.3 - 6.2 % Final   • Basophil Rel % 07/15/2021 1.2  0.0 - 1.5 % Final   • Neutrophils Absolute 07/15/2021 2.97  1.70 - 7.00 10*3/mm3 Final   • " Lymphocytes Absolute 07/15/2021 1.70  0.70 - 3.10 10*3/mm3 Final   • Monocytes Absolute 07/15/2021 0.59  0.10 - 0.90 10*3/mm3 Final   • Eosinophils Absolute 07/15/2021 0.50* 0.00 - 0.40 10*3/mm3 Final   • Basophils Absolute 07/15/2021 0.07  0.00 - 0.20 10*3/mm3 Final   • Immature Granulocyte Rel % 07/15/2021 0.3  0.0 - 0.5 % Final   • Immature Grans Absolute 07/15/2021 0.02  0.00 - 0.05 10*3/mm3 Final   • nRBC 07/15/2021 0.0  0.0 - 0.2 /100 WBC Final   • Glucose 07/15/2021 104* 65 - 99 mg/dL Final   • BUN 07/15/2021 11  8 - 23 mg/dL Final   • Creatinine 07/15/2021 0.84  0.57 - 1.00 mg/dL Final   • eGFR Non  Am 07/15/2021 66  >60 mL/min/1.73 Final    Comment: The MDRD GFR formula is only valid for adults with stable  renal function between ages 18 and 70.     • eGFR  Am 07/15/2021 80  >60 mL/min/1.73 Final   • BUN/Creatinine Ratio 07/15/2021 13.1  7.0 - 25.0 Final   • Sodium 07/15/2021 142  136 - 145 mmol/L Final   • Potassium 07/15/2021 3.8  3.5 - 5.2 mmol/L Final   • Chloride 07/15/2021 104  98 - 107 mmol/L Final   • Total CO2 07/15/2021 28.7  22.0 - 29.0 mmol/L Final   • Calcium 07/15/2021 10.1  8.6 - 10.5 mg/dL Final   • Total Protein 07/15/2021 7.4  6.0 - 8.5 g/dL Final   • Albumin 07/15/2021 4.70  3.50 - 5.20 g/dL Final   • Globulin 07/15/2021 2.7  gm/dL Final   • A/G Ratio 07/15/2021 1.7  g/dL Final   • Total Bilirubin 07/15/2021 0.2  0.0 - 1.2 mg/dL Final   • Alkaline Phosphatase 07/15/2021 86  39 - 117 U/L Final   • AST (SGOT) 07/15/2021 13  1 - 32 U/L Final   • ALT (SGPT) 07/15/2021 12  1 - 33 U/L Final   • Total Cholesterol 07/15/2021 178  0 - 200 mg/dL Final    Comment: Cholesterol Reference Ranges  (U.S. Department of Health and Human Services ATP III  Classifications)  Desirable          <200 mg/dL  Borderline High    200-239 mg/dL  High Risk          >240 mg/dL  Triglyceride Reference Ranges  (U.S. Department of Health and Human Services ATP III  Classifications)  Normal           <150  mg/dL  Borderline High  150-199 mg/dL  High             200-499 mg/dL  Very High        >500 mg/dL  HDL Reference Ranges  (U.S. Department of Health and Human Services ATP III  Classifcations)  Low     <40 mg/dl (major risk factor for CHD)  High    >60 mg/dl ('negative' risk factor for CHD)  LDL Reference Ranges  (U.S. Department of Health and Human Services ATP III  Classifcations)  Optimal          <100 mg/dL  Near Optimal     100-129 mg/dL  Borderline High  130-159 mg/dL  High             160-189 mg/dL  Very High        >189 mg/dL     • Triglycerides 07/15/2021 76  0 - 150 mg/dL Final   • HDL Cholesterol 07/15/2021 66* 40 - 60 mg/dL Final   • VLDL Cholesterol Jakub 07/15/2021 14  5 - 40 mg/dL Final   • LDL Chol Calc ([facility]) 07/15/2021 98  0 - 100 mg/dL Final   • TSH 07/15/2021 1.930  0.270 - 4.200 uIU/mL Final   • Vitamin B-12 07/15/2021 725  211 - 946 pg/mL Final    Results may be falsely increased if patient taking Biotin.   • C-Reactive Protein 07/15/2021 0.33  0.00 - 0.50 mg/dL Final   • Sed Rate 07/15/2021 9  0 - 30 mm/hr Final   • Ferritin 07/15/2021 49.10  13.00 - 150.00 ng/mL Final    Results may be falsely decreased if patient taking Biotin.   • TIBC 07/15/2021 446  mcg/dL Final   • UIBC 07/15/2021 378* 112 - 346 mcg/dL Final   • Iron 07/15/2021 68  37 - 145 mcg/dL Final   • Iron Saturation 07/15/2021 15* 20 - 50 % Final   • 25 Hydroxy, Vitamin D 07/15/2021 59.9  30.0 - 100.0 ng/ml Final    Comment: Results may be falsely increased if patient taking Biotin.  Reference Range for Total Vitamin D 25(OH)  Deficiency <20.0 ng/mL  Insufficiency 21-29 ng/mL  Sufficiency  ng/mL  Toxicity >100 ng/ml        XR Chest PA & Lateral     Result Date: 9/17/2021  No acute cardiopulmonary disease. Pacer leads in satisfactory position.  D:  09/16/2021 E:  09/17/2021  This report was finalized on 9/17/2021 10:47 AM by Dr. Alia Cornejo MD.       Cologuard dated 8/6/2021 Positive    Colonoscopy dated 7/18/2016 per   Alan Cormier  1.  Left-sided diverticulosis.    2.  Colon polyps.    3.  Internal hemorrhoids.  - Rectal polyp biopsy with hyperplastic polyps.    Assessment / Plan      1. Chronic idiopathic constipation  2. Rectal bleeding  She has a history of constipation for many years and takes for stool softeners at night as needed to have a bowel movement.  She has occasional small amount of bright red blood on the tissue.  Recent labs with normal CBC, but mild decreased iron saturation and normal iron level, she is now taking iron pill every other day. TSH normal.  High-fiber, low-fat diet with liberal water intake.  MiraLAX 17 g daily.  May take stool softeners 2/day.    - Polyethylene Glycol powder; Take 1 cap full (17 grams) in 8 oz of noncarbonated beverage and drink once daily  Dispense: 500 g; Refill: 3  - Docusate Sodium 100 MG capsule; Take 1 - 2 tablets daily. Adjust to have 1-2 soft stools daily.  Dispense: 60 tablet; Refill: 5    3. Positive colorectal cancer screening using Cologuard test  4. Personal history of colonic polyps  The patient's last colonoscopy was in 2016 with polyps removed.  She recently had positive Cologuard.  Her grandmother had colon cancer diagnosed at age 49.  Colonoscopy for surveillance.    - Case Request; Standing  - Case Request    5. Esophageal dysphagia  6. Gastroesophageal reflux disease without esophagitis  7. Family history of esophageal cancer  She has a history of occasional mild reflux, but does not need to take anything on a regular basis.  She occasionally has difficulty swallowing solids, no difficulty swallowing liquids.  She had 2 episodes a few weeks ago of difficulty swallowing solids, but none since.  There is a family history of esophageal cancer in her mother, but the family did not find this out until she passed away in her 90's.  Antireflux measures.  Advised to eat a softer diet, chew food very well, take sips of water between bites.  EGD to rule out esophageal  abnormality causing difficulty swallowing.    - Case Request; Standing  - Case Request    Patient Instructions   Antireflux measures: Avoid fried, fatty foods, alcohol, chocolate, coffee, tea,  soft drinks, peppermint and spearmint, spicy foods, tomatoes and tomato based foods, onion based foods, and smoking.   Other antireflux measures include weight reduction if overweight, avoiding tight clothing around the abdomen, elevating the head of the bed 6 inches with blocks under the head board, and don't drink or eat before going to bed and avoid lying down immediately after meals.  May take over the counter antacids as needed.   Advised to eat a softer diet, chew food very well and take sips of water between bites.   High fiber, low fat diet with liberal water intake.   Miralax 17 grams daily.   May take stool softeners 2 per day for constipation.   Upper endoscopy-EGD: The indications, technique, alternatives and potential risk and complications were discussed with the patient including but not limited to bleeding, perforations, missing lesions and anesthetic complications. The patient understands and wishes to proceed with the procedure and has given their verbal consent. Written patient education information was given to the patient.   The patient will call if they have further questions before procedure.   COVID-19 testing prior to procedure. The patient will need to self-quarantine after testing until the procedure. Instructions given to patient.  Colonoscopy: The indications, technique, alternatives and potential risk and complications were discussed with the patient including but not limited to bleeding, perforations, missing lesions and anesthetic complications. The patient understands and wishes to proceed with the procedure and has given their verbal consent. Written patient education information was given to the patient.   The patient will call if they have further questions before procedure.      Darrell SEYMOUR  TERRY Bragg  10/18/2021    Please note that portions of this note may have been completed with a voice recognition program. Efforts were made to edit the dictations, but occasionally words are mistranscribed.

## 2021-10-20 ENCOUNTER — TELEPHONE (OUTPATIENT)
Dept: GASTROENTEROLOGY | Facility: CLINIC | Age: 76
End: 2021-10-20

## 2021-10-20 NOTE — TELEPHONE ENCOUNTER
Called patient to schedule colonoscopy and upper endoscopy. Patient states that she needs to see cardiologist first and her appointment with them is on 11/24/2021. She will call back after that

## 2021-11-08 ENCOUNTER — OFFICE VISIT (OUTPATIENT)
Dept: INTERNAL MEDICINE | Facility: CLINIC | Age: 76
End: 2021-11-08

## 2021-11-08 VITALS
HEIGHT: 61 IN | HEART RATE: 64 BPM | DIASTOLIC BLOOD PRESSURE: 80 MMHG | WEIGHT: 166 LBS | TEMPERATURE: 96.8 F | BODY MASS INDEX: 31.34 KG/M2 | OXYGEN SATURATION: 98 % | SYSTOLIC BLOOD PRESSURE: 126 MMHG

## 2021-11-08 DIAGNOSIS — F41.9 ANXIETY: Primary | ICD-10-CM

## 2021-11-08 DIAGNOSIS — R53.83 OTHER FATIGUE: ICD-10-CM

## 2021-11-08 PROCEDURE — G0008 ADMIN INFLUENZA VIRUS VAC: HCPCS | Performed by: INTERNAL MEDICINE

## 2021-11-08 PROCEDURE — 99214 OFFICE O/P EST MOD 30 MIN: CPT | Performed by: INTERNAL MEDICINE

## 2021-11-08 PROCEDURE — 90662 IIV NO PRSV INCREASED AG IM: CPT | Performed by: INTERNAL MEDICINE

## 2021-11-08 RX ORDER — CHOLECALCIFEROL (VITAMIN D3) 125 MCG
5 CAPSULE ORAL
COMMUNITY

## 2021-11-08 RX ORDER — SERTRALINE HYDROCHLORIDE 25 MG/1
25 TABLET, FILM COATED ORAL DAILY
Qty: 30 TABLET | Refills: 1 | Status: SHIPPED | OUTPATIENT
Start: 2021-11-08 | End: 2022-01-13

## 2021-11-08 RX ORDER — MELATONIN
1000 DAILY
COMMUNITY

## 2021-11-08 NOTE — PROGRESS NOTES
Subjective   Monica Perea is a 76 y.o. female.     Chief Complaint   Patient presents with   • Follow-up   • Hypertension       History of Present Illness   Blood pressure stable on medication.  Patient still complains fatigue no energy with exertion.  Resting sitting no problem.  Patient states walking through the parking lot will exhaust.  Patient does have atrial fibrillation coronary artery disease with pacemaker on multiple blood pressure medications.  Patient states her cardiologist assured the patient patient fatigue is not from cardiology aspect.  Chest x-ray normal.  Patient here to do PFT.    Current Outpatient Medications:   •  Calcium Carbonate-Vitamin D (CALCIUM PLUS VITAMIN D PO), Take 1 tablet by mouth Daily., Disp: , Rfl:   •  cetirizine (zyrTEC) 10 MG tablet, Take 10 mg by mouth Daily., Disp: , Rfl:   •  cholecalciferol (VITAMIN D3) 10 MCG (400 UNIT) tablet, Take 400 Units by mouth Daily., Disp: , Rfl:   •  clobetasol (TEMOVATE) 0.05 % ointment, APPLY  TOPICALLY TO THE APPROPRIATE AREA AS DIRECTED EVERY 12 (TWELVE) HOURS., Disp: 60 g, Rfl: 0  •  cloNIDine (Catapres) 0.1 MG tablet, Take 1 tablet by mouth Every 6 (Six) Hours As Needed for High Blood Pressure (SBP>150mmHg and/or DBP>100mm Hg). (Patient taking differently: Take 0.1 mg by mouth Daily.), Disp: 60 tablet, Rfl: 11  •  coenzyme Q10 100 MG capsule, Take 100 mg by mouth Daily., Disp: , Rfl:   •  dilTIAZem CD (CARDIZEM CD) 240 MG 24 hr capsule, Take 1 capsule by mouth Daily., Disp: 90 capsule, Rfl: 3  •  Docusate Sodium 100 MG capsule, Take 1 - 2 tablets daily. Adjust to have 1-2 soft stools daily., Disp: 60 tablet, Rfl: 5  •  Ferrous Sulfate (Iron) 28 MG tablet, Take  by mouth., Disp: , Rfl:   •  hydroCHLOROthiazide (HYDRODIURIL) 25 MG tablet, Take 1 tablet by mouth Daily., Disp: 90 tablet, Rfl: 3  •  latanoprost (XALATAN) 0.005 % ophthalmic solution, , Disp: , Rfl:   •  melatonin 5 MG tablet tablet, Take 5 mg by mouth., Disp: , Rfl:    •  Polyethylene Glycol powder, Take 1 cap full (17 grams) in 8 oz of noncarbonated beverage and drink once daily, Disp: 500 g, Rfl: 3  •  pravastatin (PRAVACHOL) 40 MG tablet, Take 1 tablet by mouth Daily., Disp: 90 tablet, Rfl: 3  •  rivaroxaban (XARELTO) 20 MG tablet, Take 1 tablet by mouth Daily., Disp: 30 tablet, Rfl: 5  •  fluorouracil (EFUDEX) 5 % cream, Apply  topically to the appropriate area as directed 2 (Two) Times a Day., Disp: , Rfl:   •  hydrALAZINE (APRESOLINE) 10 MG tablet, Take 10 mg by mouth 3 (Three) Times a Day., Disp: , Rfl:   •  sertraline (Zoloft) 25 MG tablet, Take 1 tablet by mouth Daily., Disp: 30 tablet, Rfl: 1    The following portions of the patient's history were reviewed and updated as appropriate: allergies, current medications, past family history, past medical history, past social history, past surgical history and problem list.    Review of Systems   Constitutional: Positive for fatigue.   Respiratory: Negative.    Cardiovascular: Negative.    Gastrointestinal: Negative.    Musculoskeletal: Negative.    Skin: Negative.    Neurological: Negative.    Psychiatric/Behavioral: The patient is nervous/anxious.        Objective   Physical Exam  Cardiovascular:      Rate and Rhythm: Normal rate and regular rhythm.      Heart sounds: Normal heart sounds.   Pulmonary:      Effort: Pulmonary effort is normal.      Breath sounds: Normal breath sounds.   Abdominal:      General: Bowel sounds are normal.   Musculoskeletal:      Cervical back: Neck supple.   Skin:     General: Skin is warm.   Neurological:      Mental Status: She is alert and oriented to person, place, and time.   Psychiatric:         Mood and Affect: Mood normal.         All tests have been reviewed.    Assessment/Plan   Diagnoses and all orders for this visit:        Other fatigue worked for minutes patient does not have any fatigue.  Oxygenation stayed above 95%.  Differential diagnosis: Mood disorder versus medication versus  heart disease.  Patient is going to discuss with cardiologist again.  Questionable pacemaker settings.  Echocardiogram normal last year.    Possible mood disorder trial of Zoloft.    Other orders  -     Fluzone High-Dose 65+yrs ([id number])  -     cholecalciferol (VITAMIN D3) 10 MCG (400 UNIT) tablet; Take 400 Units by mouth Daily.  -     melatonin 5 MG tablet tablet; Take 5 mg by mouth.      1 month follow-up

## 2021-11-17 ENCOUNTER — TRANSCRIBE ORDERS (OUTPATIENT)
Dept: ADMINISTRATIVE | Facility: HOSPITAL | Age: 76
End: 2021-11-17

## 2021-11-17 DIAGNOSIS — Z12.31 VISIT FOR SCREENING MAMMOGRAM: Primary | ICD-10-CM

## 2021-11-22 NOTE — PROGRESS NOTES
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    Chief Complaint   Patient presents with   • Coronary Artery Disease   • Medication Problem       PROBLEM LIST:   1. Coronary artery disease:  a.  Left heart catheterization, 2009:  Moderate calcification of the left anterior descending territory.  Diffusely diseased proximal and mid left anterior descending with luminal narrowing up to 30%.  Large caliber diagonal 1 with a proximal calcified eccentric  narrowing of luminal up to 50%.  Normal left ventricular systolic function with normal left ventricular end-diastolic pressures.  b. Echocardiogram, 2012:  Ejection fraction 55% to 60%, mild MR and TR, RVSP of 50 mmHg.  c. Cardiolite GXT, October 2015,  within normal limits with an LVEF of 59%.  2. Hypertension.  3. Palps  a. 9/17 Zio less than 1% PAC /PVC with normal circadian change  4. Hyperlipidemia.  a. 12/16: 174/62/82/80  b. 5/17/18  TG 75 HDL 66 LDL 90  5.  Obstructive sleep apnea - discontinued CPAP use 2 months ago:  a.  Nocturnal oxygen saturation, October 2015, with 92% of the time spent below 95% oxygen saturation with 6.2% spent below 90% oxygen saturation. Reinitiation of CPAP therapy, November 2015.  6. Glaucoma.  7. Gastroesophageal reflux disease.  8. Osteopenia.  9. Surgical history:  a. Total hysterectomy, 2000.  b. Tubal ligation, 1974.  c. Appendectomy in 1963.    Allergies  Allergies   Allergen Reactions   • Macrobid [Nitrofurantoin Monohyd Macro]    • Nitrofurantoin Hives   • Phenylephrine-Guaifenesin Hives   • Sulfa Antibiotics Hives       Current Medications    Current Outpatient Medications:   •  amLODIPine (NORVASC) 5 MG tablet, Take 5 mg by mouth Daily., Disp: , Rfl:   •   Pt report to Ernesto PEREZ.    "aspirin (ASPIRIN LOW DOSE) 81 MG tablet, Take  by mouth Daily., Disp: , Rfl:   •  Calcium Carbonate-Vitamin D (CALCIUM PLUS VITAMIN D PO), Take  by mouth Daily., Disp: , Rfl:   •  cetirizine (zyrTEC) 10 MG tablet, Take 10 mg by mouth Daily., Disp: , Rfl:   •  clobetasol (TEMOVATE) 0.05 % ointment, Apply  topically Every 12 (Twelve) Hours., Disp: 60 g, Rfl: 3  •  coenzyme Q10 100 MG capsule, Take 100 mg by mouth Daily., Disp: , Rfl:   •  magnesium oxide (MAGOX) 400 (241.3 MG) MG tablet tablet, Take 250 mg by mouth 2 (Two) Times a Day., Disp: , Rfl:   •  Omega-3 Fatty Acids (FISH OIL) 1000 MG capsule capsule, Take 1,200 mg by mouth 2 (Two) Times a Day With Meals., Disp: , Rfl:   •  pravastatin (PRAVACHOL) 40 MG tablet, Take 1 tablet by mouth Daily. as directed, Disp: 90 tablet, Rfl: 3  •  TURMERIC PO, Take  by mouth Daily., Disp: , Rfl:   •  diltiaZEM CD (CARDIZEM CD) 120 MG 24 hr capsule, Take one every day, Disp: 30 capsule, Rfl: 5      History of Present Illness   Patient presents in work in due to recent adjustment of HTN meds w intolerance.  Notes palpitations and labile bp since transition.  Felt as if going to pass out if closed her eyes in Gnosticism.    ROS:  All systems have been reviewed and are negative with the exception of those mentioned in the HPI.    OBJECTIVE:  Vitals:    11/26/18 0945   BP: 166/90   BP Location: Right arm   Patient Position: Sitting   Pulse: 57   SpO2: 97%   Weight: 74.8 kg (165 lb)   Height: 156.2 cm (61.5\")     Physical Exam   Constitutional: She is oriented to person, place, and time. She appears well-developed and well-nourished. No distress.   Neck: Normal range of motion. Neck supple. No hepatojugular reflux and no JVD present. Carotid bruit is not present. No tracheal deviation present. No thyromegaly present.   Cardiovascular: Normal rate, regular rhythm, S1 normal, S2 normal, intact distal pulses and normal pulses. PMI is not displaced. Exam reveals no gallop, no distant heart " sounds, no friction rub, no midsystolic click and no opening snap.   No murmur heard.  Pulses:       Radial pulses are 2+ on the right side, and 2+ on the left side.        Dorsalis pedis pulses are 2+ on the right side, and 2+ on the left side.        Posterior tibial pulses are 2+ on the right side, and 2+ on the left side.   Pulmonary/Chest: Effort normal and breath sounds normal. She has no wheezes. She has no rales.   Abdominal: Soft. Bowel sounds are normal. She exhibits no mass. There is no tenderness. There is no guarding.   Musculoskeletal: Normal range of motion. She exhibits no edema or tenderness.   Neurological: She is alert and oriented to person, place, and time.   Nursing note and vitals reviewed.      Diagnostic Data:    ECG 12 Lead  Date/Time: 11/26/2018 12:26 PM  Performed by: Elier Diaz MD  Authorized by: Elier Diaz MD   Comparison: not compared with previous ECG   Rhythm: sinus rhythm  Clinical impression: non-specific ECG          Lab Results   Component Value Date    CHLPL 171 05/17/2018    TRIG 75 05/17/2018    HDL 66 (H) 05/17/2018        ASSESSMENT:   Diagnosis Plan   1. Coronary artery disease involving native coronary artery of native heart without angina pectoris     2. Essential hypertension     3. Mixed hyperlipidemia     4. Palpitations         PLAN:  1.  No new anginal equivalent.  Continue risk factor modification and medical management.  2.  Not Well controlled on today's visit.  Transitioned amlod to dilt due to palpitations but pt notes no better.  Will stay on amlodipine until 48 hr holter returns.  3.  Improvement in cholesterol since she restarted her pravastatin over the summer 2016.  Continue to monitor yearly lipids.  4.  Stress-induced palpitations with the concerns and anxiety with caring for her elderly mother. Holter 48 hr today.

## 2021-12-10 ENCOUNTER — TELEPHONE (OUTPATIENT)
Dept: INTERNAL MEDICINE | Facility: CLINIC | Age: 76
End: 2021-12-10

## 2021-12-10 NOTE — TELEPHONE ENCOUNTER
Caller: Monica Perea    Relationship: Self    Best call back number: [phone]    Who is your current provider: Dr. Juan    Who would you like your new provider to be: Dr. Vermeesch    What are your reasons for transferring care: patient would feel more comfortable with a female provider    Additional notes:

## 2021-12-22 ENCOUNTER — TELEPHONE (OUTPATIENT)
Dept: INTERNAL MEDICINE | Facility: CLINIC | Age: 76
End: 2021-12-22

## 2022-01-13 ENCOUNTER — OFFICE VISIT (OUTPATIENT)
Dept: INTERNAL MEDICINE | Facility: CLINIC | Age: 77
End: 2022-01-13

## 2022-01-13 VITALS
HEIGHT: 61 IN | WEIGHT: 167 LBS | HEART RATE: 59 BPM | DIASTOLIC BLOOD PRESSURE: 66 MMHG | OXYGEN SATURATION: 97 % | SYSTOLIC BLOOD PRESSURE: 147 MMHG | BODY MASS INDEX: 31.53 KG/M2 | TEMPERATURE: 97.8 F | RESPIRATION RATE: 16 BRPM

## 2022-01-13 DIAGNOSIS — R53.83 MALAISE AND FATIGUE: ICD-10-CM

## 2022-01-13 DIAGNOSIS — E53.8 VITAMIN B12 DEFICIENCY: ICD-10-CM

## 2022-01-13 DIAGNOSIS — E78.2 MIXED HYPERLIPIDEMIA: ICD-10-CM

## 2022-01-13 DIAGNOSIS — I48.0 PAROXYSMAL ATRIAL FIBRILLATION: ICD-10-CM

## 2022-01-13 DIAGNOSIS — R53.81 MALAISE AND FATIGUE: ICD-10-CM

## 2022-01-13 DIAGNOSIS — I10 BENIGN ESSENTIAL HYPERTENSION: Primary | ICD-10-CM

## 2022-01-13 DIAGNOSIS — R19.5 POSITIVE COLORECTAL CANCER SCREENING USING COLOGUARD TEST: ICD-10-CM

## 2022-01-13 PROCEDURE — 99214 OFFICE O/P EST MOD 30 MIN: CPT | Performed by: INTERNAL MEDICINE

## 2022-01-13 RX ORDER — HYDRALAZINE HYDROCHLORIDE 25 MG/1
25 TABLET, FILM COATED ORAL 3 TIMES DAILY
Qty: 90 TABLET | Refills: 4 | Status: SHIPPED | OUTPATIENT
Start: 2022-01-13 | End: 2022-02-11 | Stop reason: SDUPTHER

## 2022-01-13 NOTE — PROGRESS NOTES
Subjective   Monica Perea is a 77 y.o. female.     Chief Complaint   Patient presents with   • Establish Care         • Hypertension   • Hyperlipidemia   • Atrial Fibrillation   • Fatigue       History of Present Illness   HPI: Patient is here for initial visit and to follow up on the blood pressure  The patient states her blood pressure has been running high since October, she is now taking clonidine twice a day, she stopped taking the hydralazine, she does have atrial fibrillation and is on Xarelto but states its very expensive, she has an appointment to see her cardiologist Dr. Garsia on January 24, she does have a pacemaker in place, the patient is also here to follow up on the cholesterol and is trying to follow a diet. The patient  is  due to get lab work done .  She complains of a lot of fatigue, the patient also needs refills on medications .  She was put on Zoloft for anxiety but she feels that she does not have anxiety and she stopped taking the Zoloft , she did have a positive Cologuard test and was seen by GI and will be scheduled for colonoscopy after discussing being off her anticoagulation for the  procedure with her cardiologist  Hyperlipidemia   Pertinent negatives include no chest pain or shortness of breath.   Hypertension   Pertinent negatives include no chest pain, palpitations or shortness of breath.    Review of Systems   Constitutional: Positive for fatigue. Negative for appetite change and fever.   HENT: Negative for congestion, ear discharge, ear pain, sinus pressure and sore throat.    Eyes: Negative for pain and discharge.   Respiratory: Negative for cough, chest tightness, shortness of breath and wheezing.    Cardiovascular: Negative for chest pain, palpitations and leg swelling.   Gastrointestinal: Negative for abdominal pain, blood in stool, constipation, diarrhea and nausea.   Endocrine: Negative for cold intolerance and heat intolerance.   Genitourinary: Negative for  dysuria, flank pain and frequency.   Musculoskeletal: Negative for back pain and joint swelling.   Skin: Negative for color change.   Allergic/Immunologic: Negative for environmental allergies and food allergies.   Neurological: Negative for dizziness, weakness, numbness and headaches.   Hematological: Negative for adenopathy. Does not bruise/bleed easily.   Psychiatric/Behavioral: Negative for behavioral problems and dysphoric mood. The patient is not nervous/anxious.        Past Medical History:   Diagnosis Date   • A-fib (HCC)    • Anemia 2008   • B12 deficiency 2008   • Back pain 2010   • CAD (coronary artery disease)    • Cellulitis of foot    • Colon polyp 2016   • Dyspnea    • GERD (gastroesophageal reflux disease)    • Glaucoma 2016   • Hematuria    • History of mammogram    • Hypercholesteremia 2005   • Hypertension    • Iron deficiency    • Low back pain    • Menopause    • Neck strain    • Osteopenia    • Overweight    • Palpitations    • Postmenopausal atrophic vaginitis    • Sleep apnea 2012    discontinued CPAP use 2 months ago   • Tinea pedis        Past Surgical History:   Procedure Laterality Date   • APPENDECTOMY  1964   • BREAST EXCISIONAL BIOPSY Bilateral 1990'S   • CARDIAC ELECTROPHYSIOLOGY PROCEDURE N/A 4/28/2020    Procedure: Pacemaker DC new. [facility] [place];  Surgeon: Villa Thomas DO;  Location: [facility] INVASIVE LOCATION;  Service: Cardiology;  Laterality: N/A;   • COLONOSCOPY  2016        • HYSTERECTOMY  2000    total    • OOPHORECTOMY Bilateral 2000   • TUBAL ABDOMINAL LIGATION  1974       Family History   Problem Relation Age of Onset   • Heart attack Other    • Arthritis Other    • Diabetes Other    • Hypertension Other    • Hodgkin's lymphoma Other    • Esophagitis Mother    • Heart failure Mother    • Esophageal cancer Mother    • Esophagitis Maternal Aunt    • Breast cancer Maternal Aunt         DX AGE 50'S   • Colon cancer Maternal Grandmother 49   • Heart attack Father    • Breast cancer  Sister 72   • Ovarian cancer Sister 74   • Stomach cancer Neg Hx    • Liver cancer Neg Hx    • Liver disease Neg Hx         reports that she quit smoking about 28 years ago. Her smoking use included cigarettes. She smoked 1.00 pack per day. She has never used smokeless tobacco. She reports that she does not drink alcohol and does not use drugs.    Allergies   Allergen Reactions   • Macrobid [Nitrofurantoin Monohyd Macro] Rash   • Nitrofurantoin Hives   • Phenylephrine-Guaifenesin Hives   • Sulfa Antibiotics Hives   • Statins Myalgia           Current Outpatient Medications:   •  Calcium Carbonate-Vitamin D (CALCIUM PLUS VITAMIN D PO), Take 1 tablet by mouth Daily., Disp: , Rfl:   •  cetirizine (zyrTEC) 10 MG tablet, Take 10 mg by mouth Daily., Disp: , Rfl:   •  cholecalciferol (VITAMIN D3) 10 MCG (400 UNIT) tablet, Take 400 Units by mouth Daily., Disp: , Rfl:   •  clobetasol (TEMOVATE) 0.05 % ointment, APPLY  TOPICALLY TO THE APPROPRIATE AREA AS DIRECTED EVERY 12 (TWELVE) HOURS., Disp: 60 g, Rfl: 0  •  cloNIDine (Catapres) 0.1 MG tablet, Take 1 tablet by mouth Every 6 (Six) Hours As Needed for High Blood Pressure (SBP>150mmHg and/or DBP>100mm Hg). (Patient taking differently: Take 0.1 mg by mouth Every 12 (Twelve) Hours.), Disp: 60 tablet, Rfl: 11  •  coenzyme Q10 100 MG capsule, Take 100 mg by mouth Daily., Disp: , Rfl:   •  dilTIAZem CD (CARDIZEM CD) 240 MG 24 hr capsule, Take 1 capsule by mouth Daily., Disp: 90 capsule, Rfl: 3  •  Docusate Sodium 100 MG capsule, Take 1 - 2 tablets daily. Adjust to have 1-2 soft stools daily., Disp: 60 tablet, Rfl: 5  •  hydroCHLOROthiazide (HYDRODIURIL) 25 MG tablet, Take 1 tablet by mouth Daily., Disp: 90 tablet, Rfl: 3  •  latanoprost (XALATAN) 0.005 % ophthalmic solution, , Disp: , Rfl:   •  melatonin 5 MG tablet tablet, Take 5 mg by mouth., Disp: , Rfl:   •  pravastatin (PRAVACHOL) 40 MG tablet, Take 1 tablet by mouth Daily., Disp: 90 tablet, Rfl: 3  •  rivaroxaban (XARELTO)  "20 MG tablet, Take 1 tablet by mouth Daily., Disp: 30 tablet, Rfl: 5  •  hydrALAZINE (APRESOLINE) 25 MG tablet, Take 1 tablet by mouth 3 (Three) Times a Day., Disp: 90 tablet, Rfl: 4      Objective   Blood pressure 147/66, pulse 59, temperature 97.8 °F (36.6 °C), resp. rate 16, height 154.9 cm (60.98\"), weight 75.8 kg (167 lb), SpO2 97 %, not currently breastfeeding.    Physical Exam  Vitals and nursing note reviewed.   Constitutional:       General: She is not in acute distress.     Appearance: Normal appearance. She is not diaphoretic.   HENT:      Head: Normocephalic and atraumatic.      Right Ear: External ear normal.      Left Ear: External ear normal.      Nose: Nose normal.   Eyes:      Extraocular Movements: Extraocular movements intact.      Conjunctiva/sclera: Conjunctivae normal.   Neck:      Trachea: Trachea normal.   Cardiovascular:      Rate and Rhythm: Normal rate and regular rhythm.      Heart sounds: Normal heart sounds.      Comments: ppm  Pulmonary:      Effort: Pulmonary effort is normal. No respiratory distress.      Breath sounds: Normal breath sounds.   Abdominal:      General: Abdomen is flat.   Musculoskeletal:      Cervical back: Neck supple.      Comments: Moves all limbs   Skin:     General: Skin is warm.   Neurological:      Mental Status: She is alert and oriented to person, place, and time.      Comments: No gross motor or sensory deficits         Patient's Body mass index is 31.57 kg/m². indicating that she is obese (BMI >30). Obesity-related health conditions include the following: hypertension, coronary heart disease and dyslipidemias. Obesity is improving with lifestyle modifications. BMI is is above average; BMI management plan is completed. We discussed low calorie, low carb based diet program, portion control, increasing exercise and joining a fitness center or start home based exercise program..      Results for orders placed or performed in visit on 05/03/21   Comprehensive " Metabolic Panel    Specimen: Blood   Result Value Ref Range    Glucose 104 (H) 65 - 99 mg/dL    BUN 11 8 - 23 mg/dL    Creatinine 0.84 0.57 - 1.00 mg/dL    eGFR Non African Am 66 >60 mL/min/1.73    eGFR African Am 80 >60 mL/min/1.73    BUN/Creatinine Ratio 13.1 7.0 - 25.0    Sodium 142 136 - 145 mmol/L    Potassium 3.8 3.5 - 5.2 mmol/L    Chloride 104 98 - 107 mmol/L    Total CO2 28.7 22.0 - 29.0 mmol/L    Calcium 10.1 8.6 - 10.5 mg/dL    Total Protein 7.4 6.0 - 8.5 g/dL    Albumin 4.70 3.50 - 5.20 g/dL    Globulin 2.7 gm/dL    A/G Ratio 1.7 g/dL    Total Bilirubin 0.2 0.0 - 1.2 mg/dL    Alkaline Phosphatase 86 39 - 117 U/L    AST (SGOT) 13 1 - 32 U/L    ALT (SGPT) 12 1 - 33 U/L   Lipid Panel    Specimen: Blood   Result Value Ref Range    Total Cholesterol 178 0 - 200 mg/dL    Triglycerides 76 0 - 150 mg/dL    HDL Cholesterol 66 (H) 40 - 60 mg/dL    VLDL Cholesterol Jakub 14 5 - 40 mg/dL    LDL Chol Calc (NIH) 98 0 - 100 mg/dL   TSH    Specimen: Blood   Result Value Ref Range    TSH 1.930 0.270 - 4.200 uIU/mL   Vitamin B12    Specimen: Blood   Result Value Ref Range    Vitamin B-12 725 211 - 946 pg/mL   C-reactive Protein    Specimen: Blood   Result Value Ref Range    C-Reactive Protein 0.33 0.00 - 0.50 mg/dL   Sedimentation Rate    Specimen: Blood   Result Value Ref Range    Sed Rate 9 0 - 30 mm/hr   Ferritin    Specimen: Blood   Result Value Ref Range    Ferritin 49.10 13.00 - 150.00 ng/mL   Iron Profile    Specimen: Blood   Result Value Ref Range    TIBC 446 mcg/dL    UIBC 378 (H) 112 - 346 mcg/dL    Iron 68 37 - 145 mcg/dL    Iron Saturation 15 (L) 20 - 50 %   Vitamin D 25 Hydroxy    Specimen: Blood   Result Value Ref Range    25 Hydroxy, Vitamin D 59.9 30.0 - 100.0 ng/ml   CBC & Differential    Specimen: Blood   Result Value Ref Range    WBC 5.85 3.40 - 10.80 10*3/mm3    RBC 4.32 3.77 - 5.28 10*6/mm3    Hemoglobin 13.5 12.0 - 15.9 g/dL    Hematocrit 40.3 34.0 - 46.6 %    MCV 93.3 79.0 - 97.0 fL    MCH 31.3 26.6 -  33.0 pg    MCHC 33.5 31.5 - 35.7 g/dL    RDW 12.6 12.3 - 15.4 %    Platelets 217 140 - 450 10*3/mm3    Neutrophil Rel % 50.8 42.7 - 76.0 %    Lymphocyte Rel % 29.1 19.6 - 45.3 %    Monocyte Rel % 10.1 5.0 - 12.0 %    Eosinophil Rel % 8.5 (H) 0.3 - 6.2 %    Basophil Rel % 1.2 0.0 - 1.5 %    Neutrophils Absolute 2.97 1.70 - 7.00 10*3/mm3    Lymphocytes Absolute 1.70 0.70 - 3.10 10*3/mm3    Monocytes Absolute 0.59 0.10 - 0.90 10*3/mm3    Eosinophils Absolute 0.50 (H) 0.00 - 0.40 10*3/mm3    Basophils Absolute 0.07 0.00 - 0.20 10*3/mm3    Immature Granulocyte Rel % 0.3 0.0 - 0.5 %    Immature Grans Absolute 0.02 0.00 - 0.05 10*3/mm3    nRBC 0.0 0.0 - 0.2 /100 WBC         Assessment/Plan   Diagnoses and all orders for this visit:    1. Benign essential hypertension (Primary)  -     hydrALAZINE (APRESOLINE) 25 MG tablet; Take 1 tablet by mouth 3 (Three) Times a Day.  Dispense: 90 tablet; Refill: 4    2. Mixed hyperlipidemia  -     CBC & Differential  -     Comprehensive Metabolic Panel  -     Lipid Panel    3. Paroxysmal atrial fibrillation (HCC)    4. Malaise and fatigue  -     TSH    5. Positive colorectal cancer screening using Cologuard test    6. Vitamin B12 deficiency  -     Vitamin B12      Plan:  1.  Benign essential hypertension: Start hydralazine 25 mg p.o. 3 times daily, take clonidine as needed and continue other blood pressure medications low-sodium diet advised, Counseled to regularly check BP at home with goal averaging <130/80.  Keep cardiology appointment  2.mixed hyperlipidemia: will obtain   fasting CMP and lipid panel.  Diet and exercise counseled,  Will continue current medications  3.  Atrial fibrillation: To continue on anticoagulation, rate controlled, she will discuss the same with her cardiologist  4.  Fatigue: We will obtain labs  5.  Positive Cologuard test: She has been seen by GI and will be discussing with her cardiologist about being off for her colonoscopy           Nakita Crump MD

## 2022-01-13 NOTE — PATIENT INSTRUCTIONS
MyPlate from USDA    MyPlate is an outline of a general healthy diet based on the 2010 Dietary Guidelines for Americans, from the U.S. Department of Agriculture (USDA). It sets guidelines for how much food you should eat from each food group based on your age, sex, and level of physical activity.  What are tips for following MyPlate?  To follow MyPlate recommendations:  · Eat a wide variety of fruits and vegetables, grains, and protein foods.  · Serve smaller portions and eat less food throughout the day.  · Limit portion sizes to avoid overeating.  · Enjoy your food.  · Get at least 150 minutes of exercise every week. This is about 30 minutes each day, 5 or more days per week.  It can be difficult to have every meal look like MyPlate. Think about MyPlate as eating guidelines for an entire day, rather than each individual meal.  Fruits and vegetables  · Make half of your plate fruits and vegetables.  · Eat many different colors of fruits and vegetables each day.  · For a 2,000 calorie daily food plan, eat:  ? 2½ cups of vegetables every day.  ? 2 cups of fruit every day.  · 1 cup is equal to:  ? 1 cup raw or cooked vegetables.  ? 1 cup raw fruit.  ? 1 medium-sized orange, apple, or banana.  ? 1 cup 100% fruit or vegetable juice.  ? 2 cups raw leafy greens, such as lettuce, spinach, or kale.  ? ½ cup dried fruit.  Grains  · One fourth of your plate should be grains.  · Make at least half of the grains you eat each day whole grains.  · For a 2,000 calorie daily food plan, eat 6 oz of grains every day.  · 1 oz is equal to:  ? 1 slice bread.  ? 1 cup cereal.  ? ½ cup cooked rice, cereal, or pasta.  Protein  · One fourth of your plate should be protein.  · Eat a wide variety of protein foods, including meat, poultry, fish, eggs, beans, nuts, and tofu.  · For a 2,000 calorie daily food plan, eat 5½ oz of protein every day.  · 1 oz is equal to:  ? 1 oz meat, poultry, or fish.  ? ¼ cup cooked beans.  ? 1 egg.  ? ½ oz nuts  or seeds.  ? 1 Tbsp peanut butter.  Dairy  · Drink fat-free or low-fat (1%) milk.  · Eat or drink dairy as a side to meals.  · For a 2,000 calorie daily food plan, eat or drink 3 cups of dairy every day.  · 1 cup is equal to:  ? 1 cup milk, yogurt, cottage cheese, or soy milk (soy beverage).  ? 2 oz processed cheese.  ? 1½ oz natural cheese.  Fats, oils, salt, and sugars  · Only small amounts of oils are recommended.  · Avoid foods that are high in calories and low in nutritional value (empty calories), like foods high in fat or added sugars.  · Choose foods that are low in salt (sodium). Choose foods that have less than 140 milligrams (mg) of sodium per serving.  · Drink water instead of sugary drinks. Drink enough water each day to keep your urine pale yellow.  Where to find support  · Work with your health care provider or a nutrition specialist (dietitian) to develop a customized eating plan that is right for you.  · Download an pricilla (mobile application) to help you track your daily food intake.  Where to find more information  · Go to ChooseMyPlate.gov for more information.  Summary  · MyPlate is a general guideline for healthy eating from the USDA. It is based on the 2010 Dietary Guidelines for Americans.  · In general, fruits and vegetables should take up ½ of your plate, grains should take up ¼ of your plate, and protein should take up ¼ of your plate.  This information is not intended to replace advice given to you by your health care provider. Make sure you discuss any questions you have with your health care provider.  Document Revised: 05/21/2020 Document Reviewed: 03/19/2018  Elsevier Patient Education © 2021 [employer]

## 2022-01-19 LAB
ALBUMIN SERPL-MCNC: 4.3 G/DL (ref 3.5–5.2)
ALBUMIN/GLOB SERPL: 1.4 G/DL
ALP SERPL-CCNC: 86 U/L (ref 39–117)
ALT SERPL-CCNC: 14 U/L (ref 1–33)
AST SERPL-CCNC: 17 U/L (ref 1–32)
BASOPHILS # BLD AUTO: 0.05 10*3/MM3 (ref 0–0.2)
BASOPHILS NFR BLD AUTO: 1 % (ref 0–1.5)
BILIRUB SERPL-MCNC: 0.4 MG/DL (ref 0–1.2)
BUN SERPL-MCNC: 15 MG/DL (ref 8–23)
BUN/CREAT SERPL: 19 (ref 7–25)
CALCIUM SERPL-MCNC: 10.1 MG/DL (ref 8.6–10.5)
CHLORIDE SERPL-SCNC: 101 MMOL/L (ref 98–107)
CHOLEST SERPL-MCNC: 178 MG/DL (ref 0–200)
CO2 SERPL-SCNC: 29.5 MMOL/L (ref 22–29)
CREAT SERPL-MCNC: 0.79 MG/DL (ref 0.57–1)
EOSINOPHIL # BLD AUTO: 0.22 10*3/MM3 (ref 0–0.4)
EOSINOPHIL NFR BLD AUTO: 4.5 % (ref 0.3–6.2)
ERYTHROCYTE [DISTWIDTH] IN BLOOD BY AUTOMATED COUNT: 12 % (ref 12.3–15.4)
GLOBULIN SER CALC-MCNC: 3 GM/DL
GLUCOSE SERPL-MCNC: 103 MG/DL (ref 65–99)
HCT VFR BLD AUTO: 42 % (ref 34–46.6)
HDLC SERPL-MCNC: 58 MG/DL (ref 40–60)
HGB BLD-MCNC: 14.1 G/DL (ref 12–15.9)
IMM GRANULOCYTES # BLD AUTO: 0.01 10*3/MM3 (ref 0–0.05)
IMM GRANULOCYTES NFR BLD AUTO: 0.2 % (ref 0–0.5)
LDLC SERPL CALC-MCNC: 101 MG/DL (ref 0–100)
LYMPHOCYTES # BLD AUTO: 1.43 10*3/MM3 (ref 0.7–3.1)
LYMPHOCYTES NFR BLD AUTO: 29.2 % (ref 19.6–45.3)
MCH RBC QN AUTO: 31.1 PG (ref 26.6–33)
MCHC RBC AUTO-ENTMCNC: 33.6 G/DL (ref 31.5–35.7)
MCV RBC AUTO: 92.5 FL (ref 79–97)
MONOCYTES # BLD AUTO: 0.45 10*3/MM3 (ref 0.1–0.9)
MONOCYTES NFR BLD AUTO: 9.2 % (ref 5–12)
NEUTROPHILS # BLD AUTO: 2.73 10*3/MM3 (ref 1.7–7)
NEUTROPHILS NFR BLD AUTO: 55.9 % (ref 42.7–76)
NRBC BLD AUTO-RTO: 0 /100 WBC (ref 0–0.2)
PLATELET # BLD AUTO: 220 10*3/MM3 (ref 140–450)
POTASSIUM SERPL-SCNC: 3.5 MMOL/L (ref 3.5–5.2)
PROT SERPL-MCNC: 7.3 G/DL (ref 6–8.5)
RBC # BLD AUTO: 4.54 10*6/MM3 (ref 3.77–5.28)
SODIUM SERPL-SCNC: 140 MMOL/L (ref 136–145)
TRIGL SERPL-MCNC: 105 MG/DL (ref 0–150)
TSH SERPL DL<=0.005 MIU/L-ACNC: 2.1 UIU/ML (ref 0.27–4.2)
VIT B12 SERPL-MCNC: 485 PG/ML (ref 211–946)
VLDLC SERPL CALC-MCNC: 19 MG/DL (ref 5–40)
WBC # BLD AUTO: 4.89 10*3/MM3 (ref 3.4–10.8)

## 2022-01-24 ENCOUNTER — HOSPITAL ENCOUNTER (OUTPATIENT)
Dept: MAMMOGRAPHY | Facility: HOSPITAL | Age: 77
Discharge: HOME | End: 2022-01-24
Admitting: INTERNAL MEDICINE

## 2022-01-24 DIAGNOSIS — Z12.31 VISIT FOR SCREENING MAMMOGRAM: ICD-10-CM

## 2022-01-24 PROCEDURE — 77067 SCR MAMMO BI INCL CAD: CPT | Performed by: RADIOLOGY

## 2022-01-24 PROCEDURE — 77063 BREAST TOMOSYNTHESIS BI: CPT

## 2022-01-24 PROCEDURE — 77067 SCR MAMMO BI INCL CAD: CPT

## 2022-01-24 PROCEDURE — 77063 BREAST TOMOSYNTHESIS BI: CPT | Performed by: RADIOLOGY

## 2022-01-25 ENCOUNTER — OFFICE VISIT (OUTPATIENT)
Dept: INTERNAL MEDICINE | Facility: CLINIC | Age: 77
End: 2022-01-25

## 2022-01-25 VITALS
HEIGHT: 61 IN | OXYGEN SATURATION: 98 % | BODY MASS INDEX: 31.93 KG/M2 | DIASTOLIC BLOOD PRESSURE: 72 MMHG | SYSTOLIC BLOOD PRESSURE: 120 MMHG | TEMPERATURE: 97.7 F | WEIGHT: 169.12 LBS | HEART RATE: 65 BPM

## 2022-01-25 DIAGNOSIS — R73.01 IMPAIRED FASTING GLUCOSE: ICD-10-CM

## 2022-01-25 DIAGNOSIS — I48.20 CHRONIC ATRIAL FIBRILLATION: ICD-10-CM

## 2022-01-25 DIAGNOSIS — R19.5 POSITIVE COLORECTAL CANCER SCREENING USING COLOGUARD TEST: ICD-10-CM

## 2022-01-25 DIAGNOSIS — E78.2 MIXED HYPERLIPIDEMIA: ICD-10-CM

## 2022-01-25 DIAGNOSIS — I10 BENIGN ESSENTIAL HYPERTENSION: Primary | ICD-10-CM

## 2022-01-25 PROCEDURE — 99214 OFFICE O/P EST MOD 30 MIN: CPT | Performed by: INTERNAL MEDICINE

## 2022-01-25 RX ORDER — METOPROLOL SUCCINATE 25 MG/1
25 TABLET, EXTENDED RELEASE ORAL DAILY
COMMUNITY
Start: 2022-01-24

## 2022-01-25 NOTE — PROGRESS NOTES
"Chief Complaint  Hypertension (follow up after seeing Dr. Garsia ), Immunizations (discussed immunizations.), Hyperlipidemia, and Atrial Fibrillation    Subjective          Monica Perea presents to [facility]  History of Present Illness  HPI: Patient is here to follow up on the blood pressure  The patient is taking the blood pressure medications as prescribed and has had no side effects. The patient is also here to follow up on the cholesterol and is trying to follow a diet. The patient had  lab work done .  The patient also needs refills on medications . Patient states she was seen by Dr. Garsia cardiology for atrial fibrillation and will be referred to Dr. Conde for watchman procedure, she is due for a colonoscopy after having a positive Cologuard and she discussed that with Dr. Garsia and he stated for patient that she can be off anticoagulation with 2 days prior to procedure and she is advised to call and follow-up with GI regarding her colonoscopy, she also states her clonidine was stopped by cardiology she is currently on metoprolol and she is advised to take it   hyperlipidemia   Pertinent negatives include no chest pain or shortness of breath.   Hypertension   Pertinent negatives include no chest pain, palpitations or shortness of breath.    Objective   Vital Signs:   /72   Pulse 65   Temp 97.7 °F (36.5 °C) (Infrared)   Ht 154.9 cm (60.98\")   Wt 76.7 kg (169 lb 1.9 oz)   SpO2 98%   BMI 31.98 kg/m²     Physical Exam  Vitals and nursing note reviewed.   Constitutional:       General: She is not in acute distress.     Appearance: Normal appearance. She is not diaphoretic.   HENT:      Head: Normocephalic and atraumatic.      Right Ear: External ear normal.      Left Ear: External ear normal.      Nose: Nose normal.   Eyes:      Extraocular Movements: Extraocular movements intact.      Conjunctiva/sclera: Conjunctivae normal.   Neck:      Trachea: " Trachea normal.   Cardiovascular:      Rate and Rhythm: Normal rate and regular rhythm.      Heart sounds: Normal heart sounds.      Comments: ppm  Pulmonary:      Effort: Pulmonary effort is normal. No respiratory distress.      Breath sounds: Normal breath sounds.   Abdominal:      General: Abdomen is flat.   Musculoskeletal:      Cervical back: Neck supple.      Comments: Moves all limbs   Skin:     General: Skin is warm.   Neurological:      Mental Status: She is alert and oriented to person, place, and time.      Comments: No gross motor or sensory deficits        Result Review :     Common labs    Common Labsle 7/15/21 7/15/21 7/15/21 1/18/22 1/18/22 1/18/22    0911 0911 0911 0948 0948 0948   Glucose  104 (A)   103 (A)    BUN  11   15    Creatinine  0.84   0.79    eGFR Non  Am  66   71    eGFR African Am  80   86    Sodium  142   140    Potassium  3.8   3.5    Chloride  104   101    Calcium  10.1   10.1    Total Protein  7.4   7.3    Albumin  4.70   4.30    Total Bilirubin  0.2   0.4    Alkaline Phosphatase  86   86    AST (SGOT)  13   17    ALT (SGPT)  12   14    WBC 5.85   4.89     Hemoglobin 13.5   14.1     Hematocrit 40.3   42.0     Platelets 217   220     Total Cholesterol   178   178   Triglycerides   76   105   HDL Cholesterol   66 (A)   58   LDL Cholesterol    98   101 (A)   (A) Abnormal value       Comments are available for some flowsheets but are not being displayed.                     Assessment and Plan    Diagnoses and all orders for this visit:    1. Benign essential hypertension (Primary)    2. Mixed hyperlipidemia  -     CBC & Differential  -     Comprehensive Metabolic Panel  -     Lipid Panel    3. Chronic atrial fibrillation (HCC)    4. Impaired fasting glucose  -     Hemoglobin A1c    5. Positive colorectal cancer screening using Cologuard test    Plan:  1.  Benign essential hypertension: Will continue current medication, low-sodium diet advised, Counseled to regularly check BP at  home with goal averaging <130/80.   2.mixed hyperlipidemia:  reviewed  fasting CMP and lipid panel.  Diet and exercise counseled,  Will continue current medications  3. impaired glucose   :   reviewed  fasting CMP  and hba1c  , diet and exercise counseled  4.  Atrial fibrillation: rate controlled, on anticoagulation ,   will continue current medications , per cardiology , labs reviewed  5. Positive Cologuard: Advised to follow-up with GI  I spent 30 minutes caring for Monica on this date of service. This time includes time spent by me in the following activities:preparing for the visit, reviewing tests, performing a medically appropriate examination and/or evaluation , counseling and educating the patient/family/caregiver, ordering medications, tests, or procedures and documenting information in the medical record  Follow Up   Return in about 4 months (around 5/16/2022).  Patient was given instructions and counseling regarding her condition or for health maintenance advice. Please see specific information pulled into the AVS if appropriate.

## 2022-02-11 DIAGNOSIS — I10 BENIGN ESSENTIAL HYPERTENSION: ICD-10-CM

## 2022-02-11 RX ORDER — HYDRALAZINE HYDROCHLORIDE 25 MG/1
25 TABLET, FILM COATED ORAL 3 TIMES DAILY
Qty: 270 TABLET | Refills: 1 | Status: SHIPPED | OUTPATIENT
Start: 2022-02-11 | End: 2022-08-17

## 2022-02-11 NOTE — TELEPHONE ENCOUNTER
Caller: Eliazar Monica NORMA    Relationship: Self    Best call back number: [phone]    Requested Prescriptions:   Requested Prescriptions     Pending Prescriptions Disp Refills   • hydrALAZINE (APRESOLINE) 25 MG tablet 90 tablet 4     Sig: Take 1 tablet by mouth 3 (Three) Times a Day.        Pharmacy where request should be sent: [facility] - [place] [id number] [facility] - [phone]  - [phone] FX     Additional details provided by patient: PATIENT STATES THAT MEDICATION IS WORKING WELL. REQUESTING A 90 SUPPLY    Does the patient have less than a 3 day supply:  [x] Yes  [] No    Nick Wagner Rep   02/11/22 10:19 EST

## 2022-02-15 NOTE — PROGRESS NOTES
Electrophysiology Clinic Consult     Monica Perea  1945      02/16/22    DATE OF ADMISSION: (Not on file)  [facility] CARDIOLOGY    Nakita Crump MD  [address] / [facility] [address]    Referring: Dr. Garsia    Chief Complaint   Patient presents with   • Paroxysmal atrial fibrillation (CMS/HCC)   Problem List:   1. Atrial Fibrillation- Tachybrady Syndrome   a. CHADSVASC-  4(age 4 , gender, HTN)   b. 1/23/20 Myocardial perfusion imaging indicates a normal myocardial perfusion study with no evidence of ischemia. Impressions are consistent with a low risk study  c. BSC PPM Implant 4/28/2020 - per Dr. Thomas    d. 8/1/2020 Echo LVEF 65% with mild LVH   e. Echo 7/19/2021 LVEF 65%.  Normal left atrial size.  No evidence of significant valvular abnormalities or intracardiac shunt.  f. Prior Antiarrythmic use includes Flecainide (had dizziness and syncope, and Rhythmol)   2. HTN  3. HLD  4. Vit D deficiency  5. Glaucoma   6. GERD  7. Chronic constipation  8. Iron deficiency anemia - on Rx iron supplementation   9. Anxiety   10. Osteopenia   11. Psoriasis  12. Surgical History   a. Hysterectomy  b. Appendectomy   c. Breast biopsy       History of Present Illness:   Ms. Perea is a 77 year old  female referred by Dr. Garsia for consultation for Watchman Implant She has previously been see by Dr. Charles and Dr. Thomas however states she will only be seeing Dr. Garsia going forward. She presents today for consult for Watchman implant. She has had atrial fibrillation for may years and has been anticoagulated with Xarleto. She denies hx of brain bleed or GI bleeding. She would like to get off Xarelto due frequent nose bleeds. She has not gone to the ER or had to see ENT for cauterization for any of the nose bleeds. She also reports that her balance is not good and she has had several falls and has concerns about bleeding if she falls. She also states she has had  iron deficiency anemia and recently completed a course of prescription strength iron supplementation.  Recent H/H normal.  With regards to her Afib she  Has a very low AF burden off antiarrhythmic medication.  She is previously taken flecainide in the past with adverse effects.  She was also prescribed Rythmol however this was too expensive.  She is currently on metoprolol tartrate.  She has rare palpitations.  Current A. fib burden is 0.  Per last device interrogation was less than 1%.    Thyroid studies: Normal  Active sleep apnea:declines testing         Allergies   Allergen Reactions   • Macrobid [Nitrofurantoin Monohyd Macro] Rash   • Nitrofurantoin Hives   • Phenylephrine-Guaifenesin Hives   • Sulfa Antibiotics Hives   • Statins Myalgia       Current Outpatient Medications:   •  Calcium Carbonate-Vitamin D (CALCIUM PLUS VITAMIN D PO), Take 1 tablet by mouth Daily., Disp: , Rfl:   •  cetirizine (zyrTEC) 10 MG tablet, Take 10 mg by mouth Daily., Disp: , Rfl:   •  cholecalciferol (VITAMIN D3) 10 MCG (400 UNIT) tablet, Take 400 Units by mouth Daily., Disp: , Rfl:   •  clobetasol (TEMOVATE) 0.05 % ointment, APPLY  TOPICALLY TO THE APPROPRIATE AREA AS DIRECTED EVERY 12 (TWELVE) HOURS., Disp: 60 g, Rfl: 0  •  coenzyme Q10 100 MG capsule, Take 100 mg by mouth Daily., Disp: , Rfl:   •  dilTIAZem CD (CARDIZEM CD) 240 MG 24 hr capsule, Take 1 capsule by mouth Daily., Disp: 90 capsule, Rfl: 3  •  Docusate Sodium 100 MG capsule, Take 1 - 2 tablets daily. Adjust to have 1-2 soft stools daily., Disp: 60 tablet, Rfl: 5  •  hydrALAZINE (APRESOLINE) 25 MG tablet, Take 1 tablet by mouth 3 (Three) Times a Day., Disp: 270 tablet, Rfl: 1  •  hydroCHLOROthiazide (HYDRODIURIL) 25 MG tablet, Take 1 tablet by mouth Daily., Disp: 90 tablet, Rfl: 3  •  latanoprost (XALATAN) 0.005 % ophthalmic solution, , Disp: , Rfl:   •  melatonin 5 MG tablet tablet, Take 5 mg by mouth., Disp: , Rfl:   •  metoprolol succinate XL (TOPROL-XL) 50 MG 24 hr  tablet, Take 50 mg by mouth Daily., Disp: , Rfl:   •  pravastatin (PRAVACHOL) 40 MG tablet, Take 1 tablet by mouth Daily., Disp: 90 tablet, Rfl: 3  •  rivaroxaban (XARELTO) 20 MG tablet, Take 1 tablet by mouth Daily., Disp: 30 tablet, Rfl: 5    Social History     Socioeconomic History   • Marital status:    Tobacco Use   • Smoking status: Former Smoker     Packs/day: 1.00     Types: Cigarettes     Quit date:      Years since quittin.1   • Smokeless tobacco: Never Used   Vaping Use   • Vaping Use: Never used   Substance and Sexual Activity   • Alcohol use: No     Comment: quit in    • Drug use: No   • Sexual activity: Defer       Family History   Problem Relation Age of Onset   • Heart attack Other    • Arthritis Other    • Diabetes Other    • Hypertension Other    • Hodgkin's lymphoma Other    • Esophagitis Mother    • Heart failure Mother    • Esophageal cancer Mother    • Esophagitis Maternal Aunt    • Breast cancer Maternal Aunt         DX AGE 50'S   • Colon cancer Maternal Grandmother 49   • Heart attack Father    • Breast cancer Sister 72   • Ovarian cancer Sister 74   • Stomach cancer Neg Hx    • Liver cancer Neg Hx    • Liver disease Neg Hx        REVIEW OF SYSTEMS:   CONST:  No weight loss, fever, chills, weakness or +fatigue.   HEENT:  No visual loss, blurred vision, double vision, yellow sclerae.                   No hearing loss, congestion, sore throat. + epistaxis   SKIN:      No rashes, urticaria, ulcers, sores.     RESP:     No shortness of breath, hemoptysis, cough, sputum.   GI:           No anorexia, nausea, vomiting, diarrhea. No abdominal pain, melena.   :         No burning on urination, hematuria or increased frequency.  ENDO:    No diaphoresis, cold or heat intolerance. No polyuria or polydipsia.   NEURO:  No headache, dizziness, syncope, paralysis, ataxia, or parasthesias.                  No change in bowel or bladder control. No history of CVA/TIA  MUSC:    No muscle,  "back pain, joint pain or stiffness.   HEME:    No anemia, bleeding, bruising. No history of DVT/PE.  PSYCH:  No history of depression, anxiety    Vitals:    02/16/22 0832   BP: 124/72   BP Location: Left arm   Patient Position: Sitting   Pulse: 60   SpO2: 97%   Weight: 77 kg (169 lb 12.8 oz)   Height: 154.9 cm (61\")       Physical Exam:  GEN: Well nourished, well-developed, no acute distress  HEENT: Normocephalic, atraumatic, PERRLA, moist mucous membranes  NECK: Supple, NO JVD, no thyromegaly, no lymphadenopathy  CARD: S1S2, RRR, S4, no murmurs, PMI NL  LUNGS: Clear to auscultation, normal respiratory effort  ABDOMEN: Soft, nontender, normal bowel sounds  EXTREMITIES: No gross deformities, no clubbing, cyanosis, or edema  SKIN: Warm, dry  NEURO: No focal deficits, alert and oriented x 3  PSYCHIATRIC: Normal affect and mood      I personally viewed and interpreted the patient's EKG/Telemetry/lab data    Lab Results   Component Value Date    GLUCOSE 103 (H) 01/18/2022    CALCIUM 10.1 01/18/2022     01/18/2022    K 3.5 01/18/2022    CO2 29.5 (H) 01/18/2022     01/18/2022    BUN 15 01/18/2022    CREATININE 0.79 01/18/2022    EGFRIFAFRI 86 01/18/2022    EGFRIFNONA 71 01/18/2022    BCR 19.0 01/18/2022    ANIONGAP 12.0 04/28/2020     Lab Results   Component Value Date    WBC 4.89 01/18/2022    HGB 14.1 01/18/2022    HCT 42.0 01/18/2022    MCV 92.5 01/18/2022     01/18/2022     No results found for: INR, PROTIME  Lab Results   Component Value Date    TSH 2.100 01/18/2022       Battery life 8 years Stable impedances, acceptable pacing and sensing thresholds. AP 77%%, <1%. Arrythmia episodes: 0%MS, 0 VHR episodes.   Prior check showed 1% afib burden       ECG 12 Lead    Date/Time: 2/16/2022 9:08 AM  Performed by: Nirali Piedra APRN  Authorized by: Nirali Piedra APRN   Comparison: compared with previous ECG from 4/26/2021  Similar to previous ECG  Rhythm: sinus rhythm  BPM: 60  Comments: A " paced              ICD-10-CM ICD-9-CM   1. Paroxysmal atrial fibrillation (HCC)  I48.0 427.31   2. Sinus node dysfunction (HCC)  I49.5 427.81   3. Anemia, unspecified type  D64.9 285.9   4. Epistaxis  R04.0 784.7   5. Fall, initial encounter  W19.XXXA E888.9       Assessment and Plan:   1. Atrial Fibrillation/ Tachy- Chris syndrome  -BSC PPM with normal function   -Previously on flecainide (intolerant) and rythmol (too expensive)   -No with AF burden of 0% (last check burden was <1%)   -Will continue Metoprolol Xl 50mg - BP and HR well controlled - will continue to follow AF burden with symptoms and PPM checks.       2. Anticoagulation  -Elevated CHADSVASC: 4 currently on Xarelto 20mg daily. She reports frequent bothersome nose bleeds.  She has had iron deficiency anemia requiring oral iron supplementation which causes significant GI distress. She also reports falls due to balance issues as well. Discussed left atrial appendage occlusive device placement in detail with the patient.  She is a candidate for WATCHMAN.  The risk, benefits, alternatives of left atrial appendage closure been reviewed. She wishes to proceed. Will have WATCHMAN coordinator Amanda Hernandez meet with the patient to discuss process     Scribed for Kip Conde MD by TERRY Shi. 2/16/2022  09:21 Kip CANDELARIO MD, personally performed the services described in this documentation as scribed by the above named individual in my presence, and it is both accurate and complete.  2/16/2022  09:21 EST

## 2022-02-16 ENCOUNTER — OFFICE VISIT (OUTPATIENT)
Dept: CARDIOLOGY | Facility: CLINIC | Age: 77
End: 2022-02-16

## 2022-02-16 VITALS
BODY MASS INDEX: 32.06 KG/M2 | WEIGHT: 169.8 LBS | SYSTOLIC BLOOD PRESSURE: 124 MMHG | HEART RATE: 60 BPM | DIASTOLIC BLOOD PRESSURE: 72 MMHG | HEIGHT: 61 IN | OXYGEN SATURATION: 97 %

## 2022-02-16 DIAGNOSIS — D64.9 ANEMIA, UNSPECIFIED TYPE: ICD-10-CM

## 2022-02-16 DIAGNOSIS — I48.0 PAROXYSMAL ATRIAL FIBRILLATION: Primary | ICD-10-CM
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
## 2022-02-16 DIAGNOSIS — I49.5 SINUS NODE DYSFUNCTION: ICD-10-CM

## 2022-02-16 DIAGNOSIS — R04.0 EPISTAXIS: ICD-10-CM

## 2022-02-16 PROCEDURE — 99214 OFFICE O/P EST MOD 30 MIN: CPT | Performed by: INTERNAL MEDICINE

## 2022-02-16 PROCEDURE — 93280 PM DEVICE PROGR EVAL DUAL: CPT | Performed by: INTERNAL MEDICINE

## 2022-02-16 PROCEDURE — 93000 ELECTROCARDIOGRAM COMPLETE: CPT | Performed by: NURSE PRACTITIONER

## 2022-02-17 DIAGNOSIS — R04.0 EPISTAXIS: ICD-10-CM

## 2022-02-17 DIAGNOSIS — I48.0 PAROXYSMAL ATRIAL FIBRILLATION: Primary | ICD-10-CM

## 2022-03-22 ENCOUNTER — PREP FOR SURGERY (OUTPATIENT)
Dept: OTHER | Facility: HOSPITAL | Age: 77
End: 2022-03-22

## 2022-03-22 DIAGNOSIS — I48.0 PAROXYSMAL ATRIAL FIBRILLATION: Primary | ICD-10-CM

## 2022-03-22 RX ORDER — SODIUM CHLORIDE 0.9 % (FLUSH) 0.9 %
1-10 SYRINGE (ML) INJECTION AS NEEDED
Status: CANCELLED | OUTPATIENT
Start: 2022-03-22

## 2022-03-22 RX ORDER — CEFAZOLIN SODIUM 2 G/100ML
2 INJECTION, SOLUTION INTRAVENOUS ONCE
Status: CANCELLED | OUTPATIENT
Start: 2022-03-22 | End: 2022-03-22

## 2022-03-22 RX ORDER — SODIUM CHLORIDE 0.9 % (FLUSH) 0.9 %
10 SYRINGE (ML) INJECTION EVERY 12 HOURS SCHEDULED
Status: CANCELLED | OUTPATIENT
Start: 2022-03-22

## 2022-03-28 RX ORDER — ASPIRIN 81 MG/1
81 TABLET ORAL DAILY
Qty: 30 TABLET | Refills: 11 | Status: ON HOLD | OUTPATIENT
Start: 2022-04-03 | End: 2022-04-04 | Stop reason: SDUPTHER

## 2022-04-01 ENCOUNTER — PRE-ADMISSION TESTING (OUTPATIENT)
Dept: PREADMISSION TESTING | Facility: HOSPITAL | Age: 77
End: 2022-04-01

## 2022-04-01 DIAGNOSIS — I48.0 PAROXYSMAL ATRIAL FIBRILLATION: ICD-10-CM

## 2022-04-01 LAB
ANION GAP SERPL CALCULATED.3IONS-SCNC: 12 MMOL/L (ref 5–15)
BUN SERPL-MCNC: 19 MG/DL (ref 8–23)
BUN/CREAT SERPL: 20.4 (ref 7–25)
CALCIUM SPEC-SCNC: 10 MG/DL (ref 8.6–10.5)
CHLORIDE SERPL-SCNC: 96 MMOL/L (ref 98–107)
CO2 SERPL-SCNC: 30 MMOL/L (ref 22–29)
CREAT SERPL-MCNC: 0.93 MG/DL (ref 0.57–1)
DEPRECATED RDW RBC AUTO: 40.4 FL (ref 37–54)
EGFRCR SERPLBLD CKD-EPI 2021: 64.2 ML/MIN/1.73
ERYTHROCYTE [DISTWIDTH] IN BLOOD BY AUTOMATED COUNT: 12 % (ref 12.3–15.4)
GLUCOSE SERPL-MCNC: 95 MG/DL (ref 65–99)
HBA1C MFR BLD: 5.6 % (ref 4.8–5.6)
HCT VFR BLD AUTO: 40.1 % (ref 34–46.6)
HGB BLD-MCNC: 13.7 G/DL (ref 12–15.9)
MCH RBC QN AUTO: 31.3 PG (ref 26.6–33)
MCHC RBC AUTO-ENTMCNC: 34.2 G/DL (ref 31.5–35.7)
MCV RBC AUTO: 91.6 FL (ref 79–97)
PLATELET # BLD AUTO: 208 10*3/MM3 (ref 140–450)
PMV BLD AUTO: 11.1 FL (ref 6–12)
POTASSIUM SERPL-SCNC: 3.4 MMOL/L (ref 3.5–5.2)
RBC # BLD AUTO: 4.38 10*6/MM3 (ref 3.77–5.28)
SARS-COV-2 RNA PNL SPEC NAA+PROBE: NOT DETECTED
SODIUM SERPL-SCNC: 138 MMOL/L (ref 136–145)
WBC NRBC COR # BLD: 8.3 10*3/MM3 (ref 3.4–10.8)

## 2022-04-01 PROCEDURE — U0004 COV-19 TEST NON-CDC HGH THRU: HCPCS

## 2022-04-01 PROCEDURE — C9803 HOPD COVID-19 SPEC COLLECT: HCPCS

## 2022-04-01 PROCEDURE — 80048 BASIC METABOLIC PNL TOTAL CA: CPT

## 2022-04-01 PROCEDURE — 83036 HEMOGLOBIN GLYCOSYLATED A1C: CPT | Performed by: NURSE PRACTITIONER

## 2022-04-01 PROCEDURE — 85027 COMPLETE CBC AUTOMATED: CPT

## 2022-04-01 PROCEDURE — 36415 COLL VENOUS BLD VENIPUNCTURE: CPT

## 2022-04-01 NOTE — DISCHARGE INSTRUCTIONS
"Patient instructed to bring CPAP mask and tubing to the hospital for overnight stay.  Explained that it is not necessary to bring their CPAP machine to the hospital instead a CPAP machine will be provided for use by the hospital. If patient knows their CPAP settings, those settings will be implemented.  If not, the CPAP machine will be utilized on the auto setting using their mask and tubing.    Patient verbalized understanding.     Dear Patient,    Do NOT eat, drink, or smoke after midnight the night before your procedure.   Take your medications as instructed by your doctor.    Glasses and jewelry may be worn, but dentures must be removed prior to your procedure.    Leave any items you consider valuable at home.      MORNING of your Procedure, please bring the following:     -Photo ID and insurance card(s)    -ALL medications in their ORIGINAL CONTAINERS    -Co-pay and/or deductible required by your insurance   -Copy of living will or power of  document (if not brought to    Pre-Admission Testing department)   -CPAP mask and tubing, not your machine (if applicable)    -Relaxation aids (music, books, magazines)   -Skin Prep Instruction Sheet (if applicable)   -Relaxation Aids    Check in on the 2nd floor in the [facility] building.  Your procedure will be performed in the cath lab or EP lab.  During your procedure, your family will wait in the cath lab waiting area where you checked in.      Need to make arrangements for transportation prior to discharge.    A handout regarding \"Heart Healthy Eating\" was provided today to encourage healthy eating habits.    Booklet published by [employer] was given in Pre-Admission testing.  This booklet is for informational purposes only.  If you have any questions about your procedure, please speak with your physician.      Please note:  If you are scheduled to have one of the following procedures: Pulmonary Vein Ablation, Lead Extraction, MitraClip, Cerebral Coilings or " Embolization, please let your family know that after your procedure you will be going to recovery unit on the 2nd floor of the [address] [place] [address].  When the physician is finished speaking with your family after your procedure is completed, your family will be directed or escorted to the surgery waiting area in the [address] [place] [address].  This is where your family will wait until you are given a room assignment and then your family will be directed to the appropriate unit.

## 2022-04-01 NOTE — PAT
Patient instructed to bring CPAP mask and tubing to the hospital for overnight stay.  Explained that it is not necessary to bring their CPAP machine to the hospital instead a CPAP machine will be provided for use by the hospital. If patient knows their CPAP settings, those settings will be implemented.  If not, the CPAP machine will be utilized on the auto setting using their mask and tubing.    Patient verbalized understanding.

## 2022-04-04 ENCOUNTER — HOSPITAL ENCOUNTER (INPATIENT)
Facility: HOSPITAL | Age: 77
LOS: 1 days | Discharge: HOME | End: 2022-04-04
Attending: INTERNAL MEDICINE | Admitting: INTERNAL MEDICINE

## 2022-04-04 ENCOUNTER — READMISSION MANAGEMENT (OUTPATIENT)
Dept: CALL CENTER | Facility: HOSPITAL | Age: 77
End: 2022-04-04

## 2022-04-04 ENCOUNTER — ANESTHESIA EVENT (OUTPATIENT)
Dept: CARDIOLOGY | Facility: HOSPITAL | Age: 77
End: 2022-04-04

## 2022-04-04 ENCOUNTER — HOSPITAL ENCOUNTER (OUTPATIENT)
Dept: CARDIOLOGY | Facility: HOSPITAL | Age: 77
Discharge: HOME | End: 2022-04-04

## 2022-04-04 ENCOUNTER — ANESTHESIA (OUTPATIENT)
Dept: CARDIOLOGY | Facility: HOSPITAL | Age: 77
End: 2022-04-04

## 2022-04-04 VITALS
HEART RATE: 62 BPM | WEIGHT: 166.4 LBS | SYSTOLIC BLOOD PRESSURE: 108 MMHG | HEIGHT: 62 IN | BODY MASS INDEX: 30.62 KG/M2 | RESPIRATION RATE: 18 BRPM | OXYGEN SATURATION: 94 % | DIASTOLIC BLOOD PRESSURE: 62 MMHG | TEMPERATURE: 97.3 F

## 2022-04-04 VITALS — HEIGHT: 62 IN | BODY MASS INDEX: 30.63 KG/M2 | WEIGHT: 166.45 LBS

## 2022-04-04 DIAGNOSIS — I34.1 MVP (MITRAL VALVE PROLAPSE): ICD-10-CM

## 2022-04-04 DIAGNOSIS — R00.2 PALPITATIONS: ICD-10-CM

## 2022-04-04 DIAGNOSIS — R04.0 EPISTAXIS: ICD-10-CM

## 2022-04-04 DIAGNOSIS — I25.10 CORONARY ARTERY DISEASE INVOLVING NATIVE CORONARY ARTERY OF NATIVE HEART WITHOUT ANGINA PECTORIS: ICD-10-CM

## 2022-04-04 DIAGNOSIS — I48.0 PAROXYSMAL ATRIAL FIBRILLATION: ICD-10-CM

## 2022-04-04 LAB
BASE EXCESS BLDA CALC-SCNC: 6 MMOL/L (ref -5–5)
BH CV ECHO MEAS - BSA(HAYCOCK): 1.8 M^2
BH CV ECHO MEAS - BSA: 1.8 M^2
BH CV ECHO MEAS - BZI_BMI: 30.4 KILOGRAMS/M^2
BH CV ECHO MEAS - BZI_METRIC_HEIGHT: 157.5 CM
BH CV ECHO MEAS - BZI_METRIC_WEIGHT: 75.3 KG
BH CV VAS BP LEFT ARM: NORMAL MMHG
CA-I BLDA-SCNC: 1.26 MMOL/L (ref 1.2–1.32)
CO2 BLDA-SCNC: 32 MMOL/L (ref 24–29)
GLUCOSE BLDC GLUCOMTR-MCNC: 118 MG/DL (ref 70–130)
HCO3 BLDA-SCNC: 30.4 MMOL/L (ref 22–26)
HCT VFR BLDA CALC: 38 % (ref 38–51)
HGB BLDA-MCNC: 12.9 G/DL (ref 12–17)
PCO2 BLDA: 46.9 MM HG (ref 35–45)
PH BLDA: 7.42 PH UNITS (ref 7.35–7.6)
PO2 BLDA: 32 MMHG (ref 80–105)
POTASSIUM BLDA-SCNC: 3.5 MMOL/L (ref 3.5–4.9)
SAO2 % BLDA: 62 % (ref 95–98)
SODIUM BLD-SCNC: 139 MMOL/L (ref 138–146)

## 2022-04-04 PROCEDURE — 93799 UNLISTED CV SVC/PROCEDURE: CPT | Performed by: INTERNAL MEDICINE

## 2022-04-04 PROCEDURE — 33340 PERQ CLSR TCAT L ATR APNDGE: CPT | Performed by: INTERNAL MEDICINE

## 2022-04-04 PROCEDURE — 02L73DK OCCLUSION OF LEFT ATRIAL APPENDAGE WITH INTRALUMINAL DEVICE, PERCUTANEOUS APPROACH: ICD-10-PCS | Performed by: INTERNAL MEDICINE

## 2022-04-04 PROCEDURE — C1889 IMPLANT/INSERT DEVICE, NOC: HCPCS

## 2022-04-04 PROCEDURE — 82947 ASSAY GLUCOSE BLOOD QUANT: CPT

## 2022-04-04 PROCEDURE — C1894 INTRO/SHEATH, NON-LASER: HCPCS | Performed by: INTERNAL MEDICINE

## 2022-04-04 PROCEDURE — 0 IOPAMIDOL PER 1 ML: Performed by: INTERNAL MEDICINE

## 2022-04-04 PROCEDURE — C1760 CLOSURE DEV, VASC: HCPCS | Performed by: INTERNAL MEDICINE

## 2022-04-04 PROCEDURE — 25010000002 CEFAZOLIN IN DEXTROSE 2-4 GM/100ML-% SOLUTION: Performed by: NURSE PRACTITIONER

## 2022-04-04 PROCEDURE — 82803 BLOOD GASES ANY COMBINATION: CPT

## 2022-04-04 PROCEDURE — 25010000002 ONDANSETRON PER 1 MG: Performed by: NURSE ANESTHETIST, CERTIFIED REGISTERED

## 2022-04-04 PROCEDURE — C1893 INTRO/SHEATH, FIXED,NON-PEEL: HCPCS | Performed by: INTERNAL MEDICINE

## 2022-04-04 PROCEDURE — 85347 COAGULATION TIME ACTIVATED: CPT

## 2022-04-04 PROCEDURE — C1769 GUIDE WIRE: HCPCS | Performed by: INTERNAL MEDICINE

## 2022-04-04 PROCEDURE — B24BZZZ ULTRASONOGRAPHY OF HEART WITH AORTA: ICD-10-PCS | Performed by: INTERNAL MEDICINE

## 2022-04-04 PROCEDURE — 25010000002 DEXAMETHASONE PER 1 MG: Performed by: NURSE ANESTHETIST, CERTIFIED REGISTERED

## 2022-04-04 PROCEDURE — 0 LIDOCAINE 1 % SOLUTION: Performed by: INTERNAL MEDICINE

## 2022-04-04 PROCEDURE — 93355 ECHO TRANSESOPHAGEAL (TEE): CPT | Performed by: INTERNAL MEDICINE

## 2022-04-04 PROCEDURE — C1889 IMPLANT/INSERT DEVICE, NOC: HCPCS | Performed by: INTERNAL MEDICINE

## 2022-04-04 PROCEDURE — 93355 ECHO TRANSESOPHAGEAL (TEE): CPT

## 2022-04-04 PROCEDURE — 25010000002 PROTAMINE SULFATE PER 10 MG: Performed by: INTERNAL MEDICINE

## 2022-04-04 PROCEDURE — 82330 ASSAY OF CALCIUM: CPT

## 2022-04-04 PROCEDURE — 84295 ASSAY OF SERUM SODIUM: CPT

## 2022-04-04 PROCEDURE — 25010000002 PROPOFOL 10 MG/ML EMULSION: Performed by: NURSE ANESTHETIST, CERTIFIED REGISTERED

## 2022-04-04 PROCEDURE — 25010000002 HEPARIN (PORCINE) PER 1000 UNITS: Performed by: INTERNAL MEDICINE

## 2022-04-04 PROCEDURE — 93662 INTRACARDIAC ECG (ICE): CPT | Performed by: INTERNAL MEDICINE

## 2022-04-04 PROCEDURE — 84132 ASSAY OF SERUM POTASSIUM: CPT

## 2022-04-04 PROCEDURE — 85014 HEMATOCRIT: CPT

## 2022-04-04 PROCEDURE — C1759 CATH, INTRA ECHOCARDIOGRAPHY: HCPCS | Performed by: INTERNAL MEDICINE

## 2022-04-04 RX ORDER — SODIUM CHLORIDE 0.9 % (FLUSH) 0.9 %
1-10 SYRINGE (ML) INJECTION AS NEEDED
Status: DISCONTINUED | OUTPATIENT
Start: 2022-04-04 | End: 2022-04-04 | Stop reason: HOSPADM

## 2022-04-04 RX ORDER — ONDANSETRON 2 MG/ML
4 INJECTION INTRAMUSCULAR; INTRAVENOUS ONCE AS NEEDED
Status: DISCONTINUED | OUTPATIENT
Start: 2022-04-04 | End: 2022-04-04 | Stop reason: HOSPADM

## 2022-04-04 RX ORDER — EPHEDRINE SULFATE 50 MG/ML
INJECTION, SOLUTION INTRAVENOUS AS NEEDED
Status: DISCONTINUED | OUTPATIENT
Start: 2022-04-04 | End: 2022-04-04 | Stop reason: SURG

## 2022-04-04 RX ORDER — SODIUM CHLORIDE, SODIUM LACTATE, POTASSIUM CHLORIDE, CALCIUM CHLORIDE 600; 310; 30; 20 MG/100ML; MG/100ML; MG/100ML; MG/100ML
9 INJECTION, SOLUTION INTRAVENOUS CONTINUOUS
Status: DISCONTINUED | OUTPATIENT
Start: 2022-04-04 | End: 2022-04-04 | Stop reason: HOSPADM

## 2022-04-04 RX ORDER — CEFAZOLIN SODIUM 2 G/100ML
2 INJECTION, SOLUTION INTRAVENOUS ONCE
Status: COMPLETED | OUTPATIENT
Start: 2022-04-04 | End: 2022-04-04

## 2022-04-04 RX ORDER — DEXAMETHASONE SODIUM PHOSPHATE 4 MG/ML
INJECTION, SOLUTION INTRA-ARTICULAR; INTRALESIONAL; INTRAMUSCULAR; INTRAVENOUS; SOFT TISSUE AS NEEDED
Status: DISCONTINUED | OUTPATIENT
Start: 2022-04-04 | End: 2022-04-04 | Stop reason: SURG

## 2022-04-04 RX ORDER — LIDOCAINE HYDROCHLORIDE 10 MG/ML
0.5 INJECTION, SOLUTION EPIDURAL; INFILTRATION; INTRACAUDAL; PERINEURAL ONCE AS NEEDED
Status: DISCONTINUED | OUTPATIENT
Start: 2022-04-04 | End: 2022-04-04 | Stop reason: HOSPADM

## 2022-04-04 RX ORDER — MIDAZOLAM HYDROCHLORIDE 1 MG/ML
0.5 INJECTION INTRAMUSCULAR; INTRAVENOUS
Status: DISCONTINUED | OUTPATIENT
Start: 2022-04-04 | End: 2022-04-04 | Stop reason: HOSPADM

## 2022-04-04 RX ORDER — LIDOCAINE HYDROCHLORIDE 10 MG/ML
INJECTION, SOLUTION EPIDURAL; INFILTRATION; INTRACAUDAL; PERINEURAL AS NEEDED
Status: DISCONTINUED | OUTPATIENT
Start: 2022-04-04 | End: 2022-04-04 | Stop reason: SURG

## 2022-04-04 RX ORDER — FENTANYL CITRATE 50 UG/ML
50 INJECTION, SOLUTION INTRAMUSCULAR; INTRAVENOUS
Status: DISCONTINUED | OUTPATIENT
Start: 2022-04-04 | End: 2022-04-04 | Stop reason: HOSPADM

## 2022-04-04 RX ORDER — PROTAMINE SULFATE 10 MG/ML
INJECTION, SOLUTION INTRAVENOUS AS NEEDED
Status: DISCONTINUED | OUTPATIENT
Start: 2022-04-04 | End: 2022-04-04 | Stop reason: HOSPADM

## 2022-04-04 RX ORDER — ACETAMINOPHEN 650 MG/1
650 SUPPOSITORY RECTAL EVERY 4 HOURS PRN
Status: DISCONTINUED | OUTPATIENT
Start: 2022-04-04 | End: 2022-04-04 | Stop reason: HOSPADM

## 2022-04-04 RX ORDER — LIDOCAINE HYDROCHLORIDE 10 MG/ML
INJECTION, SOLUTION INFILTRATION; PERINEURAL AS NEEDED
Status: DISCONTINUED | OUTPATIENT
Start: 2022-04-04 | End: 2022-04-04 | Stop reason: HOSPADM

## 2022-04-04 RX ORDER — SODIUM CHLORIDE 0.9 % (FLUSH) 0.9 %
10 SYRINGE (ML) INJECTION AS NEEDED
Status: DISCONTINUED | OUTPATIENT
Start: 2022-04-04 | End: 2022-04-04 | Stop reason: HOSPADM

## 2022-04-04 RX ORDER — HEPARIN SODIUM 1000 [USP'U]/ML
INJECTION, SOLUTION INTRAVENOUS; SUBCUTANEOUS AS NEEDED
Status: DISCONTINUED | OUTPATIENT
Start: 2022-04-04 | End: 2022-04-04 | Stop reason: HOSPADM

## 2022-04-04 RX ORDER — FAMOTIDINE 20 MG/1
20 TABLET, FILM COATED ORAL ONCE
Status: DISCONTINUED | OUTPATIENT
Start: 2022-04-04 | End: 2022-04-04

## 2022-04-04 RX ORDER — SODIUM CHLORIDE 0.9 % (FLUSH) 0.9 %
10 SYRINGE (ML) INJECTION EVERY 12 HOURS SCHEDULED
Status: DISCONTINUED | OUTPATIENT
Start: 2022-04-04 | End: 2022-04-04 | Stop reason: HOSPADM

## 2022-04-04 RX ORDER — ACETAMINOPHEN 325 MG/1
650 TABLET ORAL EVERY 4 HOURS PRN
Status: DISCONTINUED | OUTPATIENT
Start: 2022-04-04 | End: 2022-04-04 | Stop reason: HOSPADM

## 2022-04-04 RX ORDER — PROPOFOL 10 MG/ML
VIAL (ML) INTRAVENOUS AS NEEDED
Status: DISCONTINUED | OUTPATIENT
Start: 2022-04-04 | End: 2022-04-04 | Stop reason: SURG

## 2022-04-04 RX ORDER — FAMOTIDINE 10 MG/ML
20 INJECTION, SOLUTION INTRAVENOUS ONCE
Status: COMPLETED | OUTPATIENT
Start: 2022-04-04 | End: 2022-04-04

## 2022-04-04 RX ORDER — SODIUM CHLORIDE 9 MG/ML
INJECTION, SOLUTION INTRAVENOUS CONTINUOUS PRN
Status: DISCONTINUED | OUTPATIENT
Start: 2022-04-04 | End: 2022-04-04 | Stop reason: HOSPADM

## 2022-04-04 RX ORDER — ONDANSETRON 2 MG/ML
4 INJECTION INTRAMUSCULAR; INTRAVENOUS EVERY 6 HOURS PRN
Status: DISCONTINUED | OUTPATIENT
Start: 2022-04-04 | End: 2022-04-04 | Stop reason: HOSPADM

## 2022-04-04 RX ORDER — HYDROMORPHONE HYDROCHLORIDE 1 MG/ML
0.5 INJECTION, SOLUTION INTRAMUSCULAR; INTRAVENOUS; SUBCUTANEOUS
Status: DISCONTINUED | OUTPATIENT
Start: 2022-04-04 | End: 2022-04-04 | Stop reason: HOSPADM

## 2022-04-04 RX ORDER — ASPIRIN 81 MG/1
81 TABLET ORAL DAILY
Qty: 30 TABLET | Refills: 11 | Status: SHIPPED | OUTPATIENT
Start: 2022-04-04 | End: 2022-06-02 | Stop reason: SDUPTHER

## 2022-04-04 RX ORDER — ALBUTEROL SULFATE 2.5 MG/3ML
2.5 SOLUTION RESPIRATORY (INHALATION) ONCE AS NEEDED
Status: CANCELLED | OUTPATIENT
Start: 2022-04-04

## 2022-04-04 RX ORDER — ROCURONIUM BROMIDE 10 MG/ML
INJECTION, SOLUTION INTRAVENOUS AS NEEDED
Status: DISCONTINUED | OUTPATIENT
Start: 2022-04-04 | End: 2022-04-04 | Stop reason: SURG

## 2022-04-04 RX ORDER — ONDANSETRON 2 MG/ML
INJECTION INTRAMUSCULAR; INTRAVENOUS AS NEEDED
Status: DISCONTINUED | OUTPATIENT
Start: 2022-04-04 | End: 2022-04-04 | Stop reason: SURG

## 2022-04-04 RX ADMIN — ONDANSETRON 4 MG: 2 INJECTION INTRAMUSCULAR; INTRAVENOUS at 10:12

## 2022-04-04 RX ADMIN — FAMOTIDINE 20 MG: 10 INJECTION INTRAVENOUS at 07:31

## 2022-04-04 RX ADMIN — SUGAMMADEX 200 MG: 100 INJECTION, SOLUTION INTRAVENOUS at 10:12

## 2022-04-04 RX ADMIN — LIDOCAINE HYDROCHLORIDE 50 MG: 10 INJECTION, SOLUTION EPIDURAL; INFILTRATION; INTRACAUDAL; PERINEURAL at 08:22

## 2022-04-04 RX ADMIN — DEXAMETHASONE SODIUM PHOSPHATE 8 MG: 4 INJECTION INTRA-ARTICULAR; INTRALESIONAL; INTRAMUSCULAR; INTRAVENOUS; SOFT TISSUE at 08:29

## 2022-04-04 RX ADMIN — EPHEDRINE SULFATE 10 MG: 50 INJECTION, SOLUTION INTRAVENOUS at 09:34

## 2022-04-04 RX ADMIN — PROPOFOL 120 MG: 10 INJECTION, EMULSION INTRAVENOUS at 08:22

## 2022-04-04 RX ADMIN — ROCURONIUM BROMIDE 50 MG: 10 INJECTION INTRAVENOUS at 08:22

## 2022-04-04 RX ADMIN — EPHEDRINE SULFATE 5 MG: 50 INJECTION, SOLUTION INTRAVENOUS at 08:35

## 2022-04-04 RX ADMIN — EPHEDRINE SULFATE 5 MG: 50 INJECTION, SOLUTION INTRAVENOUS at 09:20

## 2022-04-04 RX ADMIN — CEFAZOLIN SODIUM 2 G: 2 INJECTION, SOLUTION INTRAVENOUS at 09:01

## 2022-04-04 RX ADMIN — ROCURONIUM BROMIDE 20 MG: 10 INJECTION INTRAVENOUS at 09:45

## 2022-04-04 NOTE — H&P
Cardiology H&P     Monica Perea  1945  [phone]    04/04/22    DATE OF ADMISSION: 4/4/2022  [facility] Nakita Gage MD  [address] [facility] [address] / [facility] [address]      Referring: Dr. Garsia      CC; Afib, Falls, nosebleeds, Anemia     Problem List:   1. Atrial Fibrillation- Tachybrady Syndrome   a. CHADSVASC-  4(age 4 , gender, HTN)   b. 1/23/20 Myocardial perfusion imaging indicates a normal myocardial perfusion study with no evidence of ischemia. Impressions are consistent with a low risk study  c. BSC PPM Implant 4/28/2020 - per Dr. Thomas    d. 8/1/2020 Echo LVEF 65% with mild LVH   e. Echo 7/19/2021 LVEF 65%.  Normal left atrial size.  No evidence of significant valvular abnormalities or intracardiac shunt.  f. Prior Antiarrythmic use includes Flecainide (had dizziness and syncope, and Rhythmol)   2. HTN  3. HLD  4. Vit D deficiency  5. Glaucoma   6. GERD  7. Chronic constipation  8. Iron deficiency anemia - on Rx iron supplementation   9. Frequent Epistaxis   10. Anxiety   11. Osteopenia   12. Psoriasis  13. Surgical History   a. Hysterectomy  b. Appendectomy   c. Breast biopsy      History of Present Illness: Ms. Perea is a pleasant 77-year-old  female referred by Dr. Garsia for consultation regarding watchman left atrial appendage occlusive device.  Mrs. Perea has a history of frequent nosebleeds, iron deficiency anemia requiring oral iron supplementation which is caused her significant GI distress.  She also has a history of imbalance and falls.  She is interested in being able to stop oral anticoagulation on a long-term basis.  She was evaluated in electrophysiology clinic earlier in February by Dr. Conde.  Watchman left atrial appendage occlusive device was discussed in detail.  The risk, benefits, alternatives of the procedure were reviewed.  She is in favor proceeding.    Patient denies any recent hospitalizations, emergency  department visits, new health events.  No recent cough, cold, or flu symptoms.  No recent fevers, chills, other signs or symptoms of infection.  She has been anticoagulated with Xarelto 20 mg daily.  Her last dose of the medication was Sat 4/2/22. She also started taking Baby ASA yesterday 4/3/22       Allergies   Allergen Reactions   • Macrobid [Nitrofurantoin Monohyd Macro] Rash   • Nitrofurantoin Hives   • Phenylephrine-Guaifenesin Hives   • Sulfa Antibiotics Hives   • Statins Myalgia       Prior to Admission Medications     Prescriptions Last Dose Informant Patient Reported? Taking?    aspirin 81 MG EC tablet   No No    Take 1 tablet by mouth Daily.    Calcium Carbonate-Vitamin D (CALCIUM PLUS VITAMIN D PO)  Self Yes No    Take 1 tablet by mouth 2 (Two) Times a Day.    cetirizine (zyrTEC) 10 MG tablet  Medication Bottle Yes No    Take 10 mg by mouth Daily.    cholecalciferol (VITAMIN D3) 25 MCG (1000 UT) tablet  Self Yes No    Take 1,000 Units by mouth 2 (Two) Times a Day.    clobetasol (TEMOVATE) 0.05 % ointment  Self No No    APPLY  TOPICALLY TO THE APPROPRIATE AREA AS DIRECTED EVERY 12 (TWELVE) HOURS.    Patient taking differently:  Apply 1 application topically to the appropriate area as directed Every 12 (Twelve) Hours.    coenzyme Q10 100 MG capsule  Self Yes No    Take 100 mg by mouth Daily.    dilTIAZem CD (CARDIZEM CD) 240 MG 24 hr capsule  Medication Bottle No No    Take 1 capsule by mouth Daily.    Docusate Sodium 100 MG capsule  Self No No    Take 1 - 2 tablets daily. Adjust to have 1-2 soft stools daily.    hydrALAZINE (APRESOLINE) 25 MG tablet  Medication Bottle No No    Take 1 tablet by mouth 3 (Three) Times a Day.    hydroCHLOROthiazide (HYDRODIURIL) 25 MG tablet  Medication Bottle No No    Take 1 tablet by mouth Daily.    latanoprost (XALATAN) 0.005 % ophthalmic solution  Medication Bottle Yes No    Administer 1 drop to both eyes Every Night.    melatonin 5 MG tablet tablet  Self Yes No    Take  5 mg by mouth.    metoprolol succinate XL (TOPROL-XL) 50 MG 24 hr tablet  Medication Bottle Yes No    Take 50 mg by mouth Daily.    pravastatin (PRAVACHOL) 40 MG tablet  Medication Bottle No No    Take 1 tablet by mouth Daily.    rivaroxaban (XARELTO) 20 MG tablet  Medication Bottle No No    Take 1 tablet by mouth Daily.    Patient taking differently:  Take 20 mg by mouth Every Night.            Current Facility-Administered Medications:   •  ceFAZolin in dextrose (ANCEF) IVPB solution 2 g, 2 g, Intravenous, Once, Niarli Piedra APRN  •  famotidine (PEPCID) injection 20 mg, 20 mg, Intravenous, Once, Ish Vital MD  •  famotidine (PEPCID) tablet 20 mg, 20 mg, Oral, Once, Ish Vital MD  •  lactated ringers infusion, 9 mL/hr, Intravenous, Continuous, Ish Vital MD  •  lidocaine PF 1% (XYLOCAINE) injection 0.5 mL, 0.5 mL, Injection, Once PRN, Ish Vital MD  •  midazolam (VERSED) injection 0.5 mg, 0.5 mg, Intravenous, Q10 Min PRN, Ish Vital MD  •  sodium chloride 0.9 % flush 1-10 mL, 1-10 mL, Intravenous, PRN, Nirali Piedra APRN  •  sodium chloride 0.9 % flush 10 mL, 10 mL, Intravenous, Q12H, Ish Vital MD  •  sodium chloride 0.9 % flush 10 mL, 10 mL, Intravenous, PRN, Ish Vital MD  •  sodium chloride 0.9 % flush 10 mL, 10 mL, Intravenous, Q12H, Nirali Piedra APRN    Social History     Socioeconomic History   • Marital status:    Tobacco Use   • Smoking status: Former Smoker     Packs/day: 1.00     Years: 35.00     Pack years: 35.00     Types: Cigarettes     Quit date:      Years since quittin.2   • Smokeless tobacco: Never Used   Vaping Use   • Vaping Use: Never used   Substance and Sexual Activity   • Alcohol use: No     Comment: quit in    • Drug use: No   • Sexual activity: Defer       Family History   Problem Relation Age of Onset   • Heart attack Other    • Arthritis Other    • Diabetes Other    • Hypertension Other    •  "Hodgkin's lymphoma Other    • Esophagitis Mother    • Heart failure Mother    • Esophageal cancer Mother    • Esophagitis Maternal Aunt    • Breast cancer Maternal Aunt         DX AGE 50'S   • Colon cancer Maternal Grandmother 49   • Heart attack Father    • Breast cancer Sister 72   • Ovarian cancer Sister 74   • Stomach cancer Neg Hx    • Liver cancer Neg Hx    • Liver disease Neg Hx        REVIEW OF SYSTEMS:   CONST:  No weight loss, fever, chills, weakness or +fatigue.   HEENT:  No visual loss, blurred vision, double vision, yellow sclerae.                   No hearing loss, congestion, sore throat.  + epistaxis   SKIN:      No rashes, urticaria, ulcers, sores.     RESP:     No shortness of breath, hemoptysis, cough, sputum.   GI:           No anorexia, nausea, vomiting, diarrhea. No abdominal pain, melena.   :         No burning on urination, hematuria or increased frequency.  ENDO:    No diaphoresis, cold or heat intolerance. No polyuria or polydipsia.   NEURO:  No headache, dizziness, syncope, paralysis, ataxia, or parasthesias.                  No change in bowel or bladder control. No history of CVA/TIA  MUSC:    No muscle, back pain, joint pain or stiffness.   HEME:    + anemia, No alejandro bleeding,+ bruising. No history of DVT/PE.  PSYCH:  No history of depression, anxiety    Vitals:    04/04/22 0700 04/04/22 0702 04/04/22 0704   BP:  121/63 119/70   BP Location:  Right arm Left arm   Pulse: 60     Resp: 16     Temp: 97.5 °F (36.4 °C)     TempSrc: Temporal     SpO2: 95%  94%   Weight: 75.5 kg (166 lb 6.4 oz)     Height: 157.5 cm (62\")           Vital Sign Min/Max for last 24 hours  Temp  Min: 97.5 °F (36.4 °C)  Max: 97.5 °F (36.4 °C)   BP  Min: 119/70  Max: 121/63   Pulse  Min: 60  Max: 60   Resp  Min: 16  Max: 16   SpO2  Min: 94 %  Max: 95 %   No data recorded    No intake or output data in the 24 hours ending 04/04/22 0719          Physical Exam:  GEN: Well nourished, Well- developed  No acute distress- " older  female   HEENT: Normocephalic, Atraumatic, PERRLA, moist mucous membranes+ glasses   NECK: supple, NO JVD, no thyromegaly, no lymphadenopathy  CARD: S1S2  RRR no murmur, gallop, rub  LUNGS: Clear to auscultataion, normal respiratory effort  ABDOMEN: Soft, nontender, normal bowel sounds  EXTREMITIES:No gross deformities,  No clubbing, cyanosis, or edema  SKIN: Warm, dry- left chest PPM site noted.   NEURO: No focal deficits  PSYCHIATRIC: Normal affect and mood      I personally viewed and interpreted the patient's EKG/Telemetry/lab data    Data:   Results from last 7 days   Lab Units 04/01/22  1327   WBC 10*3/mm3 8.30   HEMOGLOBIN g/dL 13.7   HEMATOCRIT % 40.1   PLATELETS 10*3/mm3 208     Results from last 7 days   Lab Units 04/01/22  1327   SODIUM mmol/L 138   POTASSIUM mmol/L 3.4*   CHLORIDE mmol/L 96*   CO2 mmol/L 30.0*   BUN mg/dL 19   CREATININE mg/dL 0.93   GLUCOSE mg/dL 95      Results from last 7 days   Lab Units 04/01/22  1328   HEMOGLOBIN A1C % 5.60     COVID19   Date Value Ref Range Status   04/01/2022 Not Detected Not Detected - Ref. Range Final       Chest X-Ray:  Imaging Results (Last 24 Hours)     ** No results found for the last 24 hours. **          Telemetry: NSR 60s       Assessment and Plan:   1. Atrial Fibrillation/ Tachy- Chris syndrome  -BSC PPM with normal function   -Previously on flecainide (intolerant) and rythmol (too expensive) however AF burden per recent device interrogations has been low.   -Will continue Metoprolol Xl 50mg - BP and HR well controlled - will continue to follow AF burden with symptoms and PPM checks.         2. Anticoagulation  -Elevated CHADSVASC: 4 currently on Xarelto 20mg daily. She has had frequent nose bleeds. as well as  iron deficiency anemia requiring oral iron supplementation which causes significant GI distress. She also reports falls due to balance issues as well. She is interested in not having to take oral anticoagulation on a long term  basis however it is reasonable to for to continue it in the short term. Discussed left atrial appendage occlusive device placement in detail with the patient. The procedure was also discussed as part of a shared decision making model with her primary cardiologist Dr. Garsia who also deems LAAO device in her best interest.   She is a candidate for WATCHMAN device implant.  The risk, benefits, alternatives of left atrial appendage closure been reviewed.   -Xarleto has been held x 1 day as directed (last dose 4/2/22 in the evening).  She started ASA 81mg daily.     3. HTN: overall acceptable control- per Dr. Garsia     Electronically signed by TERRY Shi, 04/04/22, 6:55 AM EDT.      I, Kip Conde MD, personally performed the services face to face as described and documented by the above named individual. I have made any necessary edits and it is both accurate and complete 4/4/2022  10:12 EDT

## 2022-04-04 NOTE — ANESTHESIA PREPROCEDURE EVALUATION
Anesthesia Evaluation     Patient summary reviewed and Nursing notes reviewed   history of anesthetic complications:  NPO Solid Status: > 8 hours  NPO Liquid Status: > 8 hours           Airway   Mallampati: II  TM distance: >3 FB  Neck ROM: full  No difficulty expected  Dental      Pulmonary - normal exam   (+) shortness of breath, sleep apnea,   Cardiovascular - normal exam    (+) hypertension, valvular problems/murmurs, CAD, dysrhythmias, hyperlipidemia,       Neuro/Psych  (+) headaches, psychiatric history,    GI/Hepatic/Renal/Endo    (+)  GERD, GI bleeding ,     Musculoskeletal     (+) back pain,   Abdominal    Substance History      OB/GYN          Other   arthritis,                      Anesthesia Plan    ASA 3     general     intravenous induction     Anesthetic plan, all risks, benefits, and alternatives have been provided, discussed and informed consent has been obtained with: patient.    Plan discussed with CRNA.        CODE STATUS:

## 2022-04-04 NOTE — ANESTHESIA PROCEDURE NOTES
Airway  Urgency: elective    Date/Time: 4/4/2022 8:25 AM  Airway not difficult    General Information and Staff    Patient location during procedure: OR  CRNA: Chau Mariano CRNA    Indications and Patient Condition  Indications for airway management: airway protection    Preoxygenated: yes  MILS not maintained throughout  Mask difficulty assessment: 1 - vent by mask    Final Airway Details  Final airway type: endotracheal airway      Successful airway: ETT  Cuffed: yes   Successful intubation technique: video laryngoscopy  Facilitating devices/methods: intubating stylet  Endotracheal tube insertion site: oral  Blade: Vale  Blade size: 3  ETT size (mm): 7.0  Cormack-Lehane Classification: grade I - full view of glottis  Placement verified by: chest auscultation and capnometry   Cuff volume (mL): 5  Measured from: lips  ETT/EBT  to lips (cm): 21  Number of attempts at approach: 1  Assessment: lips, teeth, and gum same as pre-op and atraumatic intubation    Additional Comments  Negative epigastric sounds, Breath sound equal bilaterally with symmetric chest rise and fall

## 2022-04-04 NOTE — OUTREACH NOTE
Prep Survey    Flowsheet Row Responses   [facility] facility patient discharged from? [place]   Is LACE score < 7 ? Yes   Emergency Room discharge w/ pulse ox? No   Eligibility [facility]   Date of Admission 04/04/22   Date of Discharge 04/04/22   Discharge Disposition Home or Self Care   Discharge diagnosis A-fib, watchman device placement   Does the patient have one of the following disease processes/diagnoses(primary or secondary)? Other   Does the patient have Home health ordered? No   Is there a DME ordered? No   Prep survey completed? Yes          LAURENT PRIEST - Registered Nurse

## 2022-04-04 NOTE — ANESTHESIA POSTPROCEDURE EVALUATION
Patient: Monica Perea    Procedure Summary     Date: 04/04/22 Room / Location: [facility] / [facility]    Anesthesia Start: 0814 Anesthesia Stop:     Procedure: Atrial Appendage Occlusion on Xarelto-hold 1 day prior  4/2022 (N/A ) Diagnosis:       Paroxysmal atrial fibrillation (HCC)      Epistaxis      (atrial fibrillation)    Providers: Kip Conde MD Provider: Ish Vital MD    Anesthesia Type: general ASA Status: 3          Anesthesia Type: general    Vitals  No vitals data found for the desired time range.          Post Anesthesia Care and Evaluation    Patient location during evaluation: PACU  Patient participation: complete - patient participated  Level of consciousness: awake  Pain score: 0  Pain management: adequate  Airway patency: patent  Anesthetic complications: No anesthetic complications  PONV Status: none  Cardiovascular status: acceptable and stable  Respiratory status: nasal cannula, unassisted, acceptable and spontaneous ventilation  Hydration status: acceptable

## 2022-04-04 NOTE — PROGRESS NOTES
S: Pt doing well post procedure. She has ambulated in the halls and voided. She ate a snack with no GI distress- wants to go home,     O:   Vitals:    04/04/22 1400 04/04/22 1423 04/04/22 1424 04/04/22 1430   BP: 101/51 112/59  103/53   BP Location:       Patient Position:       Pulse: 60 71 63 61   Resp:       Temp:       TempSrc:       SpO2: 92%  95% 95%   Weight:       Height:         GEN: alert, awake, NAD. Oriented x 3   CV: RRR, no murmur or rub   Resp: Lungs CTAB, Normal resp effort   Ext: Right groin site suture removed. Site dressed with gauze and tegaderm. No Swelling or oozing at the site.     Assessment/ Plan:  Atrial Fibrillation / Tachy Bady syndrome   S/P  20mm Watchman JAS occlusive device placement.  Procedure went well. Patient has done well in recovery. Groin site is clean, dry , intact. No swelling or oozing.activity restrictions, driving restrictions, and wound care been reviewed in detail with the patient and her  at the bedside.  She can resume Xarelto 20mg Daily and baby ASA  tomorrow night.       Dispo: Patient is stable for discharge to home with 6-week ISHAN per mai protocol with Dr. Jackson and 3-month follow-up with Dr. Conde in the [place] office with [employer] pacemaker check    Electronically signed by TERRY Shi, 04/04/22, 3:23 PM EDT.

## 2022-04-04 NOTE — NURSING NOTE
LAAO Procedure Information   Monica Boucher [place] 1945   [address] [facility] [address] [phone] (home)       JAS Orifice Maximal Width: 16  mm  Cumulative Air Kerma:  63 mGy  Contrast Volume:  100 mL  Dose Area Product:  636.75 ?Gy-M2    Amanda Hernandez RN

## 2022-04-05 ENCOUNTER — TRANSITIONAL CARE MANAGEMENT TELEPHONE ENCOUNTER (OUTPATIENT)
Dept: CALL CENTER | Facility: HOSPITAL | Age: 77
End: 2022-04-05

## 2022-04-05 LAB
ACT BLD: 142 SECONDS (ref 82–152)
ACT BLD: 541 SECONDS (ref 82–152)

## 2022-04-05 NOTE — PROGRESS NOTES
Detailed information relayed to pt she verbalized understanding, patient refused to schedule an appointment, stating she only wants to see Dr. MATA Kraft and if she had any complications or problems she would contact Dr. Conde

## 2022-04-05 NOTE — OUTREACH NOTE
Call Center TCM Note    Flowsheet Row Responses   [facility] patient discharged from? [place]   Does the patient have one of the following disease processes/diagnoses(primary or secondary)? Other   TCM attempt successful? Yes   Call start time 1339   Call end time 1345   Discharge diagnosis A-fib, watchman device placement   Meds reviewed with patient/caregiver? Yes   Is the patient having any side effects they believe may be caused by any medication additions or changes? No   Does the patient have all medications ordered at discharge? Yes   Is the patient taking all medications as directed (includes completed medication regime)? No   What is preventing the patient from taking all medications as directed? Side effects   Medication comments States she has only been taking half of the hydralazine due to bp running low 95/55, 101/60.  Will message PCP office regarding this and what changes they would like for her to make.   Does the patient have a primary care provider?  Yes   Does the patient have an appointment with their PCP within 7 days of discharge? No   Has the patient kept scheduled appointments due by today? N/A   Comments No appts available with Dr. Crump in TCM time frame.  Pt does not want to see another provider   Psychosocial issues? No   Did the patient receive a copy of their discharge instructions? Yes   Nursing interventions Reviewed instructions with patient   What is the patient's perception of their health status since discharge? New symptoms unrelated to diagnosis   Is the patient/caregiver able to teach back signs and symptoms related to disease process for when to call PCP? Yes   Is the patient/caregiver able to teach back signs and symptoms related to disease process for when to call 911? Yes   Is the patient/caregiver able to teach back the hierarchy of who to call/visit for symptoms/problems? PCP, Specialist, Home health nurse, Urgent Care, ED, 911 Yes   If the patient is a current  "smoker, are they able to teach back resources for cessation? Not a smoker   Additional teach back comments States \"I'm doing real good\".  Having issues with bp being low and cutting hydralazine in half and it has not improved.     TCM call completed? Yes   Wrap up additional comments Will message office regarding low bp and appt.          Xena López LPN    4/5/2022, 13:48 EDT      "

## 2022-04-06 LAB
ACT BLD: 136 SECONDS (ref 82–152)
ACT BLD: 719 SECONDS (ref 82–152)

## 2022-05-09 LAB
ALBUMIN SERPL-MCNC: 4.5 G/DL (ref 3.5–5.2)
ALBUMIN/GLOB SERPL: 1.7 G/DL
ALP SERPL-CCNC: 91 U/L (ref 39–117)
ALT SERPL-CCNC: 13 U/L (ref 1–33)
AST SERPL-CCNC: 16 U/L (ref 1–32)
BASOPHILS # BLD AUTO: 0.07 10*3/MM3 (ref 0–0.2)
BASOPHILS NFR BLD AUTO: 1.2 % (ref 0–1.5)
BILIRUB SERPL-MCNC: 0.3 MG/DL (ref 0–1.2)
BUN SERPL-MCNC: 19 MG/DL (ref 8–23)
BUN/CREAT SERPL: 20.9 (ref 7–25)
CALCIUM SERPL-MCNC: 10 MG/DL (ref 8.6–10.5)
CHLORIDE SERPL-SCNC: 102 MMOL/L (ref 98–107)
CHOLEST SERPL-MCNC: 177 MG/DL (ref 0–200)
CO2 SERPL-SCNC: 29.2 MMOL/L (ref 22–29)
CREAT SERPL-MCNC: 0.91 MG/DL (ref 0.57–1)
EGFRCR SERPLBLD CKD-EPI 2021: 65.9 ML/MIN/1.73
EOSINOPHIL # BLD AUTO: 0.27 10*3/MM3 (ref 0–0.4)
EOSINOPHIL NFR BLD AUTO: 4.6 % (ref 0.3–6.2)
ERYTHROCYTE [DISTWIDTH] IN BLOOD BY AUTOMATED COUNT: 12.1 % (ref 12.3–15.4)
GLOBULIN SER CALC-MCNC: 2.6 GM/DL
GLUCOSE SERPL-MCNC: 110 MG/DL (ref 65–99)
HBA1C MFR BLD: 5.7 % (ref 4.8–5.6)
HCT VFR BLD AUTO: 40.7 % (ref 34–46.6)
HDLC SERPL-MCNC: 60 MG/DL (ref 40–60)
HGB BLD-MCNC: 13 G/DL (ref 12–15.9)
IMM GRANULOCYTES # BLD AUTO: 0.03 10*3/MM3 (ref 0–0.05)
IMM GRANULOCYTES NFR BLD AUTO: 0.5 % (ref 0–0.5)
LDLC SERPL CALC-MCNC: 101 MG/DL (ref 0–100)
LYMPHOCYTES # BLD AUTO: 1.49 10*3/MM3 (ref 0.7–3.1)
LYMPHOCYTES NFR BLD AUTO: 25.5 % (ref 19.6–45.3)
MCH RBC QN AUTO: 30.6 PG (ref 26.6–33)
MCHC RBC AUTO-ENTMCNC: 31.9 G/DL (ref 31.5–35.7)
MCV RBC AUTO: 95.8 FL (ref 79–97)
MONOCYTES # BLD AUTO: 0.63 10*3/MM3 (ref 0.1–0.9)
MONOCYTES NFR BLD AUTO: 10.8 % (ref 5–12)
NEUTROPHILS # BLD AUTO: 3.36 10*3/MM3 (ref 1.7–7)
NEUTROPHILS NFR BLD AUTO: 57.4 % (ref 42.7–76)
NRBC BLD AUTO-RTO: 0 /100 WBC (ref 0–0.2)
PLATELET # BLD AUTO: 212 10*3/MM3 (ref 140–450)
POTASSIUM SERPL-SCNC: 4.4 MMOL/L (ref 3.5–5.2)
PROT SERPL-MCNC: 7.1 G/DL (ref 6–8.5)
RBC # BLD AUTO: 4.25 10*6/MM3 (ref 3.77–5.28)
SODIUM SERPL-SCNC: 141 MMOL/L (ref 136–145)
TRIGL SERPL-MCNC: 85 MG/DL (ref 0–150)
VLDLC SERPL CALC-MCNC: 16 MG/DL (ref 5–40)
WBC # BLD AUTO: 5.85 10*3/MM3 (ref 3.4–10.8)

## 2022-05-11 ENCOUNTER — PREP FOR SURGERY (OUTPATIENT)
Dept: OTHER | Facility: HOSPITAL | Age: 77
End: 2022-05-11

## 2022-05-11 ENCOUNTER — TRANSCRIBE ORDERS (OUTPATIENT)
Dept: LAB | Facility: HOSPITAL | Age: 77
End: 2022-05-11

## 2022-05-11 DIAGNOSIS — Z01.812 PRE-OPERATIVE LABORATORY EXAMINATION: Primary | ICD-10-CM

## 2022-05-11 DIAGNOSIS — I48.0 PAROXYSMAL ATRIAL FIBRILLATION: Primary | ICD-10-CM

## 2022-05-11 RX ORDER — SODIUM CHLORIDE 0.9 % (FLUSH) 0.9 %
10 SYRINGE (ML) INJECTION AS NEEDED
Status: CANCELLED | OUTPATIENT
Start: 2022-05-11

## 2022-05-11 RX ORDER — SODIUM CHLORIDE 0.9 % (FLUSH) 0.9 %
10 SYRINGE (ML) INJECTION EVERY 12 HOURS SCHEDULED
Status: CANCELLED | OUTPATIENT
Start: 2022-05-11

## 2022-05-12 ENCOUNTER — OFFICE VISIT (OUTPATIENT)
Dept: INTERNAL MEDICINE | Facility: CLINIC | Age: 77
End: 2022-05-12

## 2022-05-12 VITALS
BODY MASS INDEX: 30.55 KG/M2 | RESPIRATION RATE: 16 BRPM | WEIGHT: 166 LBS | SYSTOLIC BLOOD PRESSURE: 119 MMHG | OXYGEN SATURATION: 96 % | TEMPERATURE: 97.7 F | DIASTOLIC BLOOD PRESSURE: 73 MMHG | HEART RATE: 63 BPM | HEIGHT: 62 IN

## 2022-05-12 DIAGNOSIS — R53.83 MALAISE AND FATIGUE: ICD-10-CM

## 2022-05-12 DIAGNOSIS — I48.0 PAROXYSMAL ATRIAL FIBRILLATION: ICD-10-CM

## 2022-05-12 DIAGNOSIS — R73.01 IMPAIRED FASTING GLUCOSE: ICD-10-CM

## 2022-05-12 DIAGNOSIS — E78.2 MIXED HYPERLIPIDEMIA: ICD-10-CM

## 2022-05-12 DIAGNOSIS — R53.81 MALAISE AND FATIGUE: ICD-10-CM

## 2022-05-12 DIAGNOSIS — I10 BENIGN ESSENTIAL HYPERTENSION: Primary | ICD-10-CM

## 2022-05-12 PROCEDURE — 99214 OFFICE O/P EST MOD 30 MIN: CPT | Performed by: INTERNAL MEDICINE

## 2022-05-12 NOTE — PROGRESS NOTES
"Chief Complaint  Hypertension, Fatigue ( For 2 years), and Hyperlipidemia    Subjective          Monica Perea presents to [facility]  History of Present Illness  HPI: Patient is here to follow up on the blood pressure  The patient is taking the blood pressure medications as prescribed and has had no side effects. The patient is also here to follow up on the cholesterol and is trying to follow a diet. The patient had  lab work done .  She complains of fatigue, the patient also needs refills on medications .  Patient is also to follow-up on atrial fibrillation and underwent watchman's procedure  Hyperlipidemia   Pertinent negatives include no chest pain or shortness of breath.   Hypertension   Pertinent negatives include no chest pain, palpitations or shortness of breath.    Objective   Vital Signs:  /73   Pulse 63   Temp 97.7 °F (36.5 °C)   Resp 16   Ht 157.5 cm (62.01\")   Wt 75.3 kg (166 lb)   SpO2 96%   BMI 30.35 kg/m²     BMI is >= 30 and <= 34.9 (Class 1 obesity). The following options were offered after discussion: weight loss educational material (shared in after visit summary), exercise counseling/recommendations and nutrition counseling/recommendations      Physical Exam  Vitals and nursing note reviewed.   Constitutional:       General: She is not in acute distress.     Appearance: Normal appearance. She is not diaphoretic.   HENT:      Head: Normocephalic and atraumatic.      Right Ear: External ear normal.      Left Ear: External ear normal.      Nose: Nose normal.   Eyes:      Extraocular Movements: Extraocular movements intact.      Conjunctiva/sclera: Conjunctivae normal.   Neck:      Trachea: Trachea normal.   Cardiovascular:      Rate and Rhythm: Normal rate and regular rhythm.      Heart sounds: Normal heart sounds.   Pulmonary:      Effort: Pulmonary effort is normal. No respiratory distress.      Breath sounds: Normal breath sounds. "   Abdominal:      General: Abdomen is flat.   Musculoskeletal:      Cervical back: Neck supple.      Comments: Moves all limbs   Skin:     General: Skin is warm.   Neurological:      Mental Status: She is alert and oriented to person, place, and time.      Comments: No gross motor or sensory deficits        Result Review :     Common labs    Common Labsle 4/1/22 4/1/22 4/1/22 4/4/22 5/9/22 5/9/22 5/9/22 5/9/22    1327 1327 1328  0853 0853 0853 0853   Glucose  95    110 (A)     BUN  19    19     Creatinine  0.93    0.91     Sodium  138    141     Potassium  3.4 (A)    4.4     Chloride  96 (A)    102     Calcium  10.0    10.0     Total Protein      7.1     Albumin      4.50     Total Bilirubin      0.3     Alkaline Phosphatase      91     AST (SGOT)      16     ALT (SGPT)      13     WBC 8.30    5.85      Hemoglobin 13.7   12.9 13.0      Hematocrit 40.1   38 40.7      Platelets 208    212      Total Cholesterol       177    Triglycerides       85    HDL Cholesterol       60    LDL Cholesterol        101 (A)    Hemoglobin A1C   5.60     5.70 (A)   (A) Abnormal value       Comments are available for some flowsheets but are not being displayed.                Procedure with Kip Conde MD (04/04/2022)  Admission (Discharged) with Kip Conde MD (04/04/2022)       Assessment and Plan    Diagnoses and all orders for this visit:    1. Benign essential hypertension (Primary)    2. Mixed hyperlipidemia    3. Impaired fasting glucose    4. Malaise and fatigue  -     TSH    5. Paroxysmal atrial fibrillation (HCC)    Plan:  1.  Benign essential hypertension: Will continue current medication, low-sodium diet advised, Counseled to regularly check BP at home with goal averaging <130/80.   2.mixed hyperlipidemia:  reviewed  fasting CMP and lipid panel.  Diet and exercise counseled,  Will continue current medications  3. impaired glucose   :   reviewed  fasting CMP  and hba1c  5.7  , diet and exercise counseled  4.  Fatigue  :   Obtain  tsh ,    5.  Atrial fibrillation: Underwent watchman's procedure, to follow-up with cardiology       I spent 30 minutes caring for Monica on this date of service. This time includes time spent by me in the following activities:preparing for the visit, reviewing tests, performing a medically appropriate examination and/or evaluation , counseling and educating the patient/family/caregiver, ordering medications, tests, or procedures and documenting information in the medical record  Follow Up    Patient was given instructions and counseling regarding her condition or for health maintenance advice. Please see specific information pulled into the AVS if appropriate.

## 2022-05-16 LAB — TSH SERPL DL<=0.005 MIU/L-ACNC: 0.94 UIU/ML (ref 0.27–4.2)

## 2022-05-20 ENCOUNTER — LAB (OUTPATIENT)
Dept: LAB | Facility: HOSPITAL | Age: 77
End: 2022-05-20

## 2022-05-20 ENCOUNTER — APPOINTMENT (OUTPATIENT)
Dept: LAB | Facility: HOSPITAL | Age: 77
End: 2022-05-20

## 2022-05-20 DIAGNOSIS — I48.0 PAROXYSMAL ATRIAL FIBRILLATION: ICD-10-CM

## 2022-05-20 LAB — SARS-COV-2 RNA PNL SPEC NAA+PROBE: NOT DETECTED

## 2022-05-20 PROCEDURE — U0004 COV-19 TEST NON-CDC HGH THRU: HCPCS

## 2022-05-20 PROCEDURE — C9803 HOPD COVID-19 SPEC COLLECT: HCPCS

## 2022-05-23 ENCOUNTER — HOSPITAL ENCOUNTER (OUTPATIENT)
Dept: CARDIOLOGY | Facility: HOSPITAL | Age: 77
Discharge: HOME | End: 2022-05-23
Admitting: NURSE PRACTITIONER

## 2022-05-23 VITALS
BODY MASS INDEX: 30.55 KG/M2 | HEART RATE: 60 BPM | RESPIRATION RATE: 12 BRPM | SYSTOLIC BLOOD PRESSURE: 111 MMHG | HEIGHT: 62 IN | WEIGHT: 166.01 LBS | OXYGEN SATURATION: 94 % | DIASTOLIC BLOOD PRESSURE: 63 MMHG

## 2022-05-23 DIAGNOSIS — I48.0 PAROXYSMAL ATRIAL FIBRILLATION: ICD-10-CM

## 2022-05-23 LAB
BH CV VAS BP RIGHT ARM: NORMAL MMHG
MAXIMAL PREDICTED HEART RATE: 145 BPM
STRESS TARGET HR: 123 BPM

## 2022-05-23 PROCEDURE — 93312 ECHO TRANSESOPHAGEAL: CPT

## 2022-05-23 PROCEDURE — 25010000002 FENTANYL CITRATE (PF) 50 MCG/ML SOLUTION: Performed by: INTERNAL MEDICINE

## 2022-05-23 PROCEDURE — 25010000002 MIDAZOLAM PER 1 MG: Performed by: INTERNAL MEDICINE

## 2022-05-23 PROCEDURE — 93312 ECHO TRANSESOPHAGEAL: CPT | Performed by: INTERNAL MEDICINE

## 2022-05-23 PROCEDURE — 93321 DOPPLER ECHO F-UP/LMTD STD: CPT

## 2022-05-23 PROCEDURE — 93325 DOPPLER ECHO COLOR FLOW MAPG: CPT

## 2022-05-23 PROCEDURE — 93325 DOPPLER ECHO COLOR FLOW MAPG: CPT | Performed by: INTERNAL MEDICINE

## 2022-05-23 PROCEDURE — 93321 DOPPLER ECHO F-UP/LMTD STD: CPT | Performed by: INTERNAL MEDICINE

## 2022-05-23 RX ORDER — CLOPIDOGREL BISULFATE 75 MG/1
75 TABLET ORAL DAILY
Qty: 30 TABLET | Refills: 6 | Status: SHIPPED | OUTPATIENT
Start: 2022-05-23 | End: 2022-10-14

## 2022-05-23 RX ORDER — MIDAZOLAM HYDROCHLORIDE 1 MG/ML
INJECTION INTRAMUSCULAR; INTRAVENOUS
Status: COMPLETED | OUTPATIENT
Start: 2022-05-23 | End: 2022-05-23

## 2022-05-23 RX ORDER — FENTANYL CITRATE 50 UG/ML
INJECTION, SOLUTION INTRAMUSCULAR; INTRAVENOUS
Status: COMPLETED | OUTPATIENT
Start: 2022-05-23 | End: 2022-05-23

## 2022-05-23 RX ADMIN — MIDAZOLAM HYDROCHLORIDE 2 MG: 1 INJECTION, SOLUTION INTRAMUSCULAR; INTRAVENOUS at 08:13

## 2022-05-23 RX ADMIN — FENTANYL CITRATE 50 MCG: 50 INJECTION, SOLUTION INTRAMUSCULAR; INTRAVENOUS at 08:21

## 2022-05-23 NOTE — H&P
[facility] Cardiology    Date of Hospital Visit: 22    Place of Service: [facility]    Patient Name: Monica Perea  :1945      Primary Care Provider: Nakita Crump MD    Chief complaint/Reason for Consultation:  Atrial Fibrillation    Problem List:   Atrial Fibrillation- Tachybrady Syndrome   CHADSVASC-  4(age 4 , gender, HTN)   20 Myocardial perfusion imaging indicates a normal myocardial perfusion study with no evidence of ischemia. Impressions are consistent with a low risk study  BSC PPM Implant 2020 - per Dr. Thomas    2020 Echo LVEF 65% with mild LVH   Echo 2021 LVEF 65%.  Normal left atrial size.  No evidence of significant valvular abnormalities or intracardiac shunt.  Prior Antiarrythmic use includes Flecainide (had dizziness and syncope, and Rhythmol)    successful insertion of left atrial appendage occlusive device (Watchman Flex).   ISHAN: A 20 mm watchman FL X left atrial appendage occlusion device placed under ISHAN guidance.  Device appears well-seated with no significant perivascular.  LVEF = 51-55%.  Normal RV size and function.  Moderate, grade 3 plaque in aortic arch.  HTN  HLD  Vit D deficiency  Glaucoma   GERD  Chronic constipation  Iron deficiency anemia - on Rx iron supplementation   Frequent Epistaxis   Anxiety   Osteopenia   Psoriasis  Surgical History   Hysterectomy  Appendectomy   Breast biopsy      History of Present Illness:  Monica Perea is a 77-year-old  female with above past medical history who presented to [facility] on 2022 for 45-day ISHAN evaluation of previously placed watchman device.    Per previous records, complex medical decision making and patient preference were considered prior to insertion of left atrial appendage device due to the fact that the patient has had multiple issues while taking oral anticoagulation.  Per chart review, it appears that the patient has a  history of frequent nosebleeds, GI distress secondary to oral iron supplementation due to persistent anemia, as well as an elevated fall risk.  Due to these previously documented issues the patient underwent left atrial appendage occlusive device on 2022.      Allergies   Allergen Reactions    Macrobid [Nitrofurantoin Monohyd Macro] Rash    Nitrofurantoin Hives    Phenylephrine-Guaifenesin Hives    Sulfa Antibiotics Hives    Statins Myalgia       Current Outpatient Medications   Medication Instructions    aspirin 81 mg, Oral, Daily, Start 22    Calcium Carbonate-Vitamin D (CALCIUM PLUS VITAMIN D PO) 1 tablet, Oral, 2 Times Daily    cetirizine (ZYRTEC) 10 mg, Oral, Daily    cholecalciferol (VITAMIN D3) 1,000 Units, Oral, 2 Times Daily    clobetasol (TEMOVATE) 0.05 % ointment Topical, Every 12 Hours Scheduled    coenzyme Q10 100 mg, Oral, Daily    dilTIAZem CD (CARDIZEM CD) 240 mg, Oral, Daily    Docusate Sodium 100 MG capsule Take 1 - 2 tablets daily. Adjust to have 1-2 soft stools daily.    hydrALAZINE (APRESOLINE) 25 mg, Oral, 3 Times Daily    hydroCHLOROthiazide (HYDRODIURIL) 25 mg, Oral, Daily    latanoprost (XALATAN) 0.005 % ophthalmic solution 1 drop, Both Eyes, Nightly    melatonin 5 mg, Oral    metoprolol succinate XL (TOPROL-XL) 50 mg, Oral, Daily    pravastatin (PRAVACHOL) 40 mg, Oral, Daily    rivaroxaban (XARELTO) 20 mg, Oral, Daily, Resume 22           Social History     Socioeconomic History    Marital status:    Tobacco Use    Smoking status: Former Smoker     Packs/day: 1.00     Years: 35.00     Pack years: 35.00     Types: Cigarettes     Quit date:      Years since quittin.4    Smokeless tobacco: Never Used   Vaping Use    Vaping Use: Never used   Substance and Sexual Activity    Alcohol use: No     Comment: quit in     Drug use: No    Sexual activity: Defer       Family History   Problem Relation Age of Onset    Heart attack Other     Arthritis Other     Diabetes  Other     Hypertension Other     Hodgkin's lymphoma Other     Esophagitis Mother     Heart failure Mother     Esophageal cancer Mother     Esophagitis Maternal Aunt     Breast cancer Maternal Aunt         DX AGE 50'S    Colon cancer Maternal Grandmother 49    Heart attack Father     Breast cancer Sister 72    Ovarian cancer Sister 74    Stomach cancer Neg Hx     Liver cancer Neg Hx     Liver disease Neg Hx        REVIEW OF SYSTEMS:   Review of Systems   All other systems reviewed and are negative.      Objective:  There were no vitals filed for this visit.  There is no height or weight on file to calculate BMI.      Vitals and nursing note reviewed.   Constitutional:       General: Awake. Not in acute distress.     Appearance: Healthy appearance. Well-developed, overweight and not in distress.   Eyes:      General: No scleral icterus.     Conjunctiva/sclera: Conjunctivae normal.      Pupils: Pupils are equal, round, and reactive to light.   HENT:      Head: Normocephalic and atraumatic.      Nose: Nose normal.    Mouth/Throat:      Pharynx: Uvula midline.   Neck:      Vascular: No carotid bruit.      Trachea: No tracheal deviation.   Pulmonary:      Effort: Pulmonary effort is normal.      Breath sounds: Normal breath sounds. No wheezing. No rhonchi. No rales.   Cardiovascular:      Normal rate. Regular rhythm. Normal S1. Normal S2.      Murmurs: There is no murmur.      No gallop. No click. No rub.   Pulses:     Intact distal pulses.   Edema:     Peripheral edema absent.   Abdominal:      General: Bowel sounds are normal.      Palpations: Abdomen is soft. There is no abdominal mass.      Tenderness: There is no abdominal tenderness.   Musculoskeletal:         General: No tenderness.      Extremities: No clubbing present.     Cervical back: Normal range of motion and neck supple. Skin:     General: Skin is warm and dry. There is no cyanosis.      Findings: No erythema or rash.   Neurological:      Mental Status:  Alert, oriented to person, place, and time and oriented to person, place and time.   Psychiatric:         Attention and Perception: Attention normal.         Mood and Affect: Mood normal.         Speech: Speech normal.         Behavior: Behavior normal. Behavior is cooperative.             Lab Review:               Estimated Creatinine Clearance: 50.8 mL/min (by C-G formula based on SCr of 0.91 mg/dL).    Lab Results   Component Value Date    CHLPL 177 05/09/2022    TRIG 85 05/09/2022    HDL 60 05/09/2022     (H) 05/09/2022             Assessment:   Paroxysmal atrial fibrillation status post successful Weissmann of LAAO watchman device        Plan:   ISHAN today to assess for LAAO device seating.        Electronically signed by Hugo Herron PA-C, 05/23/22, 7:40 AM EDT.    ISHAN was completed without complications.  Watchman device well-seated.  No device associated thrombus.  Small 2.5 mm jet noted along the mitral valve border of the device.  Less than 5 mm, consistent with adequate closure.  We will discontinue Xarelto today and prescribe Plavix 75 mg daily in addition to her aspirin 81 mg daily

## 2022-06-02 DIAGNOSIS — Z95.818 PRESENCE OF WATCHMAN LEFT ATRIAL APPENDAGE CLOSURE DEVICE: Primary | ICD-10-CM

## 2022-06-02 DIAGNOSIS — I48.0 PAROXYSMAL ATRIAL FIBRILLATION: ICD-10-CM

## 2022-06-02 RX ORDER — ASPIRIN 81 MG/1
81 TABLET ORAL DAILY
Qty: 90 TABLET | Refills: 1 | Status: SHIPPED | OUTPATIENT
Start: 2022-06-02

## 2022-06-08 ENCOUNTER — HOSPITAL ENCOUNTER (OUTPATIENT)
Dept: GENERAL RADIOLOGY | Facility: HOSPITAL | Age: 77
Discharge: HOME | End: 2022-06-08
Admitting: INTERNAL MEDICINE

## 2022-06-08 ENCOUNTER — OFFICE VISIT (OUTPATIENT)
Dept: INTERNAL MEDICINE | Facility: CLINIC | Age: 77
End: 2022-06-08

## 2022-06-08 VITALS
HEIGHT: 62 IN | DIASTOLIC BLOOD PRESSURE: 79 MMHG | WEIGHT: 166 LBS | BODY MASS INDEX: 30.55 KG/M2 | TEMPERATURE: 97.5 F | RESPIRATION RATE: 16 BRPM | OXYGEN SATURATION: 95 % | SYSTOLIC BLOOD PRESSURE: 126 MMHG | HEART RATE: 65 BPM

## 2022-06-08 DIAGNOSIS — I10 BENIGN ESSENTIAL HYPERTENSION: Primary | ICD-10-CM

## 2022-06-08 DIAGNOSIS — I48.20 CHRONIC ATRIAL FIBRILLATION: ICD-10-CM

## 2022-06-08 DIAGNOSIS — R06.02 SHORTNESS OF BREATH: ICD-10-CM

## 2022-06-08 PROCEDURE — 99214 OFFICE O/P EST MOD 30 MIN: CPT | Performed by: INTERNAL MEDICINE

## 2022-06-08 PROCEDURE — 71046 X-RAY EXAM CHEST 2 VIEWS: CPT

## 2022-06-08 RX ORDER — DILTIAZEM HYDROCHLORIDE 240 MG/1
240 CAPSULE, COATED, EXTENDED RELEASE ORAL DAILY
Qty: 90 CAPSULE | Refills: 3 | Status: SHIPPED | OUTPATIENT
Start: 2022-06-08

## 2022-06-08 RX ORDER — HYDROCHLOROTHIAZIDE 25 MG/1
25 TABLET ORAL DAILY
Qty: 90 TABLET | Refills: 3 | Status: SHIPPED | OUTPATIENT
Start: 2022-06-08 | End: 2022-09-28

## 2022-06-08 RX ORDER — PYRIDOXINE HCL (VITAMIN B6) 100 MG
TABLET ORAL
COMMUNITY
End: 2022-11-14

## 2022-06-08 NOTE — PROGRESS NOTES
"Chief Complaint  Hypertension, Breathing Problem, and Atrial Fibrillation    Subjective        Monica Perea presents to [facility] PRIMARY CARE  History of Present Illness  HPI: Patient is here to follow-up on her blood pressure, she is taking medication as prescribed, she is also here to follow-up on atrial fibrillation and had a watchman's procedure recently, she does complain of shortness of breath which has been worsening recently, she states she smoked in the past    Objective   Vital Signs:  /79   Pulse 65   Temp 97.5 °F (36.4 °C)   Resp 16   Ht 157 cm (61.81\")   Wt 75.3 kg (166 lb)   SpO2 95%   BMI 30.55 kg/m²   Estimated body mass index is 30.55 kg/m² as calculated from the following:    Height as of this encounter: 157 cm (61.81\").    Weight as of this encounter: 75.3 kg (166 lb).    BMI is >= 30 and <35. (Class 1 Obesity). The following options were offered after discussion;: weight loss educational material (shared in after visit summary), exercise counseling/recommendations and nutrition counseling/recommendations      Physical Exam  Vitals and nursing note reviewed.   Constitutional:       General: She is not in acute distress.     Appearance: Normal appearance. She is not diaphoretic.   HENT:      Head: Normocephalic and atraumatic.      Right Ear: External ear normal.      Left Ear: External ear normal.      Nose: Nose normal.   Eyes:      Extraocular Movements: Extraocular movements intact.      Conjunctiva/sclera: Conjunctivae normal.   Neck:      Trachea: Trachea normal.   Cardiovascular:      Rate and Rhythm: Normal rate and regular rhythm.      Heart sounds: Normal heart sounds.   Pulmonary:      Effort: Pulmonary effort is normal. No respiratory distress.      Breath sounds: Normal breath sounds.   Abdominal:      General: Abdomen is flat.   Musculoskeletal:      Cervical back: Neck supple.      Comments: Moves all limbs   Skin:     General: Skin is " warm.   Neurological:      Mental Status: She is alert and oriented to person, place, and time.      Comments: No gross motor or sensory deficits        Result Review :    CMP    CMP 1/18/22 4/1/22 5/9/22   Glucose 103 (A) 95 110 (A)   BUN 15 19 19   Creatinine 0.79 0.93 0.91   eGFR Non  Am 71     eGFR  Am 86     Sodium 140 138 141   Potassium 3.5 3.4 (A) 4.4   Chloride 101 96 (A) 102   Calcium 10.1 10.0 10.0   Total Protein 7.3  7.1   Albumin 4.30  4.50   Globulin 3.0  2.6   Total Bilirubin 0.4  0.3   Alkaline Phosphatase 86  91   AST (SGOT) 17  16   ALT (SGPT) 14  13   (A) Abnormal value       Comments are available for some flowsheets but are not being displayed.           CBC    CBC 4/1/22 4/4/22 5/9/22   WBC 8.30  5.85   RBC 4.38  4.25   Hemoglobin 13.7 12.9 13.0   Hematocrit 40.1 38 40.7   MCV 91.6  95.8   MCH 31.3  30.6   MCHC 34.2  31.9   RDW 12.0 (A)  12.1 (A)   Platelets 208  212   (A) Abnormal value                      Assessment and Plan   Diagnoses and all orders for this visit:    1. Benign essential hypertension (Primary)  -     hydroCHLOROthiazide (HYDRODIURIL) 25 MG tablet; Take 1 tablet by mouth Daily.  Dispense: 90 tablet; Refill: 3  -     dilTIAZem CD (CARDIZEM CD) 240 MG 24 hr capsule; Take 1 capsule by mouth Daily.  Dispense: 90 capsule; Refill: 3    2. Chronic atrial fibrillation (HCC)  -     hydroCHLOROthiazide (HYDRODIURIL) 25 MG tablet; Take 1 tablet by mouth Daily.  Dispense: 90 tablet; Refill: 3  -     dilTIAZem CD (CARDIZEM CD) 240 MG 24 hr capsule; Take 1 capsule by mouth Daily.  Dispense: 90 capsule; Refill: 3    3. Shortness of breath  -     Ambulatory Referral to Pulmonology  -     XR Chest PA & Lateral    Plan:  1.  Benign essential hypertension: Will continue current medication, low-sodium diet advised, Counseled to regularly check BP at home with goal averaging <130/80.   2. Atrial fibrillation: rate controlled,  will continue current medications , per cardiology ,  labs reviewed  3.  Shortness of breath: We will obtain chest x-ray and refer patient to pulmonary       I spent 30 minutes caring for Monica on this date of service. This time includes time spent by me in the following activities:preparing for the visit, reviewing tests, performing a medically appropriate examination and/or evaluation , counseling and educating the patient/family/caregiver, ordering medications, tests, or procedures and documenting information in the medical record  Follow Up    Patient was given instructions and counseling regarding her condition or for health maintenance advice. Please see specific information pulled into the AVS if appropriate.

## 2022-06-10 ENCOUNTER — TELEPHONE (OUTPATIENT)
Dept: INTERNAL MEDICINE | Facility: CLINIC | Age: 77
End: 2022-06-10

## 2022-06-10 NOTE — TELEPHONE ENCOUNTER
PT CALLED STATED THAT SHE HAD A CHEST XRAY DONE THIS WEEK AND HAD RECEIVED A CALL STATING THAT EVERYTHING LOOKED GOOD, BUT PT WILL LIKE TO KNOW WHETHER OR NOT SHE WILL NEED STILL VISIT HER LUNG DR. GARCIA.    PLEASE ADVISE.  CALL BACK:[phone]

## 2022-07-20 ENCOUNTER — OFFICE VISIT (OUTPATIENT)
Dept: CARDIOLOGY | Facility: CLINIC | Age: 77
End: 2022-07-20

## 2022-07-20 VITALS
SYSTOLIC BLOOD PRESSURE: 126 MMHG | BODY MASS INDEX: 30.18 KG/M2 | HEART RATE: 60 BPM | DIASTOLIC BLOOD PRESSURE: 70 MMHG | OXYGEN SATURATION: 96 % | HEIGHT: 62 IN | WEIGHT: 164 LBS

## 2022-07-20 DIAGNOSIS — I49.5 SINUS NODE DYSFUNCTION: ICD-10-CM

## 2022-07-20 DIAGNOSIS — I10 ESSENTIAL HYPERTENSION: ICD-10-CM

## 2022-07-20 DIAGNOSIS — I48.0 PAROXYSMAL ATRIAL FIBRILLATION: Primary | ICD-10-CM

## 2022-07-20 PROCEDURE — 99213 OFFICE O/P EST LOW 20 MIN: CPT | Performed by: PHYSICIAN ASSISTANT

## 2022-07-20 PROCEDURE — 93280 PM DEVICE PROGR EVAL DUAL: CPT | Performed by: PHYSICIAN ASSISTANT

## 2022-07-20 NOTE — PROGRESS NOTES
Monica Perea  1945  [phone]    07/20/2022    [facility] CARDIOLOGY     Referring Provider: No ref. provider found     Nakita Crump MD  [address] [address]    Chief Complaint   Patient presents with   • Atrial Fibrillation       Problem List:     1.  Atrial Fibrillation- Tachybrady Syndrome   a. CHADSVASC-  4(age 4 , gender, HTN)   b. 1/23/20 Myocardial perfusion imaging indicates a normal myocardial perfusion study with no evidence of ischemia. Impressions are consistent with a low risk study  c. BSC PPM Implant 4/28/2020 - per Dr. William jameson. 8/1/2020 Echo LVEF 65% with mild LVH   e. Echo 7/19/2021 LVEF 65%.  Normal left atrial size.  No evidence of significant valvular abnormalities or intracardiac shunt.  f. Prior Antiarrythmic use includes Flecainide (had dizziness and syncope, and Rhythmol)   g. 4/22 successful insertion of left atrial appendage occlusive device (Watchman Flex).  h. 4/22 ISHAN: A 20 mm watchman FL X left atrial appendage occlusion device placed under ISHAN guidance.  Device appears well-seated with no significant perivascular.  LVEF = 51-55%.  Normal RV size and function.  Moderate, grade 3 plaque in aortic arch.  i. ISHAN 5/23/2022: 20 mm watchman flex JAS occlusive device in place,On the mitral valve border of the device there is a 2.5 mm area of flow noted, does not come communicate posteriorly with the appendage. Left atrial appendage appears adequately sealed. No device associated thrombus. EF 51-55%, mild to moderate MR,   2. HTN  3. HLD  4. Vit D deficiency  5. Glaucoma   6. GERD  7. Chronic constipation  8. Iron deficiency anemia - on Rx iron supplementation   9. Frequent Epistaxis   10. Anxiety   11. Osteopenia   12. Psoriasis  13. Surgical History   a. Hysterectomy  b. Appendectomy   c. Breast biopsy      Allergies  Allergies   Allergen Reactions   • Macrobid [Nitrofurantoin Monohyd Macro] Rash   • Nitrofurantoin Hives   •  Phenylephrine-Guaifenesin Hives   • Sulfa Antibiotics Hives   • Statins Myalgia       Current Medications    Current Outpatient Medications:   •  aspirin 81 MG EC tablet, Take 1 tablet by mouth Daily. Start 4/5/22, Disp: 90 tablet, Rfl: 1  •  Calcium Carbonate-Vitamin D (CALCIUM PLUS VITAMIN D PO), Take 1 tablet by mouth 2 (Two) Times a Day., Disp: , Rfl:   •  cetirizine (zyrTEC) 10 MG tablet, Take 10 mg by mouth Daily., Disp: , Rfl:   •  cholecalciferol (VITAMIN D3) 25 MCG (1000 UT) tablet, Take 1,000 Units by mouth 2 (Two) Times a Day., Disp: , Rfl:   •  clobetasol (TEMOVATE) 0.05 % ointment, APPLY  TOPICALLY TO THE APPROPRIATE AREA AS DIRECTED EVERY 12 (TWELVE) HOURS. (Patient taking differently: Apply 1 application topically to the appropriate area as directed Every 12 (Twelve) Hours.), Disp: 60 g, Rfl: 0  •  clopidogrel (PLAVIX) 75 MG tablet, Take 1 tablet by mouth Daily., Disp: 30 tablet, Rfl: 6  •  coenzyme Q10 100 MG capsule, Take 100 mg by mouth Daily., Disp: , Rfl:   •  Cranberry 500 MG capsule, Take  by mouth., Disp: , Rfl:   •  dilTIAZem CD (CARDIZEM CD) 240 MG 24 hr capsule, Take 1 capsule by mouth Daily., Disp: 90 capsule, Rfl: 3  •  Docusate Sodium 100 MG capsule, Take 1 - 2 tablets daily. Adjust to have 1-2 soft stools daily., Disp: 60 tablet, Rfl: 5  •  hydrALAZINE (APRESOLINE) 25 MG tablet, Take 1 tablet by mouth 3 (Three) Times a Day., Disp: 270 tablet, Rfl: 1  •  hydroCHLOROthiazide (HYDRODIURIL) 25 MG tablet, Take 1 tablet by mouth Daily., Disp: 90 tablet, Rfl: 3  •  latanoprost (XALATAN) 0.005 % ophthalmic solution, Administer 1 drop to both eyes Every Night., Disp: , Rfl:   •  melatonin 5 MG tablet tablet, Take 5 mg by mouth., Disp: , Rfl:   •  metoprolol succinate XL (TOPROL-XL) 50 MG 24 hr tablet, Take 50 mg by mouth Daily., Disp: , Rfl:   •  pravastatin (PRAVACHOL) 40 MG tablet, Take 1 tablet by mouth Daily., Disp: 90 tablet, Rfl: 3    History of Present Illness     Pt presents for follow  "up of atrial fibrillation s/p Watchman Device in April 2022. Since we last saw the pt, she has been overall been doing well. She had follow up ISHAN in 5/2022 which showed well seated device and no stephani-device flow. She was taken off her Xarelto and started on Plavix at that time. She has not had bleeding issues or CVA like symptoms.  She denies CP, SOB, syncope. She has chronic fatigue and is going to see a pulmonologist next week. She also follows with Dr. Garsia in August 2022.  Denies any hospitalizations, ER visits, bleeding, or TIA/CVA symptoms. Overall feels well.     ROS:  General:  Denies fatigue, weight gain or loss  Cardiovascular:  Denies CP, PND, syncope, near syncope, edema or palpitations.  Pulmonary:  Denies JAIME, cough, or wheezing      Vitals:    07/20/22 1042   BP: 126/70   BP Location: Right arm   Patient Position: Sitting   Pulse: 60   SpO2: 96%   Weight: 74.4 kg (164 lb)   Height: 157.5 cm (62\")     Body mass index is 30 kg/m².  PE:  General: NAD. A & O x3   Neck: no JVD, no carotid bruits, no TM  Heart RRR, NL S1, S2, S4 present, no rubs, murmurs  Lungs: CTA, no wheezes, rhonchi, or rales  Abd: soft, non-tender, NL BS  Ext: No musculoskeletal deformities, no edema, cyanosis, or clubbing  Psych: normal mood and affect    Diagnostic Data:    PM Manual Interrogation: normal function. Less than 1% AFIB. A paced 94%. V paced less than 1%. 8 years on battery.     Procedures    1. Paroxysmal atrial fibrillation (HCC)    2. Sinus node dysfunction (HCC)    3. Essential hypertension          Plan:  1. Atrial Fibrillation/ Tachy- Chris syndrome  -BSC PPM with normal function   -Previously on flecainide (intolerant) and rythmol (too expensive)  -Will continue Metoprolol Xl 50mg - BP and HR well controlled     2. Anticoagulation  -Elevated CHADSVASC: 4 s/p Watchman Device 4/2022. ISHAN 45 days later showed well seated device without leakage. Now off Xarelto and on Plavix/ASA.   - Continue Plavix until " 10/4/2022.        3. HTN: overall acceptable control- per Dr. Garsia     F/up in 6 months    Electronically signed by NANI Campos, 07/20/22, 11:15 AM EDT.

## 2022-07-27 ENCOUNTER — OFFICE VISIT (OUTPATIENT)
Dept: PULMONOLOGY | Facility: CLINIC | Age: 77
End: 2022-07-27

## 2022-07-27 VITALS
DIASTOLIC BLOOD PRESSURE: 70 MMHG | HEART RATE: 60 BPM | SYSTOLIC BLOOD PRESSURE: 120 MMHG | OXYGEN SATURATION: 98 % | RESPIRATION RATE: 16 BRPM | WEIGHT: 166.8 LBS | HEIGHT: 62 IN | BODY MASS INDEX: 30.69 KG/M2

## 2022-07-27 DIAGNOSIS — Z87.891 PERSONAL HISTORY OF TOBACCO USE, PRESENTING HAZARDS TO HEALTH: ICD-10-CM

## 2022-07-27 DIAGNOSIS — R06.02 SOB (SHORTNESS OF BREATH): Primary | ICD-10-CM

## 2022-07-27 DIAGNOSIS — R53.83 FATIGUE, UNSPECIFIED TYPE: Primary | ICD-10-CM

## 2022-07-27 DIAGNOSIS — G47.33 OBSTRUCTIVE SLEEP APNEA: ICD-10-CM

## 2022-07-27 DIAGNOSIS — R06.02 SHORTNESS OF BREATH: ICD-10-CM

## 2022-07-27 PROCEDURE — 99204 OFFICE O/P NEW MOD 45 MIN: CPT | Performed by: INTERNAL MEDICINE

## 2022-07-27 PROCEDURE — 94060 EVALUATION OF WHEEZING: CPT | Performed by: INTERNAL MEDICINE

## 2022-07-27 RX ORDER — ALBUTEROL SULFATE 90 UG/1
2 AEROSOL, METERED RESPIRATORY (INHALATION) EVERY 4 HOURS PRN
Qty: 18 G | Refills: 0 | Status: SHIPPED | OUTPATIENT
Start: 2022-07-27 | End: 2022-09-14

## 2022-08-16 DIAGNOSIS — I10 BENIGN ESSENTIAL HYPERTENSION: ICD-10-CM

## 2022-08-16 RX ORDER — PRAVASTATIN SODIUM 40 MG
TABLET ORAL
Qty: 90 TABLET | Refills: 3 | Status: SHIPPED | OUTPATIENT
Start: 2022-08-16 | End: 2023-03-07 | Stop reason: SDUPTHER

## 2022-08-16 NOTE — TELEPHONE ENCOUNTER
Rx Refill Note  Requested Prescriptions     Pending Prescriptions Disp Refills   • hydrALAZINE (APRESOLINE) 25 MG tablet [Pharmacy Med Name: HYDRALAZINE HYDROCHLORIDE 25 MG Tablet] 270 tablet 1     Sig: TAKE 1 TABLET THREE TIMES DAILY      Last office visit with prescribing clinician: 6/8/2022      Next office visit with prescribing clinician: 9/28/2022            CARLA LEACH MA  08/16/22, 17:45 EDT

## 2022-08-17 RX ORDER — HYDRALAZINE HYDROCHLORIDE 25 MG/1
TABLET, FILM COATED ORAL
Qty: 270 TABLET | Refills: 1 | Status: SHIPPED | OUTPATIENT
Start: 2022-08-17 | End: 2022-09-28

## 2022-08-31 ENCOUNTER — TELEPHONE (OUTPATIENT)
Dept: INTERNAL MEDICINE | Facility: CLINIC | Age: 77
End: 2022-08-31

## 2022-08-31 ENCOUNTER — TELEMEDICINE (OUTPATIENT)
Dept: INTERNAL MEDICINE | Facility: CLINIC | Age: 77
End: 2022-08-31

## 2022-08-31 DIAGNOSIS — J22 LOWER RESPIRATORY TRACT INFECTION DUE TO COVID-19 VIRUS: ICD-10-CM

## 2022-08-31 DIAGNOSIS — J01.01 ACUTE RECURRENT MAXILLARY SINUSITIS: ICD-10-CM

## 2022-08-31 DIAGNOSIS — I10 BENIGN ESSENTIAL HYPERTENSION: Primary | ICD-10-CM

## 2022-08-31 DIAGNOSIS — R11.0 NAUSEA: ICD-10-CM

## 2022-08-31 DIAGNOSIS — H66.003 ACUTE SUPPURATIVE OTITIS MEDIA OF BOTH EARS WITHOUT SPONTANEOUS RUPTURE OF TYMPANIC MEMBRANES, RECURRENCE NOT SPECIFIED: ICD-10-CM

## 2022-08-31 DIAGNOSIS — U07.1 LOWER RESPIRATORY TRACT INFECTION DUE TO COVID-19 VIRUS: ICD-10-CM

## 2022-08-31 PROCEDURE — 99214 OFFICE O/P EST MOD 30 MIN: CPT | Performed by: INTERNAL MEDICINE

## 2022-08-31 RX ORDER — ONDANSETRON 4 MG/1
4 TABLET, FILM COATED ORAL EVERY 8 HOURS PRN
Qty: 30 TABLET | Refills: 1 | Status: SHIPPED | OUTPATIENT
Start: 2022-08-31 | End: 2022-09-30

## 2022-08-31 NOTE — TELEPHONE ENCOUNTER
Caller: Monica Perea Hil    Relationship: Self    Best call back number: [phone]    What medication are you requesting: SOMETHING TO FIGHT COVID    What are your current symptoms: COUGH, SORE THROAT, WEAK, SORE EARS, LOSS OF APETITE    How long have you been experiencing symptoms: 08/30/22    Have you had these symptoms before:    [] Yes  [x] No    Have you been treated for these symptoms before:   [] Yes  [x] No    If a prescription is needed, what is your preferred pharmacy and phone number:    [facility]    Additional notes:  PLEASE CALL PATIENT IF SOMETHING IS CALLED IN

## 2022-08-31 NOTE — PROGRESS NOTES
"You have chosen to receive care through a video  visit. Do you consent to use a video visit for your medical care today? Yes  Parties active with  visit via My Chart  Physician: Nakita Kraft MD  Nurse: Mickey Castillo CMA  Patient :monica rod  Location of Provider : Clinic - [place], [place]  Location of Patient : at home  Patient presents during the COVID-19 pandemic/federally declared state of public health emergency.   was seen via telehealth using real-time video conferencing technology  This service was conducted via  Video Visit        Chief Complaint  Hypertension, Cough (Covid positive yesterday), GI Problem, Nasal Congestion, Sinus Problem, Sore Throat, and Earache    Subjective        Monica Rod presents to [facility]  History of Present Illness  HPI: Patient is   up on the blood pressure  The patient is taking the blood pressure medications as prescribed and has had no side effects. The patient   complains of ear pain , nasal discharge ,   And sinus congestion since the past few days, patient has been trying over the counter medications with not much relief in the symptoms , patient also complains of cough with  sputum and  Fever and nausea    During today's visit, I reviewed the documented allergies, medications, chief complaint, and pertinent vitals.  I have confirmed with the patient that there have been no changes since this information was discussed with my clinical team member.    Objective   Vital Signs:  Fever   Estimated body mass index is 30.5 kg/m² as calculated from the following:    Height as of 7/27/22: 157.5 cm (62.01\").    Weight as of 7/27/22: 75.7 kg (166 lb 12.8 oz).     Physical Exam   All vitals recorded within this visit are reported by the patient.  General appearance: Normocephalic and nontraumatic  HEENT: External inspection of eyes, ears and nose appears benign, hearing appears intact  Neck: Neck appears supple, " trachea in midline, thyroid appears not enlarged  Respiratory: Respiratory effort appears normal ,cough  Musculoskeletal: Moving all limbs  Range of motion of visible joints appears within normal  CNS: No gross motor or sensory deficits  No gross cranial nerve deficits  No tremors  Psychiatry: Alert and oriented x3  Memory appears intact  Mood and affect appears normal  Insight appears normal    Result Review :                Assessment and Plan   Diagnoses and all orders for this visit:    1. Benign essential hypertension (Primary)    2. Lower respiratory tract infection due to COVID-19 virus  -     Nirmatrelvir&Ritonavir 300/100 (PAXLOVID) 20 x 150 MG & 10 x 100MG tablet therapy pack tablet; Take 3 tablets by mouth 2 (Two) Times a Day for 5 days.  Dispense: 30 tablet; Refill: 0    3. Acute suppurative otitis media of both ears without spontaneous rupture of tympanic membranes, recurrence not specified    4. Acute recurrent maxillary sinusitis    5. Nausea  -     ondansetron (ZOFRAN) 4 MG tablet; Take 1 tablet by mouth Every 8 (Eight) Hours As Needed for Nausea or Vomiting for up to 30 days.  Dispense: 30 tablet; Refill: 1     Plan :   1.Benign Essential Hypertension: will  continue current medications, low sodium diet, patient to continue to monitor  blood pressure  2 .  Lower respiratory tract infection due to COVID-19: We will give a trial with Paxlovid with and patient has been informed this is FDA approved for emergency utilization and she is agreeable  3.Acute suppurative otitis media: will   monitor   4.Acute maxillary sinusitis:   will    monitor  5.  Nausea: As needed Zofran, stay hydrated         I spent 30 minutes caring for Monica on this date of service. This time includes time spent by me in the following activities:preparing for the visit, reviewing tests, performing a medically appropriate examination and/or evaluation , counseling and educating the patient/family/caregiver, ordering medications,  tests, or procedures and documenting information in the medical record  Follow Up   This was a  video enabled telemedicine encounter.  Patient was given instructions and counseling regarding her condition or for health maintenance advice. Please see specific information pulled into the AVS if appropriate.

## 2022-09-14 ENCOUNTER — OFFICE VISIT (OUTPATIENT)
Dept: INTERNAL MEDICINE | Facility: CLINIC | Age: 77
End: 2022-09-14

## 2022-09-14 VITALS
OXYGEN SATURATION: 99 % | HEIGHT: 62 IN | RESPIRATION RATE: 15 BRPM | BODY MASS INDEX: 30 KG/M2 | DIASTOLIC BLOOD PRESSURE: 42 MMHG | WEIGHT: 163 LBS | TEMPERATURE: 97.8 F | HEART RATE: 63 BPM | SYSTOLIC BLOOD PRESSURE: 99 MMHG

## 2022-09-14 DIAGNOSIS — R39.9 UTI SYMPTOMS: Primary | ICD-10-CM

## 2022-09-14 DIAGNOSIS — K92.1 BLACK TARRY STOOLS: ICD-10-CM

## 2022-09-14 DIAGNOSIS — R19.5 POSITIVE COLORECTAL CANCER SCREENING USING COLOGUARD TEST: ICD-10-CM

## 2022-09-14 DIAGNOSIS — Z12.11 ENCOUNTER FOR SCREENING COLONOSCOPY: ICD-10-CM

## 2022-09-14 LAB
BILIRUB BLD-MCNC: NEGATIVE MG/DL
CLARITY, POC: ABNORMAL
COLOR UR: YELLOW
EXPIRATION DATE: ABNORMAL
GLUCOSE UR STRIP-MCNC: NEGATIVE MG/DL
KETONES UR QL: NEGATIVE
LEUKOCYTE EST, POC: ABNORMAL
Lab: ABNORMAL
NITRITE UR-MCNC: NEGATIVE MG/ML
PH UR: 7.5 [PH] (ref 5–8)
PROT UR STRIP-MCNC: ABNORMAL MG/DL
RBC # UR STRIP: ABNORMAL /UL
SP GR UR: 1.01 (ref 1–1.03)
UROBILINOGEN UR QL: NORMAL

## 2022-09-14 PROCEDURE — 99214 OFFICE O/P EST MOD 30 MIN: CPT | Performed by: NURSE PRACTITIONER

## 2022-09-14 PROCEDURE — 81003 URINALYSIS AUTO W/O SCOPE: CPT | Performed by: NURSE PRACTITIONER

## 2022-09-14 RX ORDER — DOXYCYCLINE 100 MG/1
100 TABLET ORAL 2 TIMES DAILY
Qty: 20 TABLET | Refills: 0 | Status: SHIPPED | OUTPATIENT
Start: 2022-09-14 | End: 2022-09-24

## 2022-09-14 NOTE — PROGRESS NOTES
Chief Complaint / Reason:      Chief Complaint   Patient presents with   • Urinary Tract Infection     Stools are black       Subjective     HPI  Patient presents today with complaints of UTI symptoms and states that symptoms started several days ago and she did take Azo for a few days but her symptoms never subsided.  She states that she has had UTIs frequently but denies any fever or chills at this time.  She also states that her stools are black but she denies any history of GI bleed but does have reflux in addition to history of colon polyp but is currently on blood thinners and states that she will be off of them October 4 and would like to be referred for colonoscopy as she had to do previous Cologuard which was positive.  Patient did have watchman procedure and is excited about being off blood thinners and that was the reason why she could not have her colonoscopy before as they was not agreeable to take her off the blood thinners for her A. fib.  Patient's blood pressure is slightly low but previous hemoglobin was stable.  She denies taking any vitamins to cause dark tarry stools or any specific medication that she is aware of.  History taken from: patient    PMH/FH/Social History were reviewed and updated appropriately in the electronic medical record.   Past Medical History:   Diagnosis Date   • A-fib (HCC)    • Anemia 2008   • Arthritis    • B12 deficiency 2008   • Back pain 2010   • CAD (coronary artery disease)    • Cellulitis of foot    • Colon polyp 2016   • Dyspnea    • GERD (gastroesophageal reflux disease)    • Glaucoma 2016   • Hematuria    • History of mammogram    • Hypercholesteremia 2005   • Hypertension    • Iron deficiency    • Low back pain    • Menopause    • Neck strain    • Osteopenia    • Overweight    • Palpitations    • Postmenopausal atrophic vaginitis    • Sleep apnea 2012    discontinued CPAP use 2 months ago   • Spinal headache    • Tinea pedis    • Wears eyeglasses      Past  Surgical History:   Procedure Laterality Date   • APPENDECTOMY     • ATRIAL APPENDAGE EXCLUSION LEFT WITH TRANSESOPHAGEAL ECHOCARDIOGRAM N/A 2022    Procedure: Atrial Appendage Occlusion on Xarelto-hold 1 day prior  2022;  Surgeon: Kip Conde MD;  Location: [facility] EP INVASIVE LOCATION;  Service: Cardiology;  Laterality: N/A;   • BREAST EXCISIONAL BIOPSY Bilateral    • BREAST SURGERY     • CARDIAC ELECTROPHYSIOLOGY PROCEDURE N/A 2020    Procedure: Pacemaker [place] new. [facility] [place]  Surgeon: Villa Thomas DO;  Location:  [facility] INVASIVE LOCATION;  Service: Cardiology;  Laterality: N/A;   • COLONOSCOPY          • EYE SURGERY      laser for bleed   • HYSTERECTOMY      total    • OOPHORECTOMY Bilateral    • TUBAL ABDOMINAL LIGATION       Social History     Socioeconomic History   • Marital status:    Tobacco Use   • Smoking status: Former Smoker     Packs/day: 1.00     Years: 35.00     Pack years: 35.00     Types: Cigarettes     Quit date:      Years since quittin.7   • Smokeless tobacco: Never Used   Vaping Use   • Vaping Use: Never used   Substance and Sexual Activity   • Alcohol use: No     Comment: quit in    • Drug use: No   • Sexual activity: Defer     Family History   Problem Relation Age of Onset   • Heart attack Other    • Arthritis Other    • Diabetes Other    • Hypertension Other    • Hodgkin's lymphoma Other    • Esophagitis Mother    • Heart failure Mother    • Esophageal cancer Mother    • Esophagitis Maternal Aunt    • Breast cancer Maternal Aunt         DX AGE 50'S   • Colon cancer Maternal Grandmother 49   • Heart attack Father    • Breast cancer Sister 72   • Ovarian cancer Sister 74   • Stomach cancer Neg Hx    • Liver cancer Neg Hx    • Liver disease Neg Hx        Review of Systems:   Review of Systems      All other systems were reviewed and are negative.  Exceptions are noted in the subjective or above.      Objective     Vital  Signs  Vitals:    09/14/22 1029   BP: 99/42   Pulse: 63   Resp: 15   Temp: 97.8 °F (36.6 °C)   SpO2: 99%       Body mass index is 29.81 kg/m².    Physical Exam  Vitals and nursing note reviewed.   Constitutional:       General: She is not in acute distress.     Appearance: She is well-developed. She is obese.   Cardiovascular:      Rate and Rhythm: Normal rate and regular rhythm.      Pulses: Normal pulses.      Heart sounds: Normal heart sounds.   Pulmonary:      Effort: Pulmonary effort is normal.      Breath sounds: Normal breath sounds. No wheezing.   Chest:      Chest wall: No tenderness.   Abdominal:      General: Bowel sounds are normal.      Palpations: Abdomen is soft.      Tenderness: There is abdominal tenderness.   Skin:     General: Skin is warm and dry.      Capillary Refill: Capillary refill takes less than 2 seconds.      Coloration: Skin is not pale.      Findings: No erythema or rash.   Neurological:      Mental Status: She is alert and oriented to person, place, and time.   Psychiatric:         Behavior: Behavior normal.         Thought Content: Thought content normal.         Judgment: Judgment normal.              Results Review:    I reviewed the patient's new clinical results.   Office Visit on 09/14/2022   Component Date Value Ref Range Status   • Color 09/14/2022 Yellow  Yellow, Straw, Dark Yellow, Itzel Final   • Clarity, UA 09/14/2022 Cloudy (A) Clear Final   • Specific Gravity  09/14/2022 1.015  1.005 - 1.030 Final   • pH, Urine 09/14/2022 7.5  5.0 - 8.0 Final   • Leukocytes 09/14/2022 Large (3+) (A) Negative Final   • Nitrite, UA 09/14/2022 Negative  Negative Final   • Protein, POC 09/14/2022 1+ (A) Negative mg/dL Final   • Glucose, UA 09/14/2022 Negative  Negative mg/dL Final   • Ketones, UA 09/14/2022 Negative  Negative Final   • Urobilinogen, UA 09/14/2022 Normal  Normal, 0.2 E.U./dL Final   • Bilirubin 09/14/2022 Negative  Negative Final   • Blood, UA 09/14/2022 1+ (A) Negative Final    • Lot Number 09/14/2022 [id number]   Final   • Expiration Date 09/14/2022 12/11/2023   Final         Medication Review:     Current Outpatient Medications:   •  aspirin 81 MG EC tablet, Take 1 tablet by mouth Daily. Start 4/5/22, Disp: 90 tablet, Rfl: 1  •  Calcium Carbonate-Vitamin D (CALCIUM PLUS VITAMIN D PO), Take 1 tablet by mouth 2 (Two) Times a Day., Disp: , Rfl:   •  cetirizine (zyrTEC) 10 MG tablet, Take 10 mg by mouth Daily., Disp: , Rfl:   •  cholecalciferol (VITAMIN D3) 25 MCG (1000 UT) tablet, Take 1,000 Units by mouth 2 (Two) Times a Day., Disp: , Rfl:   •  clobetasol (TEMOVATE) 0.05 % ointment, APPLY  TOPICALLY TO THE APPROPRIATE AREA AS DIRECTED EVERY 12 (TWELVE) HOURS. (Patient taking differently: Apply 1 application topically to the appropriate area as directed Every 12 (Twelve) Hours.), Disp: 60 g, Rfl: 0  •  clopidogrel (PLAVIX) 75 MG tablet, Take 1 tablet by mouth Daily., Disp: 30 tablet, Rfl: 6  •  coenzyme Q10 100 MG capsule, Take 100 mg by mouth Daily., Disp: , Rfl:   •  Cranberry 500 MG capsule, Take  by mouth., Disp: , Rfl:   •  dilTIAZem CD (CARDIZEM CD) 240 MG 24 hr capsule, Take 1 capsule by mouth Daily., Disp: 90 capsule, Rfl: 3  •  Docusate Sodium 100 MG capsule, Take 1 - 2 tablets daily. Adjust to have 1-2 soft stools daily., Disp: 60 tablet, Rfl: 5  •  hydroCHLOROthiazide (HYDRODIURIL) 25 MG tablet, Take 1 tablet by mouth Daily., Disp: 90 tablet, Rfl: 3  •  latanoprost (XALATAN) 0.005 % ophthalmic solution, Administer 1 drop to both eyes Every Night., Disp: , Rfl:   •  melatonin 5 MG tablet tablet, Take 5 mg by mouth., Disp: , Rfl:   •  metoprolol succinate XL (TOPROL-XL) 50 MG 24 hr tablet, Take 50 mg by mouth Daily., Disp: , Rfl:   •  ondansetron (ZOFRAN) 4 MG tablet, Take 1 tablet by mouth Every 8 (Eight) Hours As Needed for Nausea or Vomiting for up to 30 days., Disp: 30 tablet, Rfl: 1  •  pravastatin (PRAVACHOL) 40 MG tablet, TAKE 1 TABLET EVERY DAY, Disp: 90 tablet, Rfl:  3  •  doxycycline (ADOXA) 100 MG tablet, Take 1 tablet by mouth 2 (Two) Times a Day for 10 days., Disp: 20 tablet, Rfl: 0  •  hydrALAZINE (APRESOLINE) 25 MG tablet, TAKE 1 TABLET THREE TIMES DAILY, Disp: 270 tablet, Rfl: 1    Diagnoses and all orders for this visit:    UTI symptoms  -     POCT urinalysis dipstick, automated  -     Urine Culture - Urine, Urine, Clean Catch  -     doxycycline (ADOXA) 100 MG tablet; Take 1 tablet by mouth 2 (Two) Times a Day for 10 days.  Discussed UTI prevention recommend maintaining hydration and wiping from front to back empty bladder frequently.  Encounter for screening colonoscopy  -     Ambulatory Referral For Screening Colonoscopy    Positive colorectal cancer screening using Cologuard test  -     Ambulatory Referral For Screening Colonoscopy    Black tarry stools  Discussed worsening signs and symptoms and recommend discussing EGD and colonoscopy with GI specialist        Return if symptoms worsen or fail to improve.    Marline Gutierrez, APRN  09/14/2022

## 2022-09-19 LAB
BACTERIA UR CULT: ABNORMAL
BACTERIA UR CULT: ABNORMAL
OTHER ANTIBIOTIC SUSC ISLT: ABNORMAL

## 2022-09-28 ENCOUNTER — OFFICE VISIT (OUTPATIENT)
Dept: INTERNAL MEDICINE | Facility: CLINIC | Age: 77
End: 2022-09-28

## 2022-09-28 VITALS
RESPIRATION RATE: 16 BRPM | OXYGEN SATURATION: 97 % | WEIGHT: 163 LBS | DIASTOLIC BLOOD PRESSURE: 80 MMHG | HEART RATE: 73 BPM | HEIGHT: 62 IN | SYSTOLIC BLOOD PRESSURE: 142 MMHG | TEMPERATURE: 98 F | BODY MASS INDEX: 30 KG/M2

## 2022-09-28 DIAGNOSIS — Z23 NEED FOR IMMUNIZATION AGAINST INFLUENZA: ICD-10-CM

## 2022-09-28 DIAGNOSIS — I10 BENIGN ESSENTIAL HYPERTENSION: ICD-10-CM

## 2022-09-28 DIAGNOSIS — E78.2 MIXED HYPERLIPIDEMIA: ICD-10-CM

## 2022-09-28 DIAGNOSIS — Z00.00 ROUTINE GENERAL MEDICAL EXAMINATION AT A HEALTH CARE FACILITY: ICD-10-CM

## 2022-09-28 DIAGNOSIS — Z00.00 MEDICARE ANNUAL WELLNESS VISIT, SUBSEQUENT: Primary | ICD-10-CM

## 2022-09-28 PROCEDURE — 99397 PER PM REEVAL EST PAT 65+ YR: CPT | Performed by: INTERNAL MEDICINE

## 2022-09-28 PROCEDURE — 90662 IIV NO PRSV INCREASED AG IM: CPT | Performed by: INTERNAL MEDICINE

## 2022-09-28 PROCEDURE — 1170F FXNL STATUS ASSESSED: CPT | Performed by: INTERNAL MEDICINE

## 2022-09-28 PROCEDURE — G0008 ADMIN INFLUENZA VIRUS VAC: HCPCS | Performed by: INTERNAL MEDICINE

## 2022-09-28 PROCEDURE — 1159F MED LIST DOCD IN RCRD: CPT | Performed by: INTERNAL MEDICINE

## 2022-09-28 PROCEDURE — G0439 PPPS, SUBSEQ VISIT: HCPCS | Performed by: INTERNAL MEDICINE

## 2022-09-28 PROCEDURE — 96160 PT-FOCUSED HLTH RISK ASSMT: CPT | Performed by: INTERNAL MEDICINE

## 2022-09-28 NOTE — PATIENT INSTRUCTIONS
Advance Care Planning and Advance Directives     You make decisions on a daily basis - decisions about where you want to live, your career, your home, your life. Perhaps one of the most important decisions you face is your choice for future medical care. Take time to talk with your family and your healthcare team and start planning today.  Advance Care Planning is a process that can help you:  · Understand possible future healthcare decisions in light of your own experiences  · Reflect on those decision in light of your goals and values  · Discuss your decisions with those closest to you and the healthcare professionals that care for you  · Make a plan by creating a document that reflects your wishes    Surrogate Decision Maker  In the event of a medical emergency, which has left you unable to communicate or to make your own decisions, you would need someone to make decisions for you.  It is important to discuss your preferences for medical treatment with this person while you are in good health.     Qualities of a surrogate decision maker:  • Willing to take on this role and responsibility  • Knows what you want for future medical care  • Willing to follow your wishes even if they don't agree with them  • Able to make difficult medical decisions under stressful circumstances    Advance Directives  These are legal documents you can create that will guide your healthcare team and decision maker(s) when needed. These documents can be stored in the electronic medical record.    · Living Will - a legal document to guide your care if you have a terminal condition or a serious illness and are unable to communicate. States vary by statute in document names/types, but most forms may include one or more of the following:        -  Directions regarding life-prolonging treatments        -  Directions regarding artificially provided nutrition/hydration        -  Choosing a healthcare decision maker        -  Direction  regarding organ/tissue donation    · Durable Power of  for Healthcare - this document names an -in-fact to make medical decisions for you, but it may also allow this person to make personal and financial decisions for you. Please seek the advice of an  if you need this type of document.    **Advance Directives are not required and no one may discriminate against you if you do not sign one.    Medical Orders  Many states allow specific forms/orders signed by your physician to record your wishes for medical treatment in your current state of health. This form, signed in personal communication with your physician, addresses resuscitation and other medical interventions that you may or may not want.      For more information or to schedule a time with a [facility] Advance Care Planning Facilitator contact: [facility] or call [phone] and someone will contact you directly.  You are due for adacel Tdap vaccination. (provides protection against tetanus, diptheria and whooping cough) Please  get the immunization at your local pharmacy at your earliest convenience. This immunization will next be due in 10 years. Please click on the link for more information about this vaccine.    [facility] Tdap Vaccine Information    You are due for Shingrix vaccination series ( the newest shingles vaccine).  It is a two shot series spaced 2-6 months apart. Please get this vaccine series started at your earliest convenience at your local pharmacy to help avoid shingles outbreak. It is more effective than the old Zostavax vaccine and is recommended even if you have had the Zostavax vaccine in the past.  Once the Shingrix series is completed, it does not need to be repeated.   For more information, please look at the website below:  [facility] Shingrix Vaccine Information      Medicare Wellness  Personal Prevention Plan of Service     Date of Office Visit:    Encounter Provider:  Nakita Crump MD  Place of  Service:  [facility]  Patient Name: Monica Perea  :  1945    As part of the Medicare Wellness portion of your visit today, we are providing you with this personalized preventive plan of services (PPPS). This plan is based upon recommendations of the [place] Preventive Services Task Force (USPSTF) and the Advisory Committee on Immunization Practices (ACIP).    This lists the preventive care services that should be considered, and provides dates of when you are due. Items listed as completed are up-to-date and do not require any further intervention.    Health Maintenance   Topic Date Due   • TDAP/TD VACCINES (1 - Tdap) Never done   • ZOSTER VACCINE (1 of 2) Never done   • DXA SCAN  2022   • ANNUAL WELLNESS VISIT  09/15/2022   • COVID-19 Vaccine (3 - Booster for Moderna series) 2024 (Originally 2021)   • INFLUENZA VACCINE  10/01/2022   • LIPID PANEL  2023   • MAMMOGRAM  2024   • COLORECTAL CANCER SCREENING  2026   • HEPATITIS C SCREENING  Completed   • Pneumococcal Vaccine 65+  Completed       Orders Placed This Encounter   Procedures   • Fluzone High-Dose 65+yrs ([id number])       No follow-ups on file.

## 2022-09-28 NOTE — PROGRESS NOTES
The ABCs of the Annual Wellness Visit  Subsequent Medicare Wellness Visit    Chief Complaint   Patient presents with   • Medicare Wellness-subsequent   • Hypertension   • Hyperlipidemia      Subjective    History of Present Illness:  Monica Perea is a 77 y.o. female who presents for a Subsequent Medicare Wellness Visit, physical and [employer] PAF.   Patient is here to follow up on the blood pressure  The patient is taking the blood pressure medications as prescribed and has had no side effects. The patient is also here to follow up on the cholesterol and is trying to follow a diet. The patient had  lab work done .  The patient also needs  Flu vaccine  .   Hyperlipidemia   Pertinent negatives include no chest pain or shortness of breath.   Hypertension   Pertinent negatives include no chest pain, palpitations or shortness of breath.      The following portions of the patient's history were reviewed and   updated as appropriate: allergies, current medications, past family history, past medical history, past social history, past surgical history and problem list.    Compared to one year ago, the patient feels her physical   health is better.    Compared to one year ago, the patient feels her mental   health is better.    Recent Hospitalizations:  This patient has had a [facility] admission record on file within the last 365 days.    Current Medical Providers:  Patient Care Team:  Nakita Crump MD as PCP - General (Internal Medicine)  Tico Charles MD as Consulting Physician (Cardiology)  Delfino Thomas Jr., APRN as Nurse Practitioner (Interventional Cardiology)  Villa Thomas DO as Consulting Physician (Cardiology)  Avni Garsia MD as Consulting Physician (Cardiology)  Avni Garsia MD as Consulting Physician (Cardiology)    Outpatient Medications Prior to Visit   Medication Sig Dispense Refill   • aspirin 81 MG EC tablet Take 1 tablet by mouth Daily. Start 4/5/22 90 tablet  1   • Calcium Carbonate-Vitamin D (CALCIUM PLUS VITAMIN D PO) Take 1 tablet by mouth 2 (Two) Times a Day.     • cetirizine (zyrTEC) 10 MG tablet Take 10 mg by mouth Daily.     • cholecalciferol (VITAMIN D3) 25 MCG (1000 UT) tablet Take 1,000 Units by mouth 2 (Two) Times a Day.     • clobetasol (TEMOVATE) 0.05 % ointment APPLY  TOPICALLY TO THE APPROPRIATE AREA AS DIRECTED EVERY 12 (TWELVE) HOURS. (Patient taking differently: Apply 1 application topically to the appropriate area as directed Every 12 (Twelve) Hours.) 60 g 0   • clopidogrel (PLAVIX) 75 MG tablet Take 1 tablet by mouth Daily. 30 tablet 6   • coenzyme Q10 100 MG capsule Take 100 mg by mouth Daily.     • Cranberry 500 MG capsule Take  by mouth.     • Docusate Sodium 100 MG capsule Take 1 - 2 tablets daily. Adjust to have 1-2 soft stools daily. 60 tablet 5   • latanoprost (XALATAN) 0.005 % ophthalmic solution Administer 1 drop to both eyes Every Night.     • melatonin 5 MG tablet tablet Take 5 mg by mouth.     • metoprolol succinate XL (TOPROL-XL) 25 MG 24 hr tablet Take 50 mg by mouth Daily.     • ondansetron (ZOFRAN) 4 MG tablet Take 1 tablet by mouth Every 8 (Eight) Hours As Needed for Nausea or Vomiting for up to 30 days. 30 tablet 1   • pravastatin (PRAVACHOL) 40 MG tablet TAKE 1 TABLET EVERY DAY 90 tablet 3   • hydroCHLOROthiazide (HYDRODIURIL) 25 MG tablet Take 1 tablet by mouth Daily. 90 tablet 3   • dilTIAZem CD (CARDIZEM CD) 240 MG 24 hr capsule Take 1 capsule by mouth Daily. 90 capsule 3   • hydrALAZINE (APRESOLINE) 25 MG tablet TAKE 1 TABLET THREE TIMES DAILY 270 tablet 1     No facility-administered medications prior to visit.       No opioid medication identified on active medication list. I have reviewed chart for other potential  high risk medication/s and harmful drug interactions in the elderly.          Aspirin is on active medication list. Aspirin use is indicated based on review of current medical condition/s. Pros and cons of this  "therapy have been discussed today. Benefits of this medication outweigh potential harm.  Patient has been encouraged to continue taking this medication.  .      Patient Active Problem List   Diagnosis   • Essential hypertension   • Diverticulosis of intestine   • Other fatigue   • Gastroesophageal reflux disease without esophagitis   • Glaucoma   • Hyperlipidemia   • Low back pain   • Osteopenia   • Atrophic vaginitis   • Sleep apnea   • Psoriasis   • CAD (coronary artery disease)   • Interstitial cystitis   • Anxiety   • MVP (mitral valve prolapse)   • Paroxysmal atrial fibrillation (HCC)   • Vitamin D deficiency   • Sinus node dysfunction (HCC)   • Presence of cardiac pacemaker   • Chronic anticoagulation   • Long term current use of antiarrhythmic drug   • Allergies   • Iron deficiency   • Anemia   • Hyperglycemia   • Chronic idiopathic constipation   • Rectal bleeding   • Family history of esophageal cancer   • Esophageal dysphagia   • Personal history of colonic polyps   • Positive colorectal cancer screening using Cologuard test   • Epistaxis     Advance Care Planning  Advance Directive is not on file.  ACP discussion was held with the patient during this visit. Patient has an advance directive (not in EMR), copy requested.          Objective    Vitals:    09/28/22 1549   BP: 142/80   Pulse: 73   Resp: 16   Temp: 98 °F (36.7 °C)   SpO2: 97%   Weight: 73.9 kg (163 lb)   Height: 157.5 cm (62.01\")   PainSc: 0-No pain     Estimated body mass index is 29.81 kg/m² as calculated from the following:    Height as of this encounter: 157.5 cm (62.01\").    Weight as of this encounter: 73.9 kg (163 lb).    BMI is >= 25 and <30. (Overweight) The following options were offered after discussion;: nutrition counseling/recommendations      Does the patient have evidence of cognitive impairment? No    Physical Exam  Vitals and nursing note reviewed.   Constitutional:       General: She is not in acute distress.     Appearance: " Normal appearance. She is not diaphoretic.   HENT:      Head: Normocephalic and atraumatic.      Right Ear: External ear normal.      Left Ear: External ear normal.      Nose: Nose normal.   Eyes:      Extraocular Movements: Extraocular movements intact.      Conjunctiva/sclera: Conjunctivae normal.   Neck:      Trachea: Trachea normal.   Cardiovascular:      Rate and Rhythm: Normal rate and regular rhythm.      Heart sounds: Normal heart sounds.   Pulmonary:      Effort: Pulmonary effort is normal. No respiratory distress.      Breath sounds: Normal breath sounds.   Abdominal:      General: Abdomen is flat.   Musculoskeletal:      Cervical back: Neck supple.      Comments: Moves all limbs   Skin:     General: Skin is warm.   Neurological:      Mental Status: She is alert and oriented to person, place, and time.      Comments: No gross motor or sensory deficits                 HEALTH RISK ASSESSMENT    Smoking Status:  Social History     Tobacco Use   Smoking Status Former Smoker   • Packs/day: 1.00   • Years: 35.00   • Pack years: 35.00   • Types: Cigarettes   • Quit date:    • Years since quittin.7   Smokeless Tobacco Never Used     Alcohol Consumption:  Social History     Substance and Sexual Activity   Alcohol Use No    Comment: quit in      Fall Risk Screen:    STEADI Fall Risk Assessment was completed, and patient is at LOW risk for falls.Assessment completed on:2022    Depression Screening:  PHQ-2/PHQ-9 Depression Screening 2022   Retired PHQ-9 Total Score -   Retired Total Score -   Little Interest or Pleasure in Doing Things 0-->not at all   Feeling Down, Depressed or Hopeless 0-->not at all   PHQ-9: Brief Depression Severity Measure Score 0       Health Habits and Functional and Cognitive Screening:  Functional & Cognitive Status 2022   Do you have difficulty preparing food and eating? No   Do you have difficulty bathing yourself, getting dressed or grooming yourself? No   Do  you have difficulty using the toilet? No   Do you have difficulty moving around from place to place? No   Do you have trouble with steps or getting out of a bed or a chair? No   Current Diet Well Balanced Diet   Dental Exam Not up to date   Eye Exam Up to date   Exercise (times per week) 0 times per week   Current Exercises Include No Regular Exercise   Current Exercise Activities Include -   Do you need help using the phone?  No   Are you deaf or do you have serious difficulty hearing?  No   Do you need help with transportation? No   Do you need help shopping? No   Do you need help preparing meals?  No   Do you need help with housework?  No   Do you need help with laundry? No   Do you need help taking your medications? No   Do you need help managing money? No   Do you ever drive or ride in a car without wearing a seat belt? No   Have you felt unusual stress, anger or loneliness in the last month? No   Who do you live with? Spouse   If you need help, do you have trouble finding someone available to you? No   Have you been bothered in the last four weeks by sexual problems? No   Do you have difficulty concentrating, remembering or making decisions? No       Age-appropriate Screening Schedule:  Refer to the list below for future screening recommendations based on patient's age, sex and/or medical conditions. Orders for these recommended tests are listed in the plan section. The patient has been provided with a written plan.    Health Maintenance   Topic Date Due   • TDAP/TD VACCINES (1 - Tdap) Never done   • ZOSTER VACCINE (1 of 2) Never done   • DXA SCAN  07/08/2022   • LIPID PANEL  05/09/2023   • MAMMOGRAM  01/24/2024   • INFLUENZA VACCINE  Completed              Assessment & Plan   CMS Preventative Services Quick Reference  Risk Factors Identified During Encounter  Cardiovascular Disease  Fall Risk-High or Moderate  The above risks/problems have been discussed with the patient.  Follow up actions/plans if indicated  are seen below in the Assessment/Plan Section.  Pertinent information has been shared with the patient in the After Visit Summary.    Diagnoses and all orders for this visit:    1. Medicare annual wellness visit, subsequent (Primary)    2. Routine general medical examination at a health care facility    3. Benign essential hypertension    4. Mixed hyperlipidemia  -     CBC & Differential  -     Comprehensive Metabolic Panel  -     Lipid Panel    5. Need for immunization against influenza  -     Fluzone High-Dose 65+yrs (6801-5616)    Plan:  1.physical: Physical exam conducted today, reviewed fasting lab work,   Impression: healthy adult Patient. Currently, patient eats a healthy diet. Screening lab work includes glucose, lipid profile and complete blood count . The risks and benefits of immunizations were discussed and immunizations are up to date. Advice and education were given regarding nutrition and aerobic exercise. Patient discussion: discussed with the patient.  Annual Wellness Visit ,physical  and [employer] paf  with preventive exam as well as age and risk appropriate counseling completed.   Advance Directive Planning: paperwork and instructions were given to the patient.   Patient Discussion: plan discussed with the patient, follow-up visit needed in one year. Medication Review and medication list updated  2.  Benign essential hypertension: Will continue current medication, low-sodium diet advised, Counseled to regularly check BP at home with goal averaging <130/80.   3.mixed hyperlipidemia:  reviewed  fasting CMP and lipid panel.  Diet and exercise counseled,  Will continue current medications  4. Need for flu vaccine : given today and well tolerated       Follow Up:   Return in about 1 year (around 9/28/2023) for Medicare Wellness.     An After Visit Summary and PPPS were made available to the patient.

## 2022-10-14 ENCOUNTER — TELEMEDICINE (OUTPATIENT)
Dept: CARDIOLOGY | Facility: HOSPITAL | Age: 77
End: 2022-10-14

## 2022-10-14 VITALS — DIASTOLIC BLOOD PRESSURE: 76 MMHG | SYSTOLIC BLOOD PRESSURE: 143 MMHG | HEART RATE: 61 BPM

## 2022-10-14 DIAGNOSIS — Z95.818 PRESENCE OF WATCHMAN LEFT ATRIAL APPENDAGE CLOSURE DEVICE: Primary | ICD-10-CM

## 2022-10-14 DIAGNOSIS — I48.0 PAROXYSMAL ATRIAL FIBRILLATION: ICD-10-CM

## 2022-10-14 PROCEDURE — 99442 PR PHYS/QHP TELEPHONE EVALUATION 11-20 MIN: CPT | Performed by: NURSE PRACTITIONER

## 2022-10-14 NOTE — PROGRESS NOTES
Mode of visit: Telephone  Location of patient: Home   You have chosen to receive care through the use of telemedicine. Telemedicine enables health care providers at different locations to provide safe, effective, and convenient care through the use of technology. As with any health care service, there are risks associated with the use of telemedicine, including equipment failure, poor connections, and  issues.  • Do you understand the risks and benefits of telemedicine as I have explained them to you? Yes  • Have your questions regarding telemedicine been answered? Yes  • Do you consent to the use of telemedicine in your medical care today? Yes      Chief Complaint  Atrial Fibrillation (Watchman device follow-up)    [facility]  Telemedicine note    This was a telephone enabled telemedicine encounter.    Subjective    History of Present Illness {CC  Problem List  Visit  Diagnosis   Encounters  Notes  Medications  Labs  Result Review Imaging  Media :23}     Monica Perea, 77 y.o. female with atrial fibrillation, tachybrady syndrome, HTN, HLD  presents to [facility] telemedicine visit for Atrial Fibrillation (Watchman device follow-up).    Contacted patient for 6-month follow-up s/p watchman placement by Dr. Conde on 4/4/2022.  Patient has completed follow-up ISHAN that revealed no significant stephani-prosthetic leak present.  Patient has recently transitioned to aspirin monotherapy as directed.  Patient reports that functional status unchanged since watchman procedure. Barthel/modified Reno scale completed with patient and scanned to medical record (see media tab). Denies chest pain, dyspnea, tachypalpitation, syncope.    Discussed 1 year watchman follow-up with planned ISHAN around 1 year anniversary date of watchman implant.  1 year ISHAN ordered per watchman protocol- to be completed by Dr. Garsia's office.        Objective     Vital Signs:   Vitals:    10/14/22 1301   BP: 143/76   Pulse: 61     There is no height or weight on file to calculate BMI.  Physical Exam  Vitals reviewed.   Pulmonary:      Effort: Pulmonary effort is normal. No respiratory distress.   Neurological:      Mental Status: She is alert and oriented to person, place, and time.   Psychiatric:         Mood and Affect: Mood normal.         Behavior: Behavior normal.         Thought Content: Thought content normal.       Data Reviewed:{ Labs  Result Review  Imaging  Med Tab  Media :23}       EXTERNAL MEDICAL RECORDS - SCAN - HEART ALFIE, BHLEX, 03/30/2022 (03/28/2022)  Adult Transesophageal Echo (ISHAN) W/ Cont if Necessary Per Protocol (05/23/2022 08:45)  EP/CRM Study (04/04/2022 10:03)      Assessment and Plan {CC Problem List  Visit Diagnosis  ROS  Review (Popup)  Health Maintenance  Quality  BestPractice  Medications  SmartSets  SnapShot Encounters  Media :23}     This visit has been scheduled as a telephone visit to comply with patient safety concerns in accordance with CDC recommendations. Time of discussion: 12 minutes.    1. Presence of Watchman left atrial appendage closure device  -s/p Watchman device placement by Dr. Conde on 4/4/22  -45 day follow-up ISHAN without significant flow  -Continue ASA monotherapy. Recently dc'd clopidogrel as instructed.  -1 year follow-up ISHAN to be completed by Dr. Garsia, will order and fax to office.     2. Paroxysmal atrial fibrillation (HCC)  -CHADSVASC:4  -s/p watchman device, continue ASA as above  -BSC PPM, continue f/u with Dr. Garsia/Dr. Conde  -Continue Toprol-XL      Follow Up {Instructions Charge Capture  Follow-up Communications :23}   Return if symptoms worsen or fail to improve.      Patient was given instructions and counseling regarding her condition or for health maintenance advice. Please see specific information pulled into the AVS if appropriate.  Patient was instructed  to call the [facility] with any questions, concerns, or worsening symptoms.    *Please note that portions of this note were completed with a voice recognition program. Efforts were made to edit the dictations, but occasionally words are mistranscribed.

## 2022-10-26 LAB
ALBUMIN SERPL-MCNC: 4.4 G/DL (ref 3.5–5.2)
ALBUMIN/GLOB SERPL: 1.7 G/DL
ALP SERPL-CCNC: 86 U/L (ref 39–117)
ALT SERPL-CCNC: 13 U/L (ref 1–33)
AST SERPL-CCNC: 18 U/L (ref 1–32)
BASOPHILS # BLD AUTO: 0.09 10*3/MM3 (ref 0–0.2)
BASOPHILS NFR BLD AUTO: 1.4 % (ref 0–1.5)
BILIRUB SERPL-MCNC: 0.3 MG/DL (ref 0–1.2)
BUN SERPL-MCNC: 15 MG/DL (ref 8–23)
BUN/CREAT SERPL: 16.3 (ref 7–25)
CALCIUM SERPL-MCNC: 9.7 MG/DL (ref 8.6–10.5)
CHLORIDE SERPL-SCNC: 106 MMOL/L (ref 98–107)
CHOLEST SERPL-MCNC: 184 MG/DL (ref 0–200)
CO2 SERPL-SCNC: 27.8 MMOL/L (ref 22–29)
CREAT SERPL-MCNC: 0.92 MG/DL (ref 0.57–1)
EGFRCR SERPLBLD CKD-EPI 2021: 65.1 ML/MIN/1.73
EOSINOPHIL # BLD AUTO: 0.84 10*3/MM3 (ref 0–0.4)
EOSINOPHIL NFR BLD AUTO: 13.4 % (ref 0.3–6.2)
ERYTHROCYTE [DISTWIDTH] IN BLOOD BY AUTOMATED COUNT: 12.3 % (ref 12.3–15.4)
GLOBULIN SER CALC-MCNC: 2.6 GM/DL
GLUCOSE SERPL-MCNC: 98 MG/DL (ref 65–99)
HCT VFR BLD AUTO: 35.6 % (ref 34–46.6)
HDLC SERPL-MCNC: 65 MG/DL (ref 40–60)
HGB BLD-MCNC: 12 G/DL (ref 12–15.9)
IMM GRANULOCYTES # BLD AUTO: 0.02 10*3/MM3 (ref 0–0.05)
IMM GRANULOCYTES NFR BLD AUTO: 0.3 % (ref 0–0.5)
LDLC SERPL CALC-MCNC: 105 MG/DL (ref 0–100)
LYMPHOCYTES # BLD AUTO: 1.7 10*3/MM3 (ref 0.7–3.1)
LYMPHOCYTES NFR BLD AUTO: 27.1 % (ref 19.6–45.3)
MCH RBC QN AUTO: 30.2 PG (ref 26.6–33)
MCHC RBC AUTO-ENTMCNC: 33.7 G/DL (ref 31.5–35.7)
MCV RBC AUTO: 89.7 FL (ref 79–97)
MONOCYTES # BLD AUTO: 0.62 10*3/MM3 (ref 0.1–0.9)
MONOCYTES NFR BLD AUTO: 9.9 % (ref 5–12)
NEUTROPHILS # BLD AUTO: 3 10*3/MM3 (ref 1.7–7)
NEUTROPHILS NFR BLD AUTO: 47.9 % (ref 42.7–76)
NRBC BLD AUTO-RTO: 0 /100 WBC (ref 0–0.2)
PLATELET # BLD AUTO: 233 10*3/MM3 (ref 140–450)
POTASSIUM SERPL-SCNC: 4.2 MMOL/L (ref 3.5–5.2)
PROT SERPL-MCNC: 7 G/DL (ref 6–8.5)
RBC # BLD AUTO: 3.97 10*6/MM3 (ref 3.77–5.28)
SODIUM SERPL-SCNC: 144 MMOL/L (ref 136–145)
TRIGL SERPL-MCNC: 77 MG/DL (ref 0–150)
VLDLC SERPL CALC-MCNC: 14 MG/DL (ref 5–40)
WBC # BLD AUTO: 6.27 10*3/MM3 (ref 3.4–10.8)

## 2022-11-03 ENCOUNTER — OFFICE VISIT (OUTPATIENT)
Dept: INTERNAL MEDICINE | Facility: CLINIC | Age: 77
End: 2022-11-03

## 2022-11-03 VITALS
TEMPERATURE: 98.2 F | HEART RATE: 60 BPM | WEIGHT: 166 LBS | RESPIRATION RATE: 16 BRPM | HEIGHT: 62 IN | BODY MASS INDEX: 30.55 KG/M2 | DIASTOLIC BLOOD PRESSURE: 82 MMHG | OXYGEN SATURATION: 96 % | SYSTOLIC BLOOD PRESSURE: 134 MMHG

## 2022-11-03 DIAGNOSIS — I48.0 PAROXYSMAL ATRIAL FIBRILLATION: ICD-10-CM

## 2022-11-03 DIAGNOSIS — I10 BENIGN ESSENTIAL HYPERTENSION: Primary | ICD-10-CM

## 2022-11-03 DIAGNOSIS — R73.01 IMPAIRED FASTING GLUCOSE: ICD-10-CM

## 2022-11-03 DIAGNOSIS — Z95.818 PRESENCE OF WATCHMAN LEFT ATRIAL APPENDAGE CLOSURE DEVICE: ICD-10-CM

## 2022-11-03 DIAGNOSIS — E78.2 MIXED HYPERLIPIDEMIA: ICD-10-CM

## 2022-11-03 PROCEDURE — 99214 OFFICE O/P EST MOD 30 MIN: CPT | Performed by: INTERNAL MEDICINE

## 2022-11-03 RX ORDER — ASPIRIN 81 MG/1
81 TABLET ORAL DAILY
Qty: 90 TABLET | Refills: 1 | Status: CANCELLED | OUTPATIENT
Start: 2022-11-03

## 2022-11-03 NOTE — TELEPHONE ENCOUNTER
Rx Refill Note  Requested Prescriptions     Pending Prescriptions Disp Refills   • aspirin 81 MG EC tablet 90 tablet 1     Sig: Take 1 tablet by mouth Daily. Start 4/5/22      Last office visit with prescribing clinician: 11/3/2022      Next office visit with prescribing clinician: 3/7/2023            Mickey Castillo CMA  11/03/22, 16:26 EDT

## 2022-11-03 NOTE — PROGRESS NOTES
"Chief Complaint  Hypertension and Hyperlipidemia    Subjective        Monica Perea presents to [facility]  History of Present Illness  HPI: Patient is here to follow up on the blood pressure  The patient is taking the blood pressure medications as prescribed and has had no side effects. The patient is also here to follow up on the cholesterol and is trying to follow a diet. The patient  Had lab work done . She had covid august  And has a cough Post covid, white foamy  productive,   The patient also needs refills on medications .  She smoked for 30 yrs and quit 28 yrs ago and follows with pulmonary , she has an inhaler which  She is encouraged to use it   Hyperlipidemia   Pertinent negatives include no chest pain or shortness of breath.   Hypertension   Pertinent negatives include no chest pain, palpitations or shortness of breath.    Objective   Vital Signs:  /82   Pulse 60   Temp 98.2 °F (36.8 °C)   Resp 16   Ht 157.5 cm (62.01\")   Wt 75.3 kg (166 lb)   SpO2 96%   BMI 30.35 kg/m²   Estimated body mass index is 30.35 kg/m² as calculated from the following:    Height as of this encounter: 157.5 cm (62.01\").    Weight as of this encounter: 75.3 kg (166 lb).    BMI is >= 30 and <35. (Class 1 Obesity). The following options were offered after discussion;: weight loss educational material (shared in after visit summary), exercise counseling/recommendations and nutrition counseling/recommendations      Physical Exam  Vitals and nursing note reviewed.   Constitutional:       General: She is not in acute distress.     Appearance: Normal appearance. She is not diaphoretic.   HENT:      Head: Normocephalic and atraumatic.      Right Ear: External ear normal.      Left Ear: External ear normal.      Nose: Nose normal.   Eyes:      Extraocular Movements: Extraocular movements intact.      Conjunctiva/sclera: Conjunctivae normal.   Neck:      Trachea: Trachea normal. "   Cardiovascular:      Rate and Rhythm: Normal rate and regular rhythm.      Heart sounds: Normal heart sounds.   Pulmonary:      Effort: Pulmonary effort is normal. No respiratory distress.      Breath sounds: Normal breath sounds.   Abdominal:      General: Abdomen is flat.   Musculoskeletal:      Cervical back: Neck supple.      Comments: Moves all limbs   Skin:     General: Skin is warm.   Neurological:      Mental Status: She is alert and oriented to person, place, and time.      Comments: No gross motor or sensory deficits        Result Review :    Common labs    Common Labs 4/4/22 5/9/22 5/9/22 5/9/22 5/9/22 10/25/22 10/25/22 10/25/22     0853 [id number] 0853 0853 [id number]   Glucose   110 (A)    98    BUN   19    15    Creatinine   0.91    0.92    Sodium   141    144    Potassium   4.4    4.2    Chloride   102    106    Calcium   10.0    9.7    Total Protein   7.1    7.0    Albumin   4.50    4.40    Total Bilirubin   0.3    0.3    Alkaline Phosphatase   91    86    AST (SGOT)   16    18    ALT (SGPT)   13    13    WBC  5.85    6.27     Hemoglobin 12.9 13.0    12.0     Hematocrit 38 40.7    35.6     Platelets  212    233     Total Cholesterol    177    184   Triglycerides    85    77   HDL Cholesterol    60    65 (A)   LDL Cholesterol     101 (A)    105 (A)   Hemoglobin A1C     5.70 (A)      (A) Abnormal value       Comments are available for some flowsheets but are not being displayed.                     Assessment and Plan   Diagnoses and all orders for this visit:    1. Benign essential hypertension (Primary)    2. Mixed hyperlipidemia  -     CBC & Differential  -     Comprehensive Metabolic Panel  -     Lipid Panel    3. Impaired fasting glucose  -     Hemoglobin A1c    Plan:  1.  Benign essential hypertension: Will continue current medication, low-sodium diet advised, Counseled to regularly check BP at home with goal averaging <130/80.   2.mixed hyperlipidemia:  reviewed  fasting CMP and lipid panel.   Diet and exercise counseled,  Will continue current medications  3. impaired glucose   :   reviewed  fasting CMP  and hba1c  , diet and exercise counseled            I spent 30 minutes caring for Monica on this date of service. This time includes time spent by me in the following activities:preparing for the visit, reviewing tests, performing a medically appropriate examination and/or evaluation , counseling and educating the patient/family/caregiver, ordering medications, tests, or procedures and documenting information in the medical record  Follow Up   Return in about 4 months (around 3/6/2023).  Patient was given instructions and counseling regarding her condition or for health maintenance advice. Please see specific information pulled into the AVS if appropriate.

## 2022-11-14 ENCOUNTER — OFFICE VISIT (OUTPATIENT)
Dept: GASTROENTEROLOGY | Facility: CLINIC | Age: 77
End: 2022-11-14

## 2022-11-14 VITALS
WEIGHT: 167.4 LBS | HEIGHT: 62 IN | DIASTOLIC BLOOD PRESSURE: 72 MMHG | SYSTOLIC BLOOD PRESSURE: 120 MMHG | BODY MASS INDEX: 30.8 KG/M2

## 2022-11-14 DIAGNOSIS — K21.9 GASTROESOPHAGEAL REFLUX DISEASE WITHOUT ESOPHAGITIS: Chronic | ICD-10-CM

## 2022-11-14 DIAGNOSIS — Z86.010 PERSONAL HISTORY OF COLONIC POLYPS: ICD-10-CM

## 2022-11-14 DIAGNOSIS — Z80.0 FAMILY HISTORY OF ESOPHAGEAL CANCER: ICD-10-CM

## 2022-11-14 DIAGNOSIS — R19.5 POSITIVE COLORECTAL CANCER SCREENING USING COLOGUARD TEST: Primary | ICD-10-CM

## 2022-11-14 DIAGNOSIS — K59.00 CONSTIPATION, UNSPECIFIED CONSTIPATION TYPE: Chronic | ICD-10-CM

## 2022-11-14 DIAGNOSIS — K62.5 RECTAL BLEEDING: Chronic | ICD-10-CM

## 2022-11-14 PROCEDURE — 99214 OFFICE O/P EST MOD 30 MIN: CPT | Performed by: NURSE PRACTITIONER

## 2022-11-14 RX ORDER — SODIUM, POTASSIUM,MAG SULFATES 17.5-3.13G
SOLUTION, RECONSTITUTED, ORAL ORAL
Qty: 177 ML | Refills: 0 | Status: SHIPPED | OUTPATIENT
Start: 2022-11-14 | End: 2023-01-31 | Stop reason: HOSPADM

## 2022-11-14 RX ORDER — SODIUM CHLORIDE 9 MG/ML
70 INJECTION, SOLUTION INTRAVENOUS CONTINUOUS PRN
Status: CANCELLED | OUTPATIENT
Start: 2022-11-14

## 2022-11-14 NOTE — PATIENT INSTRUCTIONS
Antireflux measures: Avoid fried, fatty foods, alcohol, chocolate, coffee, tea,  soft drinks, peppermint and spearmint, spicy foods, tomatoes and tomato based foods, onion based foods, and smoking.   Other antireflux measures include weight reduction if overweight, avoiding tight clothing around the abdomen, elevating the head of the bed 6 inches with blocks under the head board, and don't drink or eat before going to bed and avoid lying down immediately after meals.  May take over the counter antacids as needed.   High fiber, low fat diet with liberal water intake.   Continue milk of magnesia as needed for constipation.   Avoid all dairy. May use lactose free/dairy free alternatives.   Upper endoscopy-EGD: The indications, technique, alternatives and potential risk and complications were discussed with the patient including but not limited to bleeding, perforations, missing lesions and anesthetic complications. The patient understands and wishes to proceed with the procedure and has given their verbal consent. Written patient education information was given to the patient.  Colonoscopy: The indications, technique, alternatives and potential risk and complications were discussed with the patient including but not limited to bleeding, perforations, missing lesions and anesthetic complications. The patient understands and wishes to proceed with the procedure and has given their verbal consent. Written patient education information was given to the patient.   The patient will call if they have further questions before procedure.

## 2022-11-14 NOTE — PROGRESS NOTES
Follow Up Note     Date: 2022   Patient Name: Monica Perea  MRN: [id number]  : 1945     Primary Care Provider: Nakita Crump MD     Chief Complaint   Patient presents with   • Colon Cancer Screening     History of present illness:   2022  Monica Perea is a 77 y.o. female who is here today for follow up for colon cancer screening. She had positive Cologuard test in .  She was not able to have her colonoscopy or EGD last year as she was on blood thinners and was not able to hold them according to the patient. She had Watchman placed and she is only ASA 81 mg daily now, she is not on any other blood thinners. She is not having any problems with reflux at this time. She has not had any further episodes of difficulty swallowing. Constipation is unchanged. She is taking milk of magnesia 2-3 times per week with reasonable control. She has been having occasional bright red blood in the stool. Denies abdominal pain. There is no history of nausea or vomiting.     Her last colonoscopy was in  with polyps removed. No EGD in the past. Her grandmother had colon cancer at age 49. Her mother had esophageal cancer when she passed away in her s.     Subjective      Past Medical History:   Diagnosis Date   • A-fib (HCC)    • Anemia    • Arthritis    • B12 deficiency    • Back pain    • CAD (coronary artery disease)    • Cellulitis of foot    • Colon polyp    • Dyspnea    • GERD (gastroesophageal reflux disease)    • Glaucoma    • Hematuria    • History of mammogram    • Hypercholesteremia    • Hypertension    • Iron deficiency    • Low back pain    • Menopause    • Neck strain    • Osteopenia    • Overweight    • Palpitations    • Postmenopausal atrophic vaginitis    • Sleep apnea     discontinued CPAP use 2 months ago   • Spinal headache    • Tinea pedis    • Wears eyeglasses      Past Surgical History:   Procedure Laterality Date   • APPENDECTOMY   1964   • ATRIAL APPENDAGE EXCLUSION LEFT WITH TRANSESOPHAGEAL ECHOCARDIOGRAM N/A 2022    Procedure: Atrial Appendage Occlusion on Xarelto-hold 1 day prior  2022;  Surgeon: Kip Conde MD;  Location:  [facility];  Service: Cardiology;  Laterality: N/A;   • BREAST EXCISIONAL BIOPSY Bilateral    • BREAST SURGERY     • CARDIAC ELECTROPHYSIOLOGY PROCEDURE N/A 2020    [place] [facility] [place]  Surgeon: Villa Thomas DO;  Location:  [facility];  Service: Cardiology;  Laterality: N/A;   • COLONOSCOPY          • EYE SURGERY      laser for bleed   • HYSTERECTOMY      total    • OOPHORECTOMY Bilateral    • TUBAL ABDOMINAL LIGATION       Family History   Problem Relation Age of Onset   • Heart attack Other    • Arthritis Other    • Diabetes Other    • Hypertension Other    • Hodgkin's lymphoma Other    • Esophagitis Mother    • Heart failure Mother    • Esophageal cancer Mother    • Esophagitis Maternal Aunt    • Breast cancer Maternal Aunt         DX AGE 50'S   • Colon cancer Maternal Grandmother 49   • Heart attack Father    • Breast cancer Sister 72   • Ovarian cancer Sister 74   • Stomach cancer Neg Hx    • Liver cancer Neg Hx    • Liver disease Neg Hx      Social History     Socioeconomic History   • Marital status:    Tobacco Use   • Smoking status: Former     Packs/day: 1.00     Years: 35.00     Pack years: 35.00     Types: Cigarettes     Quit date:      Years since quittin.8   • Smokeless tobacco: Never   Vaping Use   • Vaping Use: Never used   Substance and Sexual Activity   • Alcohol use: No     Comment: quit in    • Drug use: No   • Sexual activity: Defer       Current Outpatient Medications:   •  aspirin 81 MG EC tablet, Take 1 tablet by mouth Daily. Start 22, Disp: 90 tablet, Rfl: 1  •  Calcium Carbonate-Vitamin D (CALCIUM PLUS VITAMIN D PO), Take 1 tablet by mouth 2 (Two) Times a Day., Disp: , Rfl:   •   cetirizine (zyrTEC) 10 MG tablet, Take 10 mg by mouth Daily., Disp: , Rfl:   •  cholecalciferol (VITAMIN D3) 25 MCG (1000 UT) tablet, Take 1,000 Units by mouth 2 (Two) Times a Day., Disp: , Rfl:   •  clobetasol (TEMOVATE) 0.05 % ointment, APPLY  TOPICALLY TO THE APPROPRIATE AREA AS DIRECTED EVERY 12 (TWELVE) HOURS. (Patient taking differently: Apply 1 application topically to the appropriate area as directed Every 12 (Twelve) Hours.), Disp: 60 g, Rfl: 0  •  coenzyme Q10 100 MG capsule, Take 100 mg by mouth Daily., Disp: , Rfl:   •  dilTIAZem CD (CARDIZEM CD) 240 MG 24 hr capsule, Take 1 capsule by mouth Daily., Disp: 90 capsule, Rfl: 3  •  latanoprost (XALATAN) 0.005 % ophthalmic solution, Administer 1 drop to both eyes Every Night., Disp: , Rfl:   •  melatonin 5 MG tablet tablet, Take 5 mg by mouth., Disp: , Rfl:   •  metoprolol succinate XL (TOPROL-XL) 25 MG 24 hr tablet, Take 1 tablet by mouth Daily., Disp: , Rfl:   •  pravastatin (PRAVACHOL) 40 MG tablet, TAKE 1 TABLET EVERY DAY, Disp: 90 tablet, Rfl: 3  •  sodium-potassium-magnesium sulfates (Suprep Bowel Prep Kit) 17.5-3.13-1.6 GM/177ML solution oral solution, Use as directed for colonoscopy prep. Patient has instructions., Disp: 177 mL, Rfl: 0     Allergies   Allergen Reactions   • Macrobid [Nitrofurantoin Monohyd Macro] Rash   • Nitrofurantoin Hives   • Phenylephrine-Guaifenesin Hives   • Sulfa Antibiotics Hives   • Statins Myalgia     The following portions of the patient's history were reviewed and updated as appropriate: allergies, current medications, past family history, past medical history, past social history, past surgical history and problem list.  Objective     Physical Exam  Vitals and nursing note reviewed.   Constitutional:       General: She is not in acute distress.     Appearance: Normal appearance. She is well-developed.   HENT:      Head: Normocephalic and atraumatic.      Mouth/Throat:      Mouth: Mucous membranes are not pale, not dry  "and not cyanotic.   Eyes:      General: Lids are normal.   Neck:      Trachea: Trachea normal.   Cardiovascular:      Rate and Rhythm: Normal rate.   Pulmonary:      Effort: Pulmonary effort is normal. No respiratory distress.      Breath sounds: Normal breath sounds.   Abdominal:      Tenderness: There is no abdominal tenderness.   Skin:     General: Skin is warm and dry.   Neurological:      Mental Status: She is alert and oriented to person, place, and time.   Psychiatric:         Mood and Affect: Mood normal.         Speech: Speech normal.         Behavior: Behavior normal. Behavior is cooperative.       Vitals:    11/14/22 1312   BP: 120/72   Weight: 75.9 kg (167 lb 6.4 oz)   Height: 157.5 cm (62\")     Body mass index is 30.62 kg/m².     Results Review:   I reviewed the patient's new clinical results.    Office Visit on 09/28/2022   Component Date Value Ref Range Status   • WBC 10/25/2022 6.27  3.40 - 10.80 10*3/mm3 Final   • RBC 10/25/2022 3.97  3.77 - 5.28 10*6/mm3 Final   • Hemoglobin 10/25/2022 12.0  12.0 - 15.9 g/dL Final   • Hematocrit 10/25/2022 35.6  34.0 - 46.6 % Final   • MCV 10/25/2022 89.7  79.0 - 97.0 fL Final   • MCH 10/25/2022 30.2  26.6 - 33.0 pg Final   • MCHC 10/25/2022 33.7  31.5 - 35.7 g/dL Final   • RDW 10/25/2022 12.3  12.3 - 15.4 % Final   • Platelets 10/25/2022 233  140 - 450 10*3/mm3 Final   • Neutrophil Rel % 10/25/2022 47.9  42.7 - 76.0 % Final   • Lymphocyte Rel % 10/25/2022 27.1  19.6 - 45.3 % Final   • Monocyte Rel % 10/25/2022 9.9  5.0 - 12.0 % Final   • Eosinophil Rel % 10/25/2022 13.4 (H)  0.3 - 6.2 % Final   • Basophil Rel % 10/25/2022 1.4  0.0 - 1.5 % Final   • Neutrophils Absolute 10/25/2022 3.00  1.70 - 7.00 10*3/mm3 Final   • Lymphocytes Absolute 10/25/2022 1.70  0.70 - 3.10 10*3/mm3 Final   • Monocytes Absolute 10/25/2022 0.62  0.10 - 0.90 10*3/mm3 Final   • Eosinophils Absolute 10/25/2022 0.84 (H)  0.00 - 0.40 10*3/mm3 Final   • Basophils Absolute 10/25/2022 0.09  0.00 - " 0.20 10*3/mm3 Final   • Immature Granulocyte Rel % 10/25/2022 0.3  0.0 - 0.5 % Final   • Immature Grans Absolute 10/25/2022 0.02  0.00 - 0.05 10*3/mm3 Final   • nRBC 10/25/2022 0.0  0.0 - 0.2 /100 WBC Final   • Glucose 10/25/2022 98  65 - 99 mg/dL Final   • BUN 10/25/2022 15  8 - 23 mg/dL Final   • Creatinine 10/25/2022 0.92  0.57 - 1.00 mg/dL Final   • EGFR Result 10/25/2022 65.1  >60.0 mL/min/1.73 Final    Comment: National Kidney Foundation and American Society of  Nephrology (ASN) Task Force recommended calculation based  on the Chronic Kidney Disease Epidemiology Collaboration  (CKD-EPI) equation refit without adjustment for race.  GFR Normal >60  Chronic Kidney Disease <60  Kidney Failure <15  The GFR formula is only valid for adults with stable renal  function between ages 18 and 70.     • BUN/Creatinine Ratio 10/25/2022 16.3  7.0 - 25.0 Final   • Sodium 10/25/2022 144  136 - 145 mmol/L Final   • Potassium 10/25/2022 4.2  3.5 - 5.2 mmol/L Final   • Chloride 10/25/2022 106  98 - 107 mmol/L Final   • Total CO2 10/25/2022 27.8  22.0 - 29.0 mmol/L Final   • Calcium 10/25/2022 9.7  8.6 - 10.5 mg/dL Final   • Total Protein 10/25/2022 7.0  6.0 - 8.5 g/dL Final   • Albumin 10/25/2022 4.40  3.50 - 5.20 g/dL Final   • Globulin 10/25/2022 2.6  gm/dL Final   • A/G Ratio 10/25/2022 1.7  g/dL Final   • Total Bilirubin 10/25/2022 0.3  0.0 - 1.2 mg/dL Final   • Alkaline Phosphatase 10/25/2022 86  39 - 117 U/L Final   • AST (SGOT) 10/25/2022 18  1 - 32 U/L Final   • ALT (SGPT) 10/25/2022 13  1 - 33 U/L Final   • Total Cholesterol 10/25/2022 184  0 - 200 mg/dL Final    Comment: Cholesterol Reference Ranges  (U.S. Department of Health and Human Services ATP III  Classifications)  Desirable          <200 mg/dL  Borderline High    200-239 mg/dL  High Risk          >240 mg/dL  Triglyceride Reference Ranges  (U.S. Department of Health and Human Services ATP III  Classifications)  Normal           <150 mg/dL  Borderline High   150-199 mg/dL  High             200-499 mg/dL  Very High        >500 mg/dL  HDL Reference Ranges  (U.S. Department of Health and Human Services ATP III  Classifications)  Low     <40 mg/dl (major risk factor for CHD)  High    >60 mg/dl ('negative' risk factor for CHD)  LDL Reference Ranges  (U.S. Department of Health and Human Services ATP III  Classifications)  Optimal          <100 mg/dL  Near Optimal     100-129 mg/dL  Borderline High  130-159 mg/dL  High             160-189 mg/dL  Very High        >189 mg/dL     • Triglycerides 10/25/2022 77  0 - 150 mg/dL Final   • HDL Cholesterol 10/25/2022 65 (H)  40 - 60 mg/dL Final   • VLDL Cholesterol Jakub 10/25/2022 14  5 - 40 mg/dL Final   • LDL Chol Calc ([facility]) 10/25/2022 105 (H)  0 - 100 mg/dL Final   Office Visit on 09/14/2022   Component Date Value Ref Range Status   • Color 09/14/2022 Yellow  Yellow, Straw, Dark Yellow, Itzel Final   • Clarity, UA 09/14/2022 Cloudy (A)  Clear Final   • Specific Gravity  09/14/2022 1.015  1.005 - 1.030 Final   • pH, Urine 09/14/2022 7.5  5.0 - 8.0 Final   • Leukocytes 09/14/2022 Large (3+) (A)  Negative Final   • Nitrite, UA 09/14/2022 Negative  Negative Final   • Protein, POC 09/14/2022 1+ (A)  Negative mg/dL Final   • Glucose, UA 09/14/2022 Negative  Negative mg/dL Final   • Ketones, UA 09/14/2022 Negative  Negative Final   • Urobilinogen, UA 09/14/2022 Normal  Normal, 0.2 E.U./dL Final   • Bilirubin 09/14/2022 Negative  Negative Final   • Blood, UA 09/14/2022 1+ (A)  Negative Final   • Lot Number 09/14/2022 [id number]   Final   • Expiration Date 09/14/2022 12/11/2023   Final   • Urine Culture 09/14/2022 Final report (A)   Final   • Result 1 09/14/2022 Escherichia coli (A)   Final    Comment: Cefazolin <=4 ug/mL  Cefazolin with an FRANCISCO <=16 predicts susceptibility to the oral agents  cefaclor, cefdinir, cefpodoxime, cefprozil, cefuroxime, cephalexin,  and loracarbef when used for therapy of uncomplicated urinary tract  infections  due to E. coli, Klebsiella pneumoniae, and Proteus  mirabilis.  25,000-50,000 colony forming units per mL     • Susceptibility Testing 09/14/2022 Comment   Final    Comment:       ** S = Susceptible; I = Intermediate; R = Resistant **                     P = Positive; N = Negative              MICS are expressed in micrograms per mL     Antibiotic                 RSLT#1    RSLT#2    RSLT#3    RSLT#4  Amoxicillin/Clavulanic Acid    S  Ampicillin                     R  Cefepime                       S  Ceftriaxone                    S  Cefuroxime                     S  Ciprofloxacin                  S  Ertapenem                      S  Gentamicin                     R  Imipenem                       S  Levofloxacin                   S  Meropenem                      S  Nitrofurantoin                 S  Piperacillin/Tazobactam        S  Tetracycline                   S  Tobramycin                     I  Trimethoprim/Sulfa             R        Cologuard dated 8/6/2021 Positive     Colonoscopy dated 7/18/2016 per Dr. Alan Cormier  1.  Left-sided diverticulosis.    2.  Colon polyps.    3.  Internal hemorrhoids.  - Rectal polyp biopsy with hyperplastic polyps.    Assessment / Plan      1. Positive colorectal cancer screening using Cologuard test  2. Personal history of colonic polyps  The patient's last colonoscopy was in 2016 with polyps removed.  She had positive Cologuard test in August 2021.  Her grandmother had colon cancer diagnosed at age 49.  Colonoscopy for surveillance.    - Case Request  - sodium-potassium-magnesium sulfates (Suprep Bowel Prep Kit) 17.5-3.13-1.6 GM/177ML solution oral solution; Use as directed for colonoscopy prep. Patient has instructions.  Dispense: 177 mL; Refill: 0    3. Constipation, unspecified constipation type  4. Rectal bleeding  She has a long standing history of constipation that is reasonably controlled with milk of magnesia 2-3 times per week. She has occasional bright red blood in  the stool. She is no longer on Xarelto, only ASA. Denies abdominal pain. No history of anemia.   High fiber, low fat diet with liberal water intake.   Continue Milk of mag as needed.   She is due for colonoscopy, will schedule.    5. Gastroesophageal reflux disease without esophagitis  6. Family history of esophageal cancer  She has a history of reflux for many years that is doing ok at this time. She is not taking anything for reflux as she has not needed it. Denies difficulty swallowing. Her mother had esophageal cancer when she passed away in her 90's. The patient has not had an EGD in the past.  EGD to rule out Kathleen's.     - Case Request    Patient Instructions   1. Antireflux measures: Avoid fried, fatty foods, alcohol, chocolate, coffee, tea,  soft drinks, peppermint and spearmint, spicy foods, tomatoes and tomato based foods, onion based foods, and smoking.   2. Other antireflux measures include weight reduction if overweight, avoiding tight clothing around the abdomen, elevating the head of the bed 6 inches with blocks under the head board, and don't drink or eat before going to bed and avoid lying down immediately after meals.  3. May take over the counter antacids as needed.   4. High fiber, low fat diet with liberal water intake.   5. Continue milk of magnesia as needed for constipation.   6. Avoid all dairy. May use lactose free/dairy free alternatives.   7. Upper endoscopy-EGD: The indications, technique, alternatives and potential risk and complications were discussed with the patient including but not limited to bleeding, perforations, missing lesions and anesthetic complications. The patient understands and wishes to proceed with the procedure and has given their verbal consent. Written patient education information was given to the patient.  8. Colonoscopy: The indications, technique, alternatives and potential risk and complications were discussed with the patient including but not limited to  bleeding, perforations, missing lesions and anesthetic complications. The patient understands and wishes to proceed with the procedure and has given their verbal consent. Written patient education information was given to the patient.   9. The patient will call if they have further questions before procedure.      Darrell Bragg, APRN  11/14/2022    Please note that portions of this note were completed with a voice recognition program.

## 2022-11-30 ENCOUNTER — OFFICE VISIT (OUTPATIENT)
Dept: PULMONOLOGY | Facility: CLINIC | Age: 77
End: 2022-11-30

## 2022-11-30 VITALS
OXYGEN SATURATION: 96 % | HEART RATE: 60 BPM | WEIGHT: 162.2 LBS | HEIGHT: 62 IN | BODY MASS INDEX: 29.85 KG/M2 | SYSTOLIC BLOOD PRESSURE: 125 MMHG | RESPIRATION RATE: 18 BRPM | DIASTOLIC BLOOD PRESSURE: 72 MMHG

## 2022-11-30 DIAGNOSIS — Z87.891 PERSONAL HISTORY OF TOBACCO USE, PRESENTING HAZARDS TO HEALTH: ICD-10-CM

## 2022-11-30 DIAGNOSIS — Z86.16 HISTORY OF COVID-19: ICD-10-CM

## 2022-11-30 DIAGNOSIS — G47.33 OBSTRUCTIVE SLEEP APNEA: Primary | ICD-10-CM

## 2022-11-30 DIAGNOSIS — J45.40 MODERATE PERSISTENT ASTHMA WITHOUT COMPLICATION: ICD-10-CM

## 2022-11-30 PROCEDURE — 99214 OFFICE O/P EST MOD 30 MIN: CPT | Performed by: INTERNAL MEDICINE

## 2022-11-30 RX ORDER — FLUTICASONE FUROATE 100 UG/1
1 POWDER RESPIRATORY (INHALATION) DAILY
Qty: 30 EACH | Refills: 5 | Status: SHIPPED | OUTPATIENT
Start: 2022-11-30 | End: 2023-03-03

## 2022-11-30 RX ORDER — HYDROCHLOROTHIAZIDE 12.5 MG/1
12.5 TABLET ORAL DAILY
COMMUNITY
Start: 2022-11-21

## 2022-11-30 RX ORDER — TIOTROPIUM BROMIDE INHALATION SPRAY 1.56 UG/1
2 SPRAY, METERED RESPIRATORY (INHALATION) DAILY
Qty: 1 EACH | Refills: 5 | Status: SHIPPED | OUTPATIENT
Start: 2022-11-30 | End: 2023-03-30 | Stop reason: SDUPTHER

## 2022-12-06 RX ORDER — LEVALBUTEROL TARTRATE 45 UG/1
2 AEROSOL, METERED ORAL 4 TIMES DAILY PRN
Qty: 15 G | Refills: 5 | Status: SHIPPED | OUTPATIENT
Start: 2022-12-06 | End: 2023-03-07

## 2022-12-06 NOTE — TELEPHONE ENCOUNTER
Patient called and wants to try a cheaper medicine other than atrovent. DR العراقي has recommended Xopenex

## 2023-01-13 ENCOUNTER — TRANSCRIBE ORDERS (OUTPATIENT)
Dept: ADMINISTRATIVE | Facility: HOSPITAL | Age: 78
End: 2023-01-13
Payer: MEDICARE

## 2023-01-13 DIAGNOSIS — Z12.31 VISIT FOR SCREENING MAMMOGRAM: Primary | ICD-10-CM

## 2023-01-31 ENCOUNTER — HOSPITAL ENCOUNTER (OUTPATIENT)
Facility: HOSPITAL | Age: 78
Setting detail: HOSPITAL OUTPATIENT SURGERY
Discharge: HOME | End: 2023-01-31
Attending: INTERNAL MEDICINE | Admitting: INTERNAL MEDICINE
Payer: MEDICARE

## 2023-01-31 ENCOUNTER — ANESTHESIA (OUTPATIENT)
Dept: GASTROENTEROLOGY | Facility: HOSPITAL | Age: 78
End: 2023-01-31
Payer: MEDICARE

## 2023-01-31 ENCOUNTER — ANESTHESIA EVENT (OUTPATIENT)
Dept: GASTROENTEROLOGY | Facility: HOSPITAL | Age: 78
End: 2023-01-31
Payer: MEDICARE

## 2023-01-31 VITALS
TEMPERATURE: 97 F | SYSTOLIC BLOOD PRESSURE: 122 MMHG | DIASTOLIC BLOOD PRESSURE: 69 MMHG | WEIGHT: 167 LBS | HEART RATE: 60 BPM | OXYGEN SATURATION: 96 % | BODY MASS INDEX: 31.53 KG/M2 | HEIGHT: 61 IN | RESPIRATION RATE: 16 BRPM

## 2023-01-31 DIAGNOSIS — K21.9 GASTROESOPHAGEAL REFLUX DISEASE WITHOUT ESOPHAGITIS: ICD-10-CM

## 2023-01-31 DIAGNOSIS — Z86.010 PERSONAL HISTORY OF COLONIC POLYPS: ICD-10-CM

## 2023-01-31 DIAGNOSIS — D64.9 ANEMIA: ICD-10-CM

## 2023-01-31 DIAGNOSIS — Z80.0 FAMILY HISTORY OF ESOPHAGEAL CANCER: ICD-10-CM

## 2023-01-31 DIAGNOSIS — R19.5 POSITIVE COLORECTAL CANCER SCREENING USING COLOGUARD TEST: ICD-10-CM

## 2023-01-31 LAB — KOH PREP NAIL: NORMAL

## 2023-01-31 PROCEDURE — 45380 COLONOSCOPY AND BIOPSY: CPT | Performed by: INTERNAL MEDICINE

## 2023-01-31 PROCEDURE — 43239 EGD BIOPSY SINGLE/MULTIPLE: CPT | Performed by: INTERNAL MEDICINE

## 2023-01-31 PROCEDURE — 87220 TISSUE EXAM FOR FUNGI: CPT | Performed by: INTERNAL MEDICINE

## 2023-01-31 PROCEDURE — 88305 TISSUE EXAM BY PATHOLOGIST: CPT

## 2023-01-31 PROCEDURE — 25010000002 PROPOFOL 200 MG/20ML EMULSION: Performed by: NURSE ANESTHETIST, CERTIFIED REGISTERED

## 2023-01-31 RX ORDER — SIMETHICONE 20 MG/.3ML
EMULSION ORAL AS NEEDED
Status: DISCONTINUED | OUTPATIENT
Start: 2023-01-31 | End: 2023-01-31 | Stop reason: HOSPADM

## 2023-01-31 RX ORDER — SODIUM CHLORIDE 9 MG/ML
70 INJECTION, SOLUTION INTRAVENOUS CONTINUOUS PRN
Status: DISCONTINUED | OUTPATIENT
Start: 2023-01-31 | End: 2023-01-31 | Stop reason: HOSPADM

## 2023-01-31 RX ORDER — LIDOCAINE HYDROCHLORIDE 20 MG/ML
INJECTION, SOLUTION INTRAVENOUS AS NEEDED
Status: DISCONTINUED | OUTPATIENT
Start: 2023-01-31 | End: 2023-01-31 | Stop reason: SURG

## 2023-01-31 RX ORDER — PROPOFOL 10 MG/ML
INJECTION, EMULSION INTRAVENOUS AS NEEDED
Status: DISCONTINUED | OUTPATIENT
Start: 2023-01-31 | End: 2023-01-31 | Stop reason: SURG

## 2023-01-31 RX ADMIN — PROPOFOL 25 MG: 10 INJECTION, EMULSION INTRAVENOUS at 09:53

## 2023-01-31 RX ADMIN — PROPOFOL 25 MG: 10 INJECTION, EMULSION INTRAVENOUS at 10:01

## 2023-01-31 RX ADMIN — SODIUM CHLORIDE 70 ML/HR: 9 INJECTION, SOLUTION INTRAVENOUS at 08:11

## 2023-01-31 RX ADMIN — PROPOFOL 25 MG: 10 INJECTION, EMULSION INTRAVENOUS at 10:10

## 2023-01-31 RX ADMIN — LIDOCAINE HYDROCHLORIDE 100 MG: 20 INJECTION, SOLUTION INTRAVENOUS at 09:41

## 2023-01-31 RX ADMIN — PROPOFOL 25 MG: 10 INJECTION, EMULSION INTRAVENOUS at 09:48

## 2023-01-31 RX ADMIN — PROPOFOL 100 MG: 10 INJECTION, EMULSION INTRAVENOUS at 09:41

## 2023-01-31 NOTE — ANESTHESIA PREPROCEDURE EVALUATION
Anesthesia Evaluation     Patient summary reviewed and Nursing notes reviewed   NPO Solid Status: > 8 hours  NPO Liquid Status: > 8 hours           Airway   Mallampati: II  TM distance: >3 FB  Neck ROM: full  Possible difficult intubation  Dental - normal exam     Pulmonary - normal exam   (+) shortness of breath, sleep apnea,   Cardiovascular - normal exam    ECG reviewed  Patient on routine beta blocker    (+) pacemaker interrogated 6-12 months ago, hypertension, valvular problems/murmurs MVP, CAD, dysrhythmias Atrial Fib, Paroxysmal Atrial Fib, hyperlipidemia,       Neuro/Psych  (+) headaches, psychiatric history Anxiety,    GI/Hepatic/Renal/Endo    (+)  GERD, GI bleeding lower ,     Musculoskeletal     (+) back pain,   Abdominal    Substance History - negative use     OB/GYN          Other   arthritis,      ROS/Med Hx Other: Watchman Flex Occlusive Device Implant    Echo 4/4/2022:  ? 20 mm watchman FLX left atrial appendage occlusion device in place. On the mitral valve border of the device there is a 2.5 mm area of flow noted, does not come communicate posteriorly with the appendage. Left atrial appendage appears adequately sealed. No device associated thrombus.  ? Left ventricular ejection fraction appears to be 51 - 55%. Left ventricular systolic function is normal.  ? Mild to moderate mitral valve regurgitation is present  ? Small residual iatrogenic ASD with left-to-right flow noted. This is noted after recent transseptal puncture.  ? There is moderate, (grade 3) plaque in the aortic arch present                    Anesthesia Plan    ASA 3     MAC     (Risks and benefits discussed including risk of aspiration, recall and dental damage. All patient questions answered.    Will continue with plan of care.)  intravenous induction     Anesthetic plan, risks, benefits, and alternatives have been provided, discussed and informed consent has been obtained with: patient.  Pre-procedure education provided  Plan  discussed with CRNA.        CODE STATUS:

## 2023-02-01 LAB — REF LAB TEST METHOD: NORMAL

## 2023-02-14 ENCOUNTER — HOSPITAL ENCOUNTER (OUTPATIENT)
Dept: MAMMOGRAPHY | Facility: HOSPITAL | Age: 78
Discharge: HOME | End: 2023-02-14
Admitting: INTERNAL MEDICINE
Payer: MEDICARE

## 2023-02-14 DIAGNOSIS — Z12.31 VISIT FOR SCREENING MAMMOGRAM: ICD-10-CM

## 2023-02-14 PROCEDURE — 77063 BREAST TOMOSYNTHESIS BI: CPT

## 2023-02-14 PROCEDURE — 77063 BREAST TOMOSYNTHESIS BI: CPT | Performed by: RADIOLOGY

## 2023-02-14 PROCEDURE — 77067 SCR MAMMO BI INCL CAD: CPT

## 2023-02-14 PROCEDURE — 77067 SCR MAMMO BI INCL CAD: CPT | Performed by: RADIOLOGY

## 2023-03-03 ENCOUNTER — OFFICE VISIT (OUTPATIENT)
Dept: PULMONOLOGY | Facility: CLINIC | Age: 78
End: 2023-03-03
Payer: MEDICARE

## 2023-03-03 VITALS
BODY MASS INDEX: 32.1 KG/M2 | WEIGHT: 170 LBS | SYSTOLIC BLOOD PRESSURE: 126 MMHG | HEART RATE: 60 BPM | HEIGHT: 61 IN | OXYGEN SATURATION: 95 % | DIASTOLIC BLOOD PRESSURE: 66 MMHG

## 2023-03-03 DIAGNOSIS — J30.9 ALLERGIC RHINITIS, UNSPECIFIED SEASONALITY, UNSPECIFIED TRIGGER: ICD-10-CM

## 2023-03-03 DIAGNOSIS — J45.30 MILD PERSISTENT ASTHMA WITHOUT COMPLICATION: Primary | ICD-10-CM

## 2023-03-03 DIAGNOSIS — Z99.89 OSA ON CPAP: ICD-10-CM

## 2023-03-03 DIAGNOSIS — G47.33 OSA ON CPAP: ICD-10-CM

## 2023-03-03 PROCEDURE — 99213 OFFICE O/P EST LOW 20 MIN: CPT | Performed by: NURSE PRACTITIONER

## 2023-03-03 PROCEDURE — 3074F SYST BP LT 130 MM HG: CPT | Performed by: NURSE PRACTITIONER

## 2023-03-03 PROCEDURE — 3078F DIAST BP <80 MM HG: CPT | Performed by: NURSE PRACTITIONER

## 2023-03-03 RX ORDER — HYDRALAZINE HYDROCHLORIDE 25 MG/1
TABLET, FILM COATED ORAL
COMMUNITY
Start: 2023-02-07

## 2023-03-03 RX ORDER — METOPROLOL SUCCINATE 50 MG/1
TABLET, EXTENDED RELEASE ORAL
COMMUNITY
Start: 2023-02-06 | End: 2023-03-07

## 2023-03-03 NOTE — PROGRESS NOTES
Follow Up Office Visit      Patient Name: Monica Perea    Chief Complaint:    Chief Complaint   Patient presents with   • Follow-up   • Shortness of Breath       History of Present Illness: Monica Perea is a 78 y.o. female who is here today for follow up of asthma and obstructive sleep apnea.    The patient reports that she remains compliant with CPAP use. She states she began seeing Dr. العراقي in 2022 after bruce COVID-19 in 08/2022. The patient reports she is utilizing her inhalers. She states she utilizes Spiriva once daily instead of twice daily. The patient reports she is no longer utilizing Arnuity. She states she ran out of her Arnuity in 12/2022. The patient reports she has never had to utilize her rescue inhaler. She adds that she can not afford the Arnuity. She states the only thing that sets her symptoms off is exposure to cleaning products. The patient reports she has to wear a mask when she cleans.    The patient reports she has glaucoma and has an appointment with her eye doctor at the end of 03/2023. Stable vision.    The patient denies utilizing oxygen at home.    She states she has seasonal allergies. The patient denies wheezing. The patient reports she experiences shortness of breath with exertion. She states she has been experiencing shortness of breath since having her pacemaker placed.    Subjective      Review of Systems:  Review of Systems   Constitutional: Negative for fever and unexpected weight change.   Respiratory: Positive for shortness of breath. Negative for cough and wheezing.    Cardiovascular: Negative for chest pain and leg swelling.   Allergic/Immunologic: Positive for environmental allergies.        Past Medical History:   Past Medical History:   Diagnosis Date   • A-fib (HCC)    • Anemia 2008   • Arthritis    • Asthma    • B12 deficiency 2008   • Back pain 2010   • CAD (coronary artery disease)    • Cellulitis of foot    • Colon polyp 2016   •  Dyspnea    • GERD (gastroesophageal reflux disease)    • Glaucoma 2016   • Hematuria    • History of mammogram    • Hypercholesteremia 2005   • Hypertension    • Iron deficiency    • Low back pain    • Menopause    • Neck strain    • Osteopenia    • Overweight    • Palpitations    • Postmenopausal atrophic vaginitis    • Sleep apnea 2012    discontinued CPAP use 2 months ago   • Spinal headache    • Tinea pedis    • Wears eyeglasses        Past Surgical History:   Past Surgical History:   Procedure Laterality Date   • APPENDECTOMY  1964   • ATRIAL APPENDAGE EXCLUSION LEFT WITH TRANSESOPHAGEAL ECHOCARDIOGRAM N/A 4/4/2022    Procedure: Atrial Appendage Occlusion on Xarelto-hold 1 day prior  4/2022;  Surgeon: Kip Conde MD;  Location:  [facility];  Service: Cardiology;  Laterality: N/A;   • BREAST EXCISIONAL BIOPSY Bilateral 1990'S   • BREAST SURGERY     • CARDIAC ELECTROPHYSIOLOGY PROCEDURE N/A 04/28/2020    Procedure: Pacemaker DC new. [facility] [place];  Surgeon: Villa Thomas DO;  Location:  [facility];  Service: Cardiology;  Laterality: N/A;   • COLONOSCOPY  2016        • COLONOSCOPY N/A 1/31/2023    Procedure: COLONOSCOPY with biopsy;  Surgeon: Ferny Wade MD;  Location: [facility] END[facility]COPY;  Service: Gastroenterology;  Laterality: N/A;   • ENDOSCOPY N/A 1/31/2023    Procedure: ESOPHAGOGASTRODUODENOSCOPY WITH BIOPSY AND BRUSHING;  Surgeon: Ferny Wade MD;  Location: [facility] ENDOSCOPY;  Service: Gastroenterology;  Laterality: N/A;   • EYE SURGERY      laser for bleed   • HYSTERECTOMY  2000    total    • OOPHORECTOMY Bilateral 2000   • TUBAL ABDOMINAL LIGATION  1974       Family History:   Family History   Problem Relation Age of Onset   • Heart attack Other    • Arthritis Other    • Diabetes Other    • Hypertension Other    • Hodgkin's lymphoma Other    • Esophagitis Mother    • Heart failure Mother    • Esophageal cancer Mother    • Esophagitis Maternal Aunt    •  Breast cancer Maternal Aunt         DX AGE 50'S   • Colon cancer Maternal Grandmother 49   • Heart attack Father    • Breast cancer Sister 72   • Ovarian cancer Sister 74   • Stomach cancer Neg Hx    • Liver cancer Neg Hx    • Liver disease Neg Hx        Social History:   Social History     Socioeconomic History   • Marital status:    Tobacco Use   • Smoking status: Former     Packs/day: 1.00     Years: 35.00     Pack years: 35.00     Types: Cigarettes     Quit date:      Years since quittin.2   • Smokeless tobacco: Never   Vaping Use   • Vaping Use: Never used   Substance and Sexual Activity   • Alcohol use: No     Comment: quit in    • Drug use: No   • Sexual activity: Defer       Current Medications:     Current Outpatient Medications:   •  aspirin 81 MG EC tablet, Take 1 tablet by mouth Daily. Start 22, Disp: 90 tablet, Rfl: 1  •  Calcium Carbonate-Vitamin D (CALCIUM PLUS VITAMIN D PO), Take 1 tablet by mouth 2 (Two) Times a Day., Disp: , Rfl:   •  cetirizine (zyrTEC) 10 MG tablet, Take 1 tablet by mouth Daily., Disp: , Rfl:   •  cholecalciferol (VITAMIN D3) 25 MCG (1000 UT) tablet, Take 1 tablet by mouth Daily., Disp: , Rfl:   •  clobetasol (TEMOVATE) 0.05 % ointment, APPLY  TOPICALLY TO THE APPROPRIATE AREA AS DIRECTED EVERY 12 (TWELVE) HOURS. (Patient taking differently: Apply 1 application topically to the appropriate area as directed Every 12 (Twelve) Hours.), Disp: 60 g, Rfl: 0  •  coenzyme Q10 100 MG capsule, Take 1 capsule by mouth Daily., Disp: , Rfl:   •  dilTIAZem CD (CARDIZEM CD) 240 MG 24 hr capsule, Take 1 capsule by mouth Daily., Disp: 90 capsule, Rfl: 3  •  hydrALAZINE (APRESOLINE) 25 MG tablet, , Disp: , Rfl:   •  hydroCHLOROthiazide (HYDRODIURIL) 12.5 MG tablet, Take 1 tablet by mouth Daily., Disp: , Rfl:   •  latanoprost (XALATAN) 0.005 % ophthalmic solution, Administer 1 drop to both eyes Every Night., Disp: , Rfl:   •  melatonin 5 MG tablet tablet, Take 1 tablet by  "mouth., Disp: , Rfl:   •  Tiotropium Bromide Monohydrate (Spiriva Respimat) 1.25 MCG/ACT aerosol solution inhaler, Inhale 2 puffs Daily., Disp: 1 each, Rfl: 5  •  D-MANNOSE PO, Take  by mouth., Disp: , Rfl:   •  metoprolol succinate XL (TOPROL-XL) 25 MG 24 hr tablet, Take 1 tablet by mouth Daily., Disp: , Rfl:   •  pravastatin (PRAVACHOL) 40 MG tablet, Take 1 tablet by mouth Daily., Disp: 90 tablet, Rfl: 2     Allergies:   Allergies   Allergen Reactions   • Macrobid [Nitrofurantoin Monohyd Macro] Rash   • Nitrofurantoin Hives   • Phenylephrine-Guaifenesin Hives   • Sulfa Antibiotics Hives   • Statins Myalgia       Objective     Physical Exam:  Vital Signs:   Vitals:    03/03/23 1119   BP: 126/66   BP Location: Left arm   Patient Position: Sitting   Cuff Size: Adult   Pulse: 60   SpO2: 95%   Weight: 77.1 kg (170 lb)   Height: 154.9 cm (61\")     Body mass index is 32.12 kg/m².    Physical Exam  Constitutional:       General: She is not in acute distress.     Appearance: She is not toxic-appearing.   HENT:      Head: Normocephalic and atraumatic.   Eyes:      Extraocular Movements: Extraocular movements intact.      Conjunctiva/sclera: Conjunctivae normal.      Pupils: Pupils are equal, round, and reactive to light.   Cardiovascular:      Rate and Rhythm: Normal rate.   Pulmonary:      Effort: Pulmonary effort is normal.      Breath sounds: Normal breath sounds.   Abdominal:      General: There is no distension.   Musculoskeletal:         General: No swelling.      Cervical back: Neck supple.   Skin:     General: Skin is warm and dry.      Findings: No rash.   Neurological:      General: No focal deficit present.      Mental Status: She is alert and oriented to person, place, and time.   Psychiatric:         Mood and Affect: Mood normal.         Behavior: Behavior normal.       Assessment / Plan      Assessment/Plan:   Diagnoses and all orders for this visit:    1. Mild persistent asthma without complication " (Primary)  Ran out of Arnuity and did not refill the inhaler due to cost. Has been doing well without an ICS currently. Advised her to use Spiriva 2 puffs once daily. Reevaluate inhaled regimen at next visit.    2. Allergic rhinitis, unspecified seasonality, unspecified trigger  Continues on an oral antihistamine. Advised patient that use of Singulair may need to be considered in the future.     3. MOON on CPAP  Continue on CPAP nightly.      Follow Up:   July 2023 as previously scheduled  The patient was counseled on diagnostic results, risks and benefits of treatment options, risk factor modifications and the importance of treatment compliance. The patient was advised to contact the clinic with concerns or worsening symptoms.     TERRY Wisdom   Pulmonary Medicine [place]       Transcribed from ambient dictation for TERRY Wisdom by Suad Mckenna.  03/03/23   13:43 EST    Patient or patient representative verbalized consent to the visit recording.  I have personally performed the services described in this document as transcribed by the above individual, and it is both accurate and complete.

## 2023-03-07 ENCOUNTER — OFFICE VISIT (OUTPATIENT)
Dept: INTERNAL MEDICINE | Facility: CLINIC | Age: 78
End: 2023-03-07
Payer: MEDICARE

## 2023-03-07 VITALS
WEIGHT: 169 LBS | DIASTOLIC BLOOD PRESSURE: 81 MMHG | TEMPERATURE: 97.1 F | HEIGHT: 61 IN | SYSTOLIC BLOOD PRESSURE: 137 MMHG | BODY MASS INDEX: 31.91 KG/M2 | RESPIRATION RATE: 16 BRPM | OXYGEN SATURATION: 98 % | HEART RATE: 61 BPM

## 2023-03-07 DIAGNOSIS — I10 BENIGN ESSENTIAL HYPERTENSION: Primary | ICD-10-CM

## 2023-03-07 DIAGNOSIS — R53.81 MALAISE AND FATIGUE: ICD-10-CM

## 2023-03-07 DIAGNOSIS — R73.01 IMPAIRED FASTING GLUCOSE: ICD-10-CM

## 2023-03-07 DIAGNOSIS — E78.2 MIXED HYPERLIPIDEMIA: ICD-10-CM

## 2023-03-07 DIAGNOSIS — R53.83 MALAISE AND FATIGUE: ICD-10-CM

## 2023-03-07 PROCEDURE — 1160F RVW MEDS BY RX/DR IN RCRD: CPT | Performed by: INTERNAL MEDICINE

## 2023-03-07 PROCEDURE — 3079F DIAST BP 80-89 MM HG: CPT | Performed by: INTERNAL MEDICINE

## 2023-03-07 PROCEDURE — 99214 OFFICE O/P EST MOD 30 MIN: CPT | Performed by: INTERNAL MEDICINE

## 2023-03-07 PROCEDURE — 3075F SYST BP GE 130 - 139MM HG: CPT | Performed by: INTERNAL MEDICINE

## 2023-03-07 PROCEDURE — 1159F MED LIST DOCD IN RCRD: CPT | Performed by: INTERNAL MEDICINE

## 2023-03-07 RX ORDER — PRAVASTATIN SODIUM 40 MG
40 TABLET ORAL DAILY
Qty: 90 TABLET | Refills: 2 | Status: SHIPPED | OUTPATIENT
Start: 2023-03-07

## 2023-03-07 NOTE — PROGRESS NOTES
"Chief Complaint  Hypertension (fasting) and Hyperlipidemia    Subjective        Monica Perea presents to [facility]  History of Present Illness  HPI: Patient is here to follow up on the blood pressure  The patient is taking the blood pressure medications as prescribed and has had no side effects. The patient is also here to follow up on the cholesterol and is trying to follow a diet. The patient is  due to get lab work done .  Patient complains of fatigue, the patient also needs refills on medications .   Hyperlipidemia   Pertinent negatives include no chest pain or shortness of breath.   Hypertension   Pertinent negatives include no chest pain, palpitations or shortness of breath.    Objective   Vital Signs:  /81   Pulse 61   Temp 97.1 °F (36.2 °C)   Resp 16   Ht 154.9 cm (60.98\")   Wt 76.7 kg (169 lb)   SpO2 98%   BMI 31.95 kg/m²   Estimated body mass index is 31.95 kg/m² as calculated from the following:    Height as of this encounter: 154.9 cm (60.98\").    Weight as of this encounter: 76.7 kg (169 lb).       BMI is >= 30 and <35. (Class 1 Obesity). The following options were offered after discussion;: weight loss educational material (shared in after visit summary), exercise counseling/recommendations and nutrition counseling/recommendations      Physical Exam  Vitals and nursing note reviewed.   Constitutional:       General: She is not in acute distress.     Appearance: Normal appearance. She is not diaphoretic.   HENT:      Head: Normocephalic and atraumatic.      Right Ear: External ear normal.      Left Ear: External ear normal.      Nose: Nose normal.   Eyes:      Extraocular Movements: Extraocular movements intact.      Conjunctiva/sclera: Conjunctivae normal.   Neck:      Trachea: Trachea normal.   Cardiovascular:      Rate and Rhythm: Normal rate and regular rhythm.      Heart sounds: Normal heart sounds.   Pulmonary:      Effort: Pulmonary effort " is normal. No respiratory distress.      Breath sounds: Normal breath sounds.   Abdominal:      General: Abdomen is flat.   Musculoskeletal:      Cervical back: Neck supple.      Comments: Moves all limbs   Skin:     General: Skin is warm.   Neurological:      Mental Status: She is alert and oriented to person, place, and time.      Comments: No gross motor or sensory deficits        Result Review :    Common labs    Common Labs 5/9/22 5/9/22 5/9/22 5/9/22 10/25/22 10/25/22 10/25/22 3/7/23 3/7/23 3/7/23 3/7/23    0853 0853 0853 0853 0838 0838 0838 1124 1124 1124 1124   Glucose  110 (A)    98   106 (A)     BUN  19    15   15     Creatinine  0.91    0.92   0.95     Sodium  141    144   141     Potassium  4.4    4.2   3.9     Chloride  102    106   101     Calcium  10.0    9.7   9.8     Total Protein  7.1    7.0   7.5     Albumin  4.50    4.40   4.5     Total Bilirubin  0.3    0.3   0.4     Alkaline Phosphatase  91    86   97     AST (SGOT)  16    18   13     ALT (SGPT)  13    13   12     WBC 5.85    6.27   6.04      Hemoglobin 13.0    12.0   12.9      Hematocrit 40.7    35.6   39.9      Platelets 212    233   215      Total Cholesterol   177    184   181    Triglycerides   85    77   108    HDL Cholesterol   60    65 (A)   61 (A)    LDL Cholesterol    101 (A)    105 (A)   101 (A)    Hemoglobin A1C    5.70 (A)       5.60   (A) Abnormal value       Comments are available for some flowsheets but are not being displayed.                        Assessment and Plan   Diagnoses and all orders for this visit:    1. Benign essential hypertension (Primary)    2. Mixed hyperlipidemia  -     CBC & Differential  -     Comprehensive Metabolic Panel  -     Lipid Panel  -     pravastatin (PRAVACHOL) 40 MG tablet; Take 1 tablet by mouth Daily.  Dispense: 90 tablet; Refill: 2    3. Impaired fasting glucose  -     Hemoglobin A1c    4. Malaise and fatigue  -     TSH    Plan:  1.  Benign essential hypertension: Will continue current  medication, low-sodium diet advised, Counseled to regularly check BP at home with goal averaging <130/80.  Has appointment to see cardiology  2.mixed hyperlipidemia: Reviewed fasting CMP and lipid panel.  Diet and exercise counseled,  Will continue current medications  3. impaired glucose   :    Reviewed fasting CMP  and hba1c  , diet and exercise counseled  4.  Fatigue: We will obtain tsh          I spent 30 minutes caring for Monica on this date of service. This time includes time spent by me in the following activities:preparing for the visit, reviewing tests, performing a medically appropriate examination and/or evaluation , counseling and educating the patient/family/caregiver, ordering medications, tests, or procedures and documenting information in the medical record  Follow Up   Return in about 19 weeks (around 7/18/2023).  Patient was given instructions and counseling regarding her condition or for health maintenance advice. Please see specific information pulled into the AVS if appropriate.

## 2023-03-08 LAB
ALBUMIN SERPL-MCNC: 4.5 G/DL (ref 3.5–5.2)
ALBUMIN/GLOB SERPL: 1.5 G/DL
ALP SERPL-CCNC: 97 U/L (ref 39–117)
ALT SERPL-CCNC: 12 U/L (ref 1–33)
AST SERPL-CCNC: 13 U/L (ref 1–32)
BASOPHILS # BLD AUTO: 0.07 10*3/MM3 (ref 0–0.2)
BASOPHILS NFR BLD AUTO: 1.2 % (ref 0–1.5)
BILIRUB SERPL-MCNC: 0.4 MG/DL (ref 0–1.2)
BUN SERPL-MCNC: 15 MG/DL (ref 8–23)
BUN/CREAT SERPL: 15.8 (ref 7–25)
CALCIUM SERPL-MCNC: 9.8 MG/DL (ref 8.6–10.5)
CHLORIDE SERPL-SCNC: 101 MMOL/L (ref 98–107)
CHOLEST SERPL-MCNC: 181 MG/DL (ref 0–200)
CO2 SERPL-SCNC: 31.2 MMOL/L (ref 22–29)
CREAT SERPL-MCNC: 0.95 MG/DL (ref 0.57–1)
EGFRCR SERPLBLD CKD-EPI 2021: 62.2 ML/MIN/1.73
EOSINOPHIL # BLD AUTO: 0.46 10*3/MM3 (ref 0–0.4)
EOSINOPHIL NFR BLD AUTO: 7.6 % (ref 0.3–6.2)
ERYTHROCYTE [DISTWIDTH] IN BLOOD BY AUTOMATED COUNT: 13.9 % (ref 12.3–15.4)
GLOBULIN SER CALC-MCNC: 3 GM/DL
GLUCOSE SERPL-MCNC: 106 MG/DL (ref 65–99)
HBA1C MFR BLD: 5.6 % (ref 4.8–5.6)
HCT VFR BLD AUTO: 39.9 % (ref 34–46.6)
HDLC SERPL-MCNC: 61 MG/DL (ref 40–60)
HGB BLD-MCNC: 12.9 G/DL (ref 12–15.9)
IMM GRANULOCYTES # BLD AUTO: 0.01 10*3/MM3 (ref 0–0.05)
IMM GRANULOCYTES NFR BLD AUTO: 0.2 % (ref 0–0.5)
LDLC SERPL CALC-MCNC: 101 MG/DL (ref 0–100)
LYMPHOCYTES # BLD AUTO: 1.68 10*3/MM3 (ref 0.7–3.1)
LYMPHOCYTES NFR BLD AUTO: 27.8 % (ref 19.6–45.3)
MCH RBC QN AUTO: 28.9 PG (ref 26.6–33)
MCHC RBC AUTO-ENTMCNC: 32.3 G/DL (ref 31.5–35.7)
MCV RBC AUTO: 89.5 FL (ref 79–97)
MONOCYTES # BLD AUTO: 0.66 10*3/MM3 (ref 0.1–0.9)
MONOCYTES NFR BLD AUTO: 10.9 % (ref 5–12)
NEUTROPHILS # BLD AUTO: 3.16 10*3/MM3 (ref 1.7–7)
NEUTROPHILS NFR BLD AUTO: 52.3 % (ref 42.7–76)
NRBC BLD AUTO-RTO: 0.2 /100 WBC (ref 0–0.2)
PLATELET # BLD AUTO: 215 10*3/MM3 (ref 140–450)
POTASSIUM SERPL-SCNC: 3.9 MMOL/L (ref 3.5–5.2)
PROT SERPL-MCNC: 7.5 G/DL (ref 6–8.5)
RBC # BLD AUTO: 4.46 10*6/MM3 (ref 3.77–5.28)
SODIUM SERPL-SCNC: 141 MMOL/L (ref 136–145)
TRIGL SERPL-MCNC: 108 MG/DL (ref 0–150)
TSH SERPL DL<=0.005 MIU/L-ACNC: 1.38 UIU/ML (ref 0.27–4.2)
VLDLC SERPL CALC-MCNC: 19 MG/DL (ref 5–40)
WBC # BLD AUTO: 6.04 10*3/MM3 (ref 3.4–10.8)

## 2023-03-29 ENCOUNTER — OFFICE VISIT (OUTPATIENT)
Dept: CARDIOLOGY | Facility: CLINIC | Age: 78
End: 2023-03-29
Payer: MEDICARE

## 2023-03-29 VITALS
BODY MASS INDEX: 32.28 KG/M2 | SYSTOLIC BLOOD PRESSURE: 128 MMHG | HEART RATE: 60 BPM | DIASTOLIC BLOOD PRESSURE: 64 MMHG | WEIGHT: 171 LBS | OXYGEN SATURATION: 95 % | HEIGHT: 61 IN

## 2023-03-29 DIAGNOSIS — I48.0 PAROXYSMAL ATRIAL FIBRILLATION: Primary | ICD-10-CM

## 2023-03-29 DIAGNOSIS — I49.5 SINUS NODE DYSFUNCTION: ICD-10-CM

## 2023-03-29 DIAGNOSIS — I10 ESSENTIAL HYPERTENSION: ICD-10-CM

## 2023-03-29 NOTE — H&P (VIEW-ONLY)
Monica Perea  1945  [phone]    03/29/2023    [facility]     Referring Provider: No ref. provider found     Nakita Crump MD  [address] [address]    Chief Complaint   Patient presents with   • Paroxysmal atrial fibrillation (HCC)         Problem List:      1.  Atrial Fibrillation- Tachybrady Syndrome   a. CHADSVASC-  4(age 4 , gender, HTN)   b. 1/23/20 Myocardial perfusion imaging indicates a normal myocardial perfusion study with no evidence of ischemia. Impressions are consistent with a low risk study  c. BSC PPM Implant 4/28/2020 - per Dr. Thomas    d. 8/1/2020 Echo LVEF 65% with mild LVH   e. Echo 7/19/2021 LVEF 65%.  Normal left atrial size.  No evidence of significant valvular abnormalities or intracardiac shunt.  f. Prior Antiarrythmic use includes Flecainide (had dizziness and syncope, and Rhythmol)   g. 4/22 successful insertion of left atrial appendage occlusive device (Watchman Flex).  h. 4/22 ISHAN: A 20 mm watchman FL X left atrial appendage occlusion device placed under ISHAN guidance.  Device appears well-seated with no significant perivascular.  LVEF = 51-55%.  Normal RV size and function.  Moderate, grade 3 plaque in aortic arch.  i. ISHAN 5/23/2022: 20 mm watchman flex JAS occlusive device in place,On the mitral valve border of the device there is a 2.5 mm area of flow noted, does not come communicate posteriorly with the appendage. Left atrial appendage appears adequately sealed. No device associated thrombus. EF 51-55%, mild to moderate MR  2. HTN  3. HLD  4. Vit D deficiency  5. Glaucoma   6. GERD  7. Chronic constipation  8. Iron deficiency anemia - on Rx iron supplementation   9. Frequent Epistaxis   10. Anxiety   11. Osteopenia   12. Psoriasis  13. Surgical History   a. Hysterectomy  b. Appendectomy   c. Breast biopsy    Allergies  Allergies   Allergen Reactions   • Macrobid [Nitrofurantoin Monohyd Macro] Rash   •  Nitrofurantoin Hives   • Phenylephrine-Guaifenesin Hives   • Sulfa Antibiotics Hives   • Statins Myalgia       Current Medications    Current Outpatient Medications:   •  aspirin 81 MG EC tablet, Take 1 tablet by mouth Daily. Start 4/5/22, Disp: 90 tablet, Rfl: 1  •  Calcium Carbonate-Vitamin D (CALCIUM PLUS VITAMIN D PO), Take 1 tablet by mouth 2 (Two) Times a Day., Disp: , Rfl:   •  cetirizine (zyrTEC) 10 MG tablet, Take 1 tablet by mouth Daily., Disp: , Rfl:   •  cholecalciferol (VITAMIN D3) 25 MCG (1000 UT) tablet, Take 1 tablet by mouth Daily., Disp: , Rfl:   •  clobetasol (TEMOVATE) 0.05 % ointment, APPLY  TOPICALLY TO THE APPROPRIATE AREA AS DIRECTED EVERY 12 (TWELVE) HOURS. (Patient taking differently: Apply 1 application topically to the appropriate area as directed Every 12 (Twelve) Hours.), Disp: 60 g, Rfl: 0  •  coenzyme Q10 100 MG capsule, Take 1 capsule by mouth Daily., Disp: , Rfl:   •  D-MANNOSE PO, Take  by mouth., Disp: , Rfl:   •  dilTIAZem CD (CARDIZEM CD) 240 MG 24 hr capsule, Take 1 capsule by mouth Daily., Disp: 90 capsule, Rfl: 3  •  hydrALAZINE (APRESOLINE) 25 MG tablet, , Disp: , Rfl:   •  hydroCHLOROthiazide (HYDRODIURIL) 12.5 MG tablet, Take 1 tablet by mouth Daily., Disp: , Rfl:   •  latanoprost (XALATAN) 0.005 % ophthalmic solution, Administer 1 drop to both eyes Every Night., Disp: , Rfl:   •  melatonin 5 MG tablet tablet, Take 1 tablet by mouth., Disp: , Rfl:   •  metoprolol succinate XL (TOPROL-XL) 25 MG 24 hr tablet, Take 1 tablet by mouth Daily., Disp: , Rfl:   •  pravastatin (PRAVACHOL) 40 MG tablet, Take 1 tablet by mouth Daily., Disp: 90 tablet, Rfl: 2  •  Tiotropium Bromide Monohydrate (Spiriva Respimat) 1.25 MCG/ACT aerosol solution inhaler, Inhale 2 puffs Daily., Disp: 1 each, Rfl: 5    History of Present Illness     Pt presents for follow up of AF/PM/HTN. Since we last saw the pt, pt denies any sustained palps, CP, LH, and dizziness. Denies any hospitalizations, ER visits,  "bleeding, or TIA/CVA symptoms. Overall feels well. Had Covid in August with SOB/Asthma. Had increase of metoprolol to 50 mg po daily due to HTN.    ROS:  General:  + fatigue, No weight gain or loss  Cardiovascular:  Denies CP, PND, syncope, near syncope, edema + palpitations.  Pulmonary:  +JAIME, + cough,+ wheezing      Vitals:    03/29/23 1449   Weight: 77.6 kg (171 lb)   Height: 154.9 cm (61\")     Body mass index is 32.31 kg/m².  PE:  General: NAD  Neck: no JVD, no carotid bruits, no TM  Heart RRR, NL S1, S2, S4 present, no rubs, murmurs  Lungs: CTA, + wheezes, No  rhonchi, or rales  Abd: soft, non-tender, NL BS  Ext: No musculoskeletal deformities, no edema, cyanosis, or clubbing  Psych: normal mood and affect    Diagnostic Data:    PM Manual Interrogation: normal function. Less than 1% AFIB. A paced 99%. V paced less than 5%. 7 years on battery.     Procedures           1. Paroxysmal atrial fibrillation (HCC)    2. Sinus node dysfunction (HCC)    3. Essential hypertension          Plan:    1. Atrial Fibrillation/ Tachy- Chris syndrome  -BSC PPM with normal function   -Previously on flecainide (intolerant) and rythmol (too expensive)  -Will continue Metoprolol Xl 50mg - BP and HR well controlled     2. Anticoagulation  -Elevated CHADSVASC: 4 s/p Watchman Device 4/2022. ISHAN 45 days later showed well seated device without leakage. Now on ASA.        3. HTN: overall acceptable control- per Dr. Garsia     F/up in 12 months      "

## 2023-03-29 NOTE — PROGRESS NOTES
Monica Perea  1945  [phone]    03/29/2023    [facility]     Referring Provider: No ref. provider found     Nakita Crump MD  [address] [address]    Chief Complaint   Patient presents with   • Paroxysmal atrial fibrillation (HCC)         Problem List:      1.  Atrial Fibrillation- Tachybrady Syndrome   a. CHADSVASC-  4(age 4 , gender, HTN)   b. 1/23/20 Myocardial perfusion imaging indicates a normal myocardial perfusion study with no evidence of ischemia. Impressions are consistent with a low risk study  c. BSC PPM Implant 4/28/2020 - per Dr. Thomas    d. 8/1/2020 Echo LVEF 65% with mild LVH   e. Echo 7/19/2021 LVEF 65%.  Normal left atrial size.  No evidence of significant valvular abnormalities or intracardiac shunt.  f. Prior Antiarrythmic use includes Flecainide (had dizziness and syncope, and Rhythmol)   g. 4/22 successful insertion of left atrial appendage occlusive device (Watchman Flex).  h. 4/22 ISHAN: A 20 mm watchman FL X left atrial appendage occlusion device placed under ISHAN guidance.  Device appears well-seated with no significant perivascular.  LVEF = 51-55%.  Normal RV size and function.  Moderate, grade 3 plaque in aortic arch.  i. ISHAN 5/23/2022: 20 mm watchman flex JAS occlusive device in place,On the mitral valve border of the device there is a 2.5 mm area of flow noted, does not come communicate posteriorly with the appendage. Left atrial appendage appears adequately sealed. No device associated thrombus. EF 51-55%, mild to moderate MR  2. HTN  3. HLD  4. Vit D deficiency  5. Glaucoma   6. GERD  7. Chronic constipation  8. Iron deficiency anemia - on Rx iron supplementation   9. Frequent Epistaxis   10. Anxiety   11. Osteopenia   12. Psoriasis  13. Surgical History   a. Hysterectomy  b. Appendectomy   c. Breast biopsy    Allergies  Allergies   Allergen Reactions   • Macrobid [Nitrofurantoin Monohyd Macro] Rash   •  Nitrofurantoin Hives   • Phenylephrine-Guaifenesin Hives   • Sulfa Antibiotics Hives   • Statins Myalgia       Current Medications    Current Outpatient Medications:   •  aspirin 81 MG EC tablet, Take 1 tablet by mouth Daily. Start 4/5/22, Disp: 90 tablet, Rfl: 1  •  Calcium Carbonate-Vitamin D (CALCIUM PLUS VITAMIN D PO), Take 1 tablet by mouth 2 (Two) Times a Day., Disp: , Rfl:   •  cetirizine (zyrTEC) 10 MG tablet, Take 1 tablet by mouth Daily., Disp: , Rfl:   •  cholecalciferol (VITAMIN D3) 25 MCG (1000 UT) tablet, Take 1 tablet by mouth Daily., Disp: , Rfl:   •  clobetasol (TEMOVATE) 0.05 % ointment, APPLY  TOPICALLY TO THE APPROPRIATE AREA AS DIRECTED EVERY 12 (TWELVE) HOURS. (Patient taking differently: Apply 1 application topically to the appropriate area as directed Every 12 (Twelve) Hours.), Disp: 60 g, Rfl: 0  •  coenzyme Q10 100 MG capsule, Take 1 capsule by mouth Daily., Disp: , Rfl:   •  D-MANNOSE PO, Take  by mouth., Disp: , Rfl:   •  dilTIAZem CD (CARDIZEM CD) 240 MG 24 hr capsule, Take 1 capsule by mouth Daily., Disp: 90 capsule, Rfl: 3  •  hydrALAZINE (APRESOLINE) 25 MG tablet, , Disp: , Rfl:   •  hydroCHLOROthiazide (HYDRODIURIL) 12.5 MG tablet, Take 1 tablet by mouth Daily., Disp: , Rfl:   •  latanoprost (XALATAN) 0.005 % ophthalmic solution, Administer 1 drop to both eyes Every Night., Disp: , Rfl:   •  melatonin 5 MG tablet tablet, Take 1 tablet by mouth., Disp: , Rfl:   •  metoprolol succinate XL (TOPROL-XL) 25 MG 24 hr tablet, Take 1 tablet by mouth Daily., Disp: , Rfl:   •  pravastatin (PRAVACHOL) 40 MG tablet, Take 1 tablet by mouth Daily., Disp: 90 tablet, Rfl: 2  •  Tiotropium Bromide Monohydrate (Spiriva Respimat) 1.25 MCG/ACT aerosol solution inhaler, Inhale 2 puffs Daily., Disp: 1 each, Rfl: 5    History of Present Illness     Pt presents for follow up of AF/PM/HTN. Since we last saw the pt, pt denies any sustained palps, CP, LH, and dizziness. Denies any hospitalizations, ER visits,  "bleeding, or TIA/CVA symptoms. Overall feels well. Had Covid in August with SOB/Asthma. Had increase of metoprolol to 50 mg po daily due to HTN.    ROS:  General:  + fatigue, No weight gain or loss  Cardiovascular:  Denies CP, PND, syncope, near syncope, edema + palpitations.  Pulmonary:  +JAIME, + cough,+ wheezing      Vitals:    03/29/23 1449   Weight: 77.6 kg (171 lb)   Height: 154.9 cm (61\")     Body mass index is 32.31 kg/m².  PE:  General: NAD  Neck: no JVD, no carotid bruits, no TM  Heart RRR, NL S1, S2, S4 present, no rubs, murmurs  Lungs: CTA, + wheezes, No  rhonchi, or rales  Abd: soft, non-tender, NL BS  Ext: No musculoskeletal deformities, no edema, cyanosis, or clubbing  Psych: normal mood and affect    Diagnostic Data:    PM Manual Interrogation: normal function. Less than 1% AFIB. A paced 99%. V paced less than 5%. 7 years on battery.     Procedures           1. Paroxysmal atrial fibrillation (HCC)    2. Sinus node dysfunction (HCC)    3. Essential hypertension          Plan:    1. Atrial Fibrillation/ Tachy- Chris syndrome  -BSC PPM with normal function   -Previously on flecainide (intolerant) and rythmol (too expensive)  -Will continue Metoprolol Xl 50mg - BP and HR well controlled     2. Anticoagulation  -Elevated CHADSVASC: 4 s/p Watchman Device 4/2022. ISHAN 45 days later showed well seated device without leakage. Now on ASA.        3. HTN: overall acceptable control- per Dr. Garsia     F/up in 12 months      "

## 2023-03-30 DIAGNOSIS — J45.30 MILD PERSISTENT ASTHMA WITHOUT COMPLICATION: Primary | ICD-10-CM

## 2023-03-30 RX ORDER — ALBUTEROL SULFATE 90 UG/1
2 AEROSOL, METERED RESPIRATORY (INHALATION) EVERY 4 HOURS PRN
Qty: 18 G | Refills: 3 | Status: SHIPPED | OUTPATIENT
Start: 2023-03-30

## 2023-03-30 RX ORDER — TIOTROPIUM BROMIDE INHALATION SPRAY 1.56 UG/1
2 SPRAY, METERED RESPIRATORY (INHALATION) DAILY
Qty: 1 EACH | Refills: 5 | Status: SHIPPED | OUTPATIENT
Start: 2023-03-30

## 2023-03-30 NOTE — TELEPHONE ENCOUNTER
Patient request a albuterol inhaler. She has been using albuterol and it helps more then levalbuterol.   Ventolin has been sent to pharmacy

## 2023-04-05 ENCOUNTER — PREP FOR SURGERY (OUTPATIENT)
Dept: OTHER | Facility: HOSPITAL | Age: 78
End: 2023-04-05
Payer: MEDICARE

## 2023-04-05 RX ORDER — SODIUM CHLORIDE 9 MG/ML
20 INJECTION, SOLUTION INTRAVENOUS CONTINUOUS
Status: CANCELLED | OUTPATIENT
Start: 2023-04-05

## 2023-04-05 RX ORDER — MIDAZOLAM HYDROCHLORIDE 1 MG/ML
2-8 INJECTION INTRAMUSCULAR; INTRAVENOUS ONCE AS NEEDED
Status: CANCELLED | OUTPATIENT
Start: 2023-04-05

## 2023-04-05 RX ORDER — FENTANYL CITRATE 50 UG/ML
50-100 INJECTION, SOLUTION INTRAMUSCULAR; INTRAVENOUS ONCE AS NEEDED
Status: CANCELLED | OUTPATIENT
Start: 2023-04-05

## 2023-04-05 RX ORDER — NALOXONE HCL 0.4 MG/ML
0.4 VIAL (ML) INJECTION ONCE AS NEEDED
Status: CANCELLED | OUTPATIENT
Start: 2023-04-05

## 2023-04-05 RX ORDER — SODIUM CHLORIDE 0.9 % (FLUSH) 0.9 %
10 SYRINGE (ML) INJECTION EVERY 12 HOURS SCHEDULED
Status: CANCELLED | OUTPATIENT
Start: 2023-04-05

## 2023-04-05 RX ORDER — SODIUM CHLORIDE 0.9 % (FLUSH) 0.9 %
10 SYRINGE (ML) INJECTION AS NEEDED
Status: CANCELLED | OUTPATIENT
Start: 2023-04-05

## 2023-04-05 RX ORDER — SODIUM CHLORIDE 9 MG/ML
40 INJECTION, SOLUTION INTRAVENOUS AS NEEDED
Status: CANCELLED | OUTPATIENT
Start: 2023-04-05

## 2023-04-05 RX ORDER — FLUMAZENIL 0.1 MG/ML
0.5 INJECTION INTRAVENOUS ONCE AS NEEDED
Status: CANCELLED | OUTPATIENT
Start: 2023-04-05

## 2023-04-12 ENCOUNTER — OFFICE VISIT (OUTPATIENT)
Dept: GASTROENTEROLOGY | Facility: CLINIC | Age: 78
End: 2023-04-12
Payer: MEDICARE

## 2023-04-12 VITALS
DIASTOLIC BLOOD PRESSURE: 80 MMHG | WEIGHT: 172 LBS | OXYGEN SATURATION: 98 % | HEART RATE: 60 BPM | BODY MASS INDEX: 32.5 KG/M2 | SYSTOLIC BLOOD PRESSURE: 120 MMHG

## 2023-04-12 DIAGNOSIS — K64.4 EXTERNAL HEMORRHOID: ICD-10-CM

## 2023-04-12 DIAGNOSIS — K64.8 INTERNAL HEMORRHOIDS: ICD-10-CM

## 2023-04-12 DIAGNOSIS — K59.04 CHRONIC IDIOPATHIC CONSTIPATION: Chronic | ICD-10-CM

## 2023-04-12 DIAGNOSIS — Z80.0 FAMILY HISTORY OF COLON CANCER: ICD-10-CM

## 2023-04-12 DIAGNOSIS — Z12.11 ENCOUNTER FOR SCREENING FOR MALIGNANT NEOPLASM OF COLON: ICD-10-CM

## 2023-04-12 DIAGNOSIS — Z80.0 FAMILY HISTORY OF ESOPHAGEAL CANCER: ICD-10-CM

## 2023-04-12 DIAGNOSIS — K21.9 GASTROESOPHAGEAL REFLUX DISEASE WITHOUT ESOPHAGITIS: Primary | Chronic | ICD-10-CM

## 2023-04-12 PROBLEM — R19.5 POSITIVE COLORECTAL CANCER SCREENING USING COLOGUARD TEST: Status: RESOLVED | Noted: 2021-10-18 | Resolved: 2023-04-12

## 2023-04-12 PROBLEM — R13.19 ESOPHAGEAL DYSPHAGIA: Chronic | Status: RESOLVED | Noted: 2021-10-18 | Resolved: 2023-04-12

## 2023-04-12 PROCEDURE — 1160F RVW MEDS BY RX/DR IN RCRD: CPT | Performed by: NURSE PRACTITIONER

## 2023-04-12 PROCEDURE — 99214 OFFICE O/P EST MOD 30 MIN: CPT | Performed by: NURSE PRACTITIONER

## 2023-04-12 PROCEDURE — 3079F DIAST BP 80-89 MM HG: CPT | Performed by: NURSE PRACTITIONER

## 2023-04-12 PROCEDURE — 1159F MED LIST DOCD IN RCRD: CPT | Performed by: NURSE PRACTITIONER

## 2023-04-12 PROCEDURE — 3074F SYST BP LT 130 MM HG: CPT | Performed by: NURSE PRACTITIONER

## 2023-04-12 RX ORDER — BISOPROLOL FUMARATE 5 MG/1
5 TABLET, FILM COATED ORAL DAILY
COMMUNITY

## 2023-04-12 NOTE — PROGRESS NOTES
Follow Up Note     Date: 2023   Patient Name: Monica Perea  MRN: [id number]  : 1945     Primary Care Provider: Nakita Crump MD     Chief Complaint   Patient presents with   • Follow-up   • Colon Polyps     History of present illness:   2023  Monica Perea is a 78 y.o. female who is here today for follow up after colonoscopy and EGD. She did not have any problems after her procedure. No change in symptoms. Denies GI bleeding.     Interval History:  2022  Monica Perea is a 77 y.o. female who is here today for follow up for colon cancer screening. She had positive Cologuard test in .  She was not able to have her colonoscopy or EGD last year as she was on blood thinners and was not able to hold them according to the patient. She had Watchman placed and she is only ASA 81 mg daily now, she is not on any other blood thinners. She is not having any problems with reflux at this time. She has not had any further episodes of difficulty swallowing. Constipation is unchanged. She is taking milk of magnesia 2-3 times per week with reasonable control. She has been having occasional bright red blood in the stool. Denies abdominal pain. There is no history of nausea or vomiting.     Her last colonoscopy was in  with polyps removed. No EGD in the past. Her grandmother had colon cancer at age 49. Her mother had esophageal cancer when she passed away in her 90's.     Subjective      Past Medical History:   Diagnosis Date   • A-fib    • Anemia    • Arthritis    • Asthma    • B12 deficiency    • Back pain    • CAD (coronary artery disease)    • Cellulitis of foot    • Colon polyp    • Dyspnea    • GERD (gastroesophageal reflux disease)    • Glaucoma 2016   • Hematuria    • History of mammogram    • Hypercholesteremia    • Hypertension    • Iron deficiency    • Low back pain    • Menopause    • Neck strain    • Osteopenia    • Overweight    •  Palpitations    • Postmenopausal atrophic vaginitis    • Sleep apnea 2012    discontinued CPAP use 2 months ago   • Spinal headache    • Tinea pedis    • Wears eyeglasses      Past Surgical History:   Procedure Laterality Date   • APPENDECTOMY  1964   • ATRIAL APPENDAGE EXCLUSION LEFT WITH TRANSESOPHAGEAL ECHOCARDIOGRAM N/A 4/4/2022    Procedure: Atrial Appendage Occlusion on Xarelto-hold 1 day prior  4/2022;  Surgeon: Kip Conde MD;  Location:  [facility];  Service: Cardiology;  Laterality: N/A;   • BREAST EXCISIONAL BIOPSY Bilateral 1990'S   • BREAST SURGERY     • CARDIAC ELECTROPHYSIOLOGY PROCEDURE N/A 04/28/2020    Procedure: Pacemaker DC new. [facility];  Surgeon: Villa Thomas DO;  Location:  [facility];  Service: Cardiology;  Laterality: N/A;   • COLONOSCOPY  2016        • COLONOSCOPY N/A 1/31/2023    Procedure: COLONOSCOPY with biopsy;  Surgeon: Ferny Wade MD;  Location: [facility] ENDOSCOPY;  Service: Gastroenterology;  Laterality: N/A;   • ENDOSCOPY N/A 1/31/2023    Procedure: ESOPHAGOGASTRODUODENOSCOPY WITH BIOPSY AND BRUSHING;  Surgeon: Ferny Wade MD;  Location: [facility] ENDOSCOPY;  Service: Gastroenterology;  Laterality: N/A;   • EYE SURGERY      laser for bleed   • HYSTERECTOMY  2000    total    • OOPHORECTOMY Bilateral 2000   • TUBAL ABDOMINAL LIGATION  1974     Family History   Problem Relation Age of Onset   • Heart attack Other    • Arthritis Other    • Diabetes Other    • Hypertension Other    • Hodgkin's lymphoma Other    • Esophagitis Mother    • Heart failure Mother    • Esophageal cancer Mother    • Esophagitis Maternal Aunt    • Breast cancer Maternal Aunt         DX AGE 50'S   • Colon cancer Maternal Grandmother 49   • Heart attack Father    • Breast cancer Sister 72   • Ovarian cancer Sister 74   • Stomach cancer Neg Hx    • Liver cancer Neg Hx    • Liver disease Neg Hx      Social History     Socioeconomic History   • Marital status:     Tobacco Use   • Smoking status: Former     Packs/day: 1.00     Years: 35.00     Pack years: 35.00     Types: Cigarettes     Quit date:      Years since quittin.2   • Smokeless tobacco: Never   Vaping Use   • Vaping Use: Never used   Substance and Sexual Activity   • Alcohol use: No     Comment: quit in    • Drug use: No   • Sexual activity: Defer       Current Outpatient Medications:   •  albuterol sulfate  (90 Base) MCG/ACT inhaler, Inhale 2 puffs Every 4 (Four) Hours As Needed for Wheezing., Disp: 18 g, Rfl: 3  •  aspirin 81 MG EC tablet, Take 1 tablet by mouth Daily. Start 22, Disp: 90 tablet, Rfl: 1  •  bisoprolol (ZEBeta) 5 MG tablet, Take 1 tablet by mouth Daily., Disp: , Rfl:   •  Calcium Carbonate-Vitamin D (CALCIUM PLUS VITAMIN D PO), Take 1 tablet by mouth 2 (Two) Times a Day., Disp: , Rfl:   •  cetirizine (zyrTEC) 10 MG tablet, Take 1 tablet by mouth Daily., Disp: , Rfl:   •  cholecalciferol (VITAMIN D3) 25 MCG (1000 UT) tablet, Take 1 tablet by mouth Daily., Disp: , Rfl:   •  clobetasol (TEMOVATE) 0.05 % ointment, APPLY  TOPICALLY TO THE APPROPRIATE AREA AS DIRECTED EVERY 12 (TWELVE) HOURS. (Patient taking differently: Apply 1 application topically to the appropriate area as directed Every 12 (Twelve) Hours.), Disp: 60 g, Rfl: 0  •  coenzyme Q10 100 MG capsule, Take 1 capsule by mouth Daily., Disp: , Rfl:   •  D-MANNOSE PO, Take  by mouth., Disp: , Rfl:   •  dilTIAZem CD (CARDIZEM CD) 240 MG 24 hr capsule, Take 1 capsule by mouth Daily., Disp: 90 capsule, Rfl: 3  •  hydrALAZINE (APRESOLINE) 25 MG tablet, , Disp: , Rfl:   •  hydroCHLOROthiazide (HYDRODIURIL) 12.5 MG tablet, Take 1 tablet by mouth Daily., Disp: , Rfl:   •  latanoprost (XALATAN) 0.005 % ophthalmic solution, Administer 1 drop to both eyes Every Night., Disp: , Rfl:   •  melatonin 5 MG tablet tablet, Take 1 tablet by mouth., Disp: , Rfl:   •  pravastatin (PRAVACHOL) 40 MG tablet, Take 1 tablet by mouth  Daily., Disp: 90 tablet, Rfl: 2  •  Tiotropium Bromide Monohydrate (Spiriva Respimat) 1.25 MCG/ACT aerosol solution inhaler, Inhale 2 puffs Daily., Disp: 1 each, Rfl: 5     Allergies   Allergen Reactions   • Macrobid [Nitrofurantoin Monohyd Macro] Rash   • Nitrofurantoin Hives   • Phenylephrine-Guaifenesin Hives   • Sulfa Antibiotics Hives   • Statins Myalgia     The following portions of the patient's history were reviewed and updated as appropriate: allergies, current medications, past family history, past medical history, past social history, past surgical history and problem list.  Objective     Physical Exam  Vitals and nursing note reviewed.   Constitutional:       General: She is not in acute distress.     Appearance: Normal appearance. She is well-developed.   HENT:      Head: Normocephalic and atraumatic.      Mouth/Throat:      Mouth: Mucous membranes are not pale, not dry and not cyanotic.   Eyes:      General: Lids are normal.   Neck:      Trachea: Trachea normal.   Cardiovascular:      Rate and Rhythm: Normal rate.   Pulmonary:      Effort: Pulmonary effort is normal. No respiratory distress.      Breath sounds: Normal breath sounds.   Abdominal:      Tenderness: There is no abdominal tenderness.   Skin:     General: Skin is warm and dry.   Neurological:      Mental Status: She is alert and oriented to person, place, and time.   Psychiatric:         Mood and Affect: Mood normal.         Speech: Speech normal.         Behavior: Behavior normal. Behavior is cooperative.       Vitals:    04/12/23 1443   BP: 120/80   Pulse: 60   SpO2: 98%   Weight: 78 kg (172 lb)     Body mass index is 32.5 kg/m².     Results Review:   I reviewed the patient's new clinical results.    Office Visit on 03/07/2023   Component Date Value Ref Range Status   • WBC 03/07/2023 6.04  3.40 - 10.80 10*3/mm3 Final   • RBC 03/07/2023 4.46  3.77 - 5.28 10*6/mm3 Final   • Hemoglobin 03/07/2023 12.9  12.0 - 15.9 g/dL Final   • Hematocrit  03/07/2023 39.9  34.0 - 46.6 % Final   • MCV 03/07/2023 89.5  79.0 - 97.0 fL Final   • MCH 03/07/2023 28.9  26.6 - 33.0 pg Final   • MCHC 03/07/2023 32.3  31.5 - 35.7 g/dL Final   • RDW 03/07/2023 13.9  12.3 - 15.4 % Final   • Platelets 03/07/2023 215  140 - 450 10*3/mm3 Final   • Glucose 03/07/2023 106 (H)  65 - 99 mg/dL Final   • BUN 03/07/2023 15  8 - 23 mg/dL Final   • Creatinine 03/07/2023 0.95  0.57 - 1.00 mg/dL Final   • EGFR Result 03/07/2023 62.2  >60.0 mL/min/1.73 Final   • BUN/Creatinine Ratio 03/07/2023 15.8  7.0 - 25.0 Final   • Sodium 03/07/2023 141  136 - 145 mmol/L Final   • Potassium 03/07/2023 3.9  3.5 - 5.2 mmol/L Final   • Chloride 03/07/2023 101  98 - 107 mmol/L Final   • Total CO2 03/07/2023 31.2 (H)  22.0 - 29.0 mmol/L Final   • Calcium 03/07/2023 9.8  8.6 - 10.5 mg/dL Final   • Total Protein 03/07/2023 7.5  6.0 - 8.5 g/dL Final   • Albumin 03/07/2023 4.5  3.5 - 5.2 g/dL Final   • Globulin 03/07/2023 3.0  gm/dL Final   • A/G Ratio 03/07/2023 1.5  g/dL Final   • Total Bilirubin 03/07/2023 0.4  0.0 - 1.2 mg/dL Final   • Alkaline Phosphatase 03/07/2023 97  39 - 117 U/L Final   • AST (SGOT) 03/07/2023 13  1 - 32 U/L Final   • ALT (SGPT) 03/07/2023 12  1 - 33 U/L Final   • Total Cholesterol 03/07/2023 181  0 - 200 mg/dL Final   • Triglycerides 03/07/2023 108  0 - 150 mg/dL Final   • HDL Cholesterol 03/07/2023 61 (H)  40 - 60 mg/dL Final   • VLDL Cholesterol Jakub 03/07/2023 19  5 - 40 mg/dL Final   • LDL Chol Calc ([facility]) 03/07/2023 101 (H)  0 - 100 mg/dL Final   • Hemoglobin A1C 03/07/2023 5.60  4.80 - 5.60 % Final   • TSH 03/07/2023 1.380  0.270 - 4.200 uIU/mL Final   Admission on 01/31/2023, Discharged on 01/31/2023   Component Date Value Ref Range Status   • Reference Lab Report 01/31/2023    Final                    Value:[facility]  [address] [place] [address]  Phone: [phone] or [phone]  Fax: [phone]  Delfino Hager M.D., Medical  Director    PATIENT NAME                           LABORATORY NO.  [id number]  [facility]              [id number]  [id number]                         AGE              SEX  SSN           CLIENT REF #  [facility] BIBI Boyd      1945  F    [id number]   [id number]    [address]                REQUESTING M.D.     ATTENDING MJING     COPY TO.  [address]                        [place] [address]                 [facility]  DATE COLLECTED      DATE RECEIVED      DATE REPORTED  2023    DIAGNOSIS:  A.   GASTRIC ANTRUM AND BODY, BIOPSY:  Gastric antral type mucosa with mild chronic inactive gastritis  Negative for intestinal metaplasia or dysplasia  No Helicobacter pylori-like organisms seen  B.   ESOPHAGUS,                           BIOPSY, DISTAL:  Fragments of squamous mucosa with reactive epithelial changes  compatible with reflux  Negative for eosinophilic esophagitis (0-1 eosinophils/hpf) and intestinal  metaplasia  Negative for dysplasia and neoplasm  C.   TERMINAL ILEUM BIOPSY:  Small intestinal mucosa without active enteritis or granulomatous inflammation  Negative for dysplasia and neoplasm                            REVIEWED, DIAGNOSED AND ELECTRONICALLY  SIGNED BY:    Hugo Baig M.D., F.C.A.P.  CPT CODES:  88305x3     • KOH Prep 2023 No yeast or hyphal elements seen  No yeast or hyphal elements seen Final      EGD dated 2023 per Dr. Ramirez  - Normal oropharynx.  - Z-line irregular, 35 cm from the incisors.  - 2-3 cm hiatal hernia.  - Suspected Candidiasis esophagitis with no bleeding.  - No gross lesions in the entire esophagus. Biopsied.  - Erythematous mucosa in the posterior wall of the stomach, antrum and prepyloric region of the stomach. Biopsied.  - Normal duodenal bulb, first portion of the duodenum, second portion of the duodenum and third portion of the duodenum.  A.    GASTRIC ANTRUM AND BODY, BIOPSY:   Gastric antral type mucosa with mild chronic inactive gastritis   Negative for intestinal metaplasia or dysplasia   No Helicobacter pylori-like organisms seen   B.   ESOPHAGUS, BIOPSY, DISTAL:   Fragments of squamous mucosa with reactive epithelial changes   compatible with reflux   Negative for eosinophilic esophagitis (0-1 eosinophils/hpf) and intestinal   metaplasia   Negative for dysplasia and neoplasm  - KOH prep: No yeast or hyphal elements seen    Colonoscopy dated 1/31/2023 per Dr. Wade  - Perianal skin tags found on perianal exam.  - Diverticulosis in the sigmoid colon and in the descending colon.  - Tortuous colon.  - Non-bleeding external and internal hemorrhoids.  - The examined portion of the ileum was normal. Biopsied for history of anemia.  - Likely had false positive cologaurd test  C.   TERMINAL ILEUM BIOPSY:   Small intestinal mucosa without active enteritis or granulomatous inflammation   Negative for dysplasia and neoplasm    Assessment / Plan      1. Gastroesophageal reflux disease without esophagitis  2. Family history of esophageal cancer  She has a longstanding history of reflux that is reasonably controlled with over-the-counter antacids as needed.  Denies difficulty swallowing.  Her mother had esophageal cancer when she passed away in her 90s.  EGD with no gross lesions in the entire esophagus.  Biopsies unremarkable, no Kathleen's, no H. pylori.  Antireflux measures.  Continue over-the-counter antacids as needed.    3. Chronic idiopathic constipation  4. Internal hemorrhoids  5. External hemorrhoid  She has a longstanding history of constipation that is reasonably controlled with milk of magnesia 2-3 times per week.  She has occasional blood in the stool, this is improved.  No history of abdominal pain.  No nausea or vomiting.  Colonoscopy with tortuous colon, nonbleeding external and internal hemorrhoids noted.  Rectal bleeding likely secondary to  hemorrhoids.  Basic labs unremarkable.  TSH normal.  High-fiber, low-fat diet with liberal water intake.  Continue milk of magnesia as needed.  Consider MiraLAX daily if needed.    6. Encounter for screening for malignant neoplasm of colon  7. Family history of colon cancer  Colonoscopy with no polyps removed.  Her grandmother had colon cancer diagnosed at age 49.  Patient likely had false positive Cologuard test.  Colonoscopy for high risk screening in 5 years, January 2028.    Patient Instructions   1. Antireflux measures: Avoid fried, fatty foods, alcohol, chocolate, coffee, tea,  soft drinks, peppermint and spearmint, spicy foods, tomatoes and tomato based foods, onion based foods, and smoking.   2. Other antireflux measures include weight reduction if overweight, avoiding tight clothing around the abdomen, elevating the head of the bed 6 inches with blocks under the head board, and don't drink or eat before going to bed and avoid lying down immediately after meals.  3. May take over the counter antacids as needed.   4. High fiber, low fat diet with liberal water intake.   5. Continue milk of magnesia as needed for constipation.   6. Avoid all dairy. May use lactose free/dairy free alternatives.   7. Colonoscopy for screening in 5 years, January 2028.   8. Follow up: The patient will call back    Darrell Bragg, TERRY  4/12/2023    Please note that portions of this note were completed with a voice recognition program.

## 2023-04-12 NOTE — PATIENT INSTRUCTIONS
Antireflux measures: Avoid fried, fatty foods, alcohol, chocolate, coffee, tea,  soft drinks, peppermint and spearmint, spicy foods, tomatoes and tomato based foods, onion based foods, and smoking.   Other antireflux measures include weight reduction if overweight, avoiding tight clothing around the abdomen, elevating the head of the bed 6 inches with blocks under the head board, and don't drink or eat before going to bed and avoid lying down immediately after meals.  May take over the counter antacids as needed.   High fiber, low fat diet with liberal water intake.   Continue milk of magnesia as needed for constipation.   Avoid all dairy. May use lactose free/dairy free alternatives.   Colonoscopy for screening in 5 years, January 2028.   Follow up: The patient will call back

## 2023-04-19 ENCOUNTER — HOSPITAL ENCOUNTER (OUTPATIENT)
Dept: CARDIOLOGY | Facility: HOSPITAL | Age: 78
Discharge: HOME | End: 2023-04-19
Admitting: INTERNAL MEDICINE
Payer: MEDICARE

## 2023-04-19 VITALS
RESPIRATION RATE: 18 BRPM | DIASTOLIC BLOOD PRESSURE: 55 MMHG | HEART RATE: 60 BPM | SYSTOLIC BLOOD PRESSURE: 117 MMHG | OXYGEN SATURATION: 92 %

## 2023-04-19 DIAGNOSIS — Z95.818 PRESENCE OF WATCHMAN LEFT ATRIAL APPENDAGE CLOSURE DEVICE: ICD-10-CM

## 2023-04-19 DIAGNOSIS — I48.0 PAROXYSMAL ATRIAL FIBRILLATION: ICD-10-CM

## 2023-04-19 LAB
MAXIMAL PREDICTED HEART RATE: 144 BPM
STRESS TARGET HR: 122 BPM

## 2023-04-19 PROCEDURE — 25010000002 MIDAZOLAM PER 1 MG: Performed by: INTERNAL MEDICINE

## 2023-04-19 PROCEDURE — 93325 DOPPLER ECHO COLOR FLOW MAPG: CPT

## 2023-04-19 PROCEDURE — 99152 MOD SED SAME PHYS/QHP 5/>YRS: CPT

## 2023-04-19 PROCEDURE — 93312 ECHO TRANSESOPHAGEAL: CPT

## 2023-04-19 PROCEDURE — 25010000002 FENTANYL CITRATE (PF) 50 MCG/ML SOLUTION: Performed by: INTERNAL MEDICINE

## 2023-04-19 PROCEDURE — 99152 MOD SED SAME PHYS/QHP 5/>YRS: CPT | Performed by: INTERNAL MEDICINE

## 2023-04-19 PROCEDURE — 93321 DOPPLER ECHO F-UP/LMTD STD: CPT

## 2023-04-19 RX ORDER — FENTANYL CITRATE 50 UG/ML
INJECTION, SOLUTION INTRAMUSCULAR; INTRAVENOUS
Status: COMPLETED | OUTPATIENT
Start: 2023-04-19 | End: 2023-04-19

## 2023-04-19 RX ORDER — MIDAZOLAM HYDROCHLORIDE 1 MG/ML
INJECTION INTRAMUSCULAR; INTRAVENOUS
Status: COMPLETED | OUTPATIENT
Start: 2023-04-19 | End: 2023-04-19

## 2023-04-19 RX ADMIN — MIDAZOLAM HYDROCHLORIDE 1 MG: 1 INJECTION, SOLUTION INTRAMUSCULAR; INTRAVENOUS at 14:06

## 2023-04-19 RX ADMIN — FENTANYL CITRATE 25 MCG: 50 INJECTION, SOLUTION INTRAMUSCULAR; INTRAVENOUS at 14:11

## 2023-04-19 NOTE — INTERVAL H&P NOTE
Preprocedural Update  H&P updated. The patient was examined and the following changes are noted:  Patient has had some increased wheezing and occasional oxygen saturations <90% at home.     • Patient denies prior complication with sedation or during prior ISHAN.    Vitals and Labs  Vitals:    04/19/23 1311   BP: 111/68   Pulse: 60   Resp: 20   SpO2: 91%     Lab Results   Component Value Date    GLUCOSE 106 (H) 03/07/2023    BUN 15 03/07/2023    CREATININE 0.95 03/07/2023    EGFRRESULT 62.2 03/07/2023    EGFR 64.2 04/01/2022    BCR 15.8 03/07/2023    K 3.9 03/07/2023    CO2 31.2 (H) 03/07/2023    CALCIUM 9.8 03/07/2023    PROTENTOTREF 7.5 03/07/2023    ALBUMIN 4.5 03/07/2023    BILITOT 0.4 03/07/2023    AST 13 03/07/2023    ALT 12 03/07/2023     Lab Results   Component Value Date    WBC 6.04 03/07/2023    HGB 12.9 03/07/2023    HCT 39.9 03/07/2023    MCV 89.5 03/07/2023     03/07/2023     Lab Results   Component Value Date    HGBA1C 5.60 03/07/2023     Lab Results   Component Value Date    CHLPL 181 03/07/2023    TRIG 108 03/07/2023    HDL 61 (H) 03/07/2023     (H) 03/07/2023     The nature of a transesophageal echocardiogram as well as the benefits, risks, and alternatives to the procedure were discussed with the patient.  The patient expressed understanding and wishes to proceed.    Anatoliy Arciniega PA-C      The patient tolerated ISHAN well with no complications.  Watchman device well-seated with no device associated thrombus.  There is a stable, small, amount of periprosthetic flow adjacent to the mitral valve border the device.  Remains between 2 and 3 mm similar to 1 year prior.  Patient can continue on aspirin.  Encouraged patient to follow-up with her pulmonologist regarding frequent cough and intermittent hypoxic episodes at home.

## 2023-07-24 DIAGNOSIS — I48.20 CHRONIC ATRIAL FIBRILLATION: ICD-10-CM

## 2023-07-24 DIAGNOSIS — I10 BENIGN ESSENTIAL HYPERTENSION: ICD-10-CM

## 2023-07-24 RX ORDER — DILTIAZEM HYDROCHLORIDE 240 MG/1
CAPSULE, COATED, EXTENDED RELEASE ORAL
Qty: 90 CAPSULE | Refills: 0 | Status: SHIPPED | OUTPATIENT
Start: 2023-07-24

## 2023-08-01 ENCOUNTER — OFFICE VISIT (OUTPATIENT)
Dept: PULMONOLOGY | Facility: CLINIC | Age: 78
End: 2023-08-01
Payer: MEDICARE

## 2023-08-01 VITALS
HEIGHT: 62 IN | SYSTOLIC BLOOD PRESSURE: 118 MMHG | OXYGEN SATURATION: 94 % | BODY MASS INDEX: 30.73 KG/M2 | DIASTOLIC BLOOD PRESSURE: 72 MMHG | HEART RATE: 63 BPM | WEIGHT: 167 LBS | RESPIRATION RATE: 16 BRPM

## 2023-08-01 DIAGNOSIS — R06.02 SOB (SHORTNESS OF BREATH): ICD-10-CM

## 2023-08-01 DIAGNOSIS — G47.33 OBSTRUCTIVE SLEEP APNEA: ICD-10-CM

## 2023-08-01 DIAGNOSIS — J30.9 ALLERGIC RHINITIS, UNSPECIFIED SEASONALITY, UNSPECIFIED TRIGGER: ICD-10-CM

## 2023-08-01 DIAGNOSIS — J45.40 MODERATE PERSISTENT ASTHMA WITHOUT COMPLICATION: Primary | ICD-10-CM

## 2023-08-01 PROCEDURE — 3074F SYST BP LT 130 MM HG: CPT | Performed by: NURSE PRACTITIONER

## 2023-08-01 PROCEDURE — 3078F DIAST BP <80 MM HG: CPT | Performed by: NURSE PRACTITIONER

## 2023-08-01 PROCEDURE — 99214 OFFICE O/P EST MOD 30 MIN: CPT | Performed by: NURSE PRACTITIONER

## 2023-08-01 RX ORDER — FLUTICASONE FUROATE 100 UG/1
1 POWDER RESPIRATORY (INHALATION) DAILY
Qty: 30 EACH | Refills: 5 | Status: SHIPPED | OUTPATIENT
Start: 2023-08-01 | End: 2023-08-01

## 2023-08-01 RX ORDER — FLUTICASONE FUROATE AND VILANTEROL 200; 25 UG/1; UG/1
1 POWDER RESPIRATORY (INHALATION) DAILY
Qty: 60 EACH | Refills: 5 | Status: SHIPPED | OUTPATIENT
Start: 2023-08-01

## 2023-08-01 RX ORDER — TIOTROPIUM BROMIDE INHALATION SPRAY 1.56 UG/1
2 SPRAY, METERED RESPIRATORY (INHALATION) DAILY
Qty: 1 EACH | Refills: 5 | Status: SHIPPED | OUTPATIENT
Start: 2023-08-01 | End: 2023-08-01

## 2023-08-02 ENCOUNTER — LAB (OUTPATIENT)
Dept: LAB | Facility: HOSPITAL | Age: 78
End: 2023-08-02
Payer: MEDICARE

## 2023-08-02 ENCOUNTER — OFFICE VISIT (OUTPATIENT)
Dept: INTERNAL MEDICINE | Facility: CLINIC | Age: 78
End: 2023-08-02
Payer: MEDICARE

## 2023-08-02 VITALS
HEART RATE: 65 BPM | WEIGHT: 166 LBS | OXYGEN SATURATION: 98 % | DIASTOLIC BLOOD PRESSURE: 79 MMHG | TEMPERATURE: 98 F | SYSTOLIC BLOOD PRESSURE: 117 MMHG | BODY MASS INDEX: 31.34 KG/M2 | RESPIRATION RATE: 16 BRPM | HEIGHT: 61 IN

## 2023-08-02 DIAGNOSIS — E78.2 MIXED HYPERLIPIDEMIA: ICD-10-CM

## 2023-08-02 DIAGNOSIS — R73.01 IMPAIRED FASTING GLUCOSE: ICD-10-CM

## 2023-08-02 DIAGNOSIS — I10 BENIGN ESSENTIAL HYPERTENSION: Primary | ICD-10-CM

## 2023-08-02 DIAGNOSIS — J45.40 MODERATE PERSISTENT ASTHMA WITHOUT COMPLICATION: ICD-10-CM

## 2023-08-02 DIAGNOSIS — I48.20 CHRONIC ATRIAL FIBRILLATION: ICD-10-CM

## 2023-08-02 LAB
ALBUMIN SERPL-MCNC: 4.4 G/DL (ref 3.5–5.2)
ALBUMIN/GLOB SERPL: 1.6 G/DL
ALP SERPL-CCNC: 81 U/L (ref 39–117)
ALT SERPL W P-5'-P-CCNC: 9 U/L (ref 1–33)
ANION GAP SERPL CALCULATED.3IONS-SCNC: 10 MMOL/L (ref 5–15)
AST SERPL-CCNC: 14 U/L (ref 1–32)
BASOPHILS # BLD AUTO: 0.04 10*3/MM3 (ref 0–0.2)
BASOPHILS NFR BLD AUTO: 0.8 % (ref 0–1.5)
BILIRUB SERPL-MCNC: 0.3 MG/DL (ref 0–1.2)
BUN SERPL-MCNC: 13 MG/DL (ref 8–23)
BUN/CREAT SERPL: 14.6 (ref 7–25)
CALCIUM SPEC-SCNC: 9.9 MG/DL (ref 8.6–10.5)
CHLORIDE SERPL-SCNC: 105 MMOL/L (ref 98–107)
CHOLEST SERPL-MCNC: 163 MG/DL (ref 0–200)
CO2 SERPL-SCNC: 28 MMOL/L (ref 22–29)
CREAT SERPL-MCNC: 0.89 MG/DL (ref 0.57–1)
DEPRECATED RDW RBC AUTO: 45 FL (ref 37–54)
EGFRCR SERPLBLD CKD-EPI 2021: 67.3 ML/MIN/1.73
EOSINOPHIL # BLD AUTO: 0.35 10*3/MM3 (ref 0–0.4)
EOSINOPHIL NFR BLD AUTO: 6.7 % (ref 0.3–6.2)
ERYTHROCYTE [DISTWIDTH] IN BLOOD BY AUTOMATED COUNT: 14 % (ref 12.3–15.4)
GLOBULIN UR ELPH-MCNC: 2.7 GM/DL
GLUCOSE SERPL-MCNC: 102 MG/DL (ref 65–99)
HBA1C MFR BLD: 5.9 % (ref 4.8–5.6)
HCT VFR BLD AUTO: 39.2 % (ref 34–46.6)
HDLC SERPL-MCNC: 62 MG/DL (ref 40–60)
HGB BLD-MCNC: 13.5 G/DL (ref 12–15.9)
IMM GRANULOCYTES # BLD AUTO: 0.02 10*3/MM3 (ref 0–0.05)
IMM GRANULOCYTES NFR BLD AUTO: 0.4 % (ref 0–0.5)
LDLC SERPL CALC-MCNC: 84 MG/DL (ref 0–100)
LDLC/HDLC SERPL: 1.33 {RATIO}
LYMPHOCYTES # BLD AUTO: 1.52 10*3/MM3 (ref 0.7–3.1)
LYMPHOCYTES NFR BLD AUTO: 29 % (ref 19.6–45.3)
MCH RBC QN AUTO: 31 PG (ref 26.6–33)
MCHC RBC AUTO-ENTMCNC: 34.4 G/DL (ref 31.5–35.7)
MCV RBC AUTO: 89.9 FL (ref 79–97)
MONOCYTES # BLD AUTO: 0.54 10*3/MM3 (ref 0.1–0.9)
MONOCYTES NFR BLD AUTO: 10.3 % (ref 5–12)
NEUTROPHILS NFR BLD AUTO: 2.77 10*3/MM3 (ref 1.7–7)
NEUTROPHILS NFR BLD AUTO: 52.8 % (ref 42.7–76)
NRBC BLD AUTO-RTO: 0 /100 WBC (ref 0–0.2)
PLATELET # BLD AUTO: 246 10*3/MM3 (ref 140–450)
PMV BLD AUTO: 11.6 FL (ref 6–12)
POTASSIUM SERPL-SCNC: 4 MMOL/L (ref 3.5–5.2)
PROT SERPL-MCNC: 7.1 G/DL (ref 6–8.5)
RBC # BLD AUTO: 4.36 10*6/MM3 (ref 3.77–5.28)
SODIUM SERPL-SCNC: 143 MMOL/L (ref 136–145)
TRIGL SERPL-MCNC: 94 MG/DL (ref 0–150)
VLDLC SERPL-MCNC: 17 MG/DL (ref 5–40)
WBC NRBC COR # BLD: 5.24 10*3/MM3 (ref 3.4–10.8)

## 2023-08-02 PROCEDURE — 86003 ALLG SPEC IGE CRUDE XTRC EA: CPT

## 2023-08-02 PROCEDURE — 3078F DIAST BP <80 MM HG: CPT | Performed by: INTERNAL MEDICINE

## 2023-08-02 PROCEDURE — 99214 OFFICE O/P EST MOD 30 MIN: CPT | Performed by: INTERNAL MEDICINE

## 2023-08-02 PROCEDURE — 1159F MED LIST DOCD IN RCRD: CPT | Performed by: INTERNAL MEDICINE

## 2023-08-02 PROCEDURE — 83036 HEMOGLOBIN GLYCOSYLATED A1C: CPT | Performed by: INTERNAL MEDICINE

## 2023-08-02 PROCEDURE — 80053 COMPREHEN METABOLIC PANEL: CPT | Performed by: INTERNAL MEDICINE

## 2023-08-02 PROCEDURE — 3074F SYST BP LT 130 MM HG: CPT | Performed by: INTERNAL MEDICINE

## 2023-08-02 PROCEDURE — 80061 LIPID PANEL: CPT | Performed by: INTERNAL MEDICINE

## 2023-08-02 PROCEDURE — 1160F RVW MEDS BY RX/DR IN RCRD: CPT | Performed by: INTERNAL MEDICINE

## 2023-08-02 PROCEDURE — 85025 COMPLETE CBC W/AUTO DIFF WBC: CPT

## 2023-08-02 PROCEDURE — 36415 COLL VENOUS BLD VENIPUNCTURE: CPT

## 2023-08-02 PROCEDURE — 82785 ASSAY OF IGE: CPT

## 2023-08-02 RX ORDER — DILTIAZEM HYDROCHLORIDE 240 MG/1
240 CAPSULE, COATED, EXTENDED RELEASE ORAL DAILY
Qty: 90 CAPSULE | Refills: 1 | Status: SHIPPED | OUTPATIENT
Start: 2023-08-02

## 2023-08-02 NOTE — PROGRESS NOTES
"Chief Complaint  Hypertension, Hyperlipidemia, and Atrial Fibrillation    Subjective        Monica Perea presents to [facility]  HPI: Patient is here to follow up on the blood pressure  The patient is taking the blood pressure medications as prescribed and has had no side effects. The patient is also here to follow up on the cholesterol and  is  due to get lab work done .  The patient also needs refills on medications .  She sees cardiology dr jeffers next month for her atrial fibrillation and she has a pacemaker  Hyperlipidemia   Pertinent negatives include no chest pain or shortness of breath.   Hypertension   Pertinent negatives include no chest pain, palpitations or shortness of breath.    Hypertension    Hyperlipidemia    Atrial Fibrillation  Past medical history includes atrial fibrillation and hyperlipidemia.     Objective   Vital Signs:  /79   Pulse 65   Temp 98 øF (36.7 øC)   Resp 16   Ht 156.2 cm (61.5\")   Wt 75.3 kg (166 lb)   SpO2 98%   BMI 30.86 kg/mý   Estimated body mass index is 30.86 kg/mý as calculated from the following:    Height as of this encounter: 156.2 cm (61.5\").    Weight as of this encounter: 75.3 kg (166 lb).               Physical Exam  Vitals and nursing note reviewed.   Constitutional:       General: She is not in acute distress.     Appearance: Normal appearance. She is not diaphoretic.   HENT:      Head: Normocephalic and atraumatic.      Right Ear: External ear normal.      Left Ear: External ear normal.      Nose: Nose normal.   Eyes:      Extraocular Movements: Extraocular movements intact.      Conjunctiva/sclera: Conjunctivae normal.   Neck:      Trachea: Trachea normal.   Cardiovascular:      Rate and Rhythm: Normal rate and regular rhythm.      Heart sounds: Normal heart sounds.      Comments: ppm  Pulmonary:      Effort: Pulmonary effort is normal. No respiratory distress.      Breath sounds: Normal breath sounds. "   Abdominal:      General: Abdomen is flat.   Musculoskeletal:      Cervical back: Neck supple.      Comments: Moves all limbs   Skin:     General: Skin is warm.   Neurological:      Mental Status: She is alert and oriented to person, place, and time.      Comments: No gross motor or sensory deficits      Result Review :  The following data was reviewed by: Nakita Crump MD on 08/02/2023:  Common labs          10/25/2022    08:38 3/7/2023    11:24 8/2/2023    11:29   Common Labs   Glucose 98  106  102    BUN 15  15  13    Creatinine 0.92  0.95  0.89    Sodium 144  141  143    Potassium 4.2  3.9  4.0    Chloride 106  101  105    Calcium 9.7  9.8  9.9    Total Protein 7.0  7.5     Albumin 4.40  4.5  4.4    Total Bilirubin 0.3  0.4  0.3    Alkaline Phosphatase 86  97  81    AST (SGOT) 18  13  14    ALT (SGPT) 13  12  9    WBC 6.27  6.04  5.24    Hemoglobin 12.0  12.9  13.5    Hematocrit 35.6  39.9  39.2    Platelets 233  215  246    Total Cholesterol   163    Total Cholesterol 184  181     Triglycerides 77  108  94    HDL Cholesterol 65  61  62    LDL Cholesterol  105  101  84    Hemoglobin A1C  5.60  5.90                   Assessment and Plan   Diagnoses and all orders for this visit:    1. Benign essential hypertension (Primary)  -     dilTIAZem CD (CARDIZEM CD) 240 MG 24 hr capsule; Take 1 capsule by mouth Daily.  Dispense: 90 capsule; Refill: 1    2. Mixed hyperlipidemia  -     CBC & Differential  -     Comprehensive Metabolic Panel  -     Lipid Panel    3. Chronic atrial fibrillation  -     dilTIAZem CD (CARDIZEM CD) 240 MG 24 hr capsule; Take 1 capsule by mouth Daily.  Dispense: 90 capsule; Refill: 1    4. Impaired fasting glucose  -     Hemoglobin A1c    Plan:  1.  Benign essential hypertension: Will continue current medication, low-sodium diet advised, Counseled to regularly check BP at home with goal averaging <130/80.   2.mixed hyperlipidemia: will obtain   fasting CMP and lipid panel.  Diet and exercise  counseled,    3. Impaired glucose   : will obtain   fasting CMP  and hba1c  , diet and exercise counseled ,   4. Atrial fibrillation: rate controlled, on anticoagulation ,  will continue current medications , per cardiology              I spent 30 minutes caring for Monica on this date of service. This time includes time spent by me in the following activities:preparing for the visit, reviewing tests, performing a medically appropriate examination and/or evaluation , counseling and educating the patient/family/caregiver, ordering medications, tests, or procedures, and documenting information in the medical record  Follow Up   Return in about 3 months (around 11/7/2023).  Patient was given instructions and counseling regarding her condition or for health maintenance advice. Please see specific information pulled into the AVS if appropriate.

## 2023-08-05 LAB
A ALTERNATA IGE QN: <0.1 KU/L
A FUMIGATUS IGE QN: <0.1 KU/L
AMER ROACH IGE QN: <0.1 KU/L
BAHIA GRASS IGE QN: <0.1 KU/L
BERMUDA GRASS IGE QN: <0.1 KU/L
BOXELDER IGE QN: <0.1 KU/L
C HERBARUM IGE QN: <0.1 KU/L
CAT DANDER IGE QN: <0.1 KU/L
CMN PIGWEED IGE QN: <0.1 KU/L
COMMON RAGWEED IGE QN: <0.1 KU/L
CONV CLASS DESCRIPTION: NORMAL
D FARINAE IGE QN: <0.1 KU/L
D PTERONYSS IGE QN: <0.1 KU/L
DOG DANDER IGE QN: <0.1 KU/L
ENGL PLANTAIN IGE QN: <0.1 KU/L
HAZELNUT POLN IGE QN: <0.1 KU/L
JOHNSON GRASS IGE QN: <0.1 KU/L
KENT BLUE GRASS IGE QN: <0.1 KU/L
LONDON PLANE IGE QN: <0.1 KU/L
M RACEMOSUS IGE QN: <0.1 KU/L
MT JUNIPER IGE QN: <0.1 KU/L
MUGWORT IGE QN: <0.1 KU/L
NETTLE IGE QN: <0.1 KU/L
P NOTATUM IGE QN: <0.1 KU/L
S BOTRYOSUM IGE QN: <0.1 KU/L
SHEEP SORREL IGE QN: <0.1 KU/L
SWEET GUM IGE QN: <0.1 KU/L
WHITE ELM IGE QN: <0.1 KU/L
WHITE HICKORY IGE QN: <0.1 KU/L
WHITE MULBERRY IGE QN: <0.1 KU/L
WHITE OAK IGE QN: <0.1 KU/L

## 2023-08-06 LAB — IGE SERPL-ACNC: 104 IU/ML (ref 6–495)

## 2023-08-28 ENCOUNTER — TELEPHONE (OUTPATIENT)
Dept: PULMONOLOGY | Facility: CLINIC | Age: 78
End: 2023-08-28
Payer: MEDICARE

## 2023-08-28 NOTE — TELEPHONE ENCOUNTER
I have discussed with the patent over the phone the results of their overnight pulse oximetry results. No need for oxygen at night.     
(0) swallows foods/liquids without difficulty

## 2023-08-29 DIAGNOSIS — G47.33 OBSTRUCTIVE SLEEP APNEA: ICD-10-CM

## 2023-10-16 ENCOUNTER — TELEPHONE (OUTPATIENT)
Dept: PULMONOLOGY | Facility: CLINIC | Age: 78
End: 2023-10-16

## 2023-10-16 NOTE — TELEPHONE ENCOUNTER
Caller: Monica Perea    Relationship to patient: Self    Best call back number: [phone]     Patient is needing:  PATIENT CALLED STATING SHE CANNOT AFFORD HER Fluticasone Furoate-Vilanterol (BREO ELLIPTA) 200-25 MCG/ACT inhaler FOR THE MONTH OF DECEMBER.  PT WANTS TO KNOW IF THE OFFICE HAS ANY SAMPLES AVAILABLE THAT SHE COULD .  PLEASE CONTACT PATIENT TO ADVISE, THANK YOU.

## 2023-10-16 NOTE — TELEPHONE ENCOUNTER
Caller: Monica Perea    Relationship to patient: Self    Best call back number: [phone]    Patient is needing: PT STATED THAT IN ORDER TO GET HER SLEEP SUPPLIES, DR. GARCIA WILL NEED TO FILL OUT THE FAX THAT WAS SENT FROM [facility]

## 2023-10-17 DIAGNOSIS — G47.33 OSA (OBSTRUCTIVE SLEEP APNEA): Primary | ICD-10-CM

## 2024-01-09 ENCOUNTER — OFFICE VISIT (OUTPATIENT)
Dept: PULMONOLOGY | Facility: CLINIC | Age: 79
End: 2024-01-09
Payer: MEDICARE

## 2024-01-09 VITALS
HEIGHT: 61 IN | WEIGHT: 175 LBS | OXYGEN SATURATION: 94 % | SYSTOLIC BLOOD PRESSURE: 124 MMHG | RESPIRATION RATE: 18 BRPM | BODY MASS INDEX: 33.04 KG/M2 | HEART RATE: 72 BPM | DIASTOLIC BLOOD PRESSURE: 72 MMHG

## 2024-01-09 DIAGNOSIS — J45.40 MODERATE PERSISTENT ASTHMA WITHOUT COMPLICATION: ICD-10-CM

## 2024-01-09 DIAGNOSIS — R06.02 SOB (SHORTNESS OF BREATH): ICD-10-CM

## 2024-01-09 DIAGNOSIS — J30.9 ALLERGIC RHINITIS, UNSPECIFIED SEASONALITY, UNSPECIFIED TRIGGER: ICD-10-CM

## 2024-01-09 DIAGNOSIS — G47.33 OSA (OBSTRUCTIVE SLEEP APNEA): Primary | ICD-10-CM

## 2024-01-09 PROCEDURE — 99214 OFFICE O/P EST MOD 30 MIN: CPT | Performed by: NURSE PRACTITIONER

## 2024-01-09 RX ORDER — FLUTICASONE FUROATE AND VILANTEROL 100; 25 UG/1; UG/1
1 POWDER RESPIRATORY (INHALATION) DAILY
Qty: 1 EACH | Refills: 5 | Status: SHIPPED | OUTPATIENT
Start: 2024-01-09

## 2024-01-09 NOTE — PROGRESS NOTES
"Chief Complaint   Patient presents with    Shortness of Breath    Follow-up         Subjective   Monica Perea is a 79 y.o. female.   Patient is here today for follow up of asthma and shortness of breath.     Patient says that since the last office visit she has had no recent exacerbations. she denies any emergency room visits or hospitalizations, due to her asthma.     The patient says that she is compliant with pulmonary medicines, as prescribed. She is using Breo 200/25.     She has not needed JENNY.     She has had some increased HR and SOB but feels the SOB is due to Afib. She states her HR has run into the 120's. She f/w Dr. Garsia & Elton. She has watchman and PPM.    She is using AutoPap nightly without any issue.  She states she would not be able to sleep without the machine.      The following portions of the patient's history were reviewed and updated as appropriate: allergies, current medications, past family history, past medical history, past social history, and past surgical history.      Review of Systems   HENT:  Negative for sinus pressure, sneezing and sore throat.    Respiratory:  Positive for shortness of breath. Negative for cough, chest tightness and wheezing.    Cardiovascular:  Positive for palpitations. Negative for leg swelling.   Psychiatric/Behavioral:  Negative for sleep disturbance.        Objective   Visit Vitals  /72   Pulse 72   Resp 18   Ht 156.2 cm (61.5\") Comment: pt reported   Wt 79.4 kg (175 lb)   SpO2 94%   BMI 32.53 kg/m²       Physical Exam  Vitals reviewed.   HENT:      Head: Atraumatic.      Mouth/Throat:      Mouth: Mucous membranes are moist.      Comments: Crowded oropharynx.   Eyes:      Extraocular Movements: Extraocular movements intact.   Cardiovascular:      Rate and Rhythm: Normal rate and regular rhythm.   Pulmonary:      Effort: Pulmonary effort is normal. No respiratory distress.   Abdominal:      Comments: Obese abdomen.   Musculoskeletal: "      Cervical back: Neck supple.      Comments: Gait normal.    Skin:     General: Skin is warm.   Neurological:      Mental Status: She is alert and oriented to person, place, and time.         Assessment & Plan   Diagnoses and all orders for this visit:    1. MOON (obstructive sleep apnea) (Primary)    2. Moderate persistent asthma without complication    3. Allergic rhinitis, unspecified seasonality, unspecified trigger    Other orders  -     Fluticasone Furoate-Vilanterol 100-25 MCG/ACT aerosol powder ; Inhale 1 puff Daily. Rinse mouth with water after use.  Dispense: 1 each; Refill: 5           Return in about 6 months (around 7/9/2024) for Recheck, For Dr. العراقي.    DISCUSSION (if any):  PFT today consistent with mild obstruction. No restriction with air trapping suggested. Preserved diffusion capacity.     No oxygen needed on overnight done in August.      Latest Reference Range & Units 08/02/23 11:29   Eosinophils Absolute 0.00 - 0.40 10*3/mm3 0.35      Latest Reference Range & Units 08/02/23 11:29   IgE 6 - 495 IU/mL 104      Latest Reference Range & Units Most Recent   Cat Dander Class 0 kU/L <0.10  8/2/23 11:29   Dog Dander, IgE Class 0 kU/L <0.10  8/2/23 11:29   English Plantain Class 0 kU/L <0.10  8/2/23 11:29   Nettle Class 0 kU/L <0.10  8/2/23 11:29   Sweet Gum Class 0 kU/L <0.10  8/2/23 11:29   [place] Grass Class 0 kU/L <0.10  8/2/23 11:29   B[place] Grass Class 0 kU/L <0.10  8/2/23 11:29   [place] Bluegrass IgE Class 0 kU/L <0.10  8/2/23 11:29   Sheep [place] Class 0 kU/L <0.10  8/2/23 11:29   Finn Grass Class 0 kU/L <0.10  8/2/23 11:29   Cockroach, American Class 0 kU/L <0.10  8/2/23 11:29   D. farinae (dust mite) Class 0 kU/L <0.10  8/2/23 11:29   D. pteronyssinus (dust mite) Class 0 kU/L <0.10  8/2/23 11:29   Class Description  Comment  8/2/23 11:29   Aspergillus fumigatus Class 0 kU/L <0.10  8/2/23 11:29   Cladosporium herbarum Class 0 kU/L <0.10  8/2/23 11:29   Mucor racemosus Class 0 kU/L  <0.10  8/2/23 11:29   Stemphylium herbarum Class 0 kU/L <0.10  8/2/23 11:29   Rankin, Mountain Class 0 kU/L <0.10  8/2/23 11:29   Elm, American Class 0 kU/L <0.10  8/2/23 11:29   Hazelnut Tree Class 0 kU/L <0.10  8/2/23 11:29   Schley, White Class 0 kU/L <0.10  8/2/23 11:29   Maple/Harvey Class 0 kU/L <0.10  8/2/23 11:29   Maple Carl [place] Class 0 kU/L <0.10  8/2/23 11:29   Chestnut, White Class 0 kU/L <0.10  8/2/23 11:29   White [place] Class 0 kU/L <0.10  8/2/23 11:29   Mugwort Class 0 kU/L <0.10  8/2/23 11:29   Pigweed, Rough/Common Class 0 kU/L <0.10  8/2/23 11:29   Ragweed, Common/Short Class 0 kU/L <0.10  8/2/23 11:29       Her symptoms of asthma are under adequate control at this time. She would like to try to de-escalate inhaler so will decreased to Breo 100/25. If symptoms worsen, she should resume Breo 200/25.     Continue antihistamine for allergic rhinitis symptoms.    Patient's medications for underlying asthma were reviewed with her in great detail.    Any needed adjustments to her pulmonary medications, either for clinical or insurance coverage reasons, have been made and are reflected in the orders.    Side effects of prescribed medications discussed with the patient.    Asthma action plan with discussed with her.    The patient was asked to call this office if the symptoms worsen.     Sleep study performed in March 2017  AHI was 28 / hour.   Supine AHI was ?78/hour.     Continue treatment with AutoPAP at a pressure of 6/12, with a nasal mask.    Patient's compliance data was reviewed and the compliance is greater than 70%.    Humidification setup, hose and mask care discussed.    Weight loss advised.    Use every night for at least 4 hours stressed.     I have recommended pneumococcal 20 vaccine. She has had 13 & 23 after the age of 65.     Dictated utilizing Dragon dictation.    This document was electronically signed by TERRY Norton January 9, 2024  09:50 EST

## 2024-02-06 DIAGNOSIS — I10 BENIGN ESSENTIAL HYPERTENSION: ICD-10-CM

## 2024-02-06 DIAGNOSIS — E78.2 MIXED HYPERLIPIDEMIA: Primary | ICD-10-CM

## 2024-02-06 DIAGNOSIS — I48.20 CHRONIC ATRIAL FIBRILLATION: ICD-10-CM

## 2024-02-06 RX ORDER — DILTIAZEM HYDROCHLORIDE 240 MG/1
240 CAPSULE, COATED, EXTENDED RELEASE ORAL DAILY
Qty: 90 CAPSULE | Refills: 1 | Status: SHIPPED | OUTPATIENT
Start: 2024-02-06

## 2024-02-06 NOTE — TELEPHONE ENCOUNTER
Caller: [place] Monica Sander    Relationship: Self    Best call back number: [phone]     What orders are you requesting (i.e. lab or imaging): LABS    In what timeframe would the patient need to come in: TODAY ( 2/6/24)     Where will you receive your lab/imaging services: [facility]    Additional notes: PATIENT WOULD LIKE TO COMPLETE HER LABS BEFORE HER MWV ON 2/13/24        -------------------------------------------------------------------------------------------------------------    Requested Prescriptions:   Requested Prescriptions     Pending Prescriptions Disp Refills    dilTIAZem CD (CARDIZEM CD) 240 MG 24 hr capsule 90 capsule 1     Sig: Take 1 capsule by mouth Daily.        Pharmacy where request should be sent: [facility] [address] [place] [address] [phone] [facility] [phone] FX     Last office visit with prescribing clinician: 8/2/2023   Last telemedicine visit with prescribing clinician: Visit date not found   Next office visit with prescribing clinician: 2/13/2024     Additional details provided by patient: PATIENT HAS ENOUGH MEDICATION UNTIL THE WEEKEND BUT HER APPOINTMENT IS NOT UNTIL 2/13/24    Does the patient have less than a 3 day supply:  [] Yes  [x] No    Would you like a call back once the refill request has been completed: [] Yes [x] No    If the office needs to give you a call back, can they leave a voicemail: [] Yes [x] No    Nick Tsai Rep   02/06/24 08:28 EST

## 2024-02-07 LAB
ALBUMIN SERPL-MCNC: 4.4 G/DL (ref 3.8–4.8)
ALBUMIN/GLOB SERPL: 1.8 {RATIO} (ref 1.2–2.2)
ALP SERPL-CCNC: 106 IU/L (ref 44–121)
ALT SERPL-CCNC: 13 IU/L (ref 0–32)
AST SERPL-CCNC: 16 IU/L (ref 0–40)
BASOPHILS # BLD AUTO: 0.1 X10E3/UL (ref 0–0.2)
BASOPHILS NFR BLD AUTO: 1 %
BILIRUB SERPL-MCNC: 0.4 MG/DL (ref 0–1.2)
BUN SERPL-MCNC: 12 MG/DL (ref 8–27)
BUN/CREAT SERPL: 15 (ref 12–28)
CALCIUM SERPL-MCNC: 9.8 MG/DL (ref 8.7–10.3)
CHLORIDE SERPL-SCNC: 105 MMOL/L (ref 96–106)
CHOLEST SERPL-MCNC: 166 MG/DL (ref 100–199)
CO2 SERPL-SCNC: 24 MMOL/L (ref 20–29)
CREAT SERPL-MCNC: 0.78 MG/DL (ref 0.57–1)
EGFRCR SERPLBLD CKD-EPI 2021: 78 ML/MIN/1.73
EOSINOPHIL # BLD AUTO: 0.3 X10E3/UL (ref 0–0.4)
EOSINOPHIL NFR BLD AUTO: 5 %
ERYTHROCYTE [DISTWIDTH] IN BLOOD BY AUTOMATED COUNT: 11.9 % (ref 11.7–15.4)
GLOBULIN SER CALC-MCNC: 2.4 G/DL (ref 1.5–4.5)
GLUCOSE SERPL-MCNC: 108 MG/DL (ref 70–99)
HBA1C MFR BLD: 5.6 % (ref 4.8–5.6)
HCT VFR BLD AUTO: 39.5 % (ref 34–46.6)
HDLC SERPL-MCNC: 68 MG/DL
HGB BLD-MCNC: 13.4 G/DL (ref 11.1–15.9)
IMM GRANULOCYTES # BLD AUTO: 0 X10E3/UL (ref 0–0.1)
IMM GRANULOCYTES NFR BLD AUTO: 0 %
LDLC SERPL CALC-MCNC: 85 MG/DL (ref 0–99)
LYMPHOCYTES # BLD AUTO: 1.4 X10E3/UL (ref 0.7–3.1)
LYMPHOCYTES NFR BLD AUTO: 24 %
MCH RBC QN AUTO: 31.9 PG (ref 26.6–33)
MCHC RBC AUTO-ENTMCNC: 33.9 G/DL (ref 31.5–35.7)
MCV RBC AUTO: 94 FL (ref 79–97)
MONOCYTES # BLD AUTO: 0.6 X10E3/UL (ref 0.1–0.9)
MONOCYTES NFR BLD AUTO: 11 %
NEUTROPHILS # BLD AUTO: 3.5 X10E3/UL (ref 1.4–7)
NEUTROPHILS NFR BLD AUTO: 59 %
PLATELET # BLD AUTO: 205 X10E3/UL (ref 150–450)
POTASSIUM SERPL-SCNC: 4.1 MMOL/L (ref 3.5–5.2)
PROT SERPL-MCNC: 6.8 G/DL (ref 6–8.5)
RBC # BLD AUTO: 4.2 X10E6/UL (ref 3.77–5.28)
SODIUM SERPL-SCNC: 144 MMOL/L (ref 134–144)
TRIGL SERPL-MCNC: 65 MG/DL (ref 0–149)
VLDLC SERPL CALC-MCNC: 13 MG/DL (ref 5–40)
WBC # BLD AUTO: 5.9 X10E3/UL (ref 3.4–10.8)

## 2024-03-04 ENCOUNTER — OFFICE VISIT (OUTPATIENT)
Dept: INTERNAL MEDICINE | Facility: CLINIC | Age: 79
End: 2024-03-04
Payer: MEDICARE

## 2024-03-04 VITALS
HEART RATE: 68 BPM | BODY MASS INDEX: 33.49 KG/M2 | TEMPERATURE: 98.4 F | WEIGHT: 177.4 LBS | OXYGEN SATURATION: 97 % | HEIGHT: 61 IN | SYSTOLIC BLOOD PRESSURE: 140 MMHG | DIASTOLIC BLOOD PRESSURE: 62 MMHG | RESPIRATION RATE: 16 BRPM

## 2024-03-04 DIAGNOSIS — J32.9 RHINOSINUSITIS: Primary | ICD-10-CM

## 2024-03-04 LAB
EXPIRATION DATE: NORMAL
FLUAV AG UPPER RESP QL IA.RAPID: NOT DETECTED
FLUBV AG UPPER RESP QL IA.RAPID: NOT DETECTED
INTERNAL CONTROL: NORMAL
Lab: NORMAL
SARS-COV-2 AG UPPER RESP QL IA.RAPID: NOT DETECTED

## 2024-03-04 PROCEDURE — 3077F SYST BP >= 140 MM HG: CPT | Performed by: STUDENT IN AN ORGANIZED HEALTH CARE EDUCATION/TRAINING PROGRAM

## 2024-03-04 PROCEDURE — 3078F DIAST BP <80 MM HG: CPT | Performed by: STUDENT IN AN ORGANIZED HEALTH CARE EDUCATION/TRAINING PROGRAM

## 2024-03-04 PROCEDURE — 99213 OFFICE O/P EST LOW 20 MIN: CPT | Performed by: STUDENT IN AN ORGANIZED HEALTH CARE EDUCATION/TRAINING PROGRAM

## 2024-03-04 RX ORDER — FLUTICASONE PROPIONATE 50 MCG
2 SPRAY, SUSPENSION (ML) NASAL DAILY
Qty: 15.8 ML | Refills: 2 | Status: SHIPPED | OUTPATIENT
Start: 2024-03-04

## 2024-03-04 RX ORDER — AZITHROMYCIN 250 MG/1
TABLET, FILM COATED ORAL
Qty: 6 TABLET | Refills: 0 | Status: SHIPPED | OUTPATIENT
Start: 2024-03-04 | End: 2024-03-04

## 2024-03-04 NOTE — PROGRESS NOTES
Office Note     Name: Monica Perea    : 1945     MRN: [id number]     Chief Complaint  Sore Throat (Started last Thursday ) and Nasal Congestion    Subjective     History of Present Illness:  Monica Perea is a 79 y.o. female who presents today for 4 days of sore throat and nasal congestion  with associated ear fullness. No cough, difficulty breathing, fever. Has been taking zyrtec without relief.     Family History:   Family History   Problem Relation Age of Onset    Heart attack Other     Arthritis Other     Diabetes Other     Hypertension Other     Hodgkin's lymphoma Other     Esophagitis Mother     Heart failure Mother     Esophageal cancer Mother     Esophagitis Maternal Aunt     Breast cancer Maternal Aunt         DX AGE 50'S    Colon cancer Maternal Grandmother 49    Heart attack Father     Breast cancer Sister 72    Ovarian cancer Sister 74    Stomach cancer Neg Hx     Liver cancer Neg Hx     Liver disease Neg Hx        Social History:   Social History     Socioeconomic History    Marital status:    Tobacco Use    Smoking status: Former     Current packs/day: 0.00     Average packs/day: 1 pack/day for 35.0 years (35.0 ttl pk-yrs)     Types: Cigarettes     Start date:      Quit date:      Years since quittin.1    Smokeless tobacco: Never   Vaping Use    Vaping status: Never Used   Substance and Sexual Activity    Alcohol use: No     Comment: quit in     Drug use: No    Sexual activity: Defer       Health Maintenance   Topic Date Due    DXA SCAN  2022    ANNUAL WELLNESS VISIT  2023    ZOSTER VACCINE (2 of 2) 2023    TDAP/TD VACCINES (1 - Tdap) 2024 (Originally 1/3/1964)    COVID-19 Vaccine (3 - -24 season) 2025 (Originally 2023)    RSV Vaccine - Adults (1 - 1-dose 60+ series) 2025 (Originally 1/3/2007)    BMI FOLLOWUP  2024    LIPID PANEL  2025    COLORECTAL CANCER SCREENING  2028    HEPATITIS C  "SCREENING  Completed    INFLUENZA VACCINE  Completed    Pneumococcal Vaccine 65+  Completed       Objective     Vital Signs  /62   Pulse 68   Temp 98.4 °F (36.9 °C) (Infrared)   Resp 16   Ht 156.2 cm (61.5\")   Wt 80.5 kg (177 lb 6.4 oz)   SpO2 97%   BMI 32.98 kg/m²   Estimated body mass index is 32.98 kg/m² as calculated from the following:    Height as of this encounter: 156.2 cm (61.5\").    Weight as of this encounter: 80.5 kg (177 lb 6.4 oz).        Physical Exam  Vitals and nursing note reviewed.   Constitutional:       Appearance: Normal appearance.   HENT:      Head: Normocephalic and atraumatic.      Right Ear: Tympanic membrane and ear canal normal.      Left Ear: Tympanic membrane and ear canal normal.      Ears:      Comments: Mild effusion from both ears     Nose: Congestion present.      Mouth/Throat:      Pharynx: Posterior oropharyngeal erythema present. No oropharyngeal exudate.   Eyes:      Conjunctiva/sclera: Conjunctivae normal.      Pupils: Pupils are equal, round, and reactive to light.   Cardiovascular:      Rate and Rhythm: Normal rate and regular rhythm.      Pulses: Normal pulses.      Heart sounds: Normal heart sounds.   Pulmonary:      Effort: Pulmonary effort is normal. No respiratory distress.      Breath sounds: Normal breath sounds. No wheezing, rhonchi or rales.   Abdominal:      General: Abdomen is flat. Bowel sounds are normal.      Palpations: Abdomen is soft.      Tenderness: There is no abdominal tenderness. There is no guarding.   Musculoskeletal:      Cervical back: Neck supple.   Skin:     General: Skin is warm and dry.      Capillary Refill: Capillary refill takes less than 2 seconds.   Neurological:      General: No focal deficit present.      Mental Status: She is alert. Mental status is at baseline.   Psychiatric:         Mood and Affect: Mood normal.         Behavior: Behavior normal.          Procedures     Assessment and Plan     Diagnoses and all orders for " this visit:    1. Rhinosinusitis (Primary)  -     fluticasone (FLONASE) 50 MCG/ACT nasal spray; 2 sprays into the nostril(s) as directed by provider Daily.  Dispense: 15.8 mL; Refill: 2  -     phenol (CHLORASEPTIC) 1.4 % liquid liquid; Apply 1 spray to the mouth or throat Every 2 (Two) Hours As Needed (sore throat).  Dispense: 118 mL; Refill: 2  -     Discontinue: azithromycin (Zithromax Z-Harrisno) 250 MG tablet; Take 2 tablets by mouth on day 1, then 1 tablet daily on days 2-5  Dispense: 6 tablet; Refill: 0    Likely viral, COVID flu strep negative in office  To help relieve symptoms of URI/Sinus congestion and cough   For cold, sinus congestion, upper and lower respiratory congestion, OTC symptomatic management can include a mucolytic such as guaifenesin (Mucinx, Robitussin) with increased water to help keep mucous and drainage thin.  Dexomorphin, DM (Robitussin-DM)  a cough suppressant or expectorant may be of help during the night. May add an antihistamine (loratadine/cetirizine) nightly to reduce the inflammation triggers and helped with symptoms of clear runny nose, sneezing, watery eyes and postnasal drainage. Flonase (Corticosteriod) aimed to each nose is appropriate for nasal congestion, ear fullness and popping unrelated to an infection.  Tylenol/Motrin for fever and pain as appropriate and dosed per package. Humidified air, rest, and increase fluids as tolerated to help body fight infection. Discuss with pharmacist any dosing or appropriate medication choices.  Read all OTC medication labels carefully.   Home remedies consist of rest, warm soaks to sinus and face, breathing warm steam (avoiding burns) 1 tsp of eucalyptus oil may be added to the water to help with congestion.  you may take 1 tablespoon of honey daily for cough. Increase water intake and avoid dehydration.   Informed patient of warning signs and red flag symptoms which include fever greater than 100.4, difficulty breathing and oxygen saturation  less than 90%.    Counseling was given to patient for the following topics: instructions for management, risks and benefits of treatment options, and importance of treatment compliance.    Follow Up  No follow-ups on file.    MD PERICO [facility]  [address] [place] [id number] [phone]

## 2024-03-23 NOTE — PATIENT INSTRUCTIONS
Antireflux measures: Avoid fried, fatty foods, alcohol, chocolate, coffee, tea,  soft drinks, peppermint and spearmint, spicy foods, tomatoes and tomato based foods, onion based foods, and smoking.   Other antireflux measures include weight reduction if overweight, avoiding tight clothing around the abdomen, elevating the head of the bed 6 inches with blocks under the head board, and don't drink or eat before going to bed and avoid lying down immediately after meals.  May take over the counter antacids as needed.   Advised to eat a softer diet, chew food very well and take sips of water between bites.   High fiber, low fat diet with liberal water intake.   Miralax 17 grams daily.   May take stool softeners 2 per day for constipation.   Upper endoscopy-EGD: The indications, technique, alternatives and potential risk and complications were discussed with the patient including but not limited to bleeding, perforations, missing lesions and anesthetic complications. The patient understands and wishes to proceed with the procedure and has given their verbal consent. Written patient education information was given to the patient.   The patient will call if they have further questions before procedure.   COVID-19 testing prior to procedure. The patient will need to self-quarantine after testing until the procedure. Instructions given to patient.  Colonoscopy: The indications, technique, alternatives and potential risk and complications were discussed with the patient including but not limited to bleeding, perforations, missing lesions and anesthetic complications. The patient understands and wishes to proceed with the procedure and has given their verbal consent. Written patient education information was given to the patient.   The patient will call if they have further questions before procedure.    Time-based billing (NON-critical care)

## 2024-03-26 ENCOUNTER — TELEPHONE (OUTPATIENT)
Dept: INTERNAL MEDICINE | Facility: CLINIC | Age: 79
End: 2024-03-26
Payer: MEDICARE

## 2024-03-26 NOTE — TELEPHONE ENCOUNTER
Caller: Monica Perea    Relationship: Self    Best call back number: [phone]         What was the call regarding: PATIENT WOULD LIKE TO KNOW IF SHE NEEDS LAB WORK DONE FOR HER WELLNESS APPOINTMENT, PLEASE CALL TO LET HER KNOW.     Is it okay if the provider responds through MyChart: YES

## 2024-03-27 ENCOUNTER — TRANSCRIBE ORDERS (OUTPATIENT)
Dept: ADMINISTRATIVE | Facility: HOSPITAL | Age: 79
End: 2024-03-27
Payer: MEDICARE

## 2024-03-27 DIAGNOSIS — Z12.31 VISIT FOR SCREENING MAMMOGRAM: Primary | ICD-10-CM

## 2024-04-11 ENCOUNTER — OFFICE VISIT (OUTPATIENT)
Dept: INTERNAL MEDICINE | Facility: CLINIC | Age: 79
End: 2024-04-11
Payer: MEDICARE

## 2024-04-11 VITALS
HEART RATE: 60 BPM | DIASTOLIC BLOOD PRESSURE: 82 MMHG | WEIGHT: 176 LBS | BODY MASS INDEX: 33.23 KG/M2 | TEMPERATURE: 97.8 F | HEIGHT: 61 IN | OXYGEN SATURATION: 97 % | SYSTOLIC BLOOD PRESSURE: 139 MMHG | RESPIRATION RATE: 18 BRPM

## 2024-04-11 DIAGNOSIS — R30.0 DYSURIA: ICD-10-CM

## 2024-04-11 DIAGNOSIS — I10 BENIGN ESSENTIAL HYPERTENSION: Primary | ICD-10-CM

## 2024-04-11 DIAGNOSIS — N39.0 ACUTE URINARY TRACT INFECTION: ICD-10-CM

## 2024-04-11 LAB
BILIRUB BLD-MCNC: NEGATIVE MG/DL
CLARITY, POC: ABNORMAL
COLOR UR: YELLOW
EXPIRATION DATE: ABNORMAL
GLUCOSE UR STRIP-MCNC: NEGATIVE MG/DL
KETONES UR QL: NEGATIVE
LEUKOCYTE EST, POC: ABNORMAL
Lab: ABNORMAL
NITRITE UR-MCNC: NEGATIVE MG/ML
PH UR: 6 [PH] (ref 5–8)
PROT UR STRIP-MCNC: NEGATIVE MG/DL
RBC # UR STRIP: ABNORMAL /UL
SP GR UR: 1.01 (ref 1–1.03)
UROBILINOGEN UR QL: NORMAL

## 2024-04-11 RX ORDER — DOXYCYCLINE 100 MG/1
100 CAPSULE ORAL 2 TIMES DAILY
Qty: 14 CAPSULE | Refills: 0 | Status: SHIPPED | OUTPATIENT
Start: 2024-04-11

## 2024-04-11 NOTE — PROGRESS NOTES
"Chief Complaint  Urinary Tract Infection (Burning upon urination) and Hypertension    Subjective        Monica Perea presents to [facility] PRIMARY CARE  History of Present Illness  Patient is here complaining of frequent urination with burning sensation since the past few days, she took  Over the counter medications  - AZO , with not much improvement in  the symptoms , she has also  been drinking plenty of  water ,   the symptoms are worsening, she is also following up on blood pressure and is taking medications as prescribed    Objective   Vital Signs:  /82   Pulse 60   Temp 97.8 °F (36.6 °C)   Resp 18   Ht 156.2 cm (61.5\")   Wt 79.8 kg (176 lb)   SpO2 97%   BMI 32.72 kg/m²   Estimated body mass index is 32.72 kg/m² as calculated from the following:    Height as of this encounter: 156.2 cm (61.5\").    Weight as of this encounter: 79.8 kg (176 lb).       BMI is >= 30 and <35. (Class 1 Obesity). The following options were offered after discussion;: weight loss educational material (shared in after visit summary), exercise counseling/recommendations, and nutrition counseling/recommendations      Physical Exam  Vitals and nursing note reviewed.   Constitutional:       General: She is not in acute distress.     Appearance: Normal appearance. She is obese. She is not diaphoretic.   HENT:      Head: Normocephalic and atraumatic.      Right Ear: External ear normal.      Left Ear: External ear normal.      Nose: Nose normal.   Eyes:      Extraocular Movements: Extraocular movements intact.      Conjunctiva/sclera: Conjunctivae normal.   Neck:      Trachea: Trachea normal.   Cardiovascular:      Rate and Rhythm: Normal rate and regular rhythm.      Heart sounds: Normal heart sounds.   Pulmonary:      Effort: Pulmonary effort is normal. No respiratory distress.      Breath sounds: Normal breath sounds.   Abdominal:      General: Abdomen is flat.   Musculoskeletal:      Cervical " back: Neck supple.      Comments: Moves all limbs   Skin:     General: Skin is warm.   Neurological:      Mental Status: She is alert and oriented to person, place, and time.      Comments: No gross motor or sensory deficits        Result Review :    The following data was reviewed by: Nakita Crump MD on 04/11/2024:  UA          12/5/2023    10:03 4/11/2024    16:26   Urinalysis   Ketones, UA Negative  Negative    Leukocytes, UA Small (1+)  500 Sirisha/ul                   Assessment and Plan     Diagnoses and all orders for this visit:    1. Benign essential hypertension (Primary)    2. Dysuria  -     POCT urinalysis dipstick, automated  -     Cancel: Urine Culture - Urine, Urine, Clean Catch  -     Ambulatory Referral to Urology  -     Urine Culture - Urine, Urine, Clean Catch    3. Acute urinary tract infection  -     Cancel: Urine Culture - Urine, Urine, Clean Catch  -     Ambulatory Referral to Urology  -     doxycycline (MONODOX) 100 MG capsule; Take 1 capsule by mouth 2 (Two) Times a Day.  Dispense: 14 capsule; Refill: 0  -     Urine Culture - Urine, Urine, Clean Catch      Plan:  1.  Benign essential hypertension: Will continue current medication, low-sodium diet advised, Counseled to regularly check BP at home with goal averaging <130/80.   2.  Acute urinary tract infection  : In office urine analysis done ,will order urine culture ,oral hydration advised, will start oral antibiotics  3.  Dysuria: In office urine analysis done ,will order urine culture ,oral hydration advised, will start oral antibiotics         Follow Up        Patient was given instructions and counseling regarding her condition or for health maintenance advice. Please see specific information pulled into the AVS if appropriate.

## 2024-04-13 LAB
BACTERIA UR CULT: NORMAL
BACTERIA UR CULT: NORMAL

## 2024-04-24 ENCOUNTER — HOSPITAL ENCOUNTER (OUTPATIENT)
Dept: MAMMOGRAPHY | Facility: HOSPITAL | Age: 79
Discharge: HOME | End: 2024-04-24
Admitting: INTERNAL MEDICINE
Payer: MEDICARE

## 2024-04-24 DIAGNOSIS — Z12.31 VISIT FOR SCREENING MAMMOGRAM: ICD-10-CM

## 2024-04-24 PROCEDURE — 77063 BREAST TOMOSYNTHESIS BI: CPT

## 2024-04-24 PROCEDURE — 77067 SCR MAMMO BI INCL CAD: CPT

## 2024-05-01 ENCOUNTER — OFFICE VISIT (OUTPATIENT)
Dept: CARDIOLOGY | Facility: CLINIC | Age: 79
End: 2024-05-01
Payer: MEDICARE

## 2024-05-01 VITALS
DIASTOLIC BLOOD PRESSURE: 80 MMHG | SYSTOLIC BLOOD PRESSURE: 128 MMHG | WEIGHT: 177.6 LBS | HEIGHT: 61 IN | HEART RATE: 60 BPM | OXYGEN SATURATION: 97 % | BODY MASS INDEX: 33.53 KG/M2

## 2024-05-01 DIAGNOSIS — I10 ESSENTIAL HYPERTENSION: ICD-10-CM

## 2024-05-01 DIAGNOSIS — Z95.818 PRESENCE OF WATCHMAN LEFT ATRIAL APPENDAGE CLOSURE DEVICE: ICD-10-CM

## 2024-05-01 DIAGNOSIS — R60.0 EDEMA LEG: ICD-10-CM

## 2024-05-01 DIAGNOSIS — I49.5 SSS (SICK SINUS SYNDROME): ICD-10-CM

## 2024-05-01 DIAGNOSIS — I48.0 PAROXYSMAL ATRIAL FIBRILLATION: Primary | ICD-10-CM

## 2024-05-01 NOTE — PROGRESS NOTES
Monica Perea  1945  [phone]    05/01/2024    [facility] CARDIOLOGY     Referring Provider: No ref. provider found     Nakita Crump MD  [address] [address]    Chief Complaint   Patient presents with    Follow-up       Problem List:         Atrial Fibrillation- Tachybrady Syndrome   CHADSVASC-  4(age 4 , gender, HTN)   1/23/20 Myocardial perfusion imaging indicates a normal myocardial perfusion study with no evidence of ischemia. Impressions are consistent with a low risk study  BSC PPM Implant 4/28/2020 - per Dr. Thomas    8/1/2020 Echo LVEF 65% with mild LVH   Echo 7/19/2021 LVEF 65%.  Normal left atrial size.  No evidence of significant valvular abnormalities or intracardiac shunt.  Prior Antiarrythmic use includes Flecainide (had dizziness and syncope, and Rhythmol)   4/22 successful insertion of left atrial appendage occlusive device (Watchman Flex).  4/22 ISHAN: A 20 mm watchman FL X left atrial appendage occlusion device placed under ISHAN guidance.  Device appears well-seated with no significant perivascular.  LVEF = 51-55%.  Normal RV size and function.  Moderate, grade 3 plaque in aortic arch.  ISHAN 5/23/2022: 20 mm watchman flex JAS occlusive device in place,On the mitral valve border of the device there is a 2.5 mm area of flow noted, does not come communicate posteriorly with the appendage. Left atrial appendage appears adequately sealed. No device associated thrombus. EF 51-55%, mild to moderate MR  HTN  HLD  Vit D deficiency  Glaucoma   GERD  Chronic constipation  Iron deficiency anemia - on Rx iron supplementation   Frequent Epistaxis   Anxiety   Osteopenia   Psoriasis  Surgical History   Hysterectomy  Appendectomy   Breast biopsy  Allergies  Allergies   Allergen Reactions    Macrobid [Nitrofurantoin Monohyd Macro] Rash    Nitrofurantoin Hives    Phenylephrine-Guaifenesin Hives    Sulfa Antibiotics Hives    Statins Myalgia       Current  Medications    Current Outpatient Medications:     albuterol sulfate  (90 Base) MCG/ACT inhaler, Inhale 2 puffs Every 4 (Four) Hours As Needed for Wheezing., Disp: 18 g, Rfl: 3    Calcium Carbonate-Vitamin D (CALCIUM PLUS VITAMIN D PO), Take 1 tablet by mouth 2 (Two) Times a Day., Disp: , Rfl:     cetirizine (zyrTEC) 10 MG tablet, Take 1 tablet by mouth Daily., Disp: , Rfl:     coenzyme Q10 100 MG capsule, Take 1 capsule by mouth Daily., Disp: , Rfl:     Cranberry-Vit C-Lactobacillus (RA CRANBERRY SUPPLEMENTS PO), Take  by mouth., Disp: , Rfl:     dilTIAZem CD (CARDIZEM CD) 240 MG 24 hr capsule, Take 1 capsule by mouth Daily., Disp: 90 capsule, Rfl: 1    doxycycline (MONODOX) 100 MG capsule, Take 1 capsule by mouth 2 (Two) Times a Day., Disp: 14 capsule, Rfl: 0    fluticasone (FLONASE) 50 MCG/ACT nasal spray, 2 sprays into the nostril(s) as directed by provider Daily., Disp: 15.8 mL, Rfl: 2    Fluticasone Furoate-Vilanterol 100-25 MCG/ACT aerosol powder , Inhale 1 puff Daily. Rinse mouth with water after use., Disp: 1 each, Rfl: 5    latanoprost (XALATAN) 0.005 % ophthalmic solution, Administer 1 drop to both eyes Every Night., Disp: , Rfl:     melatonin 5 MG tablet tablet, Take 1 tablet by mouth., Disp: , Rfl:     phenol (CHLORASEPTIC) 1.4 % liquid liquid, Apply 1 spray to the mouth or throat Every 2 (Two) Hours As Needed (sore throat)., Disp: 118 mL, Rfl: 2    pravastatin (PRAVACHOL) 40 MG tablet, Take 1 tablet by mouth Daily., Disp: 90 tablet, Rfl: 2    History of Present Illness     Pt presents for follow up of AF/SSS/. Since we last saw the pt, pt denies any AF episodes, SOB, CP, LH, and dizziness. Denies any hospitalizations, ER visits, bleeding, or TIA/CVA symptoms. Overall feels well.  Blood pressure has been well-controlled.  Patient has noticed worsening lower extremity edema at her ankles.  She has also been taken off of aspirin.        Vitals:    05/01/24 1403   BP: 128/80   BP Location: Left arm  "  Patient Position: Sitting   Pulse: 60   SpO2: 97%   Weight: 80.6 kg (177 lb 9.6 oz)   Height: 156.2 cm (61.5\")     Body mass index is 33.02 kg/m².  PE:  General: NAD  Neck: no JVD, no carotid bruits, no TM  Heart RRR, NL S1, S2, S4 present, no rubs, murmurs  Lungs: CTA, no wheezes, rhonchi, or rales  Abd: soft, non-tender, NL BS  Ext: No musculoskeletal deformities, no edema, cyanosis, or clubbing  Psych: normal mood and affect    Diagnostic Data:    Interrogation device demonstrates normal DDD pacemaker function.  64% paced in the right atrium.  Less than 1% plate paced in the RV.  Threshold stable.  Battery voltage is 5.5 years.  Less than 1% A-fib.  Rate responsive was altered today to increase accelerometer.        ECG 12 Lead    Date/Time: 5/1/2024 2:36 PM  Performed by: Kip Conde MD    Authorized by: Kip Conde MD  Comparison: compared with previous ECG from 2/16/2022  Similar to previous ECG  Rhythm: sinus rhythm  BPM: 60                 1. Paroxysmal atrial fibrillation    2. SSS (sick sinus syndrome)    3. Presence of Watchman left atrial appendage closure device    4. Essential hypertension    5. Edema leg          Plan:    1. Atrial Fibrillation/ Tachy- Chris syndrome  -BSC PPM with normal function   -Previously on flecainide (intolerant) and rythmol (too expensive)  -Will continue Metoprolol Xl 50mg - BP and HR well controlled     2. Anticoagulation  -Elevated CHADSVASC: 4 s/p Watchman Device 4/2022. ISHAN 45 days later showed well seated device without leakage.  Restart aspirin 81 mg daily.        3. HTN: overall acceptable control- per Dr. Garsia     4.  Lower extremity edema in a patient on diltiazem follow-up with Dr. Garsia.    F/up in 12 months      "

## 2024-05-20 ENCOUNTER — OFFICE VISIT (OUTPATIENT)
Dept: INTERNAL MEDICINE | Facility: CLINIC | Age: 79
End: 2024-05-20
Payer: MEDICARE

## 2024-05-20 VITALS
SYSTOLIC BLOOD PRESSURE: 159 MMHG | HEART RATE: 67 BPM | BODY MASS INDEX: 33.04 KG/M2 | TEMPERATURE: 97.8 F | OXYGEN SATURATION: 99 % | HEIGHT: 61 IN | WEIGHT: 175 LBS | RESPIRATION RATE: 18 BRPM | DIASTOLIC BLOOD PRESSURE: 72 MMHG

## 2024-05-20 DIAGNOSIS — Z78.0 MENOPAUSE: ICD-10-CM

## 2024-05-20 DIAGNOSIS — I10 BENIGN ESSENTIAL HYPERTENSION: ICD-10-CM

## 2024-05-20 DIAGNOSIS — Z00.00 MEDICARE ANNUAL WELLNESS VISIT, SUBSEQUENT: Primary | ICD-10-CM

## 2024-05-20 DIAGNOSIS — E78.2 MIXED HYPERLIPIDEMIA: ICD-10-CM

## 2024-05-20 DIAGNOSIS — Z13.820 ENCOUNTER FOR SCREENING FOR OSTEOPOROSIS: ICD-10-CM

## 2024-05-20 PROCEDURE — 3078F DIAST BP <80 MM HG: CPT | Performed by: INTERNAL MEDICINE

## 2024-05-20 PROCEDURE — 1126F AMNT PAIN NOTED NONE PRSNT: CPT | Performed by: INTERNAL MEDICINE

## 2024-05-20 PROCEDURE — 1159F MED LIST DOCD IN RCRD: CPT | Performed by: INTERNAL MEDICINE

## 2024-05-20 PROCEDURE — 3077F SYST BP >= 140 MM HG: CPT | Performed by: INTERNAL MEDICINE

## 2024-05-20 PROCEDURE — 1170F FXNL STATUS ASSESSED: CPT | Performed by: INTERNAL MEDICINE

## 2024-05-20 PROCEDURE — 1160F RVW MEDS BY RX/DR IN RCRD: CPT | Performed by: INTERNAL MEDICINE

## 2024-05-20 PROCEDURE — 99397 PER PM REEVAL EST PAT 65+ YR: CPT | Performed by: INTERNAL MEDICINE

## 2024-05-20 PROCEDURE — G0439 PPPS, SUBSEQ VISIT: HCPCS | Performed by: INTERNAL MEDICINE

## 2024-05-20 RX ORDER — PRAVASTATIN SODIUM 40 MG
40 TABLET ORAL NIGHTLY
Qty: 90 TABLET | Refills: 1 | Status: SHIPPED | OUTPATIENT
Start: 2024-05-20

## 2024-05-20 RX ORDER — ASPIRIN 81 MG/1
81 TABLET ORAL DAILY
COMMUNITY

## 2024-05-20 RX ORDER — LOSARTAN POTASSIUM 100 MG/1
1 TABLET ORAL DAILY
COMMUNITY
Start: 2024-05-15

## 2024-05-20 NOTE — PATIENT INSTRUCTIONS
Advance Care Planning and Advance Directives     You make decisions on a daily basis - decisions about where you want to live, your career, your home, your life. Perhaps one of the most important decisions you face is your choice for future medical care. Take time to talk with your family and your healthcare team and start planning today.  Advance Care Planning is a process that can help you:  Understand possible future healthcare decisions in light of your own experiences  Reflect on those decision in light of your goals and values  Discuss your decisions with those closest to you and the healthcare professionals that care for you  Make a plan by creating a document that reflects your wishes    Surrogate Decision Maker  In the event of a medical emergency, which has left you unable to communicate or to make your own decisions, you would need someone to make decisions for you.  It is important to discuss your preferences for medical treatment with this person while you are in good health.     Qualities of a surrogate decision maker:  Willing to take on this role and responsibility  Knows what you want for future medical care  Willing to follow your wishes even if they don't agree with them  Able to make difficult medical decisions under stressful circumstances    Advance Directives  These are legal documents you can create that will guide your healthcare team and decision maker(s) when needed. These documents can be stored in the electronic medical record.    Living Will - a legal document to guide your care if you have a terminal condition or a serious illness and are unable to communicate. States vary by statute in document names/types, but most forms may include one or more of the following:        -  Directions regarding life-prolonging treatments        -  Directions regarding artificially provided nutrition/hydration        -  Choosing a healthcare decision maker        -  Direction regarding organ/tissue  donation    Durable Power of  for Healthcare - this document names an -in-fact to make medical decisions for you, but it may also allow this person to make personal and financial decisions for you. Please seek the advice of an  if you need this type of document.    **Advance Directives are not required and no one may discriminate against you if you do not sign one.    Medical Orders  Many states allow specific forms/orders signed by your physician to record your wishes for medical treatment in your current state of health. This form, signed in personal communication with your physician, addresses resuscitation and other medical interventions that you may or may not want.      For more information or to schedule a time with a [facility] Advance Care Planning Facilitator contact: [facility] or call [phone] and someone will contact you directly.  You are due for Shingrix vaccination series ( the newest shingles vaccine).  It is a two shot series spaced 2-6 months apart. Please get this vaccine series started at your earliest convenience at your local pharmacy to help avoid shingles outbreak. It is more effective than the old Zostavax vaccine and is recommended even if you have had the Zostavax vaccine in the past.  Once the Shingrix series is completed, it does not need to be repeated.   For more information, please look at the website below:  [facility] Shingrix Vaccine Information      Medicare Wellness  Personal Prevention Plan of Service     Date of Office Visit:    Encounter Provider:  Nakita Crump MD  Place of Service:  [facility] PRIMARY CARE  Patient Name: Monica Perea  :  1945    As part of the Medicare Wellness portion of your visit today, we are providing you with this personalized preventive plan of services (PPPS). This plan is based upon recommendations of the [place] Preventive Services Task Force (USPSTF) and the Advisory  Committee on Immunization Practices (ACIP).    This lists the preventive care services that should be considered, and provides dates of when you are due. Items listed as completed are up-to-date and do not require any further intervention.    Health Maintenance   Topic Date Due   • DXA SCAN  07/08/2022   • ZOSTER VACCINE (2 of 2) 12/26/2023   • TDAP/TD VACCINES (1 - Tdap) 08/02/2024 (Originally 1/3/1964)   • COVID-19 Vaccine (3 - 2023-24 season) 03/03/2025 (Originally 9/1/2023)   • RSV Vaccine - Adults (1 - 1-dose 60+ series) 03/04/2025 (Originally 1/3/2007)   • INFLUENZA VACCINE  08/01/2024   • LIPID PANEL  02/06/2025   • BMI FOLLOWUP  04/11/2025   • ANNUAL WELLNESS VISIT  05/20/2025   • COLORECTAL CANCER SCREENING  01/31/2028   • HEPATITIS C SCREENING  Completed   • Pneumococcal Vaccine 65+  Completed       No orders of the defined types were placed in this encounter.      Return in about 1 year (around 5/20/2025) for Medicare Wellness.

## 2024-05-20 NOTE — PROGRESS NOTES
The ABCs of the Annual Wellness Visit  Subsequent Medicare Wellness Visit    Chief Complaint   Patient presents with    Medicare Wellness-subsequent    Hypertension    Hyperlipidemia       Subjective      Monica Perea is a 79 y.o. female who presents for a Subsequent Medicare Wellness Visit  and physical.  Patient is following up on blood pressure and cholesterol, she states she stopped metoprolol due to increased fatigue , she was seen by EP , she then called dr garsia and he said to stopped diltiazem and  she has had no issues with blood pressure or atrial fibrillation ,  and has been off the medication for a week , she states currently she is  on losartan since the past 4 days .  Labs have been reviewed, she is due for a DEXA scan    The following portions of the patient's history were reviewed and   updated as appropriate: allergies, current medications, past family history, past medical history, past social history, past surgical history, and problem list.    Compared to one year ago, the patient feels her physical   health is better.    Compared to one year ago, the patient feels her mental   health is better.    Recent Hospitalizations:  She was not admitted to the hospital during the last year.       Current Medical Providers:  Patient Care Team:  Nakita Crump MD as PCP - General (Internal Medicine)  Tico Charles MD as Consulting Physician (Cardiology)  Delfino Thomas Jr., APRN as Nurse Practitioner (Interventional Cardiology)  Villa Thomas DO as Consulting Physician (Cardiology)  Avni Garsia MD as Consulting Physician (Cardiology)  Avni Garsia MD as Consulting Physician (Cardiology)  Kip Conde MD as Consulting Physician (Cardiac Electrophysiology)    Outpatient Medications Prior to Visit   Medication Sig Dispense Refill    albuterol sulfate  (90 Base) MCG/ACT inhaler Inhale 2 puffs Every 4 (Four) Hours As Needed for Wheezing. 18 g 3    aspirin  81 MG EC tablet Take 1 tablet by mouth Daily.      Calcium Carbonate-Vitamin D (CALCIUM PLUS VITAMIN D PO) Take 1 tablet by mouth 2 (Two) Times a Day.      cetirizine (zyrTEC) 10 MG tablet Take 1 tablet by mouth Daily.      coenzyme Q10 100 MG capsule Take 1 capsule by mouth Daily.      Cranberry-Vit C-Lactobacillus (RA CRANBERRY SUPPLEMENTS PO) Take  by mouth.      losartan (COZAAR) 100 MG tablet Take 1 tablet by mouth Daily.      melatonin 5 MG tablet tablet Take 1 tablet by mouth.      Turmeric Curcumin 500 MG capsule Take  by mouth.      pravastatin (PRAVACHOL) 40 MG tablet Take 1 tablet by mouth Daily. 90 tablet 2    dilTIAZem CD (CARDIZEM CD) 240 MG 24 hr capsule Take 1 capsule by mouth Daily. (Patient not taking: Reported on 5/20/2024) 90 capsule 1    doxycycline (MONODOX) 100 MG capsule Take 1 capsule by mouth 2 (Two) Times a Day. (Patient not taking: Reported on 5/20/2024) 14 capsule 0    fluticasone (FLONASE) 50 MCG/ACT nasal spray 2 sprays into the nostril(s) as directed by provider Daily. (Patient not taking: Reported on 5/20/2024) 15.8 mL 2    Fluticasone Furoate-Vilanterol 100-25 MCG/ACT aerosol powder  Inhale 1 puff Daily. Rinse mouth with water after use. (Patient not taking: Reported on 5/20/2024) 1 each 5    latanoprost (XALATAN) 0.005 % ophthalmic solution Administer 1 drop to both eyes Every Night. (Patient not taking: Reported on 5/20/2024)      phenol (CHLORASEPTIC) 1.4 % liquid liquid Apply 1 spray to the mouth or throat Every 2 (Two) Hours As Needed (sore throat). (Patient not taking: Reported on 5/20/2024) 118 mL 2     No facility-administered medications prior to visit.       No opioid medication identified on active medication list. I have reviewed chart for other potential  high risk medication/s and harmful drug interactions in the elderly.        Aspirin is on active medication list. Aspirin use is indicated based on review of current medical condition/s. Pros and cons of this therapy  "have been discussed today. Benefits of this medication outweigh potential harm.  Patient has been encouraged to continue taking this medication.  .      Patient Active Problem List   Diagnosis    Essential hypertension    Constipation    Diverticulosis of intestine    Other fatigue    Gastroesophageal reflux disease without esophagitis    Glaucoma    Hyperlipidemia    Low back pain    Osteopenia    Atrophic vaginitis    Sleep apnea    Psoriasis    CAD (coronary artery disease)    Interstitial cystitis    Anxiety    MVP (mitral valve prolapse)    Paroxysmal atrial fibrillation    Vitamin D deficiency    Sinus node dysfunction    Presence of cardiac pacemaker    Chronic anticoagulation    Long term current use of antiarrhythmic drug    Allergies    Iron deficiency    Anemia    Hyperglycemia    Chronic idiopathic constipation    Rectal bleeding    Family history of esophageal cancer    Personal history of colonic polyps    Epistaxis    Internal hemorrhoids    External hemorrhoid    Family history of colon cancer     Advance Care Planning   Advance Care Planning     Advance Directive is not on file.  ACP discussion was held with the patient during this visit. Patient has an advance directive (not in EMR), copy requested.     Objective    Vitals:    05/20/24 1606   BP: 159/72  Comment: just started taking new BP medication 4 days ago   Pulse: 67   Resp: 18   Temp: 97.8 °F (36.6 °C)   SpO2: 99%   Weight: 79.4 kg (175 lb)   Height: 156.2 cm (61.5\")   PainSc: 0-No pain     Estimated body mass index is 32.53 kg/m² as calculated from the following:    Height as of this encounter: 156.2 cm (61.5\").    Weight as of this encounter: 79.4 kg (175 lb).       All vitals recorded within this visit are reported by the patient.  Physical Examination  Vitals and nursing note reviewed  General appearance: Normocephalic and nontraumatic  HEENT: External inspection of eyes, ears and nose appears benign, hearing appears intact  Neck: Neck " appears supple, trachea in midline, thyroid appears not enlarged  Respiratory: Respiratory effort appears normal  CVS: S1S2 PPM  Musculoskeletal: Moving all limbs  Range of motion of visible joints appears within normal  CNS: No gross motor or sensory deficits  No gross cranial nerve deficits  No tremors  Psychiatry: Alert and oriented x3  Memory appears intact  Mood and affect appears normal  Insight appears normal      Does the patient have evidence of cognitive impairment?   No            HEALTH RISK ASSESSMENT    Smoking Status:  Social History     Tobacco Use   Smoking Status Former    Current packs/day: 0.00    Average packs/day: 1 pack/day for 35.0 years (35.0 ttl pk-yrs)    Types: Cigarettes    Start date:     Quit date:     Years since quittin.4    Passive exposure: Past   Smokeless Tobacco Never     Alcohol Consumption:  Social History     Substance and Sexual Activity   Alcohol Use No    Comment: quit in      Fall Risk Screen:    BIPIN Fall Risk Assessment was completed, and patient is at LOW risk for falls.Assessment completed on:2024    Depression Screenin/20/2024     4:18 PM   PHQ-2/PHQ-9 Depression Screening   Little Interest or Pleasure in Doing Things 0-->not at all   Feeling Down, Depressed or Hopeless 0-->not at all   PHQ-9: Brief Depression Severity Measure Score 0       Health Habits and Functional and Cognitive Screenin/20/2024     4:17 PM   Functional & Cognitive Status   Do you have difficulty preparing food and eating? No   Do you have difficulty bathing yourself, getting dressed or grooming yourself? No   Do you have difficulty using the toilet? No   Do you have difficulty moving around from place to place? No   Do you have trouble with steps or getting out of a bed or a chair? No   Current Diet Well Balanced Diet   Dental Exam Not up to date   Eye Exam Up to date   Exercise (times per week) 5 times per week   Current Exercises Include Other;House  Cleaning;Walking;Gardening   Do you need help using the phone?  No   Are you deaf or do you have serious difficulty hearing?  No   Do you need help to go to places out of walking distance? No   Do you need help shopping? No   Do you need help preparing meals?  No   Do you need help with housework?  No   Do you need help with laundry? No   Do you need help taking your medications? No   Do you need help managing money? No   Do you ever drive or ride in a car without wearing a seat belt? No   Have you felt unusual stress, anger or loneliness in the last month? No   Who do you live with? Spouse   If you need help, do you have trouble finding someone available to you? No   Have you been bothered in the last four weeks by sexual problems? No   Do you have difficulty concentrating, remembering or making decisions? No       Age-appropriate Screening Schedule:  Refer to the list below for future screening recommendations based on patient's age, sex and/or medical conditions. Orders for these recommended tests are listed in the plan section. The patient has been provided with a written plan.    Health Maintenance   Topic Date Due    DXA SCAN  07/08/2022    ZOSTER VACCINE (2 of 2) 12/26/2023    TDAP/TD VACCINES (1 - Tdap) 08/02/2024 (Originally 1/3/1964)    COVID-19 Vaccine (3 - 2023-24 season) 03/03/2025 (Originally 9/1/2023)    RSV Vaccine - Adults (1 - 1-dose 60+ series) 03/04/2025 (Originally 1/3/2007)    INFLUENZA VACCINE  08/01/2024    LIPID PANEL  02/06/2025    BMI FOLLOWUP  04/11/2025    ANNUAL WELLNESS VISIT  05/20/2025    COLORECTAL CANCER SCREENING  01/31/2028    HEPATITIS C SCREENING  Completed    Pneumococcal Vaccine 65+  Completed                  CMS Preventative Services Quick Reference  Risk Factors Identified During Encounter:    None Identified    The above risks/problems have been discussed with the patient.  Pertinent information has been shared with the patient in the After Visit Summary.    Diagnoses and  all orders for this visit:    1. Medicare annual wellness visit, subsequent (Primary)    2. Benign essential hypertension    3. Mixed hyperlipidemia  -     pravastatin (PRAVACHOL) 40 MG tablet; Take 1 tablet by mouth Every Night.  Dispense: 90 tablet; Refill: 1  -     CBC (No Diff)  -     Comprehensive Metabolic Panel  -     Lipid Panel    4. Encounter for screening for osteoporosis  -     DEXA Bone Density Axial    5. Menopause  -     DEXA Bone Density Axial    Plan:  1.physical: Physical exam conducted today, reviewed fasting lab work,   Impression: healthy adult Patient. Currently, patient eats a healthy diet. Screening lab work includes glucose, lipid profile and complete blood count . The risks and benefits of immunizations were discussed and immunizations are up to date. Advice and education were given regarding nutrition and aerobic exercise. Patient discussion: discussed with the patient.  Annual Wellness Visit physical, with preventive exam as well as age and risk appropriate counseling completed.   Advance Directive Planning: paperwork and instructions were given to the patient.   Patient Discussion: plan discussed with the patient, follow-up visit needed in one year. Medication Review and medication list updated  2.  Benign essential hypertension: Will continue current medication, low-sodium diet advised, Counseled to regularly check BP at home with goal averaging <130/80.   3.mixed hyperlipidemia:  reviewed  fasting CMP and lipid panel.  Diet and exercise counseled,  Will continue current medications  4.  History of osteoporosis: We will obtain DEXA scan       Follow Up:   Next Medicare Wellness visit to be scheduled in 1 year.      An After Visit Summary and PPPS were made available to the patient.

## 2024-05-23 ENCOUNTER — PATIENT ROUNDING (BHMG ONLY) (OUTPATIENT)
Dept: UROLOGY | Facility: CLINIC | Age: 79
End: 2024-05-23
Payer: MEDICARE

## 2024-05-23 ENCOUNTER — OFFICE VISIT (OUTPATIENT)
Dept: UROLOGY | Facility: CLINIC | Age: 79
End: 2024-05-23
Payer: MEDICARE

## 2024-05-23 VITALS
TEMPERATURE: 97.4 F | WEIGHT: 175 LBS | SYSTOLIC BLOOD PRESSURE: 132 MMHG | HEIGHT: 61 IN | DIASTOLIC BLOOD PRESSURE: 78 MMHG | BODY MASS INDEX: 33.04 KG/M2

## 2024-05-23 DIAGNOSIS — N39.0 URINARY TRACT INFECTION WITHOUT HEMATURIA, SITE UNSPECIFIED: Primary | ICD-10-CM

## 2024-05-23 DIAGNOSIS — N95.8 GENITOURINARY SYNDROME OF MENOPAUSE: ICD-10-CM

## 2024-05-23 LAB
BILIRUB BLD-MCNC: NEGATIVE MG/DL
CLARITY, POC: CLEAR
COLOR UR: YELLOW
EXPIRATION DATE: NORMAL
GLUCOSE UR STRIP-MCNC: NEGATIVE MG/DL
KETONES UR QL: NEGATIVE
LEUKOCYTE EST, POC: NEGATIVE
Lab: NORMAL
NITRITE UR-MCNC: NEGATIVE MG/ML
PH UR: 6 [PH] (ref 5–8)
PROT UR STRIP-MCNC: NEGATIVE MG/DL
RBC # UR STRIP: NEGATIVE /UL
SP GR UR: 1.01 (ref 1–1.03)
UROBILINOGEN UR QL: NORMAL

## 2024-05-23 RX ORDER — ESTRADIOL 0.1 MG/G
CREAM VAGINAL
Qty: 42.5 G | Refills: 3 | Status: SHIPPED | OUTPATIENT
Start: 2024-05-23

## 2024-05-24 NOTE — PROGRESS NOTES
May 24, 2024    Hello, may I speak with Monica Perea? No Answer.    My name is  Fatmata.     I am  with [facility]  [address]    [facility] [address] [phone].    Before we get started may I verify your date of birth? 1945    I am calling to officially welcome you to our practice and ask about your recent visit. Is this a good time to talk?     Tell me about your visit with us. What things went well?         We're always looking for ways to make our patients' experiences even better. Do you have recommendations on ways we may improve?      Overall were you satisfied with your first visit to our practice?        I appreciate you taking the time to speak with me today. Is there anything else I can do for you?       Thank you, and have a great day.

## 2024-05-28 NOTE — ANESTHESIA POSTPROCEDURE EVALUATION
[facility]    Relationship: Other    Best call back number: [phone]   What was the call regarding:  JULIO STATES THEY RECEIVED THE PRESCRIPTION FOR THE CPAP SUPPLIES BUT THE SIGNED DATE WAS UNCLEAR SO SHE IS WANTING TO KNOW IF THIS COULD BE RESIGNED TO MAKE SURE IT IS CLEAR AND SENT BACK OVER     PLEASE CALL AND ADVISE    Patient: Monica Perea    Procedure Summary     Date: 01/31/23 Room / Location: [facility] / [facility]    Anesthesia Start: 0937 Anesthesia Stop: 1016    Procedures:       ESOPHAGOGASTRODUODENOSCOPY WITH BIOPSY AND BRUSHING (Esophagus)      COLONOSCOPY with biopsy (Anus) Diagnosis:       Positive colorectal cancer screening using Cologuard test      Personal history of colonic polyps      Gastroesophageal reflux disease without esophagitis      Family history of esophageal cancer      (Positive colorectal cancer screening using Cologuard test [R19.5])      (Personal history of colonic polyps [Z86.010])      (Gastroesophageal reflux disease without esophagitis [K21.9])      (Family history of esophageal cancer [Z80.0])    Surgeons: Ferny Wade MD Provider: Ron Benito CRNA    Anesthesia Type: MAC ASA Status: 3          Anesthesia Type: MAC    Vitals  No vitals data found for the desired time range.          Post Anesthesia Care and Evaluation    Patient location during evaluation: bedside  Patient participation: complete - patient participated  Level of consciousness: awake  Pain score: 0  Pain management: adequate    Airway patency: patent  Anesthetic complications: No anesthetic complications  PONV Status: controlled  Cardiovascular status: acceptable and stable  Respiratory status: acceptable and room air  Hydration status: acceptable    Comments: Vital signs as noted in nursing documentation as per protocol

## 2024-06-13 ENCOUNTER — APPOINTMENT (OUTPATIENT)
Dept: BONE DENSITY | Facility: HOSPITAL | Age: 79
End: 2024-06-13
Payer: MEDICARE

## 2024-06-13 PROCEDURE — 77080 DXA BONE DENSITY AXIAL: CPT

## 2024-07-03 ENCOUNTER — OFFICE VISIT (OUTPATIENT)
Dept: PULMONOLOGY | Facility: CLINIC | Age: 79
End: 2024-07-03
Payer: MEDICARE

## 2024-07-03 VITALS
HEART RATE: 80 BPM | WEIGHT: 174 LBS | RESPIRATION RATE: 14 BRPM | SYSTOLIC BLOOD PRESSURE: 122 MMHG | BODY MASS INDEX: 32.02 KG/M2 | HEIGHT: 62 IN | OXYGEN SATURATION: 97 % | DIASTOLIC BLOOD PRESSURE: 68 MMHG

## 2024-07-03 DIAGNOSIS — G47.33 OSA (OBSTRUCTIVE SLEEP APNEA): ICD-10-CM

## 2024-07-03 DIAGNOSIS — J45.40 MODERATE PERSISTENT ASTHMA WITHOUT COMPLICATION: Primary | ICD-10-CM

## 2024-07-03 PROCEDURE — 3074F SYST BP LT 130 MM HG: CPT | Performed by: INTERNAL MEDICINE

## 2024-07-03 PROCEDURE — 3078F DIAST BP <80 MM HG: CPT | Performed by: INTERNAL MEDICINE

## 2024-07-03 PROCEDURE — 99214 OFFICE O/P EST MOD 30 MIN: CPT | Performed by: INTERNAL MEDICINE

## 2024-07-03 RX ORDER — FLUTICASONE FUROATE AND VILANTEROL 100; 25 UG/1; UG/1
1 POWDER RESPIRATORY (INHALATION) DAILY
Qty: 60 EACH | Refills: 5 | Status: SHIPPED | OUTPATIENT
Start: 2024-07-03

## 2024-07-03 NOTE — PROGRESS NOTES
"  Chief Complaint   Patient presents with    Sleeping Problem    Follow-up       Subjective   Monica Perea is a 79 y.o. female.   Patient was evaluated today for follow up of asthma, and MOON.     Patient does not report any recent exacerbations requiring emergency room visits or hospitalizations.     Patient is compliant with pulmonary medicines, as prescribed.     she is currently on Breo. she is using the rescue inhalers minimally.     Patient doesn't report any issues with the PAP device. The patient describes no significant issues with her mask either.     Patient says that the compliance with the use of the equipment is good.     Patient says that her symptoms of fatigue & daytime sleepiness have been helped greatly with the use of PAP, as prescribed.     she had a recalled device and was given a replacement device instead of the recalled device in May 2022.       The following portions of the patient's history were reviewed and updated as appropriate: allergies, current medications, past family history, past medical history, past social history, and past surgical history.    Review of Systems   HENT:  Negative for sinus pressure, sneezing and sore throat.    Respiratory:  Negative for cough, chest tightness, shortness of breath and wheezing.        Objective   Visit Vitals  /68   Pulse 80   Resp 14   Ht 156.2 cm (61.5\") Comment: pt reported   Wt 78.9 kg (174 lb)   SpO2 97%   BMI 32.34 kg/m²       BMI Readings from Last 8 Encounters:   07/03/24 32.34 kg/m²   05/23/24 32.53 kg/m²   05/20/24 32.53 kg/m²   05/01/24 33.02 kg/m²   04/11/24 32.72 kg/m²   03/04/24 32.98 kg/m²   01/09/24 32.53 kg/m²   12/05/23 30.86 kg/m²       Physical Exam  Vitals reviewed.   Constitutional:       Appearance: She is well-developed.   Neck:      Vascular: No JVD.   Cardiovascular:      Rate and Rhythm: Normal rate.   Pulmonary:      Effort: Pulmonary effort is normal.      Comments: Normal to percussion.  No obvious " wheezing noted.   Musculoskeletal:      Cervical back: Neck supple.      Comments: Gait was normal   Skin:     General: Skin is warm and dry.   Neurological:      Mental Status: She is alert and oriented to person, place, and time.         Assessment & Plan   Diagnoses and all orders for this visit:    1. Moderate persistent asthma without complication (Primary)  -     Nitric Oxide; Future  -     Spirometry With Bronchodilator; Future    2. MOON (obstructive sleep apnea)    Other orders  -     Fluticasone Furoate-Vilanterol 100-25 MCG/ACT aerosol powder ; Inhale 1 puff Daily. Rinse mouth with water after use.  Dispense: 60 each; Refill: 5           Return in about 30 weeks (around 1/29/2025) for Recheck, Spirometry F/U, FeNO F/U, For Dennise Cheng).    DISCUSSION (if any):  It appears that her symptoms of asthma are under adequate control with the current regimen.    Patient's medications for underlying asthma were reviewed in great detail.    Since the patient is doing fairly well as far as respiratory symptoms are concerned, I have recommended that she change Breo dose to 100 mcg dose.     If the patient is doing fairly well as far as respiratory symptoms are concerned, at the time of follow-up, we may recommend that she discontinue using Breo.     Compliance with medications stressed.     Side effects of prescribed medications discussed with the patient.    The need to continue to be aware of triggers that may cause asthma exacerbation versus progression of disease, was also discussed.    I have discussed asthma action plan with her.    The patient was asked to call this office if the symptoms worsen.    Sleep study performed in March 2017  AHI was 28 / hour.   Supine AHI was 54/hour.   REM AHI was 78/hour.     Latest PAP device provided in May 2022  DME company: [employer]    Current PAP settings: 6-12  Current mask type: Nasal pillows.    Compliance data was obtained from the her device/DME company.    Continue  PAP device on a regular basis, at least 4 hours or more per night.    Sleep hygiene measures were discussed    Weight loss advised.    Vaccination status addressed.    Up-to-date with influenza vaccinations.     Up-to-date with pneumonia vaccinations.     It was recommended that the patient consider Prevnar-20 vaccination and discuss it with her PCP, if not already obtained.       Dictated utilizing Dragon dictation.    This document was electronically signed by Tank العراقي MD on 07/03/24 at 11:01 EDT

## 2024-08-14 ENCOUNTER — OFFICE VISIT (OUTPATIENT)
Dept: INTERNAL MEDICINE | Facility: CLINIC | Age: 79
End: 2024-08-14
Payer: MEDICARE

## 2024-08-14 VITALS
BODY MASS INDEX: 31.72 KG/M2 | HEART RATE: 61 BPM | RESPIRATION RATE: 16 BRPM | OXYGEN SATURATION: 98 % | HEIGHT: 61 IN | TEMPERATURE: 97.8 F | DIASTOLIC BLOOD PRESSURE: 79 MMHG | SYSTOLIC BLOOD PRESSURE: 134 MMHG | WEIGHT: 168 LBS

## 2024-08-14 DIAGNOSIS — J06.9 UPPER RESPIRATORY INFECTION, VIRAL: Primary | ICD-10-CM

## 2024-08-14 PROCEDURE — 3078F DIAST BP <80 MM HG: CPT | Performed by: STUDENT IN AN ORGANIZED HEALTH CARE EDUCATION/TRAINING PROGRAM

## 2024-08-14 PROCEDURE — 1126F AMNT PAIN NOTED NONE PRSNT: CPT | Performed by: STUDENT IN AN ORGANIZED HEALTH CARE EDUCATION/TRAINING PROGRAM

## 2024-08-14 PROCEDURE — 99213 OFFICE O/P EST LOW 20 MIN: CPT | Performed by: STUDENT IN AN ORGANIZED HEALTH CARE EDUCATION/TRAINING PROGRAM

## 2024-08-14 PROCEDURE — 3075F SYST BP GE 130 - 139MM HG: CPT | Performed by: STUDENT IN AN ORGANIZED HEALTH CARE EDUCATION/TRAINING PROGRAM

## 2024-08-14 RX ORDER — METHYLPREDNISOLONE 4 MG/1
TABLET ORAL
Qty: 21 TABLET | Refills: 0 | Status: SHIPPED | OUTPATIENT
Start: 2024-08-14

## 2024-08-14 RX ORDER — FLUTICASONE PROPIONATE 50 MCG
2 SPRAY, SUSPENSION (ML) NASAL DAILY
Qty: 15.8 ML | Refills: 2 | Status: SHIPPED | OUTPATIENT
Start: 2024-08-14

## 2024-08-14 NOTE — PROGRESS NOTES
Office Note     Name: Monica Perea    : 1945     MRN: [id number]     Chief Complaint  Ear Fullness (Ears feel stopped up) and Nasal Congestion (Dx covid positive 3 weeks ago)    Subjective     History of Present Illness:  Monica Perea is a 79 y.o. female who presents today for ear fullness, light headed and head congestion. No fever, difficulty breathing or chest pain      Family History:   Family History   Problem Relation Age of Onset    Heart attack Other     Arthritis Other     Diabetes Other     Hypertension Other     Hodgkin's lymphoma Other     Esophagitis Mother     Heart failure Mother     Esophageal cancer Mother     Esophagitis Maternal Aunt     Breast cancer Maternal Aunt         DX AGE 50'S    Colon cancer Maternal Grandmother 49    Heart attack Father     Breast cancer Sister 72    Ovarian cancer Sister 74    Stomach cancer Neg Hx     Liver cancer Neg Hx     Liver disease Neg Hx        Social History:   Social History     Socioeconomic History    Marital status:    Tobacco Use    Smoking status: Former     Current packs/day: 0.00     Average packs/day: 1 pack/day for 35.0 years (35.0 ttl pk-yrs)     Types: Cigarettes     Start date:      Quit date:      Years since quittin.6     Passive exposure: Past    Smokeless tobacco: Never   Vaping Use    Vaping status: Never Used   Substance and Sexual Activity    Alcohol use: No     Comment: quit in     Drug use: No    Sexual activity: Defer       Health Maintenance   Topic Date Due    TDAP/TD VACCINES (1 - Tdap) Never done    INFLUENZA VACCINE  2024    COVID-19 Vaccine (3 - - season) 2025 (Originally 2023)    RSV Vaccine - Adults (1 - 1-dose 60+ series) 2025 (Originally 1/3/2007)    ZOSTER VACCINE (2 of 2) 2025 (Originally 2023)    LIPID PANEL  2025    BMI FOLLOWUP  2025    ANNUAL WELLNESS VISIT  2025    DXA SCAN  2026    COLORECTAL CANCER  "SCREENING  01/31/2028    HEPATITIS C SCREENING  Completed    Pneumococcal Vaccine 65+  Completed       Objective     Vital Signs  /79   Pulse 61   Temp 97.8 °F (36.6 °C) (Infrared)   Resp 16   Ht 156.2 cm (61.5\")   Wt 76.2 kg (168 lb)   SpO2 98%   BMI 31.23 kg/m²   Estimated body mass index is 31.23 kg/m² as calculated from the following:    Height as of this encounter: 156.2 cm (61.5\").    Weight as of this encounter: 76.2 kg (168 lb).        Physical Exam  Vitals and nursing note reviewed.   Constitutional:       Appearance: Normal appearance.   HENT:      Head: Normocephalic and atraumatic.      Right Ear: Tympanic membrane and ear canal normal.      Left Ear: Tympanic membrane and ear canal normal.      Ears:      Comments: Moderate effusion from both ears     Nose: Congestion present.      Mouth/Throat:      Pharynx: Posterior oropharyngeal erythema present. No oropharyngeal exudate.   Eyes:      Conjunctiva/sclera: Conjunctivae normal.      Pupils: Pupils are equal, round, and reactive to light.   Cardiovascular:      Rate and Rhythm: Normal rate and regular rhythm.      Pulses: Normal pulses.      Heart sounds: Normal heart sounds.   Pulmonary:      Effort: Pulmonary effort is normal. No respiratory distress.      Breath sounds: Normal breath sounds. No wheezing, rhonchi or rales.   Abdominal:      General: Abdomen is flat. Bowel sounds are normal.      Palpations: Abdomen is soft.      Tenderness: There is no abdominal tenderness. There is no guarding.   Musculoskeletal:      Cervical back: Neck supple.   Neurological:      Mental Status: She is alert.          Procedures     Assessment and Plan     Diagnoses and all orders for this visit:    1. Upper respiratory infection, viral (Primary)  Assessment & Plan:  Versus post-COVID syndrome  Treat symptomatically and supportively  To help relieve symptoms of URI/Sinus congestion and cough   For cold, sinus congestion, upper and lower respiratory " congestion, OTC symptomatic management can include a mucolytic such as guaifenesin (Mucinx, Robitussin) with increased water to help keep mucous and drainage thin.  Dexomorphin, DM (Robitussin-DM)  a cough suppressant or expectorant may be of help during the night. May add an antihistamine (loratadine/cetirizine) nightly to reduce the inflammation triggers and helped with symptoms of clear runny nose, sneezing, watery eyes and postnasal drainage. Flonase (Corticosteriod) aimed to each nose is appropriate for nasal congestion, ear fullness and popping unrelated to an infection.  Tylenol/Motrin for fever and pain as appropriate and dosed per package. Humidified air, rest, and increase fluids as tolerated to help body fight infection. Discuss with pharmacist any dosing or appropriate medication choices.  Read all OTC medication labels carefully.   Home remedies consist of rest, warm soaks to sinus and face, breathing warm steam (avoiding burns) 1 tsp of eucalyptus oil may be added to the water to help with congestion.  you may take 1 tablespoon of honey daily for cough. Increase water intake and avoid dehydration.   Informed patient of warning signs and red flag symptoms which include fever greater than 100.4, difficulty breathing and oxygen saturation less than 90%.        Orders:  -     fluticasone (FLONASE) 50 MCG/ACT nasal spray; 2 sprays into the nostril(s) as directed by provider Daily.  Dispense: 15.8 mL; Refill: 2  -     methylPREDNISolone (MEDROL) 4 MG dose pack; Take as directed on package instructions.  Dispense: 21 tablet; Refill: 0         Counseling was given to patient for the following topics: instructions for management, risks and benefits of treatment options, and importance of treatment compliance.    Follow Up  No follow-ups on file.    MD PERICO [facility]  [address] [place] [id number] [phone]

## 2024-08-14 NOTE — ASSESSMENT & PLAN NOTE
Versus post-COVID syndrome  Treat symptomatically and supportively  To help relieve symptoms of URI/Sinus congestion and cough   For cold, sinus congestion, upper and lower respiratory congestion, OTC symptomatic management can include a mucolytic such as guaifenesin (Mucinx, Robitussin) with increased water to help keep mucous and drainage thin.  Dexomorphin, DM (Robitussin-DM)  a cough suppressant or expectorant may be of help during the night. May add an antihistamine (loratadine/cetirizine) nightly to reduce the inflammation triggers and helped with symptoms of clear runny nose, sneezing, watery eyes and postnasal drainage. Flonase (Corticosteriod) aimed to each nose is appropriate for nasal congestion, ear fullness and popping unrelated to an infection.  Tylenol/Motrin for fever and pain as appropriate and dosed per package. Humidified air, rest, and increase fluids as tolerated to help body fight infection. Discuss with pharmacist any dosing or appropriate medication choices.  Read all OTC medication labels carefully.   Home remedies consist of rest, warm soaks to sinus and face, breathing warm steam (avoiding burns) 1 tsp of eucalyptus oil may be added to the water to help with congestion.  you may take 1 tablespoon of honey daily for cough. Increase water intake and avoid dehydration.   Informed patient of warning signs and red flag symptoms which include fever greater than 100.4, difficulty breathing and oxygen saturation less than 90%.

## 2024-08-27 ENCOUNTER — OFFICE VISIT (OUTPATIENT)
Dept: UROLOGY | Facility: CLINIC | Age: 79
End: 2024-08-27
Payer: MEDICARE

## 2024-08-27 VITALS
HEART RATE: 64 BPM | SYSTOLIC BLOOD PRESSURE: 122 MMHG | HEIGHT: 61 IN | RESPIRATION RATE: 20 BRPM | OXYGEN SATURATION: 98 % | TEMPERATURE: 98.1 F | BODY MASS INDEX: 31.72 KG/M2 | WEIGHT: 168 LBS | DIASTOLIC BLOOD PRESSURE: 78 MMHG

## 2024-08-27 DIAGNOSIS — N95.8 GENITOURINARY SYNDROME OF MENOPAUSE: Primary | ICD-10-CM

## 2024-08-27 RX ORDER — LATANOPROST 50 UG/ML
SOLUTION/ DROPS OPHTHALMIC
COMMUNITY
Start: 2024-08-23

## 2024-08-27 NOTE — PROGRESS NOTES
Office Visit General Established Female Patient     Patient Name: Monica Perea  : 1945   MRN: [id number]     Chief Complaint:   Chief Complaint   Patient presents with    Follow-up       Referring Provider: No ref. provider found    History of Present Illness: Monica Perea is a 79 y.o. female with history below in assessment, who presents for follow up.   Using estradiol cream when she remember, she has not had any UTI symptoms since our last visit.  She is taking a daily cranberry supplement and has increased her water intake to 4 16 oz bottles daily    Subjective      Review of System:   As noted in HPI     Past Medical History:   Past Medical History:   Diagnosis Date    A-fib     Anemia     Arthritis     Asthma     B12 deficiency     Back pain     CAD (coronary artery disease)     Cellulitis of foot     Colon polyp 2016    Dyspnea     Esophageal dysphagia 10/18/2021    GERD (gastroesophageal reflux disease)     Glaucoma 2016    Hematuria     History of mammogram     Hypercholesteremia     Hypertension     Iron deficiency     Low back pain     Menopause     Neck strain     Osteopenia     Overweight     Palpitations     Postmenopausal atrophic vaginitis     Sleep apnea     discontinued CPAP use 2 months ago    Spinal headache     Tinea pedis     Wears eyeglasses        Past Surgical History:   Past Surgical History:   Procedure Laterality Date    APPENDECTOMY  1964    ATRIAL APPENDAGE EXCLUSION LEFT WITH TRANSESOPHAGEAL ECHOCARDIOGRAM N/A 2022    Procedure: Atrial Appendage Occlusion on Xarelto-hold 1 day prior  2022;  Surgeon: Kip Conde MD;  Location:  [facility];  Service: Cardiology;  Laterality: N/A;    BREAST EXCISIONAL BIOPSY Bilateral     BREAST SURGERY      CARDIAC ELECTROPHYSIOLOGY PROCEDURE N/A 2020    Procedure: [place] [facility]  Surgeon: Villa Thomas DO;  Location:  [facility];   Service: Cardiology;  Laterality: N/A;    COLONOSCOPY  2016         COLONOSCOPY N/A 2023    Procedure: COLONOSCOPY with biopsy;  Surgeon: Ferny Wade MD;  Location: [facility];  Service: Gastroenterology;  Laterality: N/A;    ENDOSCOPY N/A 2023    Procedure: ESOPHAGOGASTRODUODENOSCOPY WITH BIOPSY AND BRUSHING;  Surgeon: Ferny Wade MD;  Location: [facility];  Service: Gastroenterology;  Laterality: N/A;    EYE SURGERY      laser for bleed    HYSTERECTOMY      total     OOPHORECTOMY Bilateral     TUBAL ABDOMINAL LIGATION  1974       Family History:   Family History   Problem Relation Age of Onset    Heart attack Other     Arthritis Other     Diabetes Other     Hypertension Other     Hodgkin's lymphoma Other     Esophagitis Mother     Heart failure Mother     Esophageal cancer Mother     Esophagitis Maternal Aunt     Breast cancer Maternal Aunt         DX AGE 50'S    Colon cancer Maternal Grandmother 49    Heart attack Father     Breast cancer Sister 72    Ovarian cancer Sister 74    Stomach cancer Neg Hx     Liver cancer Neg Hx     Liver disease Neg Hx        Social History:   Social History     Socioeconomic History    Marital status:    Tobacco Use    Smoking status: Former     Current packs/day: 0.00     Average packs/day: 1 pack/day for 35.0 years (35.0 ttl pk-yrs)     Types: Cigarettes     Start date:      Quit date:      Years since quittin.6     Passive exposure: Past    Smokeless tobacco: Never   Vaping Use    Vaping status: Never Used   Substance and Sexual Activity    Alcohol use: No     Comment: quit in     Drug use: No    Sexual activity: Defer       Medications:     Current Outpatient Medications:     latanoprost (XALATAN) 0.005 % ophthalmic solution, , Disp: , Rfl:     albuterol sulfate  (90 Base) MCG/ACT inhaler, Inhale 2 puffs Every 4 (Four) Hours As Needed for Wheezing. (Patient not taking: Reported on 2024),  "Disp: 18 g, Rfl: 3    aspirin 81 MG EC tablet, Take 1 tablet by mouth Daily., Disp: , Rfl:     Calcium Carbonate-Vitamin D (CALCIUM PLUS VITAMIN D PO), Take 1 tablet by mouth 2 (Two) Times a Day., Disp: , Rfl:     cetirizine (zyrTEC) 10 MG tablet, Take 1 tablet by mouth Daily., Disp: , Rfl:     coenzyme Q10 100 MG capsule, Take 1 capsule by mouth Daily., Disp: , Rfl:     Cranberry-Vit C-Lactobacillus (RA CRANBERRY SUPPLEMENTS PO), Take  by mouth., Disp: , Rfl:     estradiol (ESTRACE) 0.1 MG/GM vaginal cream, Apply 0.5 gm vaginally 3 times weekly at bedtime, Disp: 42.5 g, Rfl: 3    fluticasone (FLONASE) 50 MCG/ACT nasal spray, 2 sprays into the nostril(s) as directed by provider Daily., Disp: 15.8 mL, Rfl: 2    Fluticasone Furoate-Vilanterol 100-25 MCG/ACT aerosol powder , Inhale 1 puff Daily. Rinse mouth with water after use., Disp: 60 each, Rfl: 5    losartan (COZAAR) 100 MG tablet, Take 1 tablet by mouth Daily., Disp: , Rfl:     melatonin 5 MG tablet tablet, Take 1 tablet by mouth., Disp: , Rfl:     methylPREDNISolone (MEDROL) 4 MG dose pack, Take as directed on package instructions., Disp: 21 tablet, Rfl: 0    pravastatin (PRAVACHOL) 40 MG tablet, Take 1 tablet by mouth Every Night., Disp: 90 tablet, Rfl: 1    Allergies:   Allergies   Allergen Reactions    Macrobid [Nitrofurantoin Monohyd Macro] Rash    Nitrofurantoin Hives    Phenylephrine-Guaifenesin Hives    Sulfa Antibiotics Hives    Statins Myalgia       Objective     Physical Exam:   Vital Signs:   Visit Vitals  /78   Pulse 64   Temp 98.1 °F (36.7 °C) (Temporal)   Resp 20   Ht 156.2 cm (61.5\")   Wt 76.2 kg (168 lb)   SpO2 98%   BMI 31.23 kg/m²      Body mass index is 31.23 kg/m².     Physical Exam  Vitals and nursing note reviewed.   Constitutional:       General: She is not in acute distress.     Appearance: Normal appearance. She is not ill-appearing.   Pulmonary:      Effort: Pulmonary effort is normal. No respiratory distress.   Skin:     General: " Skin is warm and dry.   Neurological:      General: No focal deficit present.      Mental Status: She is alert and oriented to person, place, and time.   Psychiatric:         Mood and Affect: Mood normal.         Behavior: Behavior normal.          Labs  Brief Urine Lab Results  (Last result in the past 365 days)        Color   Clarity   Blood   Leuk Est   Nitrite   Protein   CREAT   Urine HCG        05/23/24 0905 Yellow   Clear   Negative   Negative   Negative   Negative                   Lab Results   Component Value Date    GLUCOSE 108 (H) 02/06/2024    CALCIUM 9.8 02/06/2024     02/06/2024    K 4.1 02/06/2024    CO2 24 02/06/2024     02/06/2024    BUN 12 02/06/2024    CREATININE 0.78 02/06/2024    EGFRIFAFRI 86 01/18/2022    EGFRIFNONA 71 01/18/2022    BCR 15 02/06/2024    ANIONGAP 10.0 08/02/2023       Lab Results   Component Value Date    WBC 5.9 02/06/2024    HGB 13.4 02/06/2024    HCT 39.5 02/06/2024    MCV 94 02/06/2024     02/06/2024       Urine Culture          12/5/2023    10:07 4/11/2024    16:30   Urine Culture   Urine Culture Final report  Final report         Radiographic Studies  DEXA Bone Density Axial    Result Date: 6/13/2024  Normal bone marrow density of the lumbar spine.  Osteoporosis of the left hip.   Images were reviewed, interpreted, and dictated by Dr. Ashley Perla MD Transcribed by Jose Coleman PA-C.  This report was signed and finalized on 6/13/2024 9:05 AM by Ashley Perla MD.        I have reviewed the above labs and imaging.     Assessment / Plan      Assessment/Plan:   Diagnoses and all orders for this visit:    1. Genitourinary syndrome of menopause (Primary)    79-year-old female here to follow-up with [facility].  She has had no UTI symptoms since our previous visit.  Will have patient continue with current treatment plan and follow-up with urology in 1 year or as needed for UTI symptoms.    Continue topical vaginal estradiol 2 nights weekly  Continue daily cranberry  supplement  Continue daily fluid intake 64 ounces  Vulvar exam 1 year    Follow Up:   Return in about 1 year (around 8/27/2025) for Follow up Kendra.    TERRY Puente, NP-C  [facility]

## 2024-10-05 DIAGNOSIS — E78.2 MIXED HYPERLIPIDEMIA: ICD-10-CM

## 2024-10-05 RX ORDER — PRAVASTATIN SODIUM 40 MG
40 TABLET ORAL
Qty: 90 TABLET | Refills: 1 | Status: SHIPPED | OUTPATIENT
Start: 2024-10-05

## 2024-10-10 ENCOUNTER — TELEPHONE (OUTPATIENT)
Dept: PULMONOLOGY | Facility: CLINIC | Age: 79
End: 2024-10-10
Payer: MEDICARE

## 2024-10-10 NOTE — TELEPHONE ENCOUNTER
Caller: Eliazar Monica [facility]    Relationship: Self    Best call back number: [phone] (home)       Requested Prescriptions: BREO 50MG   Requested Prescriptions      No prescriptions requested or ordered in this encounter        Pharmacy where request should be sent:  [facility]    Last office visit with prescribing clinician: 7/3/2024   Last telemedicine visit with prescribing clinician: Visit date not found   Next office visit with prescribing clinician: Visit date not found     Additional details provided by patient:     Does the patient have less than a 3 day supply:  [x] Yes  [] No    Would you like a call back once the refill request has been completed: [] Yes [] No    If the office needs to give you a call back, can they leave a voicemail: [] Yes [] No    Nick Baker Rep   10/10/24 08:50 EDT

## 2024-10-11 NOTE — TELEPHONE ENCOUNTER
Hub staff attempted to follow warm transfer process and was unsuccessful     Caller: Monica Perea    Relationship to patient: Self    Best call back number: [phone]     Patient is needing: PT NEEDS SUPPLIES FOR SLEEP MACHINE -- NEEDS APPROVAL. AND BREO 50MG STILL NEEDED ASAP

## 2024-10-15 ENCOUNTER — OFFICE VISIT (OUTPATIENT)
Dept: INTERNAL MEDICINE | Facility: CLINIC | Age: 79
End: 2024-10-15
Payer: MEDICARE

## 2024-10-15 VITALS
HEART RATE: 60 BPM | SYSTOLIC BLOOD PRESSURE: 139 MMHG | WEIGHT: 169 LBS | RESPIRATION RATE: 20 BRPM | BODY MASS INDEX: 31.91 KG/M2 | OXYGEN SATURATION: 98 % | TEMPERATURE: 97.8 F | HEIGHT: 61 IN | DIASTOLIC BLOOD PRESSURE: 84 MMHG

## 2024-10-15 DIAGNOSIS — R73.01 IMPAIRED FASTING GLUCOSE: ICD-10-CM

## 2024-10-15 DIAGNOSIS — I10 BENIGN ESSENTIAL HYPERTENSION: Primary | ICD-10-CM

## 2024-10-15 DIAGNOSIS — Z23 NEED FOR INFLUENZA VACCINATION: ICD-10-CM

## 2024-10-15 DIAGNOSIS — E78.2 MIXED HYPERLIPIDEMIA: ICD-10-CM

## 2024-10-15 DIAGNOSIS — K21.00 GASTROESOPHAGEAL REFLUX DISEASE WITH ESOPHAGITIS WITHOUT HEMORRHAGE: ICD-10-CM

## 2024-10-15 DIAGNOSIS — M81.0 OSTEOPOROSIS, POSTMENOPAUSAL: ICD-10-CM

## 2024-10-15 PROCEDURE — 3075F SYST BP GE 130 - 139MM HG: CPT | Performed by: INTERNAL MEDICINE

## 2024-10-15 PROCEDURE — 99214 OFFICE O/P EST MOD 30 MIN: CPT | Performed by: INTERNAL MEDICINE

## 2024-10-15 PROCEDURE — 90662 IIV NO PRSV INCREASED AG IM: CPT | Performed by: INTERNAL MEDICINE

## 2024-10-15 PROCEDURE — G0008 ADMIN INFLUENZA VIRUS VAC: HCPCS | Performed by: INTERNAL MEDICINE

## 2024-10-15 PROCEDURE — 1159F MED LIST DOCD IN RCRD: CPT | Performed by: INTERNAL MEDICINE

## 2024-10-15 PROCEDURE — 3079F DIAST BP 80-89 MM HG: CPT | Performed by: INTERNAL MEDICINE

## 2024-10-15 PROCEDURE — 1126F AMNT PAIN NOTED NONE PRSNT: CPT | Performed by: INTERNAL MEDICINE

## 2024-10-15 PROCEDURE — 1160F RVW MEDS BY RX/DR IN RCRD: CPT | Performed by: INTERNAL MEDICINE

## 2024-10-15 RX ORDER — PANTOPRAZOLE SODIUM 40 MG/1
40 TABLET, DELAYED RELEASE ORAL DAILY
Qty: 90 TABLET | Refills: 1 | Status: SHIPPED | OUTPATIENT
Start: 2024-10-15

## 2024-10-15 RX ORDER — ALENDRONATE SODIUM 70 MG/1
70 TABLET ORAL
Qty: 4 TABLET | Refills: 4 | Status: SHIPPED | OUTPATIENT
Start: 2024-10-15

## 2024-10-15 RX ORDER — PRAVASTATIN SODIUM 40 MG
40 TABLET ORAL
Qty: 90 TABLET | Refills: 1 | Status: SHIPPED | OUTPATIENT
Start: 2024-10-15

## 2024-10-15 NOTE — PROGRESS NOTES
"Chief Complaint  Hypertension ( patient is fasting) and Hyperlipidemia    Subjective        Monica Perea presents to [facility]  HPI: Patient is here to follow up on the blood pressure  The patient is taking the blood pressure medications as prescribed and has had no side effects. The patient is also here to follow up on the cholesterol and   is  due to get lab work done .  The patient also complains of gerd and  needs refills on medications  and flu vaccine. She is dieting and exercising 2 miles a week , she had a dexa scan which has been reviewed today  Hyperlipidemia   Pertinent negatives include no chest pain or shortness of breath.   Hypertension   Pertinent negatives include no chest pain, palpitations or shortness of breath.      Objective   Vital Signs:  /84   Pulse 60   Temp 97.8 °F (36.6 °C)   Resp 20   Ht 156.2 cm (61.5\")   Wt 76.7 kg (169 lb)   SpO2 98%   BMI 31.42 kg/m²   Estimated body mass index is 31.42 kg/m² as calculated from the following:    Height as of this encounter: 156.2 cm (61.5\").    Weight as of this encounter: 76.7 kg (169 lb).            Physical Exam  Vitals and nursing note reviewed.   Constitutional:       General: She is not in acute distress.     Appearance: Normal appearance. She is not diaphoretic.   HENT:      Head: Normocephalic and atraumatic.      Right Ear: External ear normal.      Left Ear: External ear normal.      Nose: Nose normal.   Eyes:      Extraocular Movements: Extraocular movements intact.      Conjunctiva/sclera: Conjunctivae normal.   Neck:      Trachea: Trachea normal.   Cardiovascular:      Rate and Rhythm: Normal rate and regular rhythm.      Heart sounds: Normal heart sounds.      Comments: ppm  Pulmonary:      Effort: Pulmonary effort is normal. No respiratory distress.      Breath sounds: Normal breath sounds.   Abdominal:      General: Abdomen is flat.   Musculoskeletal:      Cervical back: Neck " supple.      Comments: Moves all limbs   Skin:     General: Skin is warm.   Neurological:      Mental Status: She is alert and oriented to person, place, and time.      Comments: No gross motor or sensory deficits        Result Review :  The following data was reviewed by: Nakita Crump MD on 10/15/2024:  Common labs          2/6/2024    10:17   Common Labs   Glucose 108    BUN 12    Creatinine 0.78    Sodium 144    Potassium 4.1    Chloride 105    Calcium 9.8    Total Protein 6.8    Albumin 4.4    Total Bilirubin 0.4    Alkaline Phosphatase 106    AST (SGOT) 16    ALT (SGPT) 13    WBC 5.9    Hemoglobin 13.4    Hematocrit 39.5    Platelets 205    Total Cholesterol 166    Triglycerides 65    HDL Cholesterol 68    LDL Cholesterol  85    Hemoglobin A1C 5.6           DEXA Bone Density Axial (06/13/2024 08:39)      Assessment and Plan   Diagnoses and all orders for this visit:    1. Benign essential hypertension (Primary)    2. Mixed hyperlipidemia  -     pravastatin (PRAVACHOL) 40 MG tablet; Take 1 tablet by mouth every night at bedtime.  Dispense: 90 tablet; Refill: 1  -     CBC (No Diff)  -     Comprehensive Metabolic Panel  -     Lipid Panel    3. Impaired fasting glucose  -     Hemoglobin A1c    4. Gastroesophageal reflux disease with esophagitis without hemorrhage  -     pantoprazole (PROTONIX) 40 MG EC tablet; Take 1 tablet by mouth Daily. Half hour prior  To breakfast  Dispense: 90 tablet; Refill: 1    5. Osteoporosis, postmenopausal  -     alendronate (Fosamax) 70 MG tablet; Take 1 tablet by mouth Every 7 (Seven) Days.  Dispense: 4 tablet; Refill: 4    6. Need for influenza vaccination  -     Fluzone High-Dose 65+yrs ([id number])      Plan:  1.  Benign essential hypertension: Will continue current medication, low-sodium diet advised, Counseled to regularly check BP at home with goal averaging <130/80.   2.mixed hyperlipidemia: obtain   fasting CMP and lipid panel.  Diet and exercise counseled,  Will continue  current medications  3. impaired glucose   :  obtain   fasting CMP  and hba1c  5.6  , diet and exercise counseled   4.  Gerd :  trial with pantoprazole  5. Osteoporosis : start fosamax , counseled on medication  6.Need for flu vaccine : given today and well tolerated           Follow Up   Return in about 5 months (around 3/3/2025).  Patient was given instructions and counseling regarding her condition or for health maintenance advice. Please see specific information pulled into the AVS if appropriate.

## 2024-10-16 LAB
ALBUMIN SERPL-MCNC: 4.2 G/DL (ref 3.5–5.2)
ALBUMIN/GLOB SERPL: 1.7 G/DL
ALP SERPL-CCNC: 95 U/L (ref 39–117)
ALT SERPL-CCNC: 14 U/L (ref 1–33)
AST SERPL-CCNC: 18 U/L (ref 1–32)
BILIRUB SERPL-MCNC: 0.5 MG/DL (ref 0–1.2)
BUN SERPL-MCNC: 10 MG/DL (ref 8–23)
BUN/CREAT SERPL: 12.8 (ref 7–25)
CALCIUM SERPL-MCNC: 9.6 MG/DL (ref 8.6–10.5)
CHLORIDE SERPL-SCNC: 105 MMOL/L (ref 98–107)
CHOLEST SERPL-MCNC: 177 MG/DL (ref 0–200)
CO2 SERPL-SCNC: 27.8 MMOL/L (ref 22–29)
CREAT SERPL-MCNC: 0.78 MG/DL (ref 0.57–1)
EGFRCR SERPLBLD CKD-EPI 2021: 78.3 ML/MIN/1.73
ERYTHROCYTE [DISTWIDTH] IN BLOOD BY AUTOMATED COUNT: 12.2 % (ref 12.3–15.4)
GLOBULIN SER CALC-MCNC: 2.5 GM/DL
GLUCOSE SERPL-MCNC: 97 MG/DL (ref 65–99)
HBA1C MFR BLD: 5.6 % (ref 4.8–5.6)
HCT VFR BLD AUTO: 39.8 % (ref 34–46.6)
HDLC SERPL-MCNC: 51 MG/DL (ref 40–60)
HGB BLD-MCNC: 13.3 G/DL (ref 12–15.9)
LDLC SERPL CALC-MCNC: 106 MG/DL (ref 0–100)
MCH RBC QN AUTO: 31.3 PG (ref 26.6–33)
MCHC RBC AUTO-ENTMCNC: 33.4 G/DL (ref 31.5–35.7)
MCV RBC AUTO: 93.6 FL (ref 79–97)
PLATELET # BLD AUTO: 188 10*3/MM3 (ref 140–450)
POTASSIUM SERPL-SCNC: 4.4 MMOL/L (ref 3.5–5.2)
PROT SERPL-MCNC: 6.7 G/DL (ref 6–8.5)
RBC # BLD AUTO: 4.25 10*6/MM3 (ref 3.77–5.28)
SODIUM SERPL-SCNC: 145 MMOL/L (ref 136–145)
TRIGL SERPL-MCNC: 110 MG/DL (ref 0–150)
VLDLC SERPL CALC-MCNC: 20 MG/DL (ref 5–40)
WBC # BLD AUTO: 5.85 10*3/MM3 (ref 3.4–10.8)

## 2024-11-08 DIAGNOSIS — E78.2 MIXED HYPERLIPIDEMIA: ICD-10-CM

## 2024-11-08 RX ORDER — PRAVASTATIN SODIUM 40 MG
40 TABLET ORAL
Qty: 90 TABLET | Refills: 1 | Status: SHIPPED | OUTPATIENT
Start: 2024-11-08

## 2024-11-08 NOTE — TELEPHONE ENCOUNTER
Rx Refill Note  Requested Prescriptions     Pending Prescriptions Disp Refills    pravastatin (PRAVACHOL) 40 MG tablet 90 tablet 1     Sig: Take 1 tablet by mouth every night at bedtime.      Last office visit with prescribing clinician: 10/15/2024   Last telemedicine visit with prescribing clinician: Visit date not found   Next office visit with prescribing clinician: 3/17/2025                         Would you like a call back once the refill request has been completed: [] Yes [] No    If the office needs to give you a call back, can they leave a voicemail: [] Yes [] No    Rachelle Downing MA  11/08/24, 14:41 EST

## 2024-11-08 NOTE — TELEPHONE ENCOUNTER
Caller: Monica [facility] [place] [facility]    Relationship: Self    Best call back number: [phone]     Requested Prescriptions:   Requested Prescriptions     Pending Prescriptions Disp Refills    pravastatin (PRAVACHOL) 40 MG tablet 90 tablet 1     Sig: Take 1 tablet by mouth every night at bedtime.        Pharmacy where request should be sent: [facility] [address] [place] [address] [phone] [facility] [phone] [facility]     Last office visit with prescribing clinician: 10/15/2024   Last telemedicine visit with prescribing clinician: Visit date not found   Next office visit with prescribing clinician: 3/17/2025     Additional details provided by patient: PATIENT OUT

## 2024-11-11 ENCOUNTER — OFFICE VISIT (OUTPATIENT)
Dept: INTERNAL MEDICINE | Facility: CLINIC | Age: 79
End: 2024-11-11
Payer: MEDICARE

## 2024-11-11 VITALS
HEIGHT: 61 IN | HEART RATE: 62 BPM | OXYGEN SATURATION: 99 % | DIASTOLIC BLOOD PRESSURE: 79 MMHG | SYSTOLIC BLOOD PRESSURE: 142 MMHG | WEIGHT: 170 LBS | BODY MASS INDEX: 32.1 KG/M2 | RESPIRATION RATE: 20 BRPM | TEMPERATURE: 97.8 F

## 2024-11-11 DIAGNOSIS — Z23 NEED FOR PNEUMOCOCCAL 20-VALENT CONJUGATE VACCINATION: ICD-10-CM

## 2024-11-11 DIAGNOSIS — N39.0 ACUTE URINARY TRACT INFECTION: ICD-10-CM

## 2024-11-11 DIAGNOSIS — R30.0 DYSURIA: ICD-10-CM

## 2024-11-11 DIAGNOSIS — I10 BENIGN ESSENTIAL HYPERTENSION: Primary | ICD-10-CM

## 2024-11-11 LAB
BILIRUB BLD-MCNC: NEGATIVE MG/DL
CLARITY, POC: ABNORMAL
COLOR UR: YELLOW
EXPIRATION DATE: ABNORMAL
GLUCOSE UR STRIP-MCNC: NEGATIVE MG/DL
KETONES UR QL: NEGATIVE
LEUKOCYTE EST, POC: ABNORMAL
Lab: ABNORMAL
NITRITE UR-MCNC: NEGATIVE MG/ML
PH UR: 6 [PH] (ref 5–8)
PROT UR STRIP-MCNC: NEGATIVE MG/DL
RBC # UR STRIP: ABNORMAL /UL
SP GR UR: 1 (ref 1–1.03)
UROBILINOGEN UR QL: NORMAL

## 2024-11-11 PROCEDURE — 81003 URINALYSIS AUTO W/O SCOPE: CPT | Performed by: INTERNAL MEDICINE

## 2024-11-11 PROCEDURE — 3077F SYST BP >= 140 MM HG: CPT | Performed by: INTERNAL MEDICINE

## 2024-11-11 PROCEDURE — 1159F MED LIST DOCD IN RCRD: CPT | Performed by: INTERNAL MEDICINE

## 2024-11-11 PROCEDURE — 3078F DIAST BP <80 MM HG: CPT | Performed by: INTERNAL MEDICINE

## 2024-11-11 PROCEDURE — G0009 ADMIN PNEUMOCOCCAL VACCINE: HCPCS | Performed by: INTERNAL MEDICINE

## 2024-11-11 PROCEDURE — 90677 PCV20 VACCINE IM: CPT | Performed by: INTERNAL MEDICINE

## 2024-11-11 PROCEDURE — 1160F RVW MEDS BY RX/DR IN RCRD: CPT | Performed by: INTERNAL MEDICINE

## 2024-11-11 PROCEDURE — 99213 OFFICE O/P EST LOW 20 MIN: CPT | Performed by: INTERNAL MEDICINE

## 2024-11-11 PROCEDURE — 1126F AMNT PAIN NOTED NONE PRSNT: CPT | Performed by: INTERNAL MEDICINE

## 2024-11-11 RX ORDER — CEFUROXIME AXETIL 500 MG/1
500 TABLET ORAL 2 TIMES DAILY
Qty: 14 TABLET | Refills: 0 | Status: SHIPPED | OUTPATIENT
Start: 2024-11-11

## 2024-11-11 NOTE — PROGRESS NOTES
"Chief Complaint  Urinary Tract Infection (Frequency with burning pressure since yesterday , took azo) and Hypertension    Subjective        Monica Perea presents to [facility]  HPI: Patient is here to follow up on the blood pressure  The patient is taking the blood pressure medications as prescribed and has had no side effects. The patient is also here to follow up on the cholesterol and is trying to follow a diet. The patient is  also here complaining of problems urinating with frequency and urgency and burning sensation, she states her symptoms started yesterday she took Azo and has been drinking water, she also needs Prevnar 20  Hypertension   Pertinent negatives include no chest pain, palpitations or shortness of breath.      Objective   Vital Signs:  /79   Pulse 62   Temp 97.8 °F (36.6 °C)   Resp 20   Ht 156.2 cm (61.5\")   Wt 77.1 kg (170 lb)   SpO2 99%   BMI 31.61 kg/m²   Estimated body mass index is 31.61 kg/m² as calculated from the following:    Height as of this encounter: 156.2 cm (61.5\").    Weight as of this encounter: 77.1 kg (170 lb).            Physical Exam  Vitals and nursing note reviewed.   Constitutional:       General: She is not in acute distress.     Appearance: Normal appearance. She is not diaphoretic.   HENT:      Head: Normocephalic and atraumatic.      Right Ear: External ear normal.      Left Ear: External ear normal.      Nose: Nose normal.   Eyes:      Extraocular Movements: Extraocular movements intact.      Conjunctiva/sclera: Conjunctivae normal.   Neck:      Trachea: Trachea normal.   Cardiovascular:      Rate and Rhythm: Normal rate and regular rhythm.      Heart sounds: Normal heart sounds.   Pulmonary:      Effort: Pulmonary effort is normal. No respiratory distress.      Breath sounds: Normal breath sounds.   Abdominal:      General: Abdomen is flat.   Musculoskeletal:      Cervical back: Neck supple.      Comments: Moves " all limbs   Skin:     General: Skin is warm.   Neurological:      Mental Status: She is alert and oriented to person, place, and time.      Comments: No gross motor or sensory deficits        Result Review :  The following data was reviewed by: Nakita Crump MD on 11/11/2024:  UA          4/11/2024    16:26 5/23/2024    09:05 11/11/2024    12:07   Urinalysis   Ketones, UA Negative  Negative  Negative    Leukocytes,  Sirisha/ul  Negative  500 Sirisha/ul                Assessment and Plan   Diagnoses and all orders for this visit:    1. Benign essential hypertension (Primary)    2. Dysuria  -     POCT urinalysis dipstick, automated  -     Urine Culture - Urine, Urine, Clean Catch    3. Acute urinary tract infection  -     Urine Culture - Urine, Urine, Clean Catch  -     cefuroxime (CEFTIN) 500 MG tablet; Take 1 tablet by mouth 2 (Two) Times a Day.  Dispense: 14 tablet; Refill: 0    4. Need for pneumococcal 20-valent conjugate vaccination  -     Pneumococcal Conjugate Vaccine 20-Valent All    Other orders  -     Cancel: Fluzone High-Dose 65+yrs    Plan:  1.  Benign essential hypertension: Will continue current medication, low-sodium diet advised, Counseled to regularly check BP at home with goal averaging <130/80.   2.  Acute urinary tract infection  : In office urine analysis done ,will order urine culture ,oral hydration advised, will start oral antibiotics  3. Dysuria : In office urine analysis done ,will order urine culture ,oral hydration advised, will start oral antibiotics  4. Need for prevnar 20 : given today and well tolerated         Follow Up   No follow-ups on file.  Patient was given instructions and counseling regarding her condition or for health maintenance advice. Please see specific information pulled into the AVS if appropriate.

## 2024-11-14 LAB
BACTERIA UR CULT: ABNORMAL
BACTERIA UR CULT: ABNORMAL
OTHER ANTIBIOTIC SUSC ISLT: ABNORMAL

## 2025-01-24 NOTE — Clinical Note
The physician has confirmed that the patient has been reassessed and is appropriate for moderate sedation soft, nontender, nondistended, no guarding or rigidity, no masses palpable, normal bowel sounds, no hepatosplenomegaly, no rebound tenderness

## 2025-03-07 ENCOUNTER — OFFICE VISIT (OUTPATIENT)
Dept: INTERNAL MEDICINE | Facility: CLINIC | Age: 80
End: 2025-03-07
Payer: MEDICARE

## 2025-03-07 ENCOUNTER — TELEPHONE (OUTPATIENT)
Dept: INTERNAL MEDICINE | Facility: CLINIC | Age: 80
End: 2025-03-07
Payer: MEDICARE

## 2025-03-07 VITALS
WEIGHT: 165 LBS | OXYGEN SATURATION: 99 % | SYSTOLIC BLOOD PRESSURE: 137 MMHG | TEMPERATURE: 97.9 F | RESPIRATION RATE: 16 BRPM | DIASTOLIC BLOOD PRESSURE: 67 MMHG | HEART RATE: 63 BPM | HEIGHT: 62 IN | BODY MASS INDEX: 30.36 KG/M2

## 2025-03-07 DIAGNOSIS — R53.82 CHRONIC FATIGUE: ICD-10-CM

## 2025-03-07 DIAGNOSIS — L60.8 CHANGE IN NAIL APPEARANCE: Primary | ICD-10-CM

## 2025-03-07 DIAGNOSIS — L65.9 HAIR LOSS: ICD-10-CM

## 2025-03-07 DIAGNOSIS — I10 BENIGN ESSENTIAL HYPERTENSION: ICD-10-CM

## 2025-03-07 DIAGNOSIS — E78.2 MIXED HYPERLIPIDEMIA: ICD-10-CM

## 2025-03-07 PROCEDURE — 1160F RVW MEDS BY RX/DR IN RCRD: CPT | Performed by: INTERNAL MEDICINE

## 2025-03-07 PROCEDURE — 3075F SYST BP GE 130 - 139MM HG: CPT | Performed by: INTERNAL MEDICINE

## 2025-03-07 PROCEDURE — 99214 OFFICE O/P EST MOD 30 MIN: CPT | Performed by: INTERNAL MEDICINE

## 2025-03-07 PROCEDURE — 1159F MED LIST DOCD IN RCRD: CPT | Performed by: INTERNAL MEDICINE

## 2025-03-07 PROCEDURE — 3078F DIAST BP <80 MM HG: CPT | Performed by: INTERNAL MEDICINE

## 2025-03-07 PROCEDURE — 1126F AMNT PAIN NOTED NONE PRSNT: CPT | Performed by: INTERNAL MEDICINE

## 2025-03-07 RX ORDER — AMLODIPINE BESYLATE 5 MG/1
TABLET ORAL
COMMUNITY
Start: 2025-03-06

## 2025-03-07 NOTE — TELEPHONE ENCOUNTER
Spoke to patient. She is already scheduled to be seen today in office by Dr. Lopez. Anything needed will be ordered at that time.

## 2025-03-07 NOTE — TELEPHONE ENCOUNTER
Caller: Monica Perea Hil    Relationship: Self    Best call back number: [phone]    What orders are you requesting (i.e. lab or imaging): LABS    In what timeframe would the patient need to come in: ANYTIME    Where will you receive your lab/imaging services: WHERE SHE ALWAYS GO     Additional notes: PATIENT SAYS SHE BELIEVES SHE IS LOSING BLOOD. SUPER PALE, LOSING HAIR, FINGERNAILS GOING INWARD.

## 2025-03-07 NOTE — TELEPHONE ENCOUNTER
She is scheduled with us as same day..  Dr Lopez wanted to know if you wanted to order labs and see her on Monday as you have history with her??

## 2025-03-07 NOTE — PROGRESS NOTES
Chief Complaint   Patient presents with    Fatigue     Paleness, has had anemia in the past    Alopecia    Nail Problem     Pt goggled and things she has spoon nails, curving down into her finger       Subjective     History of Present Illness  The patient is a 80-year-old female presenting for a follow-up visit with acute concerns.    She reports experiencing symptoms suggestive of anemia, including pallor, fatigue, and hair loss. She has observed changes in her fingernails, describing them as spoon-shaped with white spots underneath. She has a scheduled appointment with Dr. Fry on 03/17/2025. She is currently fasting. She recalls a previous episode of anemia years ago, which was also associated with hair loss.    She has been experiencing blood pressure fluctuations, which she attributes to potential anemia. Her blood pressure readings have been inconsistent, with systolic values ranging from 171 to 179 and diastolic values from 99 to 101. She maintains an active lifestyle, walking 2 miles twice a week. She has a pacemaker implanted. She has been on losartan 50 mg since 12/28/2024, but due to rising blood pressure, she increased the dose to 100 mg. Despite this adjustment, her blood pressure continues to elevate, reaching 160s over 90s even at rest. She expresses satisfaction with a resting blood pressure of 140s over 80s but becomes concerned when it rises to 150s over 90s. This morning, her blood pressure was 99/82 upon waking. She notes that her blood pressure tends to decrease with increased physical activity. She recently contacted her cardiologist in [place], who prescribed amlodipine in addition to her current losartan regimen. She initiated the new medication last night.      The following portions of the patient's history were reviewed and updated as appropriate: allergies, current medications, past family history, past medical history, past social history, past surgical history and problem  list.    Review of Systems   Constitutional:  Negative for chills, fatigue and fever.   HENT:  Negative for congestion, ear pain, rhinorrhea, sinus pressure and sore throat.    Eyes:  Negative for visual disturbance.   Respiratory:  Negative for cough, chest tightness, shortness of breath and wheezing.    Cardiovascular:  Negative for chest pain, palpitations and leg swelling.   Gastrointestinal:  Negative for abdominal pain, blood in stool, constipation, diarrhea, nausea and vomiting.   Endocrine: Negative for polydipsia and polyuria.   Genitourinary:  Negative for dysuria and hematuria.   Musculoskeletal:  Negative for arthralgias and back pain.   Skin:  Negative for rash.   Neurological:  Negative for dizziness, light-headedness, numbness and headaches.   Psychiatric/Behavioral:  Negative for dysphoric mood and sleep disturbance. The patient is not nervous/anxious.        Allergies   Allergen Reactions    Macrobid [Nitrofurantoin Monohyd Macro] Rash    Nitrofurantoin Hives    Phenylephrine-Guaifenesin Hives    Sulfa Antibiotics Hives    Statins Myalgia       Past Medical History:   Diagnosis Date    A-fib     Anemia 2008    Arthritis     Asthma     B12 deficiency 2008    Back pain 2010    CAD (coronary artery disease)     Cellulitis of foot     Colon polyp 2016    Dyspnea     Esophageal dysphagia 10/18/2021    GERD (gastroesophageal reflux disease)     Glaucoma 2016    Hematuria     History of mammogram     Hypercholesteremia 2005    Hypertension     Iron deficiency     Low back pain     Menopause     Neck strain     Osteopenia     Overweight     Palpitations     Postmenopausal atrophic vaginitis     Sleep apnea 2012    discontinued CPAP use 2 months ago    Spinal headache     Tinea pedis     Wears eyeglasses        Social History     Socioeconomic History    Marital status:    Tobacco Use    Smoking status: Former     Current packs/day: 0.00     Average packs/day: 1 pack/day for 35.0 years (35.0 ttl  pk-yrs)     Types: Cigarettes     Start date:      Quit date:      Years since quittin.2     Passive exposure: Past    Smokeless tobacco: Never   Vaping Use    Vaping status: Never Used   Substance and Sexual Activity    Alcohol use: No     Comment: quit in     Drug use: No    Sexual activity: Defer        Past Surgical History:   Procedure Laterality Date    APPENDECTOMY  1964    ATRIAL APPENDAGE EXCLUSION LEFT WITH TRANSESOPHAGEAL ECHOCARDIOGRAM N/A 2022    Procedure: Atrial Appendage Occlusion on Xarelto-hold 1 day prior  2022;  Surgeon: Kip Conde MD;  Location:  [facility];  Service: Cardiology;  Laterality: N/A;    BREAST EXCISIONAL BIOPSY Bilateral     BREAST SURGERY      CARDIAC ELECTROPHYSIOLOGY PROCEDURE N/A 2020    [place] [facility] [place]  Surgeon: Villa Thomas DO;  Location:  [facility];  Service: Cardiology;  Laterality: N/A;    COLONOSCOPY  2016         COLONOSCOPY N/A 2023    Procedure: COLONOSCOPY with biopsy;  Surgeon: Ferny Wade MD;  Location: [facility];  Service: Gastroenterology;  Laterality: N/A;    ENDOSCOPY N/A 2023    Procedure: ESOPHAGOGASTRODUODENOSCOPY WITH BIOPSY AND BRUSHING;  Surgeon: Ferny Wade MD;  Location: [facility] ENDOSCOPY;  Service: Gastroenterology;  Laterality: N/A;    EYE SURGERY      laser for bleed    HYSTERECTOMY  2000    total     OOPHORECTOMY Bilateral 2000    TUBAL ABDOMINAL LIGATION  1974       Family History   Problem Relation Age of Onset    Heart attack Other     Arthritis Other     Diabetes Other     Hypertension Other     Hodgkin's lymphoma Other     Esophagitis Mother     Heart failure Mother     Esophageal cancer Mother     Esophagitis Maternal Aunt     Breast cancer Maternal Aunt         DX AGE 50'S    Colon cancer Maternal Grandmother 49    Heart attack Father     Breast cancer Sister 72    Ovarian cancer Sister 74    Stomach cancer Neg  "Hx     Liver cancer Neg Hx     Liver disease Neg Hx          Current Outpatient Medications:     amLODIPine (NORVASC) 5 MG tablet, , Disp: , Rfl:     aspirin 81 MG EC tablet, Take 1 tablet by mouth Daily., Disp: , Rfl:     Calcium Carbonate-Vitamin D (CALCIUM PLUS VITAMIN D PO), Take 1 tablet by mouth 2 (Two) Times a Day., Disp: , Rfl:     cetirizine (zyrTEC) 10 MG tablet, Take 1 tablet by mouth Daily., Disp: , Rfl:     coenzyme Q10 100 MG capsule, Take 1 capsule by mouth Daily., Disp: , Rfl:     Cranberry-Vit C-Lactobacillus (RA CRANBERRY SUPPLEMENTS PO), Take  by mouth., Disp: , Rfl:     Fluticasone Furoate-Vilanterol 100-25 MCG/ACT aerosol powder , Inhale 1 puff Daily. Rinse mouth with water after use., Disp: 60 each, Rfl: 5    latanoprost (XALATAN) 0.005 % ophthalmic solution, , Disp: , Rfl:     losartan (COZAAR) 100 MG tablet, Take 0.5 tablets by mouth Daily., Disp: , Rfl:     melatonin 5 MG tablet tablet, Take 1 tablet by mouth., Disp: , Rfl:     pravastatin (PRAVACHOL) 40 MG tablet, Take 1 tablet by mouth every night at bedtime., Disp: 90 tablet, Rfl: 1    Objective   /67   Pulse 63   Temp 97.9 °F (36.6 °C) (Temporal)   Resp 16   Ht 156.2 cm (61.5\")   Wt 74.8 kg (165 lb)   SpO2 99%   BMI 30.67 kg/m²     Physical Exam  Vitals and nursing note reviewed.   Constitutional:       Appearance: Normal appearance. She is well-developed.   HENT:      Head: Normocephalic and atraumatic.   Eyes:      Extraocular Movements: Extraocular movements intact.      Conjunctiva/sclera: Conjunctivae normal.   Pulmonary:      Effort: Pulmonary effort is normal.   Musculoskeletal:      Cervical back: Normal range of motion and neck supple.   Skin:     General: Skin is warm and dry.      Findings: No rash.   Neurological:      General: No focal deficit present.      Mental Status: She is alert and oriented to person, place, and time.   Psychiatric:         Mood and Affect: Mood normal.         Behavior: Behavior normal. "         Results:  Results for orders placed or performed in visit on 11/11/24   POCT urinalysis dipstick, automated    Collection Time: 11/11/24 12:07 PM    Specimen: Urine   Result Value Ref Range    Color Yellow Yellow, Straw, Dark Yellow, Itzel    Clarity, UA Cloudy (A) Clear    Specific Gravity  1.005 1.005 - 1.030    pH, Urine 6.0 5.0 - 8.0    Leukocytes 500 Sirisha/ul (A) Negative    Nitrite, UA Negative Negative    Protein, POC Negative Negative mg/dL    Glucose, UA Negative Negative mg/dL    Ketones, UA Negative Negative    Urobilinogen, UA Normal Normal, 0.2 E.U./dL    Bilirubin Negative Negative    Blood, UA 3+ (A) Negative    Lot Number [id number]     Expiration Date 12/20/2024    Urine Culture - Urine, Urine, Clean Catch    Collection Time: 11/11/24  2:54 PM    Specimen: Urine, Clean Catch    UC   Result Value Ref Range    Urine Culture Final report (A)     Result 1 Escherichia coli (A)     Susceptibility Testing Comment          Assessment & Plan   Diagnoses and all orders for this visit:    1. Change in nail appearance (Primary)  -     TSH  -     Iron Profile    2. Hair loss  -     TSH  -     Iron Profile    3. Chronic fatigue  -     CBC & Differential  -     Comprehensive Metabolic Panel  -     Lipid Panel  -     TSH  -     Iron Profile    4. Benign essential hypertension  -     CBC & Differential  -     Comprehensive Metabolic Panel  -     Lipid Panel    5. Mixed hyperlipidemia  -     CBC & Differential  -     Comprehensive Metabolic Panel  -     Lipid Panel        Assessment & Plan  Fatigue /  Potential anemia / hair loss  She reports symptoms of fatigue, pale skin, hair loss, and spoon-shaped nails, which may indicate anemia. Routine blood work has been ordered to investigate these symptoms further. If the blood work indicates anemia, appropriate treatment will be initiated.     Hypertension.  Her blood pressure readings have shown improvement compared to previous measurements. She has been on  amlodipine in addition to her current losartan regimen. She reports that her blood pressure spikes after physical activity but returns to normal after resting. She is advised to continue monitoring her blood pressure and to spread out her blood pressure medications by taking one in the morning and the other in the evening. If her blood pressure remains elevated despite these adjustments, further evaluation and potential medication changes will be considered.    Follow up:  Return if symptoms worsen or fail to improve.     Patient or patient representative verbalized consent for the use of Ambient Listening during the visit with  Aguila Lopez DO for chart documentation. 3/7/2025  12:10 EST

## 2025-03-08 LAB
ALBUMIN SERPL-MCNC: 4.2 G/DL (ref 3.5–5.2)
ALBUMIN/GLOB SERPL: 1.4 G/DL
ALP SERPL-CCNC: 72 U/L (ref 39–117)
ALT SERPL-CCNC: 15 U/L (ref 1–33)
AST SERPL-CCNC: 21 U/L (ref 1–32)
BASOPHILS # BLD AUTO: 0.06 10*3/MM3 (ref 0–0.2)
BASOPHILS NFR BLD AUTO: 1.1 % (ref 0–1.5)
BILIRUB SERPL-MCNC: 0.5 MG/DL (ref 0–1.2)
BUN SERPL-MCNC: 9 MG/DL (ref 8–23)
BUN/CREAT SERPL: 10 (ref 7–25)
CALCIUM SERPL-MCNC: 9.9 MG/DL (ref 8.6–10.5)
CHLORIDE SERPL-SCNC: 104 MMOL/L (ref 98–107)
CHOLEST SERPL-MCNC: 178 MG/DL (ref 0–200)
CO2 SERPL-SCNC: 27.8 MMOL/L (ref 22–29)
CREAT SERPL-MCNC: 0.9 MG/DL (ref 0.57–1)
EGFRCR SERPLBLD CKD-EPI 2021: 65.6 ML/MIN/1.73
EOSINOPHIL # BLD AUTO: 0.29 10*3/MM3 (ref 0–0.4)
EOSINOPHIL NFR BLD AUTO: 5.3 % (ref 0.3–6.2)
ERYTHROCYTE [DISTWIDTH] IN BLOOD BY AUTOMATED COUNT: 12.5 % (ref 12.3–15.4)
GLOBULIN SER CALC-MCNC: 3.1 GM/DL
GLUCOSE SERPL-MCNC: 87 MG/DL (ref 65–99)
HCT VFR BLD AUTO: 42.7 % (ref 34–46.6)
HDLC SERPL-MCNC: 64 MG/DL (ref 40–60)
HGB BLD-MCNC: 14.4 G/DL (ref 12–15.9)
IMM GRANULOCYTES # BLD AUTO: 0.01 10*3/MM3 (ref 0–0.05)
IMM GRANULOCYTES NFR BLD AUTO: 0.2 % (ref 0–0.5)
IRON SATN MFR SERPL: 22 % (ref 20–50)
IRON SERPL-MCNC: 97 MCG/DL (ref 37–145)
LDLC SERPL CALC-MCNC: 97 MG/DL (ref 0–100)
LYMPHOCYTES # BLD AUTO: 1.53 10*3/MM3 (ref 0.7–3.1)
LYMPHOCYTES NFR BLD AUTO: 27.7 % (ref 19.6–45.3)
MCH RBC QN AUTO: 32 PG (ref 26.6–33)
MCHC RBC AUTO-ENTMCNC: 33.7 G/DL (ref 31.5–35.7)
MCV RBC AUTO: 94.9 FL (ref 79–97)
MONOCYTES # BLD AUTO: 0.61 10*3/MM3 (ref 0.1–0.9)
MONOCYTES NFR BLD AUTO: 11.1 % (ref 5–12)
NEUTROPHILS # BLD AUTO: 3.02 10*3/MM3 (ref 1.7–7)
NEUTROPHILS NFR BLD AUTO: 54.6 % (ref 42.7–76)
NRBC BLD AUTO-RTO: 0 /100 WBC (ref 0–0.2)
PLATELET # BLD AUTO: 219 10*3/MM3 (ref 140–450)
POTASSIUM SERPL-SCNC: 4.9 MMOL/L (ref 3.5–5.2)
PROT SERPL-MCNC: 7.3 G/DL (ref 6–8.5)
RBC # BLD AUTO: 4.5 10*6/MM3 (ref 3.77–5.28)
SODIUM SERPL-SCNC: 143 MMOL/L (ref 136–145)
TIBC SERPL-MCNC: 438 MCG/DL
TRIGL SERPL-MCNC: 94 MG/DL (ref 0–150)
TSH SERPL DL<=0.005 MIU/L-ACNC: 1.23 UIU/ML (ref 0.27–4.2)
UIBC SERPL-MCNC: 341 MCG/DL (ref 112–346)
VLDLC SERPL CALC-MCNC: 17 MG/DL (ref 5–40)
WBC # BLD AUTO: 5.52 10*3/MM3 (ref 3.4–10.8)

## 2025-03-17 ENCOUNTER — OFFICE VISIT (OUTPATIENT)
Dept: INTERNAL MEDICINE | Facility: CLINIC | Age: 80
End: 2025-03-17
Payer: MEDICARE

## 2025-03-17 VITALS
SYSTOLIC BLOOD PRESSURE: 140 MMHG | TEMPERATURE: 97.1 F | RESPIRATION RATE: 16 BRPM | DIASTOLIC BLOOD PRESSURE: 59 MMHG | OXYGEN SATURATION: 99 % | HEART RATE: 63 BPM | HEIGHT: 62 IN | WEIGHT: 175 LBS | BODY MASS INDEX: 32.2 KG/M2

## 2025-03-17 DIAGNOSIS — R73.01 IMPAIRED FASTING GLUCOSE: ICD-10-CM

## 2025-03-17 DIAGNOSIS — I10 BENIGN ESSENTIAL HYPERTENSION: Primary | ICD-10-CM

## 2025-03-17 DIAGNOSIS — M81.0 OSTEOPOROSIS, POSTMENOPAUSAL: ICD-10-CM

## 2025-03-17 DIAGNOSIS — E78.2 MIXED HYPERLIPIDEMIA: ICD-10-CM

## 2025-03-17 RX ORDER — PRAVASTATIN SODIUM 40 MG
40 TABLET ORAL
Qty: 90 TABLET | Refills: 1 | Status: SHIPPED | OUTPATIENT
Start: 2025-03-17

## 2025-03-17 RX ORDER — ALENDRONATE SODIUM 70 MG/1
70 TABLET ORAL
Qty: 12 TABLET | Refills: 1 | Status: SHIPPED | OUTPATIENT
Start: 2025-03-17

## 2025-03-17 NOTE — PROGRESS NOTES
"Chief Complaint  Hypertension and Hyperlipidemia    Subjective        Monica Perea presents to [facility]    HPI: Patient is here to follow up on the blood pressure  The patient is taking the blood pressure medications as prescribed and she states her blood pressure medications were recently readjusted by cardiology Dr. Garsia, patient is also here to follow up on the cholesterol and had  lab work done .  The patient also following up on osteoporosis and would like refills on Fosamax which she was taking last year , she had a DEXA scan done which has been reviewed  Hyperlipidemia   Pertinent negatives include no chest pain or shortness of breath.   Hypertension   Pertinent negatives include no chest pain, palpitations or shortness of breath.    Objective   Vital Signs:  /59   Pulse 63   Temp 97.1 °F (36.2 °C) (Temporal)   Resp 16   Ht 156.2 cm (61.5\")   Wt 79.4 kg (175 lb)   SpO2 99%   BMI 32.53 kg/m²   Estimated body mass index is 32.53 kg/m² as calculated from the following:    Height as of this encounter: 156.2 cm (61.5\").    Weight as of this encounter: 79.4 kg (175 lb).            Physical Exam  Vitals and nursing note reviewed.   Constitutional:       General: She is not in acute distress.     Appearance: Normal appearance. She is not diaphoretic.   HENT:      Head: Normocephalic and atraumatic.      Right Ear: External ear normal.      Left Ear: External ear normal.      Nose: Nose normal.   Eyes:      Extraocular Movements: Extraocular movements intact.      Conjunctiva/sclera: Conjunctivae normal.   Neck:      Trachea: Trachea normal.   Cardiovascular:      Rate and Rhythm: Normal rate and regular rhythm.      Heart sounds: Normal heart sounds.   Pulmonary:      Effort: Pulmonary effort is normal. No respiratory distress.      Breath sounds: Normal breath sounds.   Abdominal:      General: Abdomen is flat.   Musculoskeletal:         General: Deformity " present.      Cervical back: Neck supple.      Comments: Moves all limbs   Skin:     General: Skin is warm.   Neurological:      Mental Status: She is alert and oriented to person, place, and time.      Comments: No gross motor or sensory deficits        Result Review :  The following data was reviewed by: Nakita Crump MD on 03/17/2025:  Common labs          10/15/2024    09:47 3/7/2025    12:34   Common Labs   Glucose 97  87    BUN 10  9    Creatinine 0.78  0.90    Sodium 145  143    Potassium 4.4  4.9    Chloride 105  104    Calcium 9.6  9.9    Albumin 4.2  4.2    Total Bilirubin 0.5  0.5    Alkaline Phosphatase 95  72    AST (SGOT) 18  21    ALT (SGPT) 14  15    WBC 5.85  5.52    Hemoglobin 13.3  14.4    Hematocrit 39.8  42.7    Platelets 188  219    Total Cholesterol 177  178    Triglycerides 110  94    HDL Cholesterol 51  64    LDL Cholesterol  106  97    Hemoglobin A1C 5.60            DEXA Bone Density Axial (06/13/2024 08:39)      Assessment and Plan   Diagnoses and all orders for this visit:    1. Benign essential hypertension (Primary)    2. Mixed hyperlipidemia  -     pravastatin (PRAVACHOL) 40 MG tablet; Take 1 tablet by mouth every night at bedtime.  Dispense: 90 tablet; Refill: 1  -     CBC (No Diff)  -     Comprehensive Metabolic Panel  -     Lipid Panel    3. Impaired fasting glucose  -     Hemoglobin A1c    4. Osteoporosis, postmenopausal  -     alendronate (Fosamax) 70 MG tablet; Take 1 tablet by mouth Every 7 (Seven) Days.  Dispense: 12 tablet; Refill: 1    Plan:  1.  Benign essential hypertension: Will continue current medication per cardiology, low-sodium diet advised, Counseled to regularly check BP at home with goal averaging <130/80.   2.mixed hyperlipidemia:  reviewed  fasting CMP and lipid panel.  Diet and exercise counseled,  Will continue current medications  3. impaired glucose   :   reviewed  fasting CMP  and hba1c   5.6 , diet and exercise counseled  4.  Osteoporosis: Refill Fosamax,  continue calcium with vitamin D and ambulation encouraged, DEXA scan reviewed           Follow Up   Return in about 3 months (around 7/1/2025) for Medicare Wellness.  Patient was given instructions and counseling regarding her condition or for health maintenance advice. Please see specific information pulled into the AVS if appropriate.

## 2025-04-01 ENCOUNTER — OFFICE VISIT (OUTPATIENT)
Dept: PULMONOLOGY | Facility: CLINIC | Age: 80
End: 2025-04-01
Payer: MEDICARE

## 2025-04-01 VITALS
OXYGEN SATURATION: 97 % | BODY MASS INDEX: 32.66 KG/M2 | SYSTOLIC BLOOD PRESSURE: 122 MMHG | RESPIRATION RATE: 18 BRPM | HEIGHT: 61 IN | WEIGHT: 173 LBS | HEART RATE: 67 BPM | DIASTOLIC BLOOD PRESSURE: 68 MMHG

## 2025-04-01 DIAGNOSIS — G47.33 OSA (OBSTRUCTIVE SLEEP APNEA): ICD-10-CM

## 2025-04-01 DIAGNOSIS — R06.02 SOB (SHORTNESS OF BREATH): ICD-10-CM

## 2025-04-01 DIAGNOSIS — J45.40 MODERATE PERSISTENT ASTHMA WITHOUT COMPLICATION: ICD-10-CM

## 2025-04-01 DIAGNOSIS — J45.40 MODERATE PERSISTENT ASTHMA WITHOUT COMPLICATION: Primary | ICD-10-CM

## 2025-04-01 DIAGNOSIS — J30.9 ALLERGIC RHINITIS, UNSPECIFIED SEASONALITY, UNSPECIFIED TRIGGER: ICD-10-CM

## 2025-04-01 RX ORDER — FLUTICASONE FUROATE AND VILANTEROL TRIFENATATE 50; 25 UG/1; UG/1
1 POWDER RESPIRATORY (INHALATION) DAILY
Qty: 60 EACH | Refills: 5 | Status: SHIPPED | OUTPATIENT
Start: 2025-04-01

## 2025-04-01 NOTE — PROGRESS NOTES
"Chief Complaint   Patient presents with    Follow-up    Shortness of Breath         Subjective   Monica Perea is a 80 y.o. female.   Patient is here today for follow up of asthma, shortness of breath and MOON.    Patient says that since the last office visit she has had no recent exacerbations. she denies any emergency room visits or hospitalizations, due to her asthma.     The patient says that she is compliant with pulmonary medicines, as prescribed. She uses Breo daily. She does not use JENNY at all.  She feels breathing has improved significantly.     She uses CPAP nightly. She feels more rested upon awakening.       The following portions of the patient's history were reviewed and updated as appropriate: allergies, current medications, past family history, past medical history, past social history, and past surgical history.      Review of Systems   HENT:  Positive for sneezing (some). Negative for sinus pressure and sore throat.    Respiratory:  Positive for cough (some). Negative for chest tightness, shortness of breath and wheezing.    Psychiatric/Behavioral:  Negative for sleep disturbance.        Objective   Visit Vitals  /68   Pulse 67   Resp 18   Ht 154.9 cm (61\") Comment: pt reported   Wt 78.5 kg (173 lb)   SpO2 97%   BMI 32.69 kg/m²       Physical Exam  Vitals reviewed.   HENT:      Head: Atraumatic.      Mouth/Throat:      Mouth: Mucous membranes are moist.      Comments: Crowded oropharynx.   Eyes:      Extraocular Movements: Extraocular movements intact.   Cardiovascular:      Rate and Rhythm: Normal rate and regular rhythm.   Pulmonary:      Effort: Pulmonary effort is normal. No respiratory distress.      Breath sounds: Normal breath sounds. No wheezing.   Abdominal:      Comments: Obese abdomen.    Skin:     General: Skin is warm.   Neurological:      Mental Status: She is alert and oriented to person, place, and time.           Assessment & Plan   Diagnoses and all orders for this " visit:    1. Moderate persistent asthma without complication (Primary)    2. MOON (obstructive sleep apnea)    3. Allergic rhinitis, unspecified seasonality, unspecified trigger    4. SOB (shortness of breath)    Other orders  -     Fluticasone Furoate-Vilanterol (Breo Ellipta) 50-25 MCG/ACT aerosol powder ; Inhale 1 puff Daily.  Dispense: 60 each; Refill: 5           Return in about 5 months (around 9/1/2025) for Recheck, For Dr. العراقي.    DISCUSSION (if any):  PFT today consistent with mild obstruction.    FeNO today 37 ppb.     Her symptoms of asthma are under good control at this time. She has had a good response to Breo 100.  I mentioned possibly d/c'ing Breo but she is very hesitant and her FeNO today is elevated. She would like to decreased to Breo 50 so this has been ordered today.     If she has any worsening SOB on Breo 50/25, she will need to resume Breo 100/25. I have told her to message or call if she needs a prescription for Breo 100.     Continue antihistamine daily.     Patient's medications for underlying asthma were reviewed with her in great detail.    Any needed adjustments to her pulmonary medications, either for clinical or insurance coverage reasons, have been made and are reflected in the orders.    Side effects of prescribed medications discussed with the patient.    Asthma action plan with discussed with her.    The patient was asked to call this office if the symptoms worsen.     Sleep study performed in March 2017  AHI was 28 / hour.   Supine AHI was 54/hour.   REM AHI was 78/hour.      Latest PAP device provided in May 2022  DME company: [employer]     Current PAP settings: 6-12  Current mask type: Nasal pillows    Continue treatment with AutoPAP at a pressure of 6-12, with a nasal pillows.    Patient's compliance data was reviewed and the compliance is greater than 70%.    Humidification setup, hose and mask care discussed.    Weight loss advised.    Use every night for at least 4 hours  stressed.         Dictated utilizing Dragon dictation.    This document was electronically signed by TERRY Norton April 1, 2025  11:58 EDT

## 2025-04-03 DIAGNOSIS — J45.40 MODERATE PERSISTENT ASTHMA WITHOUT COMPLICATION: ICD-10-CM

## 2025-04-03 NOTE — TELEPHONE ENCOUNTER
Patient states that her bp  Is running 172/91, 165/90. Patient states that it is to high. Wanted to know if there is another bp medication she can us. Patient states she does not want to go back on Amlodipine.  
Please increase calan to 360 mg daily  
Pt. Notified.  
118

## 2025-04-11 ENCOUNTER — PATIENT ROUNDING (BHMG ONLY) (OUTPATIENT)
Dept: URGENT CARE | Facility: CLINIC | Age: 80
End: 2025-04-11
Payer: MEDICARE

## 2025-05-01 ENCOUNTER — TELEPHONE (OUTPATIENT)
Dept: CARDIOLOGY | Facility: CLINIC | Age: 80
End: 2025-05-01
Payer: MEDICARE

## 2025-05-01 NOTE — TELEPHONE ENCOUNTER
Name: Monica Perea Hil    Relationship: Self    Best Callback Number: [phone]    Incoming call to the Hub, requesting to  Reschedule their Device Check appointment on 05/07/25.     Per Hub workflow, further review is needed before the task can be completed.    Result of Call: Please reach out to the patient to reschedule

## 2025-05-05 NOTE — TELEPHONE ENCOUNTER
SW PT VIA PHONE SHE IS AWARE OF THE APPT DATE, TIME LOCATION & WHO SHE WILL BE SEEING. MAILED OUT THE APPT REMINDER PAPER.

## 2025-05-27 ENCOUNTER — TRANSCRIBE ORDERS (OUTPATIENT)
Dept: ADMINISTRATIVE | Facility: HOSPITAL | Age: 80
End: 2025-05-27
Payer: MEDICARE

## 2025-05-27 DIAGNOSIS — Z12.31 SCREENING MAMMOGRAM FOR BREAST CANCER: Primary | ICD-10-CM

## 2025-06-10 ENCOUNTER — TELEPHONE (OUTPATIENT)
Dept: INTERNAL MEDICINE | Facility: CLINIC | Age: 80
End: 2025-06-10
Payer: MEDICARE

## 2025-06-10 NOTE — TELEPHONE ENCOUNTER
Fax received from [facility] pharmacy stating that patient received the Arexvy  mcg immunization on 6/10/25 in her left arm.   Fax sent to scanning.

## 2025-06-18 ENCOUNTER — OFFICE VISIT (OUTPATIENT)
Dept: CARDIOLOGY | Facility: CLINIC | Age: 80
End: 2025-06-18
Payer: MEDICARE

## 2025-06-18 VITALS
HEART RATE: 64 BPM | WEIGHT: 171.4 LBS | BODY MASS INDEX: 32.36 KG/M2 | OXYGEN SATURATION: 97 % | DIASTOLIC BLOOD PRESSURE: 82 MMHG | SYSTOLIC BLOOD PRESSURE: 124 MMHG | HEIGHT: 61 IN

## 2025-06-18 DIAGNOSIS — I10 ESSENTIAL HYPERTENSION: ICD-10-CM

## 2025-06-18 DIAGNOSIS — I49.5 SINUS NODE DYSFUNCTION: ICD-10-CM

## 2025-06-18 DIAGNOSIS — I48.0 PAROXYSMAL ATRIAL FIBRILLATION: Primary | ICD-10-CM

## 2025-06-18 NOTE — PROGRESS NOTES
Monica Perea  1945  Home phone not available    06/18/2025    [facility] CARDIOLOGY     Referring Provider: No ref. provider found     Nakita Crump MD  [address] [address]    Chief Complaint   Patient presents with    Paroxysmal atrial fibrillation     Problem List:          Atrial Fibrillation- Tachybrady Syndrome   CHADSVASC-  4(age 4 , gender, HTN)   1/23/20 Myocardial perfusion imaging indicates a normal myocardial perfusion study with no evidence of ischemia. Impressions are consistent with a low risk study  BSC PPM Implant 4/28/2020 - per Dr. Thomas    8/1/2020 Echo LVEF 65% with mild LVH   Echo 7/19/2021 LVEF 65%.  Normal left atrial size.  No evidence of significant valvular abnormalities or intracardiac shunt.  Prior Antiarrythmic use includes Flecainide (had dizziness and syncope, and Rhythmol)   4/22 successful insertion of left atrial appendage occlusive device (Watchman Flex).  4/22 ISHAN: A 20 mm watchman FL X left atrial appendage occlusion device placed under ISHAN guidance.  Device appears well-seated with no significant perivascular.  LVEF = 51-55%.  Normal RV size and function.  Moderate, grade 3 plaque in aortic arch.  ISHAN 5/23/2022: 20 mm watchman flex JAS occlusive device in place,On the mitral valve border of the device there is a 2.5 mm area of flow noted, does not come communicate posteriorly with the appendage. Left atrial appendage appears adequately sealed. No device associated thrombus. EF 51-55%, mild to moderate MR  HTN  HLD  Vit D deficiency  Glaucoma   GERD  Chronic constipation  Iron deficiency anemia - on Rx iron supplementation   Frequent Epistaxis   Anxiety   Osteopenia   Psoriasis  Surgical History   Hysterectomy  Appendectomy   Breast biopsy    Allergies  Allergies   Allergen Reactions    Macrobid [Nitrofurantoin Monohyd Macro] Rash    Nitrofurantoin Hives    Phenylephrine-Guaifenesin Hives    Sulfa Antibiotics Hives     "Statins Myalgia       Current Medications    Current Outpatient Medications:     alendronate (Fosamax) 70 MG tablet, Take 1 tablet by mouth Every 7 (Seven) Days., Disp: 12 tablet, Rfl: 1    amLODIPine (NORVASC) 5 MG tablet, , Disp: , Rfl:     aspirin 81 MG EC tablet, Take 1 tablet by mouth Daily. (Patient taking differently: Take 1 tablet by mouth Every Other Day.), Disp: , Rfl:     cetirizine (zyrTEC) 10 MG tablet, Take 1 tablet by mouth Daily., Disp: , Rfl:     coenzyme Q10 100 MG capsule, Take 1 capsule by mouth Daily., Disp: , Rfl:     Cranberry-Vit C-Lactobacillus (RA CRANBERRY SUPPLEMENTS PO), Take  by mouth., Disp: , Rfl:     Fluticasone Furoate-Vilanterol (Breo Ellipta) 50-25 MCG/ACT aerosol powder , Inhale 1 puff Daily., Disp: 60 each, Rfl: 5    latanoprost (XALATAN) 0.005 % ophthalmic solution, , Disp: , Rfl:     losartan (COZAAR) 50 MG tablet, Take 2 tablets by mouth Daily. (Patient taking differently: Take 1 tablet by mouth Daily.), Disp: , Rfl:     melatonin 5 MG tablet tablet, Take 1 tablet by mouth., Disp: , Rfl:     History of Present Illness:     Pt presents for follow up of AF/SSS/PM check. Since we last saw the pt, pt denies any AF episodes, SOB, CP, LH, and dizziness. Denies any hospitalizations, ER visits, bleeding issues on ASA every other day, or TIA/CVA symptoms. Overall feels well. BP was high and Norvasc 2.5 mg added recently. BP is now controlled.           Vitals:    06/18/25 0936   BP: 124/82   Pulse: 64   SpO2: 97%   Weight: 77.7 kg (171 lb 6.4 oz)   Height: 154.9 cm (61\")     Body mass index is 32.39 kg/m².  PE:  General: NAD. A & O x 3   Neck: no JVD, no carotid bruits, no TM  Heart RRR, NL S1, S2, S4 present, no rubs, murmurs  Lungs: CTA, no wheezes, rhonchi, or rales  Abd: soft, non-tender, NL BS  Ext: No musculoskeletal deformities, no edema, cyanosis, or clubbing  Psych: normal mood and affect  Skin: PM site WNL    Diagnostic Data:    [facility] PM manual Interrogation: normal function. " A paced 36% V paced 2%. Less than 1% AFIB. 4.5 years on battery.       ECG 12 Lead    Date/Time: 6/18/2025 10:25 AM  Performed by: Ruthie Sifuentes PA    Authorized by: Ruthie Sifuentes PA  Comparison: compared with previous ECG from 5/1/2024  Rhythm: sinus rhythm  BPM: 64          Advance Care Planning   ACP discussion was held with the patient during this visit. Patient has an advance directive (not in EMR), copy requested.       1. Paroxysmal atrial fibrillation    2. Sinus node dysfunction    3. Essential hypertension          Plan:  1. Atrial Fibrillation/ Tachy- Chris syndrome  -BSC PPM with normal function   -Previously on flecainide (intolerant) and rythmol (too expensive)  -Will continue Metoprolol Xl 50mg - BP and HR well controlled     2. Anticoagulation  -Elevated CHADSVASC: 4 s/p Watchman Device 4/2022. ISHAN 45 days later showed well seated device without leakage.  Restart aspirin 81 mg daily.        3. HTN: overall acceptable control- per Dr. Garsia        F/up in 12 months    Electronically signed by NANI Campos, 06/18/25, 10:10 AM EDT.

## 2025-07-09 ENCOUNTER — OFFICE VISIT (OUTPATIENT)
Dept: INTERNAL MEDICINE | Facility: CLINIC | Age: 80
End: 2025-07-09
Payer: MEDICARE

## 2025-07-09 VITALS
BODY MASS INDEX: 30.8 KG/M2 | HEART RATE: 62 BPM | RESPIRATION RATE: 18 BRPM | OXYGEN SATURATION: 100 % | SYSTOLIC BLOOD PRESSURE: 140 MMHG | TEMPERATURE: 98.3 F | DIASTOLIC BLOOD PRESSURE: 61 MMHG | HEIGHT: 62 IN | WEIGHT: 167.4 LBS

## 2025-07-09 DIAGNOSIS — E78.2 MIXED HYPERLIPIDEMIA: ICD-10-CM

## 2025-07-09 DIAGNOSIS — R73.01 IMPAIRED FASTING GLUCOSE: ICD-10-CM

## 2025-07-09 DIAGNOSIS — M81.0 OSTEOPOROSIS, POSTMENOPAUSAL: ICD-10-CM

## 2025-07-09 DIAGNOSIS — I10 BENIGN ESSENTIAL HYPERTENSION: ICD-10-CM

## 2025-07-09 DIAGNOSIS — Z00.00 MEDICARE ANNUAL WELLNESS VISIT, SUBSEQUENT: Primary | ICD-10-CM

## 2025-07-09 PROCEDURE — 1126F AMNT PAIN NOTED NONE PRSNT: CPT | Performed by: INTERNAL MEDICINE

## 2025-07-09 PROCEDURE — 1160F RVW MEDS BY RX/DR IN RCRD: CPT | Performed by: INTERNAL MEDICINE

## 2025-07-09 PROCEDURE — 1159F MED LIST DOCD IN RCRD: CPT | Performed by: INTERNAL MEDICINE

## 2025-07-09 PROCEDURE — 99214 OFFICE O/P EST MOD 30 MIN: CPT | Performed by: INTERNAL MEDICINE

## 2025-07-09 PROCEDURE — G0439 PPPS, SUBSEQ VISIT: HCPCS | Performed by: INTERNAL MEDICINE

## 2025-07-09 PROCEDURE — 3078F DIAST BP <80 MM HG: CPT | Performed by: INTERNAL MEDICINE

## 2025-07-09 PROCEDURE — 1170F FXNL STATUS ASSESSED: CPT | Performed by: INTERNAL MEDICINE

## 2025-07-09 PROCEDURE — 99397 PER PM REEVAL EST PAT 65+ YR: CPT | Performed by: INTERNAL MEDICINE

## 2025-07-09 PROCEDURE — G2211 COMPLEX E/M VISIT ADD ON: HCPCS | Performed by: INTERNAL MEDICINE

## 2025-07-09 PROCEDURE — 3077F SYST BP >= 140 MM HG: CPT | Performed by: INTERNAL MEDICINE

## 2025-07-09 RX ORDER — ALENDRONATE SODIUM 70 MG/1
70 TABLET ORAL
Qty: 12 TABLET | Refills: 1 | Status: SHIPPED | OUTPATIENT
Start: 2025-07-09

## 2025-07-09 RX ORDER — PRAVASTATIN SODIUM 40 MG
40 TABLET ORAL DAILY
Qty: 90 TABLET | Refills: 1 | Status: SHIPPED | OUTPATIENT
Start: 2025-07-09

## 2025-07-09 RX ORDER — PRAVASTATIN SODIUM 40 MG
40 TABLET ORAL DAILY
COMMUNITY
Start: 2025-07-05 | End: 2025-07-09 | Stop reason: SDUPTHER

## 2025-07-09 NOTE — PATIENT INSTRUCTIONS
Advance Care Planning and Advance Directives     You make decisions on a daily basis - decisions about where you want to live, your career, your home, your life. Perhaps one of the most important decisions you face is your choice for future medical care. Take time to talk with your family and your healthcare team and start planning today.  Advance Care Planning is a process that can help you:  Understand possible future healthcare decisions in light of your own experiences  Reflect on those decision in light of your goals and values  Discuss your decisions with those closest to you and the healthcare professionals that care for you  Make a plan by creating a document that reflects your wishes    Surrogate Decision Maker  In the event of a medical emergency, which has left you unable to communicate or to make your own decisions, you would need someone to make decisions for you.  It is important to discuss your preferences for medical treatment with this person while you are in good health.     Qualities of a surrogate decision maker:  Willing to take on this role and responsibility  Knows what you want for future medical care  Willing to follow your wishes even if they don't agree with them  Able to make difficult medical decisions under stressful circumstances    Advance Directives  These are legal documents you can create that will guide your healthcare team and decision maker(s) when needed. These documents can be stored in the electronic medical record.    Living Will - a legal document to guide your care if you have a terminal condition or a serious illness and are unable to communicate. States vary by statute in document names/types, but most forms may include one or more of the following:        -  Directions regarding life-prolonging treatments        -  Directions regarding artificially provided nutrition/hydration        -  Choosing a healthcare decision maker        -  Direction regarding organ/tissue  donation    Durable Power of  for Healthcare - this document names an -in-fact to make medical decisions for you, but it may also allow this person to make personal and financial decisions for you. Please seek the advice of an  if you need this type of document.    **Advance Directives are not required and no one may discriminate against you if you do not sign one.    Medical Orders  Many states allow specific forms/orders signed by your physician to record your wishes for medical treatment in your current state of health. This form, signed in personal communication with your physician, addresses resuscitation and other medical interventions that you may or may not want.      For more information or to schedule a time with a [facility] Advance Care Planning Facilitator contact: [facility] or call [phone] and someone will contact you directly.    Medicare Wellness  Personal Prevention Plan of Service     Date of Office Visit:    Encounter Provider:  Nakita Crump MD  Place of Service:  [facility] PRIMARY CARE  Patient Name: Monica Perea  :  1945    As part of the Medicare Wellness portion of your visit today, we are providing you with this personalized preventive plan of services (PPPS). This plan is based upon recommendations of the [place] Preventive Services Task Force (USPSTF) and the Advisory Committee on Immunization Practices (ACIP).    This lists the preventive care services that should be considered, and provides dates of when you are due. Items listed as completed are up-to-date and do not require any further intervention.    Health Maintenance   Topic Date Due   • ANNUAL WELLNESS VISIT  2025   • TDAP/TD VACCINES (1 - Tdap) 10/15/2025 (Originally 1/3/1964)   • COVID-19 Vaccine (3 - 2024- season) 2026 (Originally 2024)   • INFLUENZA VACCINE  10/01/2025   • LIPID PANEL  2026   • DXA SCAN  2026    • COLORECTAL CANCER SCREENING  01/31/2028   • HEPATITIS C SCREENING  Completed   • RSV Vaccine - Adults  Completed   • Pneumococcal Vaccine 50+  Completed   • ZOSTER VACCINE  Completed   • MAMMOGRAM  Discontinued       No orders of the defined types were placed in this encounter.      No follow-ups on file.

## 2025-07-09 NOTE — ASSESSMENT & PLAN NOTE
Lipid abnormalities are stable    Plan:  Continue same medication/s without change.      Discussed medication dosage, use, side effects, and goals of treatment in detail.    Counseled patient on lifestyle modifications to help control hyperlipidemia.   Cholesterol lowering dietary information shared with patient.    Patient Treatment Goals:   LDL goal is less than 70    Followup in 6 months.    Orders:    pravastatin (PRAVACHOL) 40 MG tablet; Take 1 tablet by mouth Daily.    CBC (No Diff)    Comprehensive Metabolic Panel    Lipid Panel

## 2025-07-09 NOTE — LETTER
[facility]  Vaccine Consent Form    Patient Name:  Monica Perea  Patient :  1945     Vaccine(s) Ordered    Hepatitis B Vaccine Heplisav-B        Screening Checklist  The following questions should be completed prior to vaccination. If you answer “yes” to any question, it does not necessarily mean you should not be vaccinated. It just means we may need to clarify or ask more questions. If a question is unclear, please ask your healthcare provider to explain it.    Yes No   Any fever or moderate to severe illness today (mild illness and/or antibiotic treatment are not contraindications)?     Do you have a history of a serious reaction to any previous vaccinations, such as anaphylaxis, encephalopathy within 7 days, Guillain-Stella syndrome within 6 weeks, seizure?     Have you received any live vaccine(s) (e.g MMR, JOHANA) or any other vaccines in the last month (to ensure duplicate doses aren't given)?     Do you have an anaphylactic allergy to latex (DTaP, DTaP-IPV, Hep A, Hep B, MenB, RV, Td, Tdap), baker’s yeast (Hep B, HPV), polysorbates (RSV, nirsevimab, PCV 20 and 21, Rotavirrus, Tdap, Shingrix), or gelatin (JOHANA, MMR)?     Do you have an anaphylactic allergy to neomycin (Rabies, JOHANA, MMR, IPV, Hep A), polymyxin B (IPV), or streptomycin (IPV)?      Any cancer, leukemia, AIDS, or other immune system disorder? (JOHANA, MMR, RV)     Do you have a parent, brother, or sister with an immune system problem (if immune competence of vaccine recipient clinically verified, can proceed)? (MMR, JOHANA)     Any recent steroid treatments for >2 weeks, chemotherapy, or radiation treatment? (JOHANA, MMR)     Have you received antibody-containing blood transfusions or IVIG in the past 11 months (recommended interval is dependent on product)? (MMR, JOHANA)     Have you taken antiviral drugs (acyclovir, famciclovir, valacyclovir for JOHANA) in the last 24 or 48 hours, respectively?      Are you pregnant or planning to become  "pregnant within 1 month? (JOHANA, MMR, HPV, IPV, MenB, Abrexvy; For Hep B- refer to Engerix-B; For RSV - Abrysvo is indicated for 32-36 weeks of pregnancy from September to January)     For infants, have you ever been told your child has had intussusception or a medical emergency involving obstruction of the intestine (Rotavirus)? If not for an infant, can skip this question.         *Ordering Physicians/APC should be consulted if \"yes\" is checked by the patient or guardian above.  I have received, read, and understand the Vaccine Information Statement (VIS) for each vaccine ordered.  I have considered my or my child's health status as well as the health status of my close contacts.  I have taken the opportunity to discuss my vaccine questions with my or my child's health care provider.   I have requested that the ordered vaccine(s) be given to me or my child.  I understand the benefits and risks of the vaccines.  I understand that I should remain in the clinic for 15 minutes after receiving the vaccine(s).  _________________________________________________________  Signature of Patient or Parent/Legal Guardian ____________________  Date   "

## 2025-07-09 NOTE — PROGRESS NOTES
Subjective   The ABCs of the Annual Wellness Visit  Medicare Wellness Visit    Chief Complaint   Patient presents with    Medicare Wellness-subsequent       Monica Perea is a 80 y.o. patient who presents for a Medicare Wellness Visit and physical .    The following portions of the patient's history were reviewed and   updated as appropriate: allergies, current medications, past family history, past medical history, past social history, past surgical history, and problem list.    Compared to one year ago, the patient's physical   health is better.  Compared to one year ago, the patient's mental   health is better.    Recent Hospitalizations:  She was not admitted to the hospital during the last year.     Current Medical Providers:  Patient Care Team:  Nakita Crump MD as PCP - General (Internal Medicine)  Tico Charles MD as Consulting Physician (Cardiology)  Delfino Thomas Jr., APRN as Nurse Practitioner (Interventional Cardiology)  Villa Thomas DO as Consulting Physician (Cardiology)  Avni Garsia MD as Consulting Physician (Cardiology)  Avni Garsia MD as Consulting Physician (Cardiology)  Kip Conde MD as Consulting Physician (Cardiac Electrophysiology)  Ruthie Sifuentes PA as Physician Assistant (Cardiology)    Outpatient Medications Prior to Visit   Medication Sig Dispense Refill    amLODIPine (NORVASC) 5 MG tablet  (Patient taking differently: 0.5 tablets.)      aspirin 81 MG EC tablet Take 1 tablet by mouth Daily. (Patient taking differently: Take 1 tablet by mouth Every Other Day.)      cetirizine (zyrTEC) 10 MG tablet Take 1 tablet by mouth Daily.      coenzyme Q10 100 MG capsule Take 1 capsule by mouth Daily.      Cranberry-Vit C-Lactobacillus (RA CRANBERRY SUPPLEMENTS PO) Take  by mouth.      Fluticasone Furoate-Vilanterol (Breo Ellipta) 50-25 MCG/ACT aerosol powder  Inhale 1 puff Daily. 60 each 5    latanoprost (XALATAN) 0.005 % ophthalmic solution        losartan (COZAAR) 50 MG tablet Take 2 tablets by mouth Daily. (Patient taking differently: Take 1 tablet by mouth Daily.)      melatonin 5 MG tablet tablet Take 1 tablet by mouth.      alendronate (Fosamax) 70 MG tablet Take 1 tablet by mouth Every 7 (Seven) Days. 12 tablet 1    pravastatin (PRAVACHOL) 40 MG tablet Take 1 tablet by mouth Daily.       No facility-administered medications prior to visit.     No opioid medication identified on active medication list. I have reviewed chart for other potential  high risk medication/s and harmful drug interactions in the elderly.      Aspirin is on active medication list. Aspirin use is indicated based on review of current medical condition/s. Pros and cons of this therapy have been discussed today. Benefits of this medication outweigh potential harm.  Patient has been encouraged to continue taking this medication.  .      Patient Active Problem List   Diagnosis    Essential hypertension    Constipation    Diverticulosis of intestine    Other fatigue    Gastroesophageal reflux disease without esophagitis    Glaucoma    Hyperlipidemia    Low back pain    Osteopenia    Atrophic vaginitis    Sleep apnea    Psoriasis    CAD (coronary artery disease)    Interstitial cystitis    Anxiety    MVP (mitral valve prolapse)    Paroxysmal atrial fibrillation    Vitamin D deficiency    Sinus node dysfunction    Presence of cardiac pacemaker    Chronic anticoagulation    Long term current use of antiarrhythmic drug    Allergies    Iron deficiency    Anemia    Hyperglycemia    Chronic idiopathic constipation    Rectal bleeding    Family history of esophageal cancer    Personal history of colonic polyps    Epistaxis    Internal hemorrhoids    External hemorrhoid    Family history of colon cancer    Upper respiratory infection, viral     Advance Care Planning Advance Directive is not on file.  ACP discussion was declined by the patient. Patient has an advance directive (not in EMR), copy  "requested.            Objective   Vitals:    25 0852   BP: 140/61   Pulse: 62   Resp: 18   Temp: 98.3 °F (36.8 °C)   TempSrc: Temporal   SpO2: 100%   Weight: 75.9 kg (167 lb 6.4 oz)   Height: 156.2 cm (61.5\")   PainSc: 0-No pain       Estimated body mass index is 31.12 kg/m² as calculated from the following:    Height as of this encounter: 156.2 cm (61.5\").    Weight as of this encounter: 75.9 kg (167 lb 6.4 oz).    BMI is >= 30 and <35. (Class 1 Obesity). The following options were offered after discussion;: weight loss educational material (shared in after visit summary), exercise counseling/recommendations, and nutrition counseling/recommendations             Physical Examination  Vitals and nursing note reviewed  General appearance: Normocephalic and nontraumatic  HEENT: External inspection of eyes, ears and nose appears benign, hearing appears intact  Neck: Neck appears supple, trachea in midline, thyroid appears not enlarged  Respiratory: Respiratory effort appears normal  Musculoskeletal: Moving all limbs  Range of motion of visible joints appears within normal  CNS: No gross motor or sensory deficits  No gross cranial nerve deficits  No tremors  Psychiatry: Alert and oriented x3  Memory appears intact  Mood and affect appears normal  Insight appears normal    Does the patient have evidence of cognitive impairment? No                                                                                                Health  Risk Assessment    Smoking Status:  Social History     Tobacco Use   Smoking Status Former    Current packs/day: 0.00    Average packs/day: 1 pack/day for 35.0 years (35.0 ttl pk-yrs)    Types: Cigarettes    Start date:     Quit date:     Years since quittin.5    Passive exposure: Past   Smokeless Tobacco Never     Alcohol Consumption:  Social History     Substance and Sexual Activity   Alcohol Use No    Comment: quit in        Fall Risk Screen  STEADI Fall Risk " Assessment was completed, and patient is at LOW risk for falls.Assessment completed on:2025    Depression Screening   Little interest or pleasure in doing things? Not at all   Feeling down, depressed, or hopeless? Not at all   PHQ-2 Total Score 0      Health Habits and Functional and Cognitive Screenin/9/2025     8:48 AM   Functional & Cognitive Status   Do you have difficulty preparing food and eating? No   Do you have difficulty bathing yourself, getting dressed or grooming yourself? No   Do you have difficulty using the toilet? No   Do you have difficulty moving around from place to place? No   Do you have trouble with steps or getting out of a bed or a chair? No   Current Diet Well Balanced Diet   Dental Exam Up to date   Eye Exam Up to date   Exercise (times per week) 5 times per week   Current Exercises Include Walking   Do you need help using the phone?  No   Are you deaf or do you have serious difficulty hearing?  No   Do you need help to go to places out of walking distance? No   Do you need help shopping? No   Do you need help preparing meals?  No   Do you need help with housework?  No   Do you need help with laundry? No   Do you need help taking your medications? No   Do you need help managing money? No   Do you ever drive or ride in a car without wearing a seat belt? No   Have you felt unusual fatigue (could be tiredness), stress, anger or loneliness in the last month? No   Who do you live with? Spouse   If you need help, do you have trouble finding someone available to you? No   Have you been bothered in the last four weeks by sexual problems? No   Do you have difficulty concentrating, remembering or making decisions? No           Age-appropriate Screening Schedule:  Refer to the list below for future screening recommendations based on patient's age, sex and/or medical conditions. Orders for these recommended tests are listed in the plan section. The patient has been provided with a written  plan.    Health Maintenance List  Health Maintenance   Topic Date Due    TDAP/TD VACCINES (1 - Tdap) 10/15/2025 (Originally 1/3/1964)    COVID-19 Vaccine (3 - 2024-25 season) 02/20/2026 (Originally 9/1/2024)    INFLUENZA VACCINE  10/01/2025    LIPID PANEL  03/07/2026    DXA SCAN  06/13/2026    ANNUAL WELLNESS VISIT  07/09/2026    COLORECTAL CANCER SCREENING  01/31/2028    HEPATITIS C SCREENING  Completed    RSV Vaccine - Adults  Completed    Pneumococcal Vaccine 50+  Completed    ZOSTER VACCINE  Completed    MAMMOGRAM  Discontinued                                                                                                                                                CMS Preventative Services Quick Reference  Risk Factors Identified During Encounter  Immunizations Discussed/Encouraged: Hepatitis B Vaccine/Series  Dental Screening Recommended  Vision Screening Recommended    The above risks/problems have been discussed with the patient.  Pertinent information has been shared with the patient in the After Visit Summary.  An After Visit Summary and PPPS were made available to the patient.    Follow Up:   Next Medicare Wellness visit to be scheduled in 1 year.         Additional E&M Note during same encounter follows:  Patient has additional, significant, and separately identifiable condition(s)/problem(s) that require work above and beyond the Medicare Wellness Visit     Chief Complaint  Medicare Wellness-subsequent    Subjective   HPI  Monica is also being seen today for an annual adult preventative physical exam.  and Monica is also being seen today for additional medical problem/s.   Patient is here to follow up on the blood pressure  The patient is taking the blood pressure medications but has decreased to half dose as she states her home blood pressure readings below normal and she has been monitoring it since then. The patient is also here to follow up on the cholesterol and   lab work done .  She was seen  "by cardiology, the patient also needs refills on medications .   Hyperlipidemia   Pertinent negatives include no chest pain or shortness of breath.   Hypertension   Pertinent negatives include no chest pain, palpitations or shortness of breath.                  Objective   Vital Signs:  /61   Pulse 62   Temp 98.3 °F (36.8 °C) (Temporal)   Resp 18   Ht 156.2 cm (61.5\")   Wt 75.9 kg (167 lb 6.4 oz)   SpO2 100%   BMI 31.12 kg/m²   Physical Exam  Vitals and nursing note reviewed.   Constitutional:       General: She is not in acute distress.     Appearance: Normal appearance. She is not diaphoretic.   HENT:      Head: Normocephalic and atraumatic.      Right Ear: External ear normal.      Left Ear: External ear normal.      Nose: Nose normal.   Eyes:      Extraocular Movements: Extraocular movements intact.      Conjunctiva/sclera: Conjunctivae normal.   Neck:      Thyroid: No thyromegaly.   Cardiovascular:      Rate and Rhythm: Normal rate and regular rhythm.      Heart sounds: Normal heart sounds.      Comments: ppm  Pulmonary:      Effort: Pulmonary effort is normal.      Breath sounds: Normal breath sounds.   Abdominal:      General: Abdomen is flat.   Musculoskeletal:      Cervical back: Neck supple.   Skin:     General: Skin is warm.      Findings: No erythema.   Neurological:      Mental Status: She is alert and oriented to person, place, and time.      Comments: No gross  motor or sensory deficits         The following data was reviewed by: Nakita Crump MD on 07/09/2025:    Common labs          10/15/2024    09:47 3/7/2025    12:34   Common Labs   Glucose 97  87    BUN 10  9    Creatinine 0.78  0.90    Sodium 145  143    Potassium 4.4  4.9    Chloride 105  104    Calcium 9.6  9.9    Albumin 4.2  4.2    Total Bilirubin 0.5  0.5    Alkaline Phosphatase 95  72    AST (SGOT) 18  21    ALT (SGPT) 14  15    WBC 5.85  5.52    Hemoglobin 13.3  14.4    Hematocrit 39.8  42.7    Platelets 188  219    Total " Cholesterol 177  178    Triglycerides 110  94    HDL Cholesterol 51  64    LDL Cholesterol  106  97    Hemoglobin A1C 5.60            Assessment and Plan Additional age appropriate preventative wellness advice topics were discussed during today's preventative wellness exam(some topics already addressed during AWV portion of the note above):    Physical Activity: Advised cardiovascular activity 150 minutes per week as tolerated. (example brisk walk for 30 minutes, 5 days a week).     Nutrition: Discussed nutrition plan with patient. Information shared in after visit summary. Goal is for a well balanced diet to enhance overall health.     Healthy Weight: Discussed current and goal BMI with patient. Steps to attain this goal discussed. Information shared in after visit summary.     Medicare annual wellness visit, subsequent  Plan:  1.physical: Physical exam conducted today, reviewed fasting lab work,   Impression: healthy adult Patient. Currently, patient eats a healthy diet. Screening lab work includes glucose, lipid profile and complete blood count . The risks and benefits of immunizations were discussed and immunizations are up to date. Advice and education were given regarding nutrition and aerobic exercise. Patient discussion: discussed with the patient.  Annual Wellness Visit ,physical  with preventive exam as well as age and risk appropriate counseling completed.   Advance Directive Planning: paperwork and instructions were given to the patient.   Patient Discussion: plan discussed with the patient, follow-up visit needed in one year. Medication Review and medication list updated         Benign essential hypertension  Hypertension is stable and controlled  Continue current treatment regimen.  Blood pressure will be reassessed in 6 months.         Mixed hyperlipidemia   Lipid abnormalities are stable    Plan:  Continue same medication/s without change.      Discussed medication dosage, use, side effects, and  goals of treatment in detail.    Counseled patient on lifestyle modifications to help control hyperlipidemia.   Cholesterol lowering dietary information shared with patient.    Patient Treatment Goals:   LDL goal is less than 70    Followup in 6 months.    Orders:    pravastatin (PRAVACHOL) 40 MG tablet; Take 1 tablet by mouth Daily.    CBC (No Diff)    Comprehensive Metabolic Panel    Lipid Panel    Impaired fasting glucose  Diet and exercise  Orders:    Hemoglobin A1c    Osteoporosis, postmenopausal  Dexa scan reviewed   Orders:    alendronate (Fosamax) 70 MG tablet; Take 1 tablet by mouth Every 7 (Seven) Days.            Follow Up   Return in about 1 year (around 7/9/2026) for Medicare Wellness.  Patient was given instructions and counseling regarding her condition or for health maintenance advice. Please see specific information pulled into the AVS if appropriate.

## 2025-07-10 LAB
ALBUMIN SERPL-MCNC: 4.5 G/DL (ref 3.5–5.2)
ALBUMIN/GLOB SERPL: 1.6 G/DL
ALP SERPL-CCNC: 68 U/L (ref 39–117)
ALT SERPL-CCNC: 13 U/L (ref 1–33)
AST SERPL-CCNC: 21 U/L (ref 1–32)
BILIRUB SERPL-MCNC: 0.7 MG/DL (ref 0–1.2)
BUN SERPL-MCNC: 10 MG/DL (ref 8–23)
BUN/CREAT SERPL: 13.3 (ref 7–25)
CALCIUM SERPL-MCNC: 9.6 MG/DL (ref 8.6–10.5)
CHLORIDE SERPL-SCNC: 104 MMOL/L (ref 98–107)
CHOLEST SERPL-MCNC: 166 MG/DL (ref 0–200)
CO2 SERPL-SCNC: 26.3 MMOL/L (ref 22–29)
CREAT SERPL-MCNC: 0.75 MG/DL (ref 0.57–1)
EGFRCR SERPLBLD CKD-EPI 2021: 81.6 ML/MIN/1.73
ERYTHROCYTE [DISTWIDTH] IN BLOOD BY AUTOMATED COUNT: 12 % (ref 12.3–15.4)
GLOBULIN SER CALC-MCNC: 2.8 GM/DL
GLUCOSE SERPL-MCNC: 92 MG/DL (ref 65–99)
HBA1C MFR BLD: 5.7 % (ref 4.8–5.6)
HCT VFR BLD AUTO: 39.3 % (ref 34–46.6)
HDLC SERPL-MCNC: 63 MG/DL (ref 40–60)
HGB BLD-MCNC: 13.4 G/DL (ref 12–15.9)
LDLC SERPL CALC-MCNC: 91 MG/DL (ref 0–100)
MCH RBC QN AUTO: 32.3 PG (ref 26.6–33)
MCHC RBC AUTO-ENTMCNC: 34.1 G/DL (ref 31.5–35.7)
MCV RBC AUTO: 94.7 FL (ref 79–97)
PLATELET # BLD AUTO: 196 10*3/MM3 (ref 140–450)
POTASSIUM SERPL-SCNC: 4.3 MMOL/L (ref 3.5–5.2)
PROT SERPL-MCNC: 7.3 G/DL (ref 6–8.5)
RBC # BLD AUTO: 4.15 10*6/MM3 (ref 3.77–5.28)
SODIUM SERPL-SCNC: 142 MMOL/L (ref 136–145)
TRIGL SERPL-MCNC: 61 MG/DL (ref 0–150)
VLDLC SERPL CALC-MCNC: 12 MG/DL (ref 5–40)
WBC # BLD AUTO: 4.88 10*3/MM3 (ref 3.4–10.8)

## 2025-07-31 ENCOUNTER — TELEPHONE (OUTPATIENT)
Dept: INTERNAL MEDICINE | Facility: CLINIC | Age: 80
End: 2025-07-31
Payer: MEDICARE

## 2025-07-31 NOTE — TELEPHONE ENCOUNTER
Caller: Monica Perea Hil    Relationship: Self    Best call back number: [phone]     What is the best time to reach you: ANYTIME     What was the call regarding: TWO WEEKS AGO PATIENT SAW PROVIDER AND WAS TOLD TO TAKE HER ALENDRONATE WITH HER CALCIUM MEDICATION. EVERY SINCE SHE STARTED DOING THAT SHE IS EXPERIENCING LEFT, UPPER THIGH AND HIP AREA PAIN.    PATIENT WOULD LIKE A CALL BACK TO DISCUSS WHETHER TO CONTINUE THE MEDICATION. FOR EXAMPLE, WILL THE PAIN SUBSIDE OVER TIME OR WILL IT CONTINUE? PATIENT STATES THAT THE PAIN IS SIGNIFICANT AND SHE DOES NOT WANT TO CONTINUE IF THE PAIN WILL NOT SUBSIDE.     PATIENT STATES THAT HER ESOPHAGUS WAS BETTER FROM PREVIOUS MEDICATION THAT SHE NO LONGER NEEDED TO TAKE. SHE WAS DOING REALLY WELL AND NOW HER ESOPHAGUS IS STARTING TO HAVE PROBLEMS AGAIN AND SHE BELIEVES IT IS DUE TO COMBINING THE ALENDRONATE AND CALCIUM. PLEASE CALL AND ADVISE.

## 2025-08-08 ENCOUNTER — HOSPITAL ENCOUNTER (OUTPATIENT)
Dept: MAMMOGRAPHY | Facility: HOSPITAL | Age: 80
Discharge: HOME | End: 2025-08-08
Admitting: INTERNAL MEDICINE
Payer: MEDICARE

## 2025-08-08 DIAGNOSIS — Z12.31 SCREENING MAMMOGRAM FOR BREAST CANCER: ICD-10-CM

## 2025-08-08 PROCEDURE — 77067 SCR MAMMO BI INCL CAD: CPT

## 2025-08-08 PROCEDURE — 77063 BREAST TOMOSYNTHESIS BI: CPT

## 2025-08-26 ENCOUNTER — OFFICE VISIT (OUTPATIENT)
Dept: UROLOGY | Facility: CLINIC | Age: 80
End: 2025-08-26
Payer: MEDICARE

## 2025-08-26 VITALS
SYSTOLIC BLOOD PRESSURE: 122 MMHG | HEIGHT: 62 IN | TEMPERATURE: 97.8 F | OXYGEN SATURATION: 97 % | HEART RATE: 59 BPM | RESPIRATION RATE: 18 BRPM | WEIGHT: 168 LBS | BODY MASS INDEX: 30.91 KG/M2 | DIASTOLIC BLOOD PRESSURE: 74 MMHG

## 2025-08-26 DIAGNOSIS — N95.8 GENITOURINARY SYNDROME OF MENOPAUSE: ICD-10-CM

## 2025-08-26 DIAGNOSIS — N39.0 URINARY TRACT INFECTION WITHOUT HEMATURIA, SITE UNSPECIFIED: Primary | ICD-10-CM

## 2025-08-26 LAB
BILIRUB BLD-MCNC: NEGATIVE MG/DL
CLARITY, POC: CLEAR
COLOR UR: YELLOW
EXPIRATION DATE: ABNORMAL
GLUCOSE UR STRIP-MCNC: NEGATIVE MG/DL
KETONES UR QL: NEGATIVE
LEUKOCYTE EST, POC: ABNORMAL
Lab: ABNORMAL
NITRITE UR-MCNC: NEGATIVE MG/ML
PH UR: 7 [PH] (ref 5–8)
PROT UR STRIP-MCNC: NEGATIVE MG/DL
RBC # UR STRIP: NEGATIVE /UL
SP GR UR: 1.01 (ref 1–1.03)
UROBILINOGEN UR QL: ABNORMAL

## 2025-08-29 DIAGNOSIS — E78.2 MIXED HYPERLIPIDEMIA: ICD-10-CM

## 2025-08-29 RX ORDER — PRAVASTATIN SODIUM 40 MG
40 TABLET ORAL
Qty: 90 TABLET | Refills: 1 | OUTPATIENT
Start: 2025-08-29

## (undated) DEVICE — ADULT, W/LG. BACK PAD, RADIOTRANSPARENT ELEMENT AND LEAD WIRE: Brand: DEFIBRILLATION ELECTRODES

## (undated) DEVICE — SKIN PREP TRAY W/CHG: Brand: MEDLINE INDUSTRIES, INC.

## (undated) DEVICE — ST INF PRI SMRTSTE 20DRP 2VLV 24ML 117

## (undated) DEVICE — PENCL E/S HNDSWCH ROCKRBTN HOLSTR 10FT

## (undated) DEVICE — INTRO SHEATH ENGAGE W/50 GW .038 9F12

## (undated) DEVICE — CONTRL CONTRST CHMBRD W/TBG72IN REUS

## (undated) DEVICE — KT MANIFLD EP

## (undated) DEVICE — CAUTERY TIP POLISHER: Brand: DEVON

## (undated) DEVICE — GW DIAG .032

## (undated) DEVICE — Device

## (undated) DEVICE — Device: Brand: MEDEX

## (undated) DEVICE — DECANT BG O JET

## (undated) DEVICE — DOME MONITORING W BONDED STPCK BIOTRANS2

## (undated) DEVICE — LEX ELECTRO PHYSIOLOGY: Brand: MEDLINE INDUSTRIES, INC.

## (undated) DEVICE — INTRO TEAR AWAY/LVD W/SD PRT 6F 13CM

## (undated) DEVICE — SYS CLS VASC/VENI VASCADE MVP 6TO12F

## (undated) DEVICE — HYBRID TUBING/CAP SET FOR OLYMPUS® SCOPES: Brand: ERBE

## (undated) DEVICE — LIMB HOLDER, WRIST/ANKLE: Brand: DEROYAL

## (undated) DEVICE — CONMED SCOPE SAVER BITE BLOCK, 20X27 MM: Brand: SCOPE SAVER

## (undated) DEVICE — FRCP BX RADJAW4 NDL 2.8 240 STD OG

## (undated) DEVICE — SOL NACL 0.9PCT 1000ML

## (undated) DEVICE — CATH ULTRASND ECHO ACUNAV FOR ACUSON 8F 90CM

## (undated) DEVICE — ACCESS SHEATH WITH DILATOR: Brand: WATCHMAN™ TRUSEAL™ ACCESS SYSTEM

## (undated) DEVICE — DRSNG TELFA PAD NONADH STR 1S 3X8IN

## (undated) DEVICE — ST EXT IV SMARTSITE 2VLV SP M LL 5ML IV1

## (undated) DEVICE — TUBING, SUCTION, 1/4" X 10', STRAIGHT: Brand: MEDLINE

## (undated) DEVICE — DRSNG SURESITE123 4X4.8IN

## (undated) DEVICE — CANN NASL CO2 DIVIDED A/

## (undated) DEVICE — VLV SXN AIR/H2O ORCAPOD3 1P/U STRL

## (undated) DEVICE — CATH DIAG EXPO .052 PIG145 6F 110CM

## (undated) DEVICE — LEFT ATRIAL APPENDAGE CLOSURE DEVICE WITH DELIVERY SYSTEM
Type: IMPLANTABLE DEVICE | Site: HEART | Status: NON-FUNCTIONAL
Brand: WATCHMAN FLX™

## (undated) DEVICE — SET PRIMARY GRVTY 10DP MALE LL 104IN

## (undated) DEVICE — STERILE (15.2 TAPERED TO 7.6 X 183CM) POLYETHYLENE ACCORDION-FOLDED COVER FOR USE WITH SIEMENS ACUNAV ULTRASOUND CATHETER FAMILY CONNECTOR: Brand: SWIFTLINK TRANSDUCER COVER

## (undated) DEVICE — ST EXT IV LG BORE NDLESS FLTR LL 17IN

## (undated) DEVICE — LUBE JELLY PK/2.75GM STRL BX/144

## (undated) DEVICE — INTRO SHEATH ENGAGE W/50 GW .038 7F12

## (undated) DEVICE — ENDOSCOPY PORT CONNECTOR FOR OLYMPUS® SCOPES: Brand: ERBE

## (undated) DEVICE — MEDI-VAC YANKAUER SUCTION HANDLE W/BULBOUS TIP: Brand: CARDINAL HEALTH

## (undated) DEVICE — ACQGUIDE MINI-S, 65CM - L2 WITH ACQCROSS QX: Brand: ACQGUIDE MINI-S